# Patient Record
Sex: FEMALE | Race: WHITE | NOT HISPANIC OR LATINO | Employment: OTHER | URBAN - METROPOLITAN AREA
[De-identification: names, ages, dates, MRNs, and addresses within clinical notes are randomized per-mention and may not be internally consistent; named-entity substitution may affect disease eponyms.]

---

## 2017-01-03 ENCOUNTER — TELEPHONE (OUTPATIENT)
Dept: OPHTHALMOLOGY | Facility: CLINIC | Age: 82
End: 2017-01-03

## 2017-01-03 NOTE — TELEPHONE ENCOUNTER
----- Message from Monisha Mejia MA sent at 1/3/2017 11:42 AM CST -----  Contact: self [phone]      ----- Message -----     From: Dori See     Sent: 1/3/2017  11:32 AM       To: Rupali LÓPEZ Staff    Please call her about rescheduling her appt.  Thank you!

## 2017-01-11 ENCOUNTER — LAB VISIT (OUTPATIENT)
Dept: LAB | Facility: HOSPITAL | Age: 82
End: 2017-01-11
Attending: INTERNAL MEDICINE
Payer: MEDICARE

## 2017-01-11 ENCOUNTER — ANTI-COAG VISIT (OUTPATIENT)
Dept: CARDIOLOGY | Facility: CLINIC | Age: 82
End: 2017-01-11

## 2017-01-11 DIAGNOSIS — Z96.649 STATUS POST REVISION OF TOTAL HIP REPLACEMENT: ICD-10-CM

## 2017-01-11 DIAGNOSIS — Z86.718 PERSONAL HISTORY OF DVT (DEEP VEIN THROMBOSIS): ICD-10-CM

## 2017-01-11 DIAGNOSIS — N18.30 CKD (CHRONIC KIDNEY DISEASE) STAGE 3, GFR 30-59 ML/MIN: ICD-10-CM

## 2017-01-11 LAB
ALBUMIN SERPL BCP-MCNC: 3 G/DL
ANION GAP SERPL CALC-SCNC: 8 MMOL/L
BUN SERPL-MCNC: 40 MG/DL
CALCIUM SERPL-MCNC: 9.4 MG/DL
CHLORIDE SERPL-SCNC: 106 MMOL/L
CO2 SERPL-SCNC: 28 MMOL/L
CREAT SERPL-MCNC: 1.8 MG/DL
EST. GFR  (AFRICAN AMERICAN): 29 ML/MIN/1.73 M^2
EST. GFR  (NON AFRICAN AMERICAN): 25 ML/MIN/1.73 M^2
GLUCOSE SERPL-MCNC: 79 MG/DL
INR PPP: 1.9
PHOSPHATE SERPL-MCNC: 3 MG/DL
POTASSIUM SERPL-SCNC: 4.3 MMOL/L
PROTHROMBIN TIME: 19.8 SEC
SODIUM SERPL-SCNC: 142 MMOL/L

## 2017-01-11 PROCEDURE — 80069 RENAL FUNCTION PANEL: CPT

## 2017-01-11 PROCEDURE — 36415 COLL VENOUS BLD VENIPUNCTURE: CPT

## 2017-01-11 PROCEDURE — 85610 PROTHROMBIN TIME: CPT

## 2017-01-11 NOTE — PROGRESS NOTES
lvm for pt with dosing and next inr check date, pt was asked to return call to clinic to confirm. Lab appt made.

## 2017-01-25 ENCOUNTER — OFFICE VISIT (OUTPATIENT)
Dept: ORTHOPEDICS | Facility: CLINIC | Age: 82
End: 2017-01-25
Payer: MEDICARE

## 2017-01-25 VITALS — DIASTOLIC BLOOD PRESSURE: 74 MMHG | HEIGHT: 67 IN | SYSTOLIC BLOOD PRESSURE: 125 MMHG | HEART RATE: 88 BPM

## 2017-01-25 DIAGNOSIS — M17.11 PRIMARY OSTEOARTHRITIS OF RIGHT KNEE: Primary | ICD-10-CM

## 2017-01-25 PROCEDURE — 1126F AMNT PAIN NOTED NONE PRSNT: CPT | Mod: S$GLB,,, | Performed by: ORTHOPAEDIC SURGERY

## 2017-01-25 PROCEDURE — 1159F MED LIST DOCD IN RCRD: CPT | Mod: S$GLB,,, | Performed by: ORTHOPAEDIC SURGERY

## 2017-01-25 PROCEDURE — 3078F DIAST BP <80 MM HG: CPT | Mod: S$GLB,,, | Performed by: ORTHOPAEDIC SURGERY

## 2017-01-25 PROCEDURE — 3074F SYST BP LT 130 MM HG: CPT | Mod: S$GLB,,, | Performed by: ORTHOPAEDIC SURGERY

## 2017-01-25 PROCEDURE — 99212 OFFICE O/P EST SF 10 MIN: CPT | Mod: S$GLB,,, | Performed by: ORTHOPAEDIC SURGERY

## 2017-01-25 PROCEDURE — 1157F ADVNC CARE PLAN IN RCRD: CPT | Mod: S$GLB,,, | Performed by: ORTHOPAEDIC SURGERY

## 2017-01-25 PROCEDURE — 1160F RVW MEDS BY RX/DR IN RCRD: CPT | Mod: S$GLB,,, | Performed by: ORTHOPAEDIC SURGERY

## 2017-01-25 PROCEDURE — 99999 PR PBB SHADOW E&M-EST. PATIENT-LVL III: CPT | Mod: PBBFAC,,, | Performed by: ORTHOPAEDIC SURGERY

## 2017-01-25 NOTE — MR AVS SNAPSHOT
[facility]  [address]  [facility] [address]  Phone: [phone]                  Nicole Lala   2017 11:00 AM   Office Visit    Description:  Female : 10/22/1932   Provider:  Kana Montgomery MD   Department:  [facility]           Reason for Visit     Follow-up           Diagnoses this Visit        Comments    Primary osteoarthritis of right knee    -  Primary            To Do List           Future Appointments        Provider Department Dept Phone    2017 2:15 PM Brandon Gray MD [facility] [phone]    2017 2:20 PM [facility] [phone]    2017 10:00 AM Bernie Zapien MD [facility] [phone]    3/20/2017 10:30 AM LAB, [facility] - Lab [phone]    3/27/2017 10:00 AM Rey Muhammad MD [facility] Nephrology [phone]      Goals (5 Years of Data)     None      Follow-Up and Disposition     Return in about 1 year (around 2018).    Follow-up and Disposition History      [facility]     [facility] Nurse Care Line -  Assistance  Registered nurses in the [facility] provide clinical advisement, health education, appointment booking, and other advisory services.  Call for this free service at [phone].             Medications           Message regarding Medications     Verify the changes and/or additions to your medication regime listed below are the same as discussed with your clinician today.  If any of these changes or additions are incorrect, please notify your healthcare provider.             Verify that the below list of medications is an accurate representation of the medications you are currently taking.  If none reported, the list may be blank. If incorrect, please contact your healthcare provider. Carry this list with you in case of emergency.           Current Medications     digoxin (LANOXIN)  "125 mcg tablet Take 1 tablet (0.125 mg total) by mouth once daily.    diltiazem (CARDIZEM CD) 240 MG 24 hr capsule Take 1 capsule (240 mg total) by mouth once daily.    lisinopril (PRINIVIL,ZESTRIL) 20 MG tablet Take 1 tablet (20 mg total) by mouth once daily.    MULTAQ 400 mg Tab TAKE 1 TABLET TWICE DAILY WITH MEALS    torsemide (DEMADEX) 20 MG Tab TAKE 1 TABLET EVERY MORNING    warfarin (COUMADIN) 2.5 MG tablet Take 1-2 tablets (2.5-5 mg total) by mouth Daily.    warfarin (COUMADIN) 5 MG tablet TAKE 1 TABLET EVERY EVENING AS INSTRUCTED BY COUMADIN CLINIC           Clinical Reference Information           Vital Signs - Last Recorded  Most recent update: 1/25/2017 11:17 AM by Trice Ann MA    BP Pulse Ht LMP          125/74 (BP Location: Right arm, Patient Position: Sitting, BP Method: Automatic) 88 5' 7" (1.702 m) (LMP Unknown)        Blood Pressure          Most Recent Value    BP  125/74      Allergies as of 1/25/2017     Penicillins      Immunizations Administered on Date of Encounter - 1/25/2017     None      "

## 2017-01-25 NOTE — PROGRESS NOTES
Nicole Lala is in for 4 week follow-up from a right knee corticosteroid injection. She received excellent relief.  Currently there is minimal pain.    Review of Systems   Constitution: Negative for chills, fever and night sweats.   Cardiovascular: Negative for chest pain, claudication and leg swelling.   Respiratory: Negative for shortness of breath.   Hematologic/Lymphatic: Negative for adenopathy and bleeding problem. Does not bruise/bleed easily.   Skin: Negative for poor wound healing.   Gastrointestinal: Negative for diarrhea and heartburn.   Genitourinary: Negative for bladder incontinence.   Neurological: Negative for focal weakness, numbness, paresthesias and sensory change.   Psychiatric/Behavioral: The patient is not nervous/anxious.   Allergic/Immunologic: Negative for persistent infections.    EXAM:  {RIGHT LEFT BILATERAL:35751  Knee:  Skin intact.  No effusion.  Minimal tenderness.  Minimal crepitus.  No instability.  ROM: 0-100  NVI distally.  Severe venous stasis bilaterally      Imp: DJD Right knee      Plan: Doing well.  F/U in one year with xrays

## 2017-01-31 ENCOUNTER — OFFICE VISIT (OUTPATIENT)
Dept: FAMILY MEDICINE | Facility: CLINIC | Age: 82
End: 2017-01-31
Payer: MEDICARE

## 2017-01-31 VITALS
SYSTOLIC BLOOD PRESSURE: 114 MMHG | HEART RATE: 72 BPM | RESPIRATION RATE: 20 BRPM | DIASTOLIC BLOOD PRESSURE: 58 MMHG | HEIGHT: 67 IN | OXYGEN SATURATION: 97 %

## 2017-01-31 DIAGNOSIS — I48.20 CHRONIC ATRIAL FIBRILLATION: Primary | ICD-10-CM

## 2017-01-31 DIAGNOSIS — E66.01 MORBID OBESITY, UNSPECIFIED OBESITY TYPE: ICD-10-CM

## 2017-01-31 DIAGNOSIS — I27.20 PULMONARY HYPERTENSION: ICD-10-CM

## 2017-01-31 DIAGNOSIS — E77.8 HYPOPROTEINEMIA: ICD-10-CM

## 2017-01-31 PROCEDURE — 3074F SYST BP LT 130 MM HG: CPT | Mod: S$GLB,,, | Performed by: FAMILY MEDICINE

## 2017-01-31 PROCEDURE — 1159F MED LIST DOCD IN RCRD: CPT | Mod: S$GLB,,, | Performed by: FAMILY MEDICINE

## 2017-01-31 PROCEDURE — 1157F ADVNC CARE PLAN IN RCRD: CPT | Mod: S$GLB,,, | Performed by: FAMILY MEDICINE

## 2017-01-31 PROCEDURE — 99214 OFFICE O/P EST MOD 30 MIN: CPT | Mod: S$GLB,,, | Performed by: FAMILY MEDICINE

## 2017-01-31 PROCEDURE — 99999 PR PBB SHADOW E&M-EST. PATIENT-LVL III: CPT | Mod: PBBFAC,,, | Performed by: FAMILY MEDICINE

## 2017-01-31 PROCEDURE — 3078F DIAST BP <80 MM HG: CPT | Mod: S$GLB,,, | Performed by: FAMILY MEDICINE

## 2017-01-31 PROCEDURE — 99499 UNLISTED E&M SERVICE: CPT | Mod: S$GLB,,, | Performed by: FAMILY MEDICINE

## 2017-01-31 PROCEDURE — 1126F AMNT PAIN NOTED NONE PRSNT: CPT | Mod: S$GLB,,, | Performed by: FAMILY MEDICINE

## 2017-01-31 PROCEDURE — 1160F RVW MEDS BY RX/DR IN RCRD: CPT | Mod: S$GLB,,, | Performed by: FAMILY MEDICINE

## 2017-01-31 NOTE — MR AVS SNAPSHOT
[facility]  [address] [facility] [address]  [place] [address]  Phone: [phone]  Fax: [phone]                  Nicole Lala   2017 2:15 PM   Office Visit    Description:  Female : 10/22/1932   Provider:  Brandon Gray MD   Department:  [facility]           Reason for Visit     Hypertension           Diagnoses this Visit        Comments    Chronic atrial fibrillation    -  Primary     Morbid obesity, unspecified obesity type         Hypoproteinemia         Pulmonary hypertension                To Do List           Future Appointments        Provider Department Dept Phone    2017 2:20 PM [facility] [phone]    2017 8:30 AM Bernie Zapien MD [facility] Ophthalmology [phone]    3/20/2017 10:30 AM LAB, [facility] - Lab [phone]    3/27/2017 10:00 AM Rey Muhammad MD [facility] Nephrology [phone]      Goals (5 Years of Data)     None      Follow-Up and Disposition     Return in about 6 months (around 2017).    Follow-up and Disposition History      [facility]     [facility] Care [facility] -  Assistance  Registered nurses in the [facility] provide clinical advisement, health education, appointment booking, and other advisory services.  Call for this free service at [phone].             Medications           Message regarding Medications     Verify the changes and/or additions to your medication regime listed below are the same as discussed with your clinician today.  If any of these changes or additions are incorrect, please notify your healthcare provider.             Verify that the below list of medications is an accurate representation of the medications you are currently taking.  If none reported, the list may be blank. If incorrect, please contact your healthcare provider. Carry this list with you in case of emergency.          "  Current Medications     digoxin (LANOXIN) 125 mcg tablet Take 1 tablet (0.125 mg total) by mouth once daily.    diltiazem (CARDIZEM CD) 240 MG 24 hr capsule Take 1 capsule (240 mg total) by mouth once daily.    MULTAQ 400 mg Tab TAKE 1 TABLET TWICE DAILY WITH MEALS    torsemide (DEMADEX) 20 MG Tab TAKE 1 TABLET EVERY MORNING    warfarin (COUMADIN) 2.5 MG tablet Take 1-2 tablets (2.5-5 mg total) by mouth Daily.    warfarin (COUMADIN) 5 MG tablet TAKE 1 TABLET EVERY EVENING AS INSTRUCTED BY COUMADIN CLINIC           Clinical Reference Information           Vital Signs - Last Recorded  Most recent update: 1/31/2017  2:20 PM by Katie Chatterjee LPN    BP Pulse Resp Ht LMP SpO2    (!) 114/58 72 20 5' 7" (1.702 m) (LMP Unknown) 97%      Blood Pressure          Most Recent Value    BP  (!)  114/58      Allergies as of 1/31/2017     Penicillins      Immunizations Administered on Date of Encounter - 1/31/2017     None      "

## 2017-01-31 NOTE — PROGRESS NOTES
Patient, Nicole Lala (MRN #[id number]), presented with a recent Estimated Glumerular Filtration Rate (EGFR) between 15 and 29 consistent with the definition of chronic kidney disease stage 4 (ICD10 - N18.4).    eGFR if non    Date Value Ref Range Status   01/11/2017 25 (A) >60 mL/min/1.73 m^2 Final     Comment:     Calculation used to obtain the estimated glomerular filtration  rate (eGFR) is the CKD-EPI equation. Since race is unknown   in our information system, the eGFR values for   -American and Non--American patients are given   for each creatinine result.     01/11/2017 25 (A) >60 mL/min/1.73 m^2 Final     Comment:     Calculation used to obtain the estimated glomerular filtration  rate (eGFR) is the CKD-EPI equation. Since race is unknown   in our information system, the eGFR values for   -American and Non--American patients are given   for each creatinine result.         The patient's chronic kidney disease stage 4 was monitored, evaluated, addressed and/or treated. This addendum to the medical record is made on 01/31/2017.

## 2017-01-31 NOTE — PROGRESS NOTES
Subjective:       Patient ID: Nicole Lala is a 84 y.o. female.    Chief Complaint: Hypertension    HPI   Here today for interval evaluation  She reports improved dyspnea and oxygen dependence.  Improved lower extremity edema/lymphedema.    Review of Systems    Objective:      Physical Exam   Constitutional: She appears well-developed and well-nourished.   HENT:   Head: Normocephalic and atraumatic.   Eyes: Conjunctivae and EOM are normal. Pupils are equal, round, and reactive to light. No scleral icterus.   Neck: Normal range of motion. Neck supple. No thyromegaly present.   Cardiovascular: Normal rate and normal heart sounds.  An irregularly irregular rhythm present. Exam reveals no gallop and no friction rub.    No murmur heard.  Pulmonary/Chest: Effort normal and breath sounds normal. No respiratory distress. She has no wheezes. She has no rales.   Lymphadenopathy:     She has no cervical adenopathy.   Vitals reviewed.      Lab Results   Component Value Date    WBC 7.29 05/24/2016    HGB 11.0 (L) 05/24/2016    HCT 34.9 (L) 05/24/2016     05/24/2016    CHOL 169 06/25/2015    TRIG 81 06/25/2015    HDL 60 06/25/2015    ALT 19 05/24/2016    AST 13 05/24/2016     01/11/2017     01/11/2017    K 4.3 01/11/2017    K 4.3 01/11/2017     01/11/2017     01/11/2017    CREATININE 1.8 (H) 01/11/2017    CREATININE 1.8 (H) 01/11/2017    BUN 40 (H) 01/11/2017    BUN 40 (H) 01/11/2017    CO2 28 01/11/2017    CO2 28 01/11/2017    TSH 2.483 05/27/2014    INR 1.9 (H) 01/11/2017       Assessment:       1. Chronic atrial fibrillation    2. Morbid obesity, unspecified obesity type    3. Hypoproteinemia    4. Pulmonary hypertension        Plan:       Chronic atrial fibrillation with Pulmonary hypertension related to Morbid obesity, unspecified obesity type  - Continue current therapy  - Serial blood pressure monitoring  - Diet and exercise education.  - Continue Cardiology    Hypoproteinemia  - Diet  and exercise education.  - Continue Nephrology

## 2017-02-01 ENCOUNTER — ANTI-COAG VISIT (OUTPATIENT)
Dept: CARDIOLOGY | Facility: CLINIC | Age: 82
End: 2017-02-01

## 2017-02-01 ENCOUNTER — TELEPHONE (OUTPATIENT)
Dept: OPTOMETRY | Facility: CLINIC | Age: 82
End: 2017-02-01

## 2017-02-01 ENCOUNTER — LAB VISIT (OUTPATIENT)
Dept: LAB | Facility: HOSPITAL | Age: 82
End: 2017-02-01
Attending: INTERNAL MEDICINE
Payer: MEDICARE

## 2017-02-01 DIAGNOSIS — Z96.649 STATUS POST REVISION OF TOTAL HIP REPLACEMENT: ICD-10-CM

## 2017-02-01 DIAGNOSIS — Z86.718 PERSONAL HISTORY OF DVT (DEEP VEIN THROMBOSIS): ICD-10-CM

## 2017-02-01 LAB
INR PPP: 2.2
PROTHROMBIN TIME: 22.6 SEC

## 2017-02-01 PROCEDURE — 85610 PROTHROMBIN TIME: CPT

## 2017-02-01 PROCEDURE — 36415 COLL VENOUS BLD VENIPUNCTURE: CPT

## 2017-02-01 NOTE — TELEPHONE ENCOUNTER
----- Message from Monisha Mejia MA sent at 2/1/2017  2:19 PM CST -----  Contact: pt      ----- Message -----     From: Brenda Lyman     Sent: 2/1/2017   1:26 PM       To: Rupali LÓPEZ Staff    Pt would like to reschedule appt on 2-8-17,(due to transportation issues) pt would like to get appt back on  2-22-17 at 10:00    Call back on # [phone]  thanks

## 2017-02-22 ENCOUNTER — INITIAL CONSULT (OUTPATIENT)
Dept: OPHTHALMOLOGY | Facility: CLINIC | Age: 82
End: 2017-02-22
Payer: MEDICARE

## 2017-02-22 DIAGNOSIS — H25.12 NUCLEAR SCLEROTIC CATARACT OF LEFT EYE: ICD-10-CM

## 2017-02-22 DIAGNOSIS — H25.11 NUCLEAR SCLEROTIC CATARACT OF RIGHT EYE: Primary | ICD-10-CM

## 2017-02-22 PROCEDURE — 92136 OPHTHALMIC BIOMETRY: CPT | Mod: RT,S$GLB,, | Performed by: OPHTHALMOLOGY

## 2017-02-22 PROCEDURE — 99999 PR PBB SHADOW E&M-EST. PATIENT-LVL II: CPT | Mod: PBBFAC,,, | Performed by: OPHTHALMOLOGY

## 2017-02-22 PROCEDURE — 92014 COMPRE OPH EXAM EST PT 1/>: CPT | Mod: S$GLB,,, | Performed by: OPHTHALMOLOGY

## 2017-02-22 RX ORDER — CIPROFLOXACIN 250 MG/5ML
125 KIT ORAL 2 TIMES DAILY
COMMUNITY
End: 2017-03-14

## 2017-02-22 NOTE — LETTER
February 22, 2017      Chiki Cornelius, OD  [address]  [place] [address]           [facility]  [address] [facility] [address] [place] [address]  Phone: [phone]  Fax: [phone]          Patient: Nicole Lala   MR Number: [id number]   YOB: 1932   Date of Visit: 2/22/2017       Dear Dr. Chiki Cornelius:    Thank you for referring Nicole Lala to me for evaluation. Attached you will find relevant portions of my assessment and plan of care.    If you have questions, please do not hesitate to call me. I look forward to following Nicole Lala along with you.    Sincerely,    Bernie Zapien MD    Enclosure  CC:  No Recipients    If you would like to receive this communication electronically, please contact [email or web address] or [phone] to request more information on [facility] App Link access.    For providers and/or their staff who would like to refer a patient to [facility], please contact us through our one-stop-shop provider referral line, [facility], at [phone].    If you feel you have received this communication in error or would no longer like to receive these types of communications, please e-mail [email or web address]

## 2017-02-22 NOTE — PROGRESS NOTES
HPI     [place] ref    1.NS OU    Blurred vision at near-ok distance. No gtts, no eye pain       Last edited by Jina Medina on 2/22/2017 10:36 AM.         Assessment /Plan     For exam results, see Encounter Report.    Nuclear sclerotic cataract of right eye  -     IOL Master - OD - Right Eye    Nuclear sclerotic cataract of left eye      Visually significant nuclear sclerotic cataract   - Interfering with activities of daily living.  Pt desires cataract surgery for Va rehabilitation.   - R/B/A discussed and pt agrees to proceed with surgery.   - IOL options discussed according to patient's goals and concomitant ocular pathology; and pt content with monofocal lens.    - Target: plano.    pcboo 22.0 OD  * dense / VB  * i did sx on pt's     (pcboo 21.5  OS)

## 2017-03-01 ENCOUNTER — ANTI-COAG VISIT (OUTPATIENT)
Dept: CARDIOLOGY | Facility: CLINIC | Age: 82
End: 2017-03-01

## 2017-03-01 ENCOUNTER — LAB VISIT (OUTPATIENT)
Dept: LAB | Facility: HOSPITAL | Age: 82
End: 2017-03-01
Attending: INTERNAL MEDICINE
Payer: MEDICARE

## 2017-03-01 DIAGNOSIS — Z96.649 STATUS POST REVISION OF TOTAL HIP REPLACEMENT: ICD-10-CM

## 2017-03-01 DIAGNOSIS — Z86.718 PERSONAL HISTORY OF DVT (DEEP VEIN THROMBOSIS): ICD-10-CM

## 2017-03-01 LAB
INR PPP: 1.8
PROTHROMBIN TIME: 18.6 SEC

## 2017-03-01 PROCEDURE — 85610 PROTHROMBIN TIME: CPT

## 2017-03-01 PROCEDURE — 36415 COLL VENOUS BLD VENIPUNCTURE: CPT

## 2017-03-06 ENCOUNTER — TELEPHONE (OUTPATIENT)
Dept: DERMATOLOGY | Facility: CLINIC | Age: 82
End: 2017-03-06

## 2017-03-06 NOTE — TELEPHONE ENCOUNTER
----- Message from Omayra Ford sent at 3/6/2017  7:04 AM CST -----  Contact: PT  Pt called to speak to the nurse to request a work in appt with the provider for a skin check on her hand on Friday, March 10, 2017 and would also like to be seen by the provider at the time of her 's appt at 2:00 pm.    Pt can be reached at [phone].    Thanks

## 2017-03-07 NOTE — PROGRESS NOTES
Pt states she does not want to increase her dose she will not eat the coleslaw no other vit K foods

## 2017-03-07 NOTE — PROGRESS NOTES
Need to know if patient is currently eating any vitamin K foods. If not, then will increase dose to 7.5mg daily except 5mg mon/fri once she starts eating coleslaw. Need to check INR 1 week post diet change. Need to document when she is starting and add dose change to the calendar.

## 2017-03-13 ENCOUNTER — TELEPHONE (OUTPATIENT)
Dept: OPHTHALMOLOGY | Facility: CLINIC | Age: 82
End: 2017-03-13

## 2017-03-13 DIAGNOSIS — H25.11 NUCLEAR SCLEROTIC CATARACT OF RIGHT EYE: Primary | ICD-10-CM

## 2017-03-14 ENCOUNTER — OFFICE VISIT (OUTPATIENT)
Dept: CARDIOLOGY | Facility: CLINIC | Age: 82
End: 2017-03-14
Payer: MEDICARE

## 2017-03-14 VITALS — HEIGHT: 67 IN | SYSTOLIC BLOOD PRESSURE: 112 MMHG | HEART RATE: 92 BPM | DIASTOLIC BLOOD PRESSURE: 70 MMHG

## 2017-03-14 DIAGNOSIS — Z95.828 S/P INSERTION OF IVC (INFERIOR VENA CAVAL) FILTER: Chronic | ICD-10-CM

## 2017-03-14 DIAGNOSIS — I87.8 VENOUS STASIS OF LOWER EXTREMITY: Chronic | ICD-10-CM

## 2017-03-14 DIAGNOSIS — R60.0 BILATERAL LEG EDEMA: ICD-10-CM

## 2017-03-14 DIAGNOSIS — Z86.718 PERSONAL HISTORY OF DVT (DEEP VEIN THROMBOSIS): ICD-10-CM

## 2017-03-14 DIAGNOSIS — I10 ESSENTIAL HYPERTENSION: Chronic | ICD-10-CM

## 2017-03-14 DIAGNOSIS — E66.01 MORBID OBESITY WITH BMI OF 40.0-44.9, ADULT: ICD-10-CM

## 2017-03-14 DIAGNOSIS — I48.21 PERMANENT ATRIAL FIBRILLATION: ICD-10-CM

## 2017-03-14 DIAGNOSIS — N18.30 CKD (CHRONIC KIDNEY DISEASE) STAGE 3, GFR 30-59 ML/MIN: Chronic | ICD-10-CM

## 2017-03-14 PROCEDURE — 3078F DIAST BP <80 MM HG: CPT | Mod: S$GLB,,, | Performed by: INTERNAL MEDICINE

## 2017-03-14 PROCEDURE — 1126F AMNT PAIN NOTED NONE PRSNT: CPT | Mod: S$GLB,,, | Performed by: INTERNAL MEDICINE

## 2017-03-14 PROCEDURE — 1159F MED LIST DOCD IN RCRD: CPT | Mod: S$GLB,,, | Performed by: INTERNAL MEDICINE

## 2017-03-14 PROCEDURE — 1160F RVW MEDS BY RX/DR IN RCRD: CPT | Mod: S$GLB,,, | Performed by: INTERNAL MEDICINE

## 2017-03-14 PROCEDURE — 99499 UNLISTED E&M SERVICE: CPT | Mod: S$GLB,,, | Performed by: INTERNAL MEDICINE

## 2017-03-14 PROCEDURE — 99214 OFFICE O/P EST MOD 30 MIN: CPT | Mod: S$GLB,,, | Performed by: INTERNAL MEDICINE

## 2017-03-14 PROCEDURE — 1157F ADVNC CARE PLAN IN RCRD: CPT | Mod: S$GLB,,, | Performed by: INTERNAL MEDICINE

## 2017-03-14 PROCEDURE — 3074F SYST BP LT 130 MM HG: CPT | Mod: S$GLB,,, | Performed by: INTERNAL MEDICINE

## 2017-03-14 PROCEDURE — 99999 PR PBB SHADOW E&M-EST. PATIENT-LVL II: CPT | Mod: PBBFAC,,, | Performed by: INTERNAL MEDICINE

## 2017-03-14 RX ORDER — LISINOPRIL 20 MG/1
20 TABLET ORAL DAILY
COMMUNITY
End: 2017-04-03 | Stop reason: SDUPTHER

## 2017-03-14 NOTE — MR AVS SNAPSHOT
[facility]  [address] [facility]  [facility] [address]  Phone: [phone]                  Nicole Lala   3/14/2017 2:40 PM   Office Visit    Description:  Female : 10/22/1932   Provider:  Froylan Rivas MD   Department:  [facility]           Reason for Visit     Shortness of Breath     Atrial Fibrillation     Leg Swelling     Hypertension           Diagnoses this Visit        Comments    Permanent atrial fibrillation         Essential hypertension         CKD (chronic kidney disease) stage 3, GFR 30-59 ml/min         Venous stasis of lower extremity         Personal history of DVT (deep vein thrombosis)         S/P insertion of IVC (inferior vena caval) filter         Morbid obesity with BMI of 40.0-44.9, adult         Bilateral leg edema                To Do List           Future Appointments        Provider Department Dept Phone    3/22/2017 2:00 PM LAB, [facility] [phone]    3/27/2017 10:00 AM Rey Muhammad MD [facility] Nephrology [phone]    2017 1:00 PM Bernie Zapien MD [facility] Ophthalmology [phone]      Goals (5 Years of Data)     None      Follow-Up and Disposition     Return in about 6 months (around 2017).      [facility]     [facility] Nurse Care Line - 24/7 Assistance  Registered nurses in the [facility] provide clinical advisement, health education, appointment booking, and other advisory services.  Call for this free service at [phone].             Medications           Message regarding Medications     Verify the changes and/or additions to your medication regime listed below are the same as discussed with your clinician today.  If any of these changes or additions are incorrect, please notify your healthcare provider.        STOP taking these medications     ciprofloxacin 250 mg/5 ml (CIPRO) Take 125 mg by mouth 2 (two) times daily.    digoxin (LANOXIN) 125 mcg  "tablet Take 1 tablet (0.125 mg total) by mouth once daily.           Verify that the below list of medications is an accurate representation of the medications you are currently taking.  If none reported, the list may be blank. If incorrect, please contact your healthcare provider. Carry this list with you in case of emergency.           Current Medications     diltiazem (CARDIZEM CD) 240 MG 24 hr capsule Take 1 capsule (240 mg total) by mouth once daily.    lisinopril (PRINIVIL,ZESTRIL) 20 MG tablet Take 20 mg by mouth once daily.    MULTAQ 400 mg Tab TAKE 1 TABLET TWICE DAILY WITH MEALS    torsemide (DEMADEX) 20 MG Tab TAKE 1 TABLET EVERY MORNING    warfarin (COUMADIN) 2.5 MG tablet Take 1-2 tablets (2.5-5 mg total) by mouth Daily.    warfarin (COUMADIN) 5 MG tablet TAKE 1 TABLET EVERY EVENING AS INSTRUCTED BY COUMADIN CLINIC           Clinical Reference Information           Your Vitals Were     BP Pulse Height Last Period          112/70 92 5' 7" (1.702 m) (LMP Unknown)        Blood Pressure          Most Recent Value    BP  112/70      Allergies as of 3/14/2017     Penicillins      Immunizations Administered on Date of Encounter - 3/14/2017     None      Language Assistance Services     ATTENTION: Language assistance services are available, free of charge. Please call [phone].      ATENCIÓN: Si flash robbin, tiene a cadet disposición servicios gratuitos de asistencia lingüística. Llame al [phone].     [facility] Ý: N?u b?n nói Ti?ng Vi?t, có các d?ch v? h? tr? ngôn ng? mi?n phí dành cho b?n. G?i s? [phone].         [facility] complies with applicable Federal civil rights laws and does not discriminate on the basis of race, color, national origin, age, disability, or sex.        "

## 2017-03-14 NOTE — PROGRESS NOTES
Subjective:    Patient ID:  Nicole Lala is a 84 y.o. female who presents for follow-up of Shortness of Breath (follow up); Atrial Fibrillation; Leg Swelling; and Hypertension      HPI Comments: Pt last seen in June. She had an admission last year for LE cellulitis and her chronic AF became rapid and she was cardioverted. She was started on Multaq to maintain sinus rhythm. Presently she reports no cardiac symptoms. She will have occasional SOB but has not changed. Her only new complaint is an intermittent R facial muscle twitch. She has chronic LE edema and uses a leg pumping system and leg wraps.      Review of Systems   Constitution: Negative for weight gain and weight loss.   HENT: Negative.    Eyes: Negative.    Cardiovascular: Positive for leg swelling. Negative for chest pain, claudication, cyanosis, dyspnea on exertion, irregular heartbeat, near-syncope, orthopnea (no PND), palpitations and syncope.   Respiratory: Positive for shortness of breath (occasional). Negative for cough, hemoptysis and snoring.    Endocrine: Negative.    Skin: Negative.    Musculoskeletal: Negative for joint pain, muscle cramps, muscle weakness and myalgias.   Gastrointestinal: Negative for diarrhea, hematemesis, nausea and vomiting.   Genitourinary: Negative.    Neurological: Negative for dizziness, focal weakness, light-headedness, loss of balance, numbness, paresthesias and seizures.   Psychiatric/Behavioral: Negative.         Objective:    Physical Exam   Constitutional: She is oriented to person, place, and time. She appears well-developed and well-nourished.   Eyes: Pupils are equal, round, and reactive to light.   Neck: Normal range of motion. No thyromegaly present.   Cardiovascular: Normal rate, S1 normal, S2 normal, normal heart sounds, intact distal pulses and normal pulses.  An irregularly irregular rhythm present.  No extrasystoles are present. PMI is not displaced.  Exam reveals no friction rub.    No murmur  heard.  Pulmonary/Chest: Effort normal and breath sounds normal. She has no wheezes. She has no rales. She exhibits no tenderness.   Abdominal: Soft. Bowel sounds are normal. She exhibits no distension and no mass. There is no tenderness.   Musculoskeletal: Normal range of motion. She exhibits edema (legs in ACE wraps).   Neurological: She is alert and oriented to person, place, and time.   Skin: Skin is warm and dry.   Vitals reviewed.      Test(s) Reviewed  I have reviewed the following in detail:  [] Stress test   [] Angiography   [x] Echocardiogram   [] Labs   [] Other:         Assessment:       1. Permanent atrial fibrillation    2. Essential hypertension    3. CKD (chronic kidney disease) stage 3, GFR 30-59 ml/min    4. Venous stasis of lower extremity    5. Personal history of DVT (deep vein thrombosis)    6. S/P insertion of IVC (inferior vena caval) filter 3/22/10    7. Morbid obesity with BMI of 40.0-44.9, adult    8. Bilateral leg edema         Plan:       Pt back in AF w/o symptoms.  Will stop the multaq  Continue the diltiazem and warfarin  F/u 6 months

## 2017-03-24 ENCOUNTER — ANTI-COAG VISIT (OUTPATIENT)
Dept: CARDIOLOGY | Facility: CLINIC | Age: 82
End: 2017-03-24

## 2017-03-24 ENCOUNTER — LAB VISIT (OUTPATIENT)
Dept: LAB | Facility: HOSPITAL | Age: 82
End: 2017-03-24
Attending: INTERNAL MEDICINE
Payer: MEDICARE

## 2017-03-24 DIAGNOSIS — Z96.649 STATUS POST REVISION OF TOTAL HIP REPLACEMENT: ICD-10-CM

## 2017-03-24 DIAGNOSIS — Z86.718 PERSONAL HISTORY OF DVT (DEEP VEIN THROMBOSIS): ICD-10-CM

## 2017-03-24 DIAGNOSIS — N18.30 CKD (CHRONIC KIDNEY DISEASE), STAGE III: Primary | ICD-10-CM

## 2017-03-24 LAB
INR PPP: 1.8
PROTHROMBIN TIME: 18.3 SEC

## 2017-03-24 PROCEDURE — 85610 PROTHROMBIN TIME: CPT

## 2017-03-24 PROCEDURE — 36415 COLL VENOUS BLD VENIPUNCTURE: CPT

## 2017-03-27 ENCOUNTER — OFFICE VISIT (OUTPATIENT)
Dept: NEPHROLOGY | Facility: CLINIC | Age: 82
End: 2017-03-27
Payer: MEDICARE

## 2017-03-27 VITALS
WEIGHT: 275.56 LBS | DIASTOLIC BLOOD PRESSURE: 80 MMHG | HEART RATE: 90 BPM | BODY MASS INDEX: 43.16 KG/M2 | TEMPERATURE: 98 F | OXYGEN SATURATION: 97 % | SYSTOLIC BLOOD PRESSURE: 150 MMHG

## 2017-03-27 DIAGNOSIS — I12.9 BENIGN HYPERTENSIVE KIDNEY DISEASE WITH CHRONIC KIDNEY DISEASE STAGE I THROUGH STAGE IV, OR UNSPECIFIED(403.10): ICD-10-CM

## 2017-03-27 DIAGNOSIS — N18.30 CKD (CHRONIC KIDNEY DISEASE), STAGE III: Primary | ICD-10-CM

## 2017-03-27 DIAGNOSIS — I89.0 LYMPHEDEMA: ICD-10-CM

## 2017-03-27 PROCEDURE — 99214 OFFICE O/P EST MOD 30 MIN: CPT | Mod: S$GLB,,, | Performed by: INTERNAL MEDICINE

## 2017-03-27 PROCEDURE — 1126F AMNT PAIN NOTED NONE PRSNT: CPT | Mod: S$GLB,,, | Performed by: INTERNAL MEDICINE

## 2017-03-27 PROCEDURE — 1159F MED LIST DOCD IN RCRD: CPT | Mod: S$GLB,,, | Performed by: INTERNAL MEDICINE

## 2017-03-27 PROCEDURE — 99999 PR PBB SHADOW E&M-EST. PATIENT-LVL II: CPT | Mod: PBBFAC,,, | Performed by: INTERNAL MEDICINE

## 2017-03-27 PROCEDURE — 1160F RVW MEDS BY RX/DR IN RCRD: CPT | Mod: S$GLB,,, | Performed by: INTERNAL MEDICINE

## 2017-03-27 PROCEDURE — 3077F SYST BP >= 140 MM HG: CPT | Mod: S$GLB,,, | Performed by: INTERNAL MEDICINE

## 2017-03-27 PROCEDURE — 3079F DIAST BP 80-89 MM HG: CPT | Mod: S$GLB,,, | Performed by: INTERNAL MEDICINE

## 2017-03-27 PROCEDURE — 1157F ADVNC CARE PLAN IN RCRD: CPT | Mod: S$GLB,,, | Performed by: INTERNAL MEDICINE

## 2017-03-27 PROCEDURE — 99499 UNLISTED E&M SERVICE: CPT | Mod: S$GLB,,, | Performed by: INTERNAL MEDICINE

## 2017-03-27 NOTE — Clinical Note
Hello.  I just wanted you to be aware that she was asking about being taken off of the multaq.  Please contact her to answer her questions.  Thanks!

## 2017-03-27 NOTE — PROGRESS NOTES
Subjective:       Patient ID: Nicole Lala is a 84 y.o. White female who presents for return patient evaluation for chronic renal failure.    She has no uremic or urinary symptoms and is in her usual state of health.  She is still wrapping her legs each week with no cellulitis or wounds with Dr. Hernandez at [facility].  She reports that she passes a lot of urine with her diuretic.      Review of Systems   Constitutional: Negative for appetite change, chills and fever.   Eyes: Negative for visual disturbance.   Respiratory: Positive for shortness of breath (with exertion). Negative for cough.    Cardiovascular: Positive for leg swelling. Negative for chest pain and palpitations.   Gastrointestinal: Negative for nausea.   Genitourinary: Negative for difficulty urinating, dysuria and hematuria.   Musculoskeletal: Positive for arthralgias (knees, B hip) and gait problem.   Skin: Negative for rash.   Hematological: Bruises/bleeds easily.         BP (!) 150/80  Pulse 90  Temp 98.1 °F (36.7 °C) (Oral)   Wt 125 kg (275 lb 9.2 oz)  LMP  (LMP Unknown)  SpO2 97%  BMI 43.16 kg/m2    Objective:      Physical Exam   Constitutional: She appears well-developed and well-nourished. No distress.   HENT:   Head: Normocephalic and atraumatic.   Eyes: Conjunctivae are normal. No scleral icterus.   Neck: Normal range of motion. No JVD present.   Cardiovascular: Exam reveals no gallop and no friction rub.    No murmur heard.  Decreased tones with irregularly irregular rhythm   Pulmonary/Chest: Effort normal and breath sounds normal. No respiratory distress. She has no wheezes.   Abdominal: Soft. Bowel sounds are normal. Distention: obese. There is no tenderness.   Musculoskeletal: She exhibits edema (legs are wrapped).   Skin: Skin is warm and dry. No rash noted.   Psychiatric: She has a normal mood and affect.   Vitals reviewed.      Assessment:       1. CKD (chronic kidney disease), stage III    2. 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 Lymphedema        Plan:   Return to clinic in 3 months.  Labs for next visit include rp, pth. She gets her labs in [place].  Renal panel on the 11th.  Baseline creatinine is 1.2-1.6 since 2013.  Her function is labile. She does not seem to have an active disease which is affecting her renal function however her cardiac function may be implicated in her labile function. She has pulmonary HTN in the past and this would explain both her edema and a labile kidney function.

## 2017-04-03 RX ORDER — LISINOPRIL 20 MG/1
TABLET ORAL
Qty: 90 TABLET | Refills: 3 | Status: SHIPPED | OUTPATIENT
Start: 2017-04-03 | End: 2017-12-21

## 2017-04-03 RX ORDER — DILTIAZEM HYDROCHLORIDE 240 MG/1
CAPSULE, EXTENDED RELEASE ORAL
Qty: 90 CAPSULE | Refills: 3 | Status: ON HOLD | OUTPATIENT
Start: 2017-04-03 | End: 2017-09-20

## 2017-04-12 ENCOUNTER — OFFICE VISIT (OUTPATIENT)
Dept: FAMILY MEDICINE | Facility: CLINIC | Age: 82
End: 2017-04-12
Payer: MEDICARE

## 2017-04-12 VITALS
DIASTOLIC BLOOD PRESSURE: 74 MMHG | RESPIRATION RATE: 18 BRPM | WEIGHT: 277.75 LBS | HEIGHT: 67 IN | BODY MASS INDEX: 43.6 KG/M2 | HEART RATE: 73 BPM | SYSTOLIC BLOOD PRESSURE: 128 MMHG

## 2017-04-12 DIAGNOSIS — E66.01 MORBID OBESITY WITH BMI OF 40.0-44.9, ADULT: ICD-10-CM

## 2017-04-12 DIAGNOSIS — Z01.818 PREOPERATIVE CLEARANCE: Primary | ICD-10-CM

## 2017-04-12 DIAGNOSIS — I48.91 ATRIAL FIBRILLATION WITH RAPID VENTRICULAR RESPONSE: ICD-10-CM

## 2017-04-12 DIAGNOSIS — I10 ESSENTIAL HYPERTENSION: Chronic | ICD-10-CM

## 2017-04-12 DIAGNOSIS — N18.30 CKD (CHRONIC KIDNEY DISEASE) STAGE 3, GFR 30-59 ML/MIN: Chronic | ICD-10-CM

## 2017-04-12 PROCEDURE — 1159F MED LIST DOCD IN RCRD: CPT | Mod: S$GLB,,, | Performed by: NURSE PRACTITIONER

## 2017-04-12 PROCEDURE — 99214 OFFICE O/P EST MOD 30 MIN: CPT | Mod: S$GLB,,, | Performed by: NURSE PRACTITIONER

## 2017-04-12 PROCEDURE — 99999 PR PBB SHADOW E&M-EST. PATIENT-LVL III: CPT | Mod: PBBFAC,,, | Performed by: NURSE PRACTITIONER

## 2017-04-12 PROCEDURE — 3074F SYST BP LT 130 MM HG: CPT | Mod: S$GLB,,, | Performed by: NURSE PRACTITIONER

## 2017-04-12 PROCEDURE — 1126F AMNT PAIN NOTED NONE PRSNT: CPT | Mod: S$GLB,,, | Performed by: NURSE PRACTITIONER

## 2017-04-12 PROCEDURE — 1157F ADVNC CARE PLAN IN RCRD: CPT | Mod: S$GLB,,, | Performed by: NURSE PRACTITIONER

## 2017-04-12 PROCEDURE — 1160F RVW MEDS BY RX/DR IN RCRD: CPT | Mod: S$GLB,,, | Performed by: NURSE PRACTITIONER

## 2017-04-12 PROCEDURE — 3078F DIAST BP <80 MM HG: CPT | Mod: S$GLB,,, | Performed by: NURSE PRACTITIONER

## 2017-04-12 PROCEDURE — 99499 UNLISTED E&M SERVICE: CPT | Mod: S$GLB,,, | Performed by: NURSE PRACTITIONER

## 2017-04-12 NOTE — MR AVS SNAPSHOT
[facility]  [address] [facility] [place] [address]  Phone: [phone]  Fax: [phone]                  Nicole Lala   2017 10:00 AM   Office Visit    Description:  Female : 10/22/1932   Provider:  Bobbi Lai NP   Department:  [facility]           Reason for Visit     Pre-op Exam           Diagnoses this Visit        Comments    Preoperative clearance    -  Primary            To Do List           Future Appointments        Provider Department Dept Phone    2017 9:00 AM HROMAYRA MARES 1 [facility] [phone]    2017 2:00 PM LAB, N [facility] HOSP [facility] [phone]    2017 1:00 PM Bernie Zapien MD [facility] Ophthalmology [phone]    2017 10:45 AM [facility] [phone]    2017 10:40 AM Rey Muhammad MD [facility] Nephrology [phone]      Your Future Surgeries/Procedures     2017   Surgery with Bernie Zapien MD   [facility] ([facility] [place])    [address] [facility] [place] [id number] [phone]              Goals (5 Years of Data)     None      Follow-Up and Disposition     Return if symptoms worsen or fail to improve.      [facility]     [facility] Nurse Care Line -  Assistance  Unless otherwise directed by your provider, please contact [facility], our nurse care line that is available for / assistance.     Registered nurses in the [facility] provide: appointment scheduling, clinical advisement, health education, and other advisory services.  Call: [phone] (toll free)               Medications           Message regarding Medications     Verify the changes and/or additions to your medication regime listed below are the same as discussed with your clinician today.  If any of these changes or additions are incorrect, please notify your healthcare provider.            "  Verify that the below list of medications is an accurate representation of the medications you are currently taking.  If none reported, the list may be blank. If incorrect, please contact your healthcare provider. Carry this list with you in case of emergency.           Current Medications     CARTIA  mg 24 hr capsule TAKE 1 CAPSULE ONE TIME DAILY    lisinopril (PRINIVIL,ZESTRIL) 20 MG tablet TAKE 1 TABLET EVERY DAY    torsemide (DEMADEX) 20 MG Tab TAKE 1 TABLET EVERY MORNING    warfarin (COUMADIN) 2.5 MG tablet Take 1-2 tablets (2.5-5 mg total) by mouth Daily.    warfarin (COUMADIN) 5 MG tablet TAKE 1 TABLET EVERY EVENING AS INSTRUCTED BY COUMADIN CLINIC           Clinical Reference Information           Your Vitals Were     BP Pulse Resp Height Weight Last Period    128/74 (BP Location: Left arm) 73 18 5' 7" (1.702 m) 126 kg (277 lb 12.5 oz) (LMP Unknown)    BMI                43.51 kg/m2          Blood Pressure          Most Recent Value    BP  128/74      Allergies as of 4/12/2017     Penicillins      Immunizations Administered on Date of Encounter - 4/12/2017     None      Language Assistance Services     ATTENTION: Language assistance services are available, free of charge. Please call [phone].      ATENCIÓN: Si habla robbin, tiene a cadet disposición servicios gratuitos de asistencia lingüística. Llame al [phone].     CONNOR Ý: N?u b?n nói Ti?ng Vi?t, có các d?ch v? h? tr? ngôn ng? mi?n phí dành cho b?n. G?i s? [phone].         [facility] complies with applicable Federal civil rights laws and does not discriminate on the basis of race, color, national origin, age, disability, or sex.        "

## 2017-04-12 NOTE — PROGRESS NOTES
Subjective:       Patient ID: Nicole Lala is a 84 y.o. female.    Chief Complaint: Pre-op Exam (cataract surgery)    HPI Comments: Here today for preoperative clearance. She is scheduled for right eye cataract surgery with Dr. Zapien on 4/25/17.   She is doing well. No complaints    Past Medical History:  9/25/2013: Acute CHF  No date: Blood transfusion  No date: Branch retinal vein occlusion of right eye  No date: Cellulitis and abscess of lower extremity  No date: Cerebral aneurysm      Comment: S/p repair  No date: CKD (chronic kidney disease)  No date: Elevated serum creatinine  No date: HTN (hypertension)  No date: Lymphedema  1/31/2013: Macular degeneration - Right Eye  1/31/2013: Nuclear sclerosis - Both Eyes  excised 11/16/16: Squamous cell carcinoma      Comment: R forearm    Past Surgical History:  No date: ABDOMINAL SURGERY  No date: APPENDECTOMY  No date: APPENDECTOMY  10/16/1994: CEREBRAL ANEURYSM REPAIR      Comment: RIGHT FRONTOTEMPORAL CRANIOTOMY WITH LIPPING                OF SUPRACLINOID CAROTID ARTERY ANEURYSM    10/27/1994: CEREBRAL ANEURYSM REPAIR      Comment: RIGHT FRONTOTEMPORAL CRANIOTOMY WITH CLIPPING                OF RIGHT MIDDLE CEREBRAL ARTERY ANEURYSM   8/29/2013: COMPLICATED TOTAL HIP PROSTHESIS AND METHYLMET*      Comment: left  No date: EYE SURGERY  No date: Focal laser      Comment: OD  3/22/2010: ADOLFO FILTER PLACEMENT  11/2009: HIP FRACTURE SURGERY      Comment: left  No date: JOINT REPLACEMENT  No date: Patent PI      Comment: Both Eye   No date: TOTAL ABDOMINAL HYSTERECTOMY  3/19/2010: TOTAL HIP ARTHROPLASTY      Comment: left  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 father    Aneurysm                                                    Comment: maternal aunt-Brain    Glaucoma                       Daughter                    Comment: Narrow Angle Glaucoma    Hypertension                                             Amblyopia                      Neg Hx                    Blindness                      Neg Hx                    Cataracts                      Neg Hx                    Macular degeneration           Neg Hx                    Retinal detachment             Neg Hx                    Strabismus                     Neg Hx                    Anesthesia problems            Neg Hx                    Malignant hypertension         Neg Hx                    Hypotension                    Neg Hx                    Malignant hyperthermia         Neg Hx                    Pseudochol deficiency          Neg Hx                    Cancer                         Neg Hx                    Thyroid disease                Neg Hx                    Melanoma                       Neg Hx                    Psoriasis                      Neg Hx                    Lupus                          Neg Hx                    Social History    Marital status:              Spouse name:                       Years of education:                 Number of children:               Social History Main Topics    Smoking status: Former Smoker                                                                Packs/day: 0.00      Years: 0.00           Quit date: 11/7/1992    Smokeless status: Never Used                        Alcohol use: Yes                Comment: socially    Drug use: No              Sexual activity: No                   Other Topics            Concern  Are you pregnant or th* No  Breast-feeding          No    Current Outpatient Prescriptions:  CARTIA  mg 24 hr capsule, TAKE 1 CAPSULE ONE TIME DAILY, Disp: 90 capsule, Rfl: 3  lisinopril (PRINIVIL,ZESTRIL) 20 MG tablet, TAKE 1 TABLET EVERY DAY, Disp: 90  tablet, Rfl: 3  torsemide (DEMADEX) 20 MG Tab, TAKE 1 TABLET EVERY MORNING, Disp: 90 tablet, Rfl: 1  warfarin (COUMADIN) 2.5 MG tablet, Take 1-2 tablets (2.5-5 mg total) by mouth Daily., Disp: 100 tablet, Rfl: 3  warfarin (COUMADIN) 5 MG tablet, TAKE 1 TABLET EVERY EVENING AS INSTRUCTED BY COUMADIN CLINIC, Disp: 90 tablet, Rfl: 3    No current facility-administered medications for this visit.       Review of patient's allergies indicates:   -- Penicillins     --  Other reaction(s): Hives      Review of Systems   Constitutional: Negative for activity change, appetite change, fatigue and fever.   HENT: Negative.    Respiratory: Negative for cough, chest tightness, shortness of breath and wheezing.    Cardiovascular: Negative for chest pain, palpitations and leg swelling.   Gastrointestinal: Negative.    Genitourinary: Negative.    Neurological: Negative for dizziness, weakness, light-headedness and headaches.       Objective:      Physical Exam   Constitutional: She is oriented to person, place, and time. She appears well-nourished.   HENT:   Head: Normocephalic and atraumatic.   Right Ear: External ear normal.   Left Ear: External ear normal.   Nose: Nose normal.   Mouth/Throat: Oropharynx is clear and moist. No oropharyngeal exudate.   Eyes: Conjunctivae and EOM are normal. Pupils are equal, round, and reactive to light.   Neck: Normal range of motion. Neck supple.   Cardiovascular: Normal rate, normal heart sounds and intact distal pulses.  An irregularly irregular rhythm present.   Pulmonary/Chest: Effort normal and breath sounds normal. She has no wheezes. She has no rales.   Abdominal: Soft. Bowel sounds are normal. There is no tenderness.   Lymphadenopathy:     She has no cervical adenopathy.   Neurological: She is alert and oriented to person, place, and time.   Skin: Skin is warm and dry. No rash noted.   Vitals reviewed.      Assessment:       1. Preoperative clearance    2. Atrial fibrillation with rapid  ventricular response    3. CKD (chronic kidney disease) stage 3, GFR 30-59 ml/min    4. Essential hypertension    5. Morbid obesity with BMI of 40.0-44.9, adult        Plan:       Nicole was seen today for pre-op exam.    Diagnoses and all orders for this visit:    Atrial fibrillation with rapid ventricular response  Stable- continue current medications and follow up with cardiology as planned    CKD (chronic kidney disease) stage 3, GFR 30-59 ml/min  Stable- continue routine follow ups. Ongoing monitioring    Essential hypertension  Stable- continue current medications and follow up with cardiology as planned    Morbid obesity with BMI of 40.0-44.9, adult  Encouraged healthy eating and increased physical activity    Preoperative clearance  Pt cleared for cataract surgery with Dr. Zapien

## 2017-04-17 ENCOUNTER — OFFICE VISIT (OUTPATIENT)
Dept: FAMILY MEDICINE | Facility: CLINIC | Age: 82
End: 2017-04-17
Payer: MEDICARE

## 2017-04-17 VITALS — HEIGHT: 67 IN | HEART RATE: 85 BPM | SYSTOLIC BLOOD PRESSURE: 131 MMHG | DIASTOLIC BLOOD PRESSURE: 67 MMHG

## 2017-04-17 DIAGNOSIS — N18.4 CHRONIC KIDNEY DISEASE, STAGE IV (SEVERE): ICD-10-CM

## 2017-04-17 DIAGNOSIS — Z00.00 ENCOUNTER FOR PREVENTIVE HEALTH EXAMINATION: Primary | ICD-10-CM

## 2017-04-17 DIAGNOSIS — I89.0 LYMPHEDEMA: Chronic | ICD-10-CM

## 2017-04-17 DIAGNOSIS — I10 ESSENTIAL HYPERTENSION: Chronic | ICD-10-CM

## 2017-04-17 DIAGNOSIS — I48.91 ATRIAL FIBRILLATION WITH RAPID VENTRICULAR RESPONSE: ICD-10-CM

## 2017-04-17 DIAGNOSIS — I87.8 VENOUS STASIS OF LOWER EXTREMITY: Chronic | ICD-10-CM

## 2017-04-17 DIAGNOSIS — Z79.01 LONG TERM (CURRENT) USE OF ANTICOAGULANTS: ICD-10-CM

## 2017-04-17 DIAGNOSIS — D50.9 IRON DEFICIENCY ANEMIA, UNSPECIFIED IRON DEFICIENCY ANEMIA TYPE: ICD-10-CM

## 2017-04-17 DIAGNOSIS — E66.01 MORBID OBESITY DUE TO EXCESS CALORIES: Chronic | ICD-10-CM

## 2017-04-17 PROBLEM — I48.21 PERMANENT ATRIAL FIBRILLATION: Status: RESOLVED | Noted: 2017-03-14 | Resolved: 2017-04-17

## 2017-04-17 PROCEDURE — 99499 UNLISTED E&M SERVICE: CPT | Mod: S$GLB,,, | Performed by: NURSE PRACTITIONER

## 2017-04-17 PROCEDURE — 99999 PR PBB SHADOW E&M-EST. PATIENT-LVL III: CPT | Mod: PBBFAC,,, | Performed by: NURSE PRACTITIONER

## 2017-04-17 PROCEDURE — 3078F DIAST BP <80 MM HG: CPT | Mod: S$GLB,,, | Performed by: NURSE PRACTITIONER

## 2017-04-17 PROCEDURE — G0439 PPPS, SUBSEQ VISIT: HCPCS | Mod: S$GLB,,, | Performed by: NURSE PRACTITIONER

## 2017-04-17 PROCEDURE — 3075F SYST BP GE 130 - 139MM HG: CPT | Mod: S$GLB,,, | Performed by: NURSE PRACTITIONER

## 2017-04-17 NOTE — PATIENT INSTRUCTIONS
Counseling and Referral of Other Preventative  (Italic type indicates deductible and co-insurance are waived)    Patient Name: Nicole Lala  Today's Date: 4/17/2017      SERVICE LIMITATIONS RECOMMENDATION    Vaccines    · Pneumococcal (once after 65)    · Influenza (annually)    · Hepatitis B (if medium/high risk)    · Prevnar 13      Hepatitis B medium/high risk factors:       - End-stage renal disease       - Hemophiliacs who received Factor VII or         IX concentrates       - Clients of institutions for the mentally             retarded       - Persons who live in the same house as          a HepB carrier       - Homosexual men       - Illicit injectable drug abusers     Pneumococcal: Done, no repeat necessary     Influenza: Done, repeat in one year     Hepatitis B: N/A     Prevnar 13: Done, no repeat necessary    Mammogram (biennial age 50-74)  Annually (age 40 or over)  N/A    Pap (up to age 70 and after 70 if unknown history or abnormal study last 10 years)    N/A     The USPSTF recommends against screening for cervical cancer in women who have had a hysterectomy with removal of the cervix and who do not have a history of a high-grade precancerous lesion (cervical intraepithelial neoplasia [JOHN] grade 2 or 3) or cervical cancer.     Colorectal cancer screening (to age 75)    · Fecal occult blood test (annual)  · Flexible sigmoidoscopy (5y)  · Screening colonoscopy (10y)  · Barium enema   N/A    Diabetes self-management training (no USPSTF recommendations)  Requires referral by treating physician for patient with diabetes or renal disease. 10 hours of initial DSMT sessions of no less than 30 minutes each in a continuous 12-month period. 2 hours of follow-up DSMT in subsequent years.  Done this year, repeat every year    Bone mass measurements (age 65 & older, biennial)  Requires diagnosis related to osteoporosis or estrogen deficiency. Biennial benefit unless patient has history of long-term glucocorticoid   Last done 2015, recommend to repeat every 3 years     Glaucoma screening (no USPSTF recommendation)  Diabetes mellitus, family history   , age 50 or over    American, age 65 or over  Scheduled, see appointments    Medical nutrition therapy for diabetes or renal disease (no recommended schedule)  Requires referral by treating physician for patient with diabetes or renal disease or kidney transplant within the past 3 years.  Can be provided in same year as diabetes self-management training (DSMT), and CMS recommends medical nutrition therapy take place after DSMT. Up to 3 hours for initial year and 2 hours in subsequent years.  N/A    Cardiovascular screening blood tests (every 5 years)  · Fasting lipid panel  Order as a panel if possible  Done this year, repeat every year    Diabetes screening tests (at least every 3 years, Medicare covers annually or at 6-month intervals for prediabetic patients)  · Fasting blood sugar (FBS) or glucose tolerance test (GTT)  Patient must be diagnosed with one of the following:       - Hypertension       - Dyslipidemia       - Obesity (BMI 30kg/m2)       - Previous elevated impaired FBS or GTT       ... or any two of the following:       - Overweight (BMI 25 but <30)       - Family history of diabetes       - Age 65 or older       - History of gestational diabetes or birth of baby weighing more than 9 pounds  Done this year, repeat every year    Abdominal aortic aneurysm screening (once)  · Sonogram   Limited to patients who meet one of the following criteria:       - Men who are 65-75 years old and have smoked more than 100 cigarette in their lifetime       - Anyone with a family history of abdominal aortic aneurysm       - Anyone recommended for screening by the USPSTF  N/A    HIV screening (annually for increased risk patients)  · HIV-1 and HIV-2 by EIA, or MARY ANN, rapid antibody test or oral mucosa transudate  Patients must be at increased risk for HIV  infection per USPSTF guidelines or pregnant. Tests covered annually for patient at increased risk or as requested by the patient. Pregnant patients may receive up to 3 tests during pregnancy.  Risks discussed, screening is not recommended    Smoking cessation counseling (up to 8 sessions per year)  Patients must be asymptomatic of tobacco-related conditions to receive as a preventative service.  Non-smoker    Subsequent annual wellness visit  At least 12 months since last AWV  Return in one year     The following information is provided to all patients.  This information is to help you find resources for any of the problems found today that may be affecting your health:                Living healthy guide: www.[facility].[place].[facility]      Understanding Diabetes: www.diabetes.org      Eating healthy: www.cdc.gov/healthyweight      CDC home safety checklist: www.cdc.gov/steadi/patient.html      Agency on Aging: www.goea.[place].[facility]      Alcoholics anonymous (AA): www.aa.org      Physical Activity: www.boo.nih.gov/xa6bzkw      Tobacco use: www.quitwithusla.org

## 2017-04-17 NOTE — MR AVS SNAPSHOT
[facility]  [address]  [place] [address]  Phone: [phone]  Fax: [phone]                  Nicole Lala   2017 9:00 AM   Office Visit    Description:  Female : 10/22/1932   Provider:  Bobbi Lai NP   Department:  [facility]           Reason for Visit     Health Risk Assessment           Diagnoses this Visit        Comments    Encounter for preventive health examination    -  Primary     Chronic kidney disease, stage IV (severe)         Atrial fibrillation with rapid ventricular response         Lymphedema         Venous stasis of lower extremity         Essential hypertension         Morbid obesity due to excess calories         Iron deficiency anemia, unspecified iron deficiency anemia type         Long term (current) use of anticoagulants                To Do List           Future Appointments        Provider Department Dept Phone    2017 2:00 PM LAB N [facility] [phone]    2017 1:00 PM Bernie Zapien MD [facility] Ophthalmology [phone]    2017 10:45 AM SWETHA HARRIS [facility] - Lab [phone]    2017 10:40 AM Rey Muhammad MD [facility] Nephrology [phone]    2017 11:00 AM Brandon Gray MD [facility] [phone]      Your Future Surgeries/Procedures     2017   Surgery with Bernie Zapien MD   [facility] ([facility])    [address] [id number] [phone]              Goals (5 Years of Data)     None      Follow-Up and Disposition     Return in about 3 months (around 2017).      [facility]     [facility] Nurse Care Line -  Assistance  Unless otherwise directed by your provider, please contact [facility], our nurse care line that is available for  assistance.     Registered nurses in the [facility] provide: appointment  "scheduling, clinical advisement, health education, and other advisory services.  Call: [phone] (toll free)               Medications           Message regarding Medications     Verify the changes and/or additions to your medication regime listed below are the same as discussed with your clinician today.  If any of these changes or additions are incorrect, please notify your healthcare provider.             Verify that the below list of medications is an accurate representation of the medications you are currently taking.  If none reported, the list may be blank. If incorrect, please contact your healthcare provider. Carry this list with you in case of emergency.           Current Medications     CARTIA  mg 24 hr capsule TAKE 1 CAPSULE ONE TIME DAILY    lisinopril (PRINIVIL,ZESTRIL) 20 MG tablet TAKE 1 TABLET EVERY DAY    torsemide (DEMADEX) 20 MG Tab TAKE 1 TABLET EVERY MORNING    warfarin (COUMADIN) 2.5 MG tablet Take 1-2 tablets (2.5-5 mg total) by mouth Daily.    warfarin (COUMADIN) 5 MG tablet TAKE 1 TABLET EVERY EVENING AS INSTRUCTED BY COUMADIN CLINIC           Clinical Reference Information           Your Vitals Were     BP Pulse Height Last Period          131/67 (BP Location: Left arm, Patient Position: Sitting, BP Method: Automatic) 85 5' 7" (1.702 m) (LMP Unknown)        Blood Pressure          Most Recent Value    BP  131/67      Allergies as of 4/17/2017     Penicillins      Immunizations Administered on Date of Encounter - 4/17/2017     None      Instructions      Counseling and Referral of Other Preventative  (Italic type indicates deductible and co-insurance are waived)    Patient Name: Nicole Lala  Today's Date: 4/17/2017      SERVICE LIMITATIONS RECOMMENDATION    Vaccines    · Pneumococcal (once after 65)    · Influenza (annually)    · Hepatitis B (if medium/high risk)    · Prevnar 13      Hepatitis B medium/high risk factors:       - End-stage renal disease       - Hemophiliacs who " received Factor VII or         IX concentrates       - Clients of institutions for the mentally             retarded       - Persons who live in the same house as          a HepB carrier       - Homosexual men       - Illicit injectable drug abusers     Pneumococcal: Done, no repeat necessary     Influenza: Done, repeat in one year     Hepatitis B: N/A     Prevnar 13: Done, no repeat necessary    Mammogram (biennial age 50-74)  Annually (age 40 or over)  N/A    Pap (up to age 70 and after 70 if unknown history or abnormal study last 10 years)    N/A     The USPSTF recommends against screening for cervical cancer in women who have had a hysterectomy with removal of the cervix and who do not have a history of a high-grade precancerous lesion (cervical intraepithelial neoplasia [JOHN] grade 2 or 3) or cervical cancer.     Colorectal cancer screening (to age 75)    · Fecal occult blood test (annual)  · Flexible sigmoidoscopy (5y)  · Screening colonoscopy (10y)  · Barium enema   N/A    Diabetes self-management training (no USPSTF recommendations)  Requires referral by treating physician for patient with diabetes or renal disease. 10 hours of initial DSMT sessions of no less than 30 minutes each in a continuous 12-month period. 2 hours of follow-up DSMT in subsequent years.  Done this year, repeat every year    Bone mass measurements (age 65 & older, biennial)  Requires diagnosis related to osteoporosis or estrogen deficiency. Biennial benefit unless patient has history of long-term glucocorticoid  Last done 2015, recommend to repeat every 3 years     Glaucoma screening (no USPSTF recommendation)  Diabetes mellitus, family history   , age 50 or over    American, age 65 or over  Scheduled, see appointments    Medical nutrition therapy for diabetes or renal disease (no recommended schedule)  Requires referral by treating physician for patient with diabetes or renal disease or kidney transplant within  the past 3 years.  Can be provided in same year as diabetes self-management training (DSMT), and CMS recommends medical nutrition therapy take place after DSMT. Up to 3 hours for initial year and 2 hours in subsequent years.  N/A    Cardiovascular screening blood tests (every 5 years)  · Fasting lipid panel  Order as a panel if possible  Done this year, repeat every year    Diabetes screening tests (at least every 3 years, Medicare covers annually or at 6-month intervals for prediabetic patients)  · Fasting blood sugar (FBS) or glucose tolerance test (GTT)  Patient must be diagnosed with one of the following:       - Hypertension       - Dyslipidemia       - Obesity (BMI 30kg/m2)       - Previous elevated impaired FBS or GTT       ... or any two of the following:       - Overweight (BMI 25 but <30)       - Family history of diabetes       - Age 65 or older       - History of gestational diabetes or birth of baby weighing more than 9 pounds  Done this year, repeat every year    Abdominal aortic aneurysm screening (once)  · Sonogram   Limited to patients who meet one of the following criteria:       - Men who are 65-75 years old and have smoked more than 100 cigarette in their lifetime       - Anyone with a family history of abdominal aortic aneurysm       - Anyone recommended for screening by the USPSTF  N/A    HIV screening (annually for increased risk patients)  · HIV-1 and HIV-2 by EIA, or MARY ANN, rapid antibody test or oral mucosa transudate  Patients must be at increased risk for HIV infection per USPSTF guidelines or pregnant. Tests covered annually for patient at increased risk or as requested by the patient. Pregnant patients may receive up to 3 tests during pregnancy.  Risks discussed, screening is not recommended    Smoking cessation counseling (up to 8 sessions per year)  Patients must be asymptomatic of tobacco-related conditions to receive as a preventative service.  Non-smoker    Subsequent annual  wellness visit  At least 12 months since last AWV  Return in one year     The following information is provided to all patients.  This information is to help you find resources for any of the problems found today that may be affecting your health:                Living healthy guide: www.[facility].[place].[facility]      Understanding Diabetes: www.diabetes.org      Eating healthy: www.cdc.gov/healthyweight      CDC home safety checklist: www.cdc.gov/steadi/patient.html      Agency on Aging: www.goea.[place].[facility]      Alcoholics anonymous (AA): www.aa.org      Physical Activity: www.boo.nih.gov/zn9idgk      Tobacco use: www.quitwithusla.org          Language Assistance Services     ATTENTION: Language assistance services are available, free of charge. Please call [phone].      ATENCIÓN: Si flash robbin, tiene a cadet disposición servicios gratuitos de asistencia lingüística. Llame al [phone].     CHÚ Ý: N?u b?n nói Ti?ng Vi?t, có các d?ch v? h? tr? ngôn ng? mi?n phí dành cho b?n. G?i s? [phone].         [facility] complies with applicable Federal civil rights laws and does not discriminate on the basis of race, color, national origin, age, disability, or sex.

## 2017-04-20 ENCOUNTER — LAB VISIT (OUTPATIENT)
Dept: LAB | Facility: HOSPITAL | Age: 82
End: 2017-04-20
Attending: FAMILY MEDICINE
Payer: MEDICARE

## 2017-04-20 DIAGNOSIS — Z86.718 PERSONAL HISTORY OF DVT (DEEP VEIN THROMBOSIS): ICD-10-CM

## 2017-04-20 DIAGNOSIS — Z96.649 STATUS POST REVISION OF TOTAL HIP REPLACEMENT: ICD-10-CM

## 2017-04-20 LAB
INR PPP: 1.9
PROTHROMBIN TIME: 19.2 SEC

## 2017-04-20 PROCEDURE — 36415 COLL VENOUS BLD VENIPUNCTURE: CPT

## 2017-04-20 PROCEDURE — 85610 PROTHROMBIN TIME: CPT

## 2017-04-21 ENCOUNTER — ANTI-COAG VISIT (OUTPATIENT)
Dept: CARDIOLOGY | Facility: CLINIC | Age: 82
End: 2017-04-21
Payer: MEDICARE

## 2017-04-21 DIAGNOSIS — Z86.718 PERSONAL HISTORY OF DVT (DEEP VEIN THROMBOSIS): ICD-10-CM

## 2017-04-21 NOTE — PROGRESS NOTES
Spoke with pt gave dosing and next inr check date pt voiced understanding.   Pt states she is having cataract surgery on Tuesday will she need to hold her coumadin?

## 2017-04-24 ENCOUNTER — ANESTHESIA EVENT (OUTPATIENT)
Dept: SURGERY | Facility: HOSPITAL | Age: 82
End: 2017-04-24
Payer: MEDICARE

## 2017-04-25 ENCOUNTER — SURGERY (OUTPATIENT)
Age: 82
End: 2017-04-25

## 2017-04-25 ENCOUNTER — ANESTHESIA (OUTPATIENT)
Dept: SURGERY | Facility: HOSPITAL | Age: 82
End: 2017-04-25
Payer: MEDICARE

## 2017-04-25 ENCOUNTER — HOSPITAL ENCOUNTER (OUTPATIENT)
Facility: HOSPITAL | Age: 82
Discharge: HOME | End: 2017-04-25
Attending: OPHTHALMOLOGY | Admitting: OPHTHALMOLOGY
Payer: MEDICARE

## 2017-04-25 VITALS
OXYGEN SATURATION: 92 % | TEMPERATURE: 98 F | DIASTOLIC BLOOD PRESSURE: 76 MMHG | HEART RATE: 111 BPM | BODY MASS INDEX: 43.16 KG/M2 | SYSTOLIC BLOOD PRESSURE: 114 MMHG | WEIGHT: 275 LBS | RESPIRATION RATE: 20 BRPM | HEIGHT: 67 IN

## 2017-04-25 DIAGNOSIS — H25.10 SENILE NUCLEAR SCLEROSIS: ICD-10-CM

## 2017-04-25 DIAGNOSIS — H25.11 NUCLEAR SCLEROSIS, RIGHT: Primary | ICD-10-CM

## 2017-04-25 PROCEDURE — 36000706: Mod: PO | Performed by: OPHTHALMOLOGY

## 2017-04-25 PROCEDURE — D9220A PRA ANESTHESIA: Mod: ANES,,, | Performed by: ANESTHESIOLOGY

## 2017-04-25 PROCEDURE — 63600175 PHARM REV CODE 636 W HCPCS: Mod: PO | Performed by: OPHTHALMOLOGY

## 2017-04-25 PROCEDURE — 36000707: Mod: PO | Performed by: OPHTHALMOLOGY

## 2017-04-25 PROCEDURE — 37000008 HC ANESTHESIA 1ST 15 MINUTES: Mod: PO | Performed by: OPHTHALMOLOGY

## 2017-04-25 PROCEDURE — 71000033 HC RECOVERY, INTIAL HOUR: Mod: PO | Performed by: OPHTHALMOLOGY

## 2017-04-25 PROCEDURE — C9447 INJ, PHENYLEPHRINE KETOROLAC: HCPCS | Mod: PO | Performed by: OPHTHALMOLOGY

## 2017-04-25 PROCEDURE — 25000003 PHARM REV CODE 250: Mod: PO | Performed by: OPHTHALMOLOGY

## 2017-04-25 PROCEDURE — D9220A PRA ANESTHESIA: Mod: CRNA,,, | Performed by: NURSE ANESTHETIST, CERTIFIED REGISTERED

## 2017-04-25 PROCEDURE — 37000009 HC ANESTHESIA EA ADD 15 MINS: Mod: PO | Performed by: OPHTHALMOLOGY

## 2017-04-25 PROCEDURE — V2632 POST CHMBR INTRAOCULAR LENS: HCPCS | Mod: PO | Performed by: OPHTHALMOLOGY

## 2017-04-25 PROCEDURE — 66982 XCAPSL CTRC RMVL CPLX WO ECP: CPT | Mod: RT,,, | Performed by: OPHTHALMOLOGY

## 2017-04-25 PROCEDURE — 25000003 PHARM REV CODE 250: Mod: PO | Performed by: ANESTHESIOLOGY

## 2017-04-25 DEVICE — LENS IOL ITEC PRELOAD 22.0D: Type: IMPLANTABLE DEVICE | Site: EYE | Status: FUNCTIONAL

## 2017-04-25 RX ORDER — PREDNISOLONE ACETATE 10 MG/ML
SUSPENSION/ DROPS OPHTHALMIC
Status: DISCONTINUED | OUTPATIENT
Start: 2017-04-25 | End: 2017-04-25 | Stop reason: HOSPADM

## 2017-04-25 RX ORDER — KETOROLAC TROMETHAMINE 5 MG/ML
1 SOLUTION OPHTHALMIC ONCE
Status: DISCONTINUED | OUTPATIENT
Start: 2017-04-25 | End: 2017-04-25

## 2017-04-25 RX ORDER — LIDOCAINE HYDROCHLORIDE 10 MG/ML
INJECTION, SOLUTION EPIDURAL; INFILTRATION; INTRACAUDAL; PERINEURAL
Status: DISCONTINUED | OUTPATIENT
Start: 2017-04-25 | End: 2017-04-25 | Stop reason: HOSPADM

## 2017-04-25 RX ORDER — MOXIFLOXACIN 5 MG/ML
SOLUTION/ DROPS OPHTHALMIC
Status: DISCONTINUED | OUTPATIENT
Start: 2017-04-25 | End: 2017-04-25 | Stop reason: HOSPADM

## 2017-04-25 RX ORDER — MOXIFLOXACIN 5 MG/ML
1 SOLUTION/ DROPS OPHTHALMIC
Status: DISCONTINUED | OUTPATIENT
Start: 2017-04-25 | End: 2017-04-25

## 2017-04-25 RX ORDER — SODIUM CHLORIDE 0.9 % (FLUSH) 0.9 %
3 SYRINGE (ML) INJECTION
Status: DISCONTINUED | OUTPATIENT
Start: 2017-04-25 | End: 2017-04-25 | Stop reason: HOSPADM

## 2017-04-25 RX ORDER — OFLOXACIN 3 MG/ML
1 SOLUTION/ DROPS OPHTHALMIC
Status: DISCONTINUED | OUTPATIENT
Start: 2017-04-25 | End: 2017-04-25

## 2017-04-25 RX ORDER — LIDOCAINE HYDROCHLORIDE 10 MG/ML
1 INJECTION, SOLUTION EPIDURAL; INFILTRATION; INTRACAUDAL; PERINEURAL ONCE
Status: COMPLETED | OUTPATIENT
Start: 2017-04-25 | End: 2017-04-25

## 2017-04-25 RX ORDER — PROPARACAINE HYDROCHLORIDE 5 MG/ML
1 SOLUTION/ DROPS OPHTHALMIC
Status: DISCONTINUED | OUTPATIENT
Start: 2017-04-25 | End: 2017-04-25 | Stop reason: HOSPADM

## 2017-04-25 RX ORDER — LIDOCAINE HYDROCHLORIDE 40 MG/ML
1 INJECTION, SOLUTION RETROBULBAR
Status: COMPLETED | OUTPATIENT
Start: 2017-04-25 | End: 2017-04-25

## 2017-04-25 RX ORDER — SODIUM CHLORIDE, SODIUM LACTATE, POTASSIUM CHLORIDE, CALCIUM CHLORIDE 600; 310; 30; 20 MG/100ML; MG/100ML; MG/100ML; MG/100ML
INJECTION, SOLUTION INTRAVENOUS CONTINUOUS
Status: DISCONTINUED | OUTPATIENT
Start: 2017-04-25 | End: 2017-04-25 | Stop reason: HOSPADM

## 2017-04-25 RX ORDER — PHENYLEPHRINE HYDROCHLORIDE 25 MG/ML
1 SOLUTION/ DROPS OPHTHALMIC
Status: COMPLETED | OUTPATIENT
Start: 2017-04-25 | End: 2017-04-25

## 2017-04-25 RX ORDER — ONDANSETRON 2 MG/ML
4 INJECTION INTRAMUSCULAR; INTRAVENOUS ONCE AS NEEDED
Status: DISCONTINUED | OUTPATIENT
Start: 2017-04-25 | End: 2017-04-25 | Stop reason: HOSPADM

## 2017-04-25 RX ORDER — PROPARACAINE HYDROCHLORIDE 5 MG/ML
1 SOLUTION/ DROPS OPHTHALMIC
Status: COMPLETED | OUTPATIENT
Start: 2017-04-25 | End: 2017-04-25

## 2017-04-25 RX ORDER — MEPERIDINE HYDROCHLORIDE 100 MG/ML
12.5 INJECTION INTRAMUSCULAR; INTRAVENOUS; SUBCUTANEOUS ONCE AS NEEDED
Status: DISCONTINUED | OUTPATIENT
Start: 2017-04-25 | End: 2017-04-25 | Stop reason: HOSPADM

## 2017-04-25 RX ORDER — TROPICAMIDE 10 MG/ML
1 SOLUTION/ DROPS OPHTHALMIC
Status: COMPLETED | OUTPATIENT
Start: 2017-04-25 | End: 2017-04-25

## 2017-04-25 RX ORDER — SODIUM CHLORIDE, SODIUM LACTATE, POTASSIUM CHLORIDE, CALCIUM CHLORIDE 600; 310; 30; 20 MG/100ML; MG/100ML; MG/100ML; MG/100ML
75 INJECTION, SOLUTION INTRAVENOUS CONTINUOUS
Status: DISCONTINUED | OUTPATIENT
Start: 2017-04-25 | End: 2017-04-25 | Stop reason: HOSPADM

## 2017-04-25 RX ORDER — ACETAMINOPHEN 325 MG/1
650 TABLET ORAL EVERY 4 HOURS PRN
Status: DISCONTINUED | OUTPATIENT
Start: 2017-04-25 | End: 2017-04-25 | Stop reason: HOSPADM

## 2017-04-25 RX ADMIN — TRYPAN BLUE 1 ML: 0.3 INJECTION, SOLUTION INTRAOCULAR; OPHTHALMIC at 10:04

## 2017-04-25 RX ADMIN — LIDOCAINE HYDROCHLORIDE 1 ML: 10 INJECTION, SOLUTION EPIDURAL; INFILTRATION; INTRACAUDAL; PERINEURAL at 09:04

## 2017-04-25 RX ADMIN — PROPARACAINE HYDROCHLORIDE 1 DROP: 5 SOLUTION/ DROPS OPHTHALMIC at 09:04

## 2017-04-25 RX ADMIN — TROPICAMIDE 1 DROP: 10 SOLUTION/ DROPS OPHTHALMIC at 09:04

## 2017-04-25 RX ADMIN — LIDOCAINE HYDROCHLORIDE 1 DROP: 40 SOLUTION RETROBULBAR; TOPICAL at 09:04

## 2017-04-25 RX ADMIN — PHENYLEPHRINE HYDROCHLORIDE 1 DROP: 25 SOLUTION/ DROPS OPHTHALMIC at 09:04

## 2017-04-25 RX ADMIN — LIDOCAINE HYDROCHLORIDE: 10 INJECTION, SOLUTION EPIDURAL; INFILTRATION; INTRACAUDAL; PERINEURAL at 09:04

## 2017-04-25 RX ADMIN — PREDNISOLONE ACETATE 1 DROP: 10 SUSPENSION OPHTHALMIC at 09:04

## 2017-04-25 RX ADMIN — Medication 0.5 ML: at 09:04

## 2017-04-25 RX ADMIN — PHENYLEPHRINE AND KETOROLAC 4 ML: 10.16; 2.88 INJECTION, SOLUTION, CONCENTRATE INTRAOCULAR at 09:04

## 2017-04-25 RX ADMIN — BALANCED SALT SOLUTION 500 ML: 6.4; .75; .48; .3; 3.9; 1.7 SOLUTION OPHTHALMIC at 09:04

## 2017-04-25 RX ADMIN — SODIUM HYALURONATE 10 MG: 10 INJECTION INTRAOCULAR at 09:04

## 2017-04-25 RX ADMIN — MOXIFLOXACIN HYDROCHLORIDE 1 DROP: 5 SOLUTION/ DROPS OPHTHALMIC at 09:04

## 2017-04-25 RX ADMIN — SODIUM CHLORIDE, SODIUM LACTATE, POTASSIUM CHLORIDE, AND CALCIUM CHLORIDE: .6; .31; .03; .02 INJECTION, SOLUTION INTRAVENOUS at 09:04

## 2017-04-25 RX ADMIN — ACETYLCHOLINE CHLORIDE 2 ML: KIT at 10:04

## 2017-04-25 NOTE — PLAN OF CARE
Problem: Patient Care Overview  Goal: Plan of Care Review  Vss, erna po fluids, denies pain, ambulates easily.  States ready to go home.  Discharged from facility with family.

## 2017-04-25 NOTE — H&P
History    Chief complaint:  Painless progressive vision loss    Present Ilness/Diagnosis: Nuclear sclerotic Cataract    Past Medical History:  has a past medical history of Acute CHF (9/25/2013); Blood transfusion; Branch retinal vein occlusion of right eye; Cellulitis and abscess of lower extremity; Cerebral aneurysm; CKD (chronic kidney disease); Elevated serum creatinine; HTN (hypertension); Lymphedema; Macular degeneration - Right Eye (1/31/2013); Nuclear sclerosis - Both Eyes (1/31/2013); and Squamous cell carcinoma (excised 11/16/16).    Family History/Social History: refer to chart    Allergies:   Review of patient's allergies indicates:   Allergen Reactions    Penicillins      Other reaction(s): Hives       Current Medications: No current facility-administered medications for this encounter.     Current Outpatient Prescriptions:     CARTIA  mg 24 hr capsule, TAKE 1 CAPSULE ONE TIME DAILY, Disp: 90 capsule, Rfl: 3    lisinopril (PRINIVIL,ZESTRIL) 20 MG tablet, TAKE 1 TABLET EVERY DAY, Disp: 90 tablet, Rfl: 3    torsemide (DEMADEX) 20 MG Tab, TAKE 1 TABLET EVERY MORNING, Disp: 90 tablet, Rfl: 1    warfarin (COUMADIN) 2.5 MG tablet, Take 1-2 tablets (2.5-5 mg total) by mouth Daily., Disp: 100 tablet, Rfl: 3    warfarin (COUMADIN) 5 MG tablet, TAKE 1 TABLET EVERY EVENING AS INSTRUCTED BY COUMADIN CLINIC, Disp: 90 tablet, Rfl: 3    Physical Exam    BP: Vital signs stable  General: No apparent distress  HEENT: nuclear sclerotic cataract  Lungs: adequate respirations  Heart: + pulses  Abdomen: soft  Rectal/pelvic: deferred    Impression: Visually significant Cataract    Plan: Phacoemulsification with implantation of Intraocular lens

## 2017-04-25 NOTE — ANESTHESIA POSTPROCEDURE EVALUATION
"Anesthesia Post Evaluation    Patient: Nicole Lala    Procedure(s) Performed: Procedure(s) (LRB):  PHACOEMULSIFICATION-ASPIRATION-CATARACT (Right)  INSERTION-INTRAOCULAR LENS (IOL) (Right)    Final Anesthesia Type: general  Patient location during evaluation: PACU  Patient participation: Yes- Able to Participate  Level of consciousness: awake and alert and oriented  Post-procedure vital signs: reviewed and stable  Pain management: adequate  Airway patency: patent  PONV status at discharge: No PONV  Anesthetic complications: no      Cardiovascular status: blood pressure returned to baseline  Respiratory status: unassisted, spontaneous ventilation and room air  Hydration status: euvolemic  Follow-up not needed.        Visit Vitals    BP (!) 149/71 (BP Location: Right arm, Patient Position: Lying, BP Method: Automatic)    Pulse 78    Temp 36.6 °C (97.9 °F) (Skin)    Resp (!) 22    Ht 5' 7" (1.702 m)    Wt 124.7 kg (275 lb)    LMP  (LMP Unknown)    SpO2 98%    Breastfeeding No    BMI 43.07 kg/m2       Pain/Lucy Score: Pain Assessment Performed: Yes (4/25/2017  9:36 AM)  Presence of Pain: denies (4/25/2017  9:36 AM)      "

## 2017-04-25 NOTE — TRANSFER OF CARE
"Anesthesia Transfer of Care Note    Patient: Nicole Lala    Procedure(s) Performed: Procedure(s) (LRB):  PHACOEMULSIFICATION-ASPIRATION-CATARACT (Right)  INSERTION-INTRAOCULAR LENS (IOL) (Right)    Patient location: PACU    Anesthesia Type: MAC    Transport from OR: Transported from OR on room air with adequate spontaneous ventilation    Post pain: adequate analgesia    Post assessment: no apparent anesthetic complications and tolerated procedure well    Post vital signs: stable    Level of consciousness: awake and sedated    Nausea/Vomiting: no nausea/vomiting    Complications: none          Last vitals:   Visit Vitals    BP (!) 149/71 (BP Location: Right arm, Patient Position: Lying, BP Method: Automatic)    Pulse 78    Temp 36.6 °C (97.9 °F) (Skin)    Resp (!) 22    Ht 5' 7" (1.702 m)    Wt 124.7 kg (275 lb)    LMP  (LMP Unknown)    SpO2 98%    Breastfeeding No    BMI 43.07 kg/m2     "

## 2017-04-25 NOTE — IP AVS SNAPSHOT
[facility]  [address]  [place] [address]  Phone: [phone]           Patient Discharge Instructions   Our goal is to set you up for success. This packet includes information on your condition, medications, and your home care.  It will help you care for yourself to prevent having to return to the hospital.     Please ask your nurse if you have any questions.      There are many details to remember when preparing to leave the hospital. Here is what you will need to do:    1. Take your medicine. If you are prescribed medications, review your Medication List on the following pages. You may have new medications to  at the pharmacy and others that you'll need to stop taking. Review the instructions for how and when to take your medications. Talk with your doctor or nurses if you are unsure of what to do.     2. Go to your follow-up appointments. Specific follow-up information is listed in the following pages. Your may be contacted by a nurse or clinical provider about future appointments. Be sure we have all of the phone numbers to reach you. Please contact your provider's office if you are unable to make an appointment.     3. Watch for warning signs. Your doctor or nurse will give you detailed warning signs to watch for and when to call for assistance. These instructions may also include educational information about your condition. If you experience any of warning signs to your health, call your doctor.           [facility]  Unless otherwise directed by your provider, please   contact [facility], our nurse care line   that is available for 24/7 assistance.     [phone] (toll-free)     Registered nurses in the [facility]   provide: appointment scheduling, clinical advisement, health education, and other advisory services.                  ** Verify the list of medication(s) below is accurate and up to date. Carry this with you in case of  emergency. If your medications have changed, please notify your healthcare provider.             Medication List      CONTINUE taking these medications        Additional Info                      CARTIA  MG 24 hr capsule   Quantity:  90 capsule   Refills:  3   Generic drug:  diltiaZEM    Instructions:  TAKE 1 CAPSULE ONE TIME DAILY     Begin Date    AM    Noon    PM    Bedtime       lisinopril 20 MG tablet   Commonly known as:  PRINIVIL,ZESTRIL   Quantity:  90 tablet   Refills:  3    Instructions:  TAKE 1 TABLET EVERY DAY     Begin Date    AM    Noon    PM    Bedtime       torsemide 20 MG Tab   Commonly known as:  DEMADEX   Quantity:  90 tablet   Refills:  1    Instructions:  TAKE 1 TABLET EVERY MORNING     Begin Date    AM    Noon    PM    Bedtime       * warfarin 2.5 MG tablet   Commonly known as:  COUMADIN   Quantity:  100 tablet   Refills:  3   Dose:  2.5-5 mg    Instructions:  Take 1-2 tablets (2.5-5 mg total) by mouth Daily.     Begin Date    AM    Noon    PM    Bedtime       * warfarin 5 MG tablet   Commonly known as:  COUMADIN   Quantity:  90 tablet   Refills:  3    Instructions:  TAKE 1 TABLET EVERY EVENING AS INSTRUCTED BY COUMADIN CLINIC     Begin Date    AM    Noon    PM    Bedtime       * Notice:  This list has 2 medication(s) that are the same as other medications prescribed for you. Read the directions carefully, and ask your doctor or other care provider to review them with you.               Please bring to all follow up appointments:    1. A copy of your discharge instructions.  2. All medicines you are currently taking in their original bottles.  3. Identification and insurance card.    Please arrive 15 minutes ahead of scheduled appointment time.    Please call 24 hours in advance if you must reschedule your appointment and/or time.        Your Scheduled Appointments     Apr 26, 2017  1:00 PM CDT   Post OP with MD [facility] ([facility])    [address] [facility] [address]   [phone]            Jun 01, 2017 10:30 AM CDT   Non-Fasting Lab with LAB, SLIDELL SAT   [facility] ([facility])    [address] [place] [address]   [phone]            Jun 21, 2017 10:45 AM CDT   Non-Fasting Lab with LAB, SLIDELL SAT   [facility] [facility]    [address] [place]  [place] [address]   [phone]            Jun 28, 2017 10:40 AM CDT   Established Patient Visit with Rey Muhammad MD   [place] - Nephrology ([facility])    [address] [facility] [address]   [phone]            Aug 07, 2017 11:00 AM CDT   Established Patient Visit with Brandon Gray MD   [facility] ([facility])    [address]  [facility] [address]   [phone]              Follow-up Information     Follow up In 1 day.        Discharge Instructions     Future Orders    Call MD for:  severe uncontrolled pain     Diet general     Questions:    Total calories:      Fat restriction, if any:      Protein restriction, if any:      Na restriction, if any:      Fluid restriction:      Additional restrictions:      Lifting restrictions         Discharge Instructions       Please follow instructions provided by Dr. Zapien  and return for your follow up appointment tomorrow morning as scheduled.       Procedural Sedation (Adult)  You have been given medicine by vein to make you sleep during your surgery. This may have included both a pain medicine and sleeping medicine. Most of the effects have worn off. But you may still have some drowsiness for the next 6 to 8 hours.  Home care  Follow these guidelines when you get home:  · For the next 8 hours, you should be watched by a responsible adult. This person should make sure your condition is not getting worse.  · Don't take any medicine by mouth for pain or for sleep during the next 4 hours. These might react with the medicines you were given  "in the hospital. This could cause a much stronger response than usual.  · Don't drink any alcohol for the next 24 hours.  · Don't drive, operate dangerous machinery, or make important business or personal decisions during the next 24 hours.  Follow-up care  Follow up with your healthcare provider if you are not alert and back to your usual level of activity within 12 hours.  When to seek medical advice  Call your healthcare provider right away if any of these occur:  · Drowsiness gets worse  · Weakness or dizziness gets worse  · Repeated vomiting  · You cannot be awakened   Date Last Reviewed: 10/18/2016  © 9031-7192 [employer]. [address] [place] [address]. All rights reserved. This information is not intended as a substitute for professional medical care. Always follow your healthcare professional's instructions.            Primary Diagnosis     Your primary diagnosis was:  Cataract      Admission Information     Date & Time Provider Department CSN    4/25/2017  7:56 AM Bernie Zapien MD [facility] [id number]      Care Providers     Provider Role Specialty Primary office phone    Bernie Zapien MD Attending Provider Ophthalmology [phone]    Bernie Zapien MD Surgeon  Ophthalmology [phone]      Your Vitals Were     BP Pulse Temp Resp Height Weight    127/95 120 97.7 °F (36.5 °C) (Skin) 20 5' 7" (1.702 m) 124.7 kg (275 lb)    Last Period SpO2 BMI          (LMP Unknown) 97% 43.07 kg/m2        Recent Lab Values     No lab values to display.      Allergies as of 4/25/2017        Reactions    Penicillins Hives    Other reaction(s): Hives      Advance Directives     An advance directive is a document which, in the event you are no longer able to make decisions for yourself, tells your healthcare team what kind of treatment you do or do not want to receive, or who you would like to make those decisions for you.  If you do not currently have an advance " directive, [facility] encourages you to create one.  For more information call:  [phone] ([phone]), [phone] ([phone]),  or log on to [email or web address].        Smoking Cessation     If you would like to quit smoking:   You may be eligible for free services if you are a [place] resident and started smoking cigarettes before September 1, 1988.  Call the Smoking Cessation Trust (SCT) toll free at (590) 945-2786 or (951) 288-5121.   Call 2-646-QUIT-NOW if you do not meet the above criteria.   Contact us via email: [email or web address]   View our website for more information: [email or web address]        Language Assistance Services     ATTENTION: Language assistance services are available, free of charge. Please call [phone].      ATENCIÓN: Si habla español, tiene a cadet disposición servicios gratuitos de asistencia lingüística. Llame al [phone].     CHÚ Ý: N?u b?n nói Ti?ng Vi?t, có các d?ch v? h? tr? ngôn ng? mi?n phí dành cho b?n. G?i s? [phone].         [facility] complies with applicable Federal civil rights laws and does not discriminate on the basis of race, color, national origin, age, disability, or sex.

## 2017-04-25 NOTE — OP NOTE
DATE OF PROCEDURE: 04/25/2017    SURGEON: KIKE MCDERMOTT MD    PREOPERATIVE DIAGNOSIS:  Mature brunescent senile nuclear sclerotic cataract right eye.     POSTOPERATIVE DIAGNOSIS: Mature brunescent senile nuclear sclerotic cataract right eye.     PROCEDURE PERFORMED:  Complex phacoemulsification with placement of intraocular lens, right eye, with trypan blue    IMPLANT: PCBOO 22.0    ANESTHESIA:  Topical and MAC    COMPLICATIONS: none    ESTIMATED BLOOD LOSS: <1cc    SPECIMENS: none    INDICATIONS FOR PROCEDURE:  This patient presented to the clinic with decreased vision in the right eye and was found to have a cataract.  The risks, benefits, and alternatives were discussed and the patient agreed to proceed with phacoemulsification and implantation of a lens in the right eye.     PROCEDURE IN DETAIL:  The patient was met in the preop holding area.  Consent was confirmed to be signed.  The operative site was marked.  The patient was brought into the operating room by the anesthesia team and placed under monitored anesthesia care.  The right eye was prepped and draped in a sterile ophthalmic fashion.  A John speculum was placed into the right eye.   A paracentesis site was made and 1% preservative-free lidocaine was injected into the anterior chamber.  Trypan blue was then injected and allowed to sit for 1 minute.  Then BSS was used to wash out the trypan blue. Viscoelastic material was injected into the anterior chamber.  A keratome blade was used to make a clear corneal incision.  A cystotome was used to initiate the continuous curvilinear capsulorrhexis which was completed with Utrata forceps.  BSS on a sood cannula was used to perform hydrodissection.  The phacoemulsification tip was introduced into the eye and the nucleus was removed in a standard divide-and-conquer fashion.  Remaining cortical material was removed from the eye using irrigation-aspiration.  The capsular bag was filled with viscoelastic  material and the intraocular lens was injected and positioned into place. Remaining viscoelastic material was removed from the eye using irrigation and aspiration.  The corneal wounds were hydrated.  The eye was filled to physiologic pressure. The wounds were found to be watertight. Drops of Vigamox and prednisilone were placed into the eye.  The eye was washed, dried, and shielded.  The patient tolerated the procedure well and knows to follow up with me tomorrow morning, sooner if needed.

## 2017-04-25 NOTE — DISCHARGE SUMMARY
BRIEF DISCHARGE NOTE:    Reason for hospitalization -  Cataract surgery     Final Diagnosis - Visually significant Cataract    Procedures and treatment provided - Status post phacoemulsification with placement of intraocular lens     Diet - Advance to regular as tolerated    Activity - as tolerated    Disposition at the end of the case - Good.    Discharge: to home    The patient tolerated the procedure well and knows to follow up with me tomorrow morning in the eye clinic, sooner if needed.    Patient and family instructions (as appropriate) - Given to patient on discharge    Bernie Zapien MD

## 2017-04-25 NOTE — DISCHARGE INSTRUCTIONS
Please follow instructions provided by Dr. Zapien  and return for your follow up appointment tomorrow morning as scheduled.       Procedural Sedation (Adult)  You have been given medicine by vein to make you sleep during your surgery. This may have included both a pain medicine and sleeping medicine. Most of the effects have worn off. But you may still have some drowsiness for the next 6 to 8 hours.  Home care  Follow these guidelines when you get home:  · For the next 8 hours, you should be watched by a responsible adult. This person should make sure your condition is not getting worse.  · Don't take any medicine by mouth for pain or for sleep during the next 4 hours. These might react with the medicines you were given in the hospital. This could cause a much stronger response than usual.  · Don't drink any alcohol for the next 24 hours.  · Don't drive, operate dangerous machinery, or make important business or personal decisions during the next 24 hours.  Follow-up care  Follow up with your healthcare provider if you are not alert and back to your usual level of activity within 12 hours.  When to seek medical advice  Call your healthcare provider right away if any of these occur:  · Drowsiness gets worse  · Weakness or dizziness gets worse  · Repeated vomiting  · You cannot be awakened   Date Last Reviewed: 10/18/2016  © 7931-2237 [employer]. [address] [place] [address], [place] [address]. All rights reserved. This information is not intended as a substitute for professional medical care. Always follow your healthcare professional's instructions.

## 2017-04-25 NOTE — ANESTHESIA PREPROCEDURE EVALUATION
04/25/2017  Nicole Lomas is a 84 y.o., female.    Anesthesia Evaluation    I have reviewed the Patient Summary Reports.    I have reviewed the Nursing Notes.   I have reviewed the Medications.     Review of Systems  Anesthesia Hx:  No problems with previous Anesthesia Results for NICOLE LOMAS (MRN [id number]) as of 4/25/2017 09:01    4/20/2017 15:36  Protime: 19.2 (H)  Coumadin Monitoring INR: 1.9 (H)     Social:  Non-Smoker, No Alcohol Use    Cardiovascular:   Hypertension, poorly controlled Dysrhythmias atrial fibrillation CHF Orthopnea PVD hyperlipidemia ALLEN    Pulmonary:   COPD, severe Shortness of breath  Pulmonary Symptoms:  are shortness of breath at rest.  Chronic Obstructive Pulmonary Disease (COPD): Chronic Bronchitis Current breathing status is wheezing, moderate/severe exercise intolerence.  Obstructive Sleep Apnea (MELISSA).   Renal/:   Chronic Renal Disease, CRI  Kidney Function/Disease, Chronic Kidney Disease (CKD) , CKD Stage III (GFR 30-59)    Hepatic/GI:   GERD    Neurological:   Neuromuscular Disease, Macular degeneration  Cerebral aneurysm repair   shunt  Essential tremor       Physical Exam  General:  Morbid Obesity    Airway/Jaw/Neck:  Airway Findings: Mouth Opening: Normal Tongue: Large  General Airway Assessment: Adult, Possible difficult mask airway, Possible difficult intubation, Average  Mallampati: III  TM Distance: Normal, at least 6 cm  Jaw/Neck Findings:  Neck ROM: Normal ROM, Decreased flexion, Extension Decreased, Mod., Decreased Lateral Motion, to the left, to the right     Eyes/Ears/Nose:  Eyes/Ears/Nose Findings:    Dental:  Dental Findings: In tact   Chest/Lungs:  Chest/Lungs Findings: Rales, Basilar, Rhonchi, Tachypnea     Heart/Vascular:  Heart Findings: Rate: Normal  Rhythm: Irregularly Irregular  Sounds: Normal  Heart murmur: negative       Mental  Status:  Mental Status Findings:  Cooperative         Anesthesia Plan  Type of Anesthesia, risks & benefits discussed:  Anesthesia Type:  MAC  Patient's Preference:   Intra-op Monitoring Plan:   Intra-op Monitoring Plan Comments:   Post Op Pain Control Plan:   Post Op Pain Control Plan Comments:   Induction:    Beta Blocker:  Patient is not currently on a Beta-Blocker (No further documentation required).       Informed Consent: Patient understands risks and agrees with Anesthesia plan.  Questions answered. Anesthesia consent signed with patient.  ASA Score: 4     Day of Surgery Review of History & Physical: I have interviewed and examined the patient. I have reviewed the patient's H&P dated:  There are no significant changes.          Ready For Surgery From Anesthesia Perspective.

## 2017-04-26 ENCOUNTER — OFFICE VISIT (OUTPATIENT)
Dept: OPHTHALMOLOGY | Facility: CLINIC | Age: 82
End: 2017-04-26
Payer: MEDICARE

## 2017-04-26 DIAGNOSIS — Z96.1 STATUS POST CATARACT EXTRACTION AND INSERTION OF INTRAOCULAR LENS, RIGHT: Primary | ICD-10-CM

## 2017-04-26 DIAGNOSIS — Z98.41 STATUS POST CATARACT EXTRACTION AND INSERTION OF INTRAOCULAR LENS, RIGHT: Primary | ICD-10-CM

## 2017-04-26 DIAGNOSIS — H25.12 NUCLEAR SCLEROTIC CATARACT OF LEFT EYE: ICD-10-CM

## 2017-04-26 PROCEDURE — 99999 PR PBB SHADOW E&M-EST. PATIENT-LVL II: CPT | Mod: PBBFAC,,, | Performed by: OPHTHALMOLOGY

## 2017-04-26 PROCEDURE — 99024 POSTOP FOLLOW-UP VISIT: CPT | Mod: S$GLB,,, | Performed by: OPHTHALMOLOGY

## 2017-04-26 NOTE — PROGRESS NOTES
HPI     [place] ref     1. S/p phaco iol OD done 4/25/17    Pt denies pain, using gtts as directed.    PGN TID OD         Last edited by Jina Medina on 4/26/2017  1:12 PM.         Assessment /Plan     For exam results, see Encounter Report.    Status post cataract extraction and insertion of intraocular lens, right    Nuclear sclerotic cataract of left eye      Slit Lamp Exam  L/L - normal  C/s - quiet  Cornea - clear  A/C - 1+ cell  Lens - PCIOL    POD #1 s/p phaco/IOL  - doing well  - continue the following drops:    vigamox or ocuflox TID x 1 wk then stop  Pred forte or durezol or dexamethasone TID x  4 wks  Ketorolac TID until runs out    Versus:    Steroid/abx/NSAID combo drop TID until it runs out, ~ 30 days duration    Appropriate precautions and post op medications reviewed.  Patient instructed to call or come in if symptoms of redness, decreased vision, or pain are experienced.    -f/up 1-2wks, sooner PRN.     Cat OS - can schedule next if ready to proceed.

## 2017-05-01 ENCOUNTER — TELEPHONE (OUTPATIENT)
Dept: CARDIOLOGY | Facility: CLINIC | Age: 82
End: 2017-05-01

## 2017-05-01 NOTE — TELEPHONE ENCOUNTER
----- Message from Dori See sent at 5/1/2017  7:25 AM CDT -----  Contact: self [phone]  Please call her, she says she is confused about her medication.  Thank you!

## 2017-05-03 ENCOUNTER — TELEPHONE (OUTPATIENT)
Dept: CARDIOLOGY | Facility: CLINIC | Age: 82
End: 2017-05-03

## 2017-05-03 NOTE — TELEPHONE ENCOUNTER
----- Message from Shari Perales sent at 5/3/2017  7:04 AM CDT -----  Patient requesting to speak with nurse concerning her medication: Diltiazem 240 mg /stated that she is confused about whether to take or not/please call patient back at [phone] to advise.

## 2017-05-08 NOTE — PROGRESS NOTES
Pt is wondering what the side effect of coumadin is? Could one be joint pain? She states she is having pain in her knee and pain in her back and abdomen. Could this be due to the coumadin?

## 2017-05-09 ENCOUNTER — OFFICE VISIT (OUTPATIENT)
Dept: FAMILY MEDICINE | Facility: CLINIC | Age: 82
End: 2017-05-09
Payer: MEDICARE

## 2017-05-09 VITALS
HEIGHT: 67 IN | HEART RATE: 80 BPM | SYSTOLIC BLOOD PRESSURE: 128 MMHG | DIASTOLIC BLOOD PRESSURE: 70 MMHG | BODY MASS INDEX: 43.16 KG/M2 | WEIGHT: 275 LBS

## 2017-05-09 DIAGNOSIS — R60.0 BILATERAL LEG EDEMA: ICD-10-CM

## 2017-05-09 DIAGNOSIS — R53.81 PHYSICAL DECONDITIONING: ICD-10-CM

## 2017-05-09 DIAGNOSIS — E66.01 MORBID OBESITY WITH BMI OF 40.0-44.9, ADULT: ICD-10-CM

## 2017-05-09 DIAGNOSIS — M54.9 MID BACK PAIN: Primary | ICD-10-CM

## 2017-05-09 DIAGNOSIS — Z91.81 RISK FOR FALLS: ICD-10-CM

## 2017-05-09 DIAGNOSIS — R25.2 MUSCLE CRAMPING: ICD-10-CM

## 2017-05-09 PROCEDURE — 99499 UNLISTED E&M SERVICE: CPT | Mod: S$GLB,,, | Performed by: NURSE PRACTITIONER

## 2017-05-09 PROCEDURE — 99999 PR PBB SHADOW E&M-EST. PATIENT-LVL III: CPT | Mod: PBBFAC,,, | Performed by: NURSE PRACTITIONER

## 2017-05-09 PROCEDURE — 3078F DIAST BP <80 MM HG: CPT | Mod: S$GLB,,, | Performed by: NURSE PRACTITIONER

## 2017-05-09 PROCEDURE — 1157F ADVNC CARE PLAN IN RCRD: CPT | Mod: S$GLB,,, | Performed by: NURSE PRACTITIONER

## 2017-05-09 PROCEDURE — 99213 OFFICE O/P EST LOW 20 MIN: CPT | Mod: S$GLB,,, | Performed by: NURSE PRACTITIONER

## 2017-05-09 PROCEDURE — 3074F SYST BP LT 130 MM HG: CPT | Mod: S$GLB,,, | Performed by: NURSE PRACTITIONER

## 2017-05-09 PROCEDURE — 1159F MED LIST DOCD IN RCRD: CPT | Mod: S$GLB,,, | Performed by: NURSE PRACTITIONER

## 2017-05-09 PROCEDURE — 1160F RVW MEDS BY RX/DR IN RCRD: CPT | Mod: S$GLB,,, | Performed by: NURSE PRACTITIONER

## 2017-05-09 NOTE — PROGRESS NOTES
"Subjective:       Patient ID: Nicole Lala is a 84 y.o. female.    Chief Complaint: Back Pain (R sided midback pain.) and Abdominal Pain (R sided)    Back Pain   This is a chronic problem. The current episode started more than 1 month ago. The problem occurs intermittently. The problem is unchanged. The pain is present in the thoracic spine. The pain does not radiate. Associated symptoms include abdominal pain (RUQ "soreness"). Pertinent negatives include no bladder incontinence, bowel incontinence, chest pain, dysuria, fever, headaches, leg pain, numbness, paresis, paresthesias, pelvic pain, perianal numbness, tingling, weakness or weight loss. She has tried analgesics for the symptoms.     Review of Systems   Constitutional: Negative for fever and weight loss.   Cardiovascular: Negative for chest pain.   Gastrointestinal: Positive for abdominal pain (RUQ "soreness"). Negative for bowel incontinence.   Genitourinary: Negative for bladder incontinence, dysuria and pelvic pain.   Musculoskeletal: Positive for back pain.   Neurological: Negative for tingling, weakness, numbness, headaches and paresthesias.       Objective:      Physical Exam   Constitutional: She is oriented to person, place, and time. She appears well-nourished.   Cardiovascular: Normal rate, regular rhythm, normal heart sounds and intact distal pulses.    Pulmonary/Chest: Effort normal and breath sounds normal. She has no wheezes. She has no rales.   Abdominal: Soft. Bowel sounds are normal. There is no tenderness.   Musculoskeletal:        Thoracic back: She exhibits decreased range of motion, tenderness and spasm. She exhibits no bony tenderness, no swelling, no edema, no laceration, no pain and normal pulse.        Back:    Neurological: She is alert and oriented to person, place, and time.   Skin: Skin is warm and dry.   Vitals reviewed.      Assessment:       1. Mid back pain    2. Bilateral leg edema    3. Morbid obesity with BMI of " 40.0-44.9, adult    4. Physical deconditioning    5. Muscle cramping    6. Risk for falls        Plan:       Nicole was seen today for back pain and abdominal pain.    Diagnoses and all orders for this visit:    Mid back pain  -     Ambulatory referral to Home Health    Bilateral leg edema  -     Ambulatory referral to Home Health    Morbid obesity with BMI of 40.0-44.9, adult  -     Ambulatory referral to Home Health    Physical deconditioning  -     Ambulatory referral to Home Health    Muscle cramping  -     Ambulatory referral to Home Health    Risk for falls  -     Ambulatory referral to Home Health    PT eval with   RTC prn

## 2017-05-09 NOTE — MR AVS SNAPSHOT
[facility]  [address]  [place] [address]  Phone: [phone]  Fax: [phone]                  Nicole Lala   2017 1:40 PM   Office Visit    Description:  Female : 10/22/1932   Provider:  Bobbi Lai NP   Department:  [facility]           Reason for Visit     Back Pain     Abdominal Pain           Diagnoses this Visit        Comments    Mid back pain    -  Primary     Bilateral leg edema         Morbid obesity with BMI of 40.0-44.9, adult         Physical deconditioning         Muscle cramping         Risk for falls                To Do List           Future Appointments        Provider Department Dept Phone    5/10/2017 2:00 PM Bernie Zapien MD [facility] [phone]    2017 10:30 AM LAB, [facility] - Lab [phone]    2017 10:45 AM LAB, [facility] - Lab [phone]    2017 10:40 AM Rey Muhammad MD [facility] Nephrology [phone]    2017 11:00 AM Brandon Gray MD [facility] [phone]      Goals (5 Years of Data)     None      Follow-Up and Disposition     Return if symptoms worsen or fail to improve.      [facility]     [facility] Nurse Care Line - 24/ Assistance  Unless otherwise directed by your provider, please contact [facility], our nurse care line that is available for / assistance.     Registered nurses in the [facility] provide: appointment scheduling, clinical advisement, health education, and other advisory services.  Call: [phone] (toll free)               Medications           Message regarding Medications     Verify the changes and/or additions to your medication regime listed below are the same as discussed with your clinician today.  If any of these changes or additions are incorrect, please notify your healthcare provider.             Verify that the below list of medications is an  "accurate representation of the medications you are currently taking.  If none reported, the list may be blank. If incorrect, please contact your healthcare provider. Carry this list with you in case of emergency.           Current Medications     CARTIA  mg 24 hr capsule TAKE 1 CAPSULE ONE TIME DAILY    lisinopril (PRINIVIL,ZESTRIL) 20 MG tablet TAKE 1 TABLET EVERY DAY    torsemide (DEMADEX) 20 MG Tab TAKE 1 TABLET EVERY MORNING    UNABLE TO FIND 1 drop 3 (three) times daily. PGN    warfarin (COUMADIN) 2.5 MG tablet Take 1-2 tablets (2.5-5 mg total) by mouth Daily.    warfarin (COUMADIN) 5 MG tablet TAKE 1 TABLET EVERY EVENING AS INSTRUCTED BY COUMADIN CLINIC           Clinical Reference Information           Your Vitals Were     BP Pulse Height Weight Last Period BMI    128/70 80 5' 7" (1.702 m) 124.7 kg (275 lb) (LMP Unknown) 43.07 kg/m2      Blood Pressure          Most Recent Value    BP  128/70      Allergies as of 5/9/2017     Penicillins      Immunizations Administered on Date of Encounter - 5/9/2017     None      Orders Placed During Today's Visit      Normal Orders This Visit    Ambulatory referral to Home Health       Language Assistance Services     ATTENTION: Language assistance services are available, free of charge. Please call [phone].      ATENCIÓN: Si habla robbin, tiene a cadet disposición servicios gratuitos de asistencia lingüística. Llame al [phone].     CONNOR Ý: N?u b?n nói Ti?ng Vi?t, có các d?ch v? h? tr? ngôn ng? mi?n phí dành cho b?n. G?i s? [phone].         [facility] complies with applicable Federal civil rights laws and does not discriminate on the basis of race, color, national origin, age, disability, or sex.        "

## 2017-05-10 ENCOUNTER — OFFICE VISIT (OUTPATIENT)
Dept: OPHTHALMOLOGY | Facility: CLINIC | Age: 82
End: 2017-05-10
Payer: MEDICARE

## 2017-05-10 DIAGNOSIS — H25.12 NUCLEAR SCLEROTIC CATARACT OF LEFT EYE: ICD-10-CM

## 2017-05-10 DIAGNOSIS — Z98.41 STATUS POST CATARACT EXTRACTION AND INSERTION OF INTRAOCULAR LENS, RIGHT: Primary | ICD-10-CM

## 2017-05-10 DIAGNOSIS — Z96.1 STATUS POST CATARACT EXTRACTION AND INSERTION OF INTRAOCULAR LENS, RIGHT: Primary | ICD-10-CM

## 2017-05-10 PROCEDURE — 99024 POSTOP FOLLOW-UP VISIT: CPT | Mod: S$GLB,,, | Performed by: OPHTHALMOLOGY

## 2017-05-10 PROCEDURE — 99999 PR PBB SHADOW E&M-EST. PATIENT-LVL II: CPT | Mod: PBBFAC,,, | Performed by: OPHTHALMOLOGY

## 2017-05-10 PROCEDURE — 92136 OPHTHALMIC BIOMETRY: CPT | Mod: 26,LT,S$GLB, | Performed by: OPHTHALMOLOGY

## 2017-05-10 NOTE — PROGRESS NOTES
HPI     [place] ref     1. phaco iol OD done 4/25/17   AMD OD>OS    2 week s/p phaco iol done 4/25/17 Pt denies pain, using gtts as directed.   PGN TID OD         Last edited by Bernie Zapien MD on 5/10/2017  2:20 PM.         Assessment /Plan     For exam results, see Encounter Report.    Status post cataract extraction and insertion of intraocular lens, right    Nuclear sclerotic cataract of left eye  -     IOL Master - OU - Both Eyes      PO week #1 s/p phaco/IOL -    - doing well, no issues  - okay to d/c abx gtt  - continue PF/ketoroloc TID for total of 1 month VS. COMBINATION drop TID until gone.    - f/up 3-4 wks for MRx, DFE    Visually significant nuclear sclerotic cataract   - Interfering with activities of daily living.  Pt desires cataract surgery for [facility].   - R/B/A discussed and pt agrees to proceed with surgery.   - IOL options discussed according to patient's goals and concomitant ocular pathology; and pt content with monofocal lens.    - Target: plano.     dense / VB    (pcboo 21.5  OS)

## 2017-05-15 ENCOUNTER — TELEPHONE (OUTPATIENT)
Dept: FAMILY MEDICINE | Facility: CLINIC | Age: 82
End: 2017-05-15

## 2017-05-15 NOTE — TELEPHONE ENCOUNTER
(see other telephone message 5/15/17) called pt, notified order for wheelchair sent over and she verbally understood.

## 2017-05-15 NOTE — TELEPHONE ENCOUNTER
----- Message from Dori See sent at 5/15/2017 10:09 AM CDT -----  Contact: self [phone]  She is requesting that you fax an order for a wide wheelchair with a cushion, to [facility] @ [phone].  Thank you!

## 2017-05-15 NOTE — TELEPHONE ENCOUNTER
----- Message from [place] sent at 5/15/2017 12:40 PM CDT -----  Contact: self  Patient is returning calling back regarding a order for a wheelchair. Please call patient at [phone]. Thanks!

## 2017-05-22 ENCOUNTER — TELEPHONE (OUTPATIENT)
Dept: OPHTHALMOLOGY | Facility: CLINIC | Age: 82
End: 2017-05-22

## 2017-05-22 DIAGNOSIS — H25.12 NUCLEAR SCLEROTIC CATARACT OF LEFT EYE: Primary | ICD-10-CM

## 2017-05-29 ENCOUNTER — ANTI-COAG VISIT (OUTPATIENT)
Dept: CARDIOLOGY | Facility: CLINIC | Age: 82
End: 2017-05-29

## 2017-05-29 ENCOUNTER — TELEPHONE (OUTPATIENT)
Dept: OPHTHALMOLOGY | Facility: CLINIC | Age: 82
End: 2017-05-29

## 2017-05-29 DIAGNOSIS — Z86.718 PERSONAL HISTORY OF DVT (DEEP VEIN THROMBOSIS): ICD-10-CM

## 2017-05-29 LAB — INR PPP: 2.5

## 2017-05-29 NOTE — TELEPHONE ENCOUNTER
----- Message from Monisha Mejia MA sent at 5/29/2017  1:40 PM CDT -----  Contact: self [phone]      ----- Message -----  From: Dori See  Sent: 5/29/2017   1:30 PM  To: [facility] Staff    Please call her regarding the clearance for her cataract surgery.  She has questions.  Thank you!

## 2017-06-01 ENCOUNTER — OFFICE VISIT (OUTPATIENT)
Dept: FAMILY MEDICINE | Facility: CLINIC | Age: 82
End: 2017-06-01
Payer: MEDICARE

## 2017-06-01 VITALS
WEIGHT: 275 LBS | HEIGHT: 67 IN | DIASTOLIC BLOOD PRESSURE: 88 MMHG | HEART RATE: 76 BPM | SYSTOLIC BLOOD PRESSURE: 136 MMHG | BODY MASS INDEX: 43.16 KG/M2

## 2017-06-01 DIAGNOSIS — Z01.818 PREOPERATIVE CLEARANCE: ICD-10-CM

## 2017-06-01 DIAGNOSIS — I10 ESSENTIAL HYPERTENSION: Chronic | ICD-10-CM

## 2017-06-01 DIAGNOSIS — N18.4 CHRONIC KIDNEY DISEASE, STAGE IV (SEVERE): ICD-10-CM

## 2017-06-01 DIAGNOSIS — I89.0 LYMPHEDEMA: Primary | Chronic | ICD-10-CM

## 2017-06-01 DIAGNOSIS — R53.81 PHYSICAL DECONDITIONING: ICD-10-CM

## 2017-06-01 DIAGNOSIS — E66.01 MORBID OBESITY WITH BMI OF 40.0-44.9, ADULT: ICD-10-CM

## 2017-06-01 DIAGNOSIS — Z79.01 LONG TERM (CURRENT) USE OF ANTICOAGULANTS: ICD-10-CM

## 2017-06-01 DIAGNOSIS — I48.91 ATRIAL FIBRILLATION WITH RAPID VENTRICULAR RESPONSE: ICD-10-CM

## 2017-06-01 PROCEDURE — 1159F MED LIST DOCD IN RCRD: CPT | Mod: S$GLB,,, | Performed by: NURSE PRACTITIONER

## 2017-06-01 PROCEDURE — 99999 PR PBB SHADOW E&M-EST. PATIENT-LVL III: CPT | Mod: PBBFAC,,, | Performed by: NURSE PRACTITIONER

## 2017-06-01 PROCEDURE — 99499 UNLISTED E&M SERVICE: CPT | Mod: S$GLB,,, | Performed by: NURSE PRACTITIONER

## 2017-06-01 PROCEDURE — 99214 OFFICE O/P EST MOD 30 MIN: CPT | Mod: S$GLB,,, | Performed by: NURSE PRACTITIONER

## 2017-06-01 PROCEDURE — 1126F AMNT PAIN NOTED NONE PRSNT: CPT | Mod: S$GLB,,, | Performed by: NURSE PRACTITIONER

## 2017-06-01 NOTE — PROGRESS NOTES
Subjective:       Patient ID: Nicole Lala is a 84 y.o. female.    Chief Complaint: Pre-op Exam (L eye cataract clerance)    Here today for preoperative clearance. She is scheduled for left eye cataract surgery with Dr. Zapien on 6/13/17.   She is doing well. No complaints    Past Medical History:  9/25/2013: Acute CHF  No date: Blood transfusion  No date: Branch retinal vein occlusion of right eye  No date: Cellulitis and abscess of lower extremity  No date: Cerebral aneurysm      Comment: S/p repair  No date: CKD (chronic kidney disease)  No date: Elevated serum creatinine  No date: HTN (hypertension)  No date: Lymphedema  1/31/2013: Macular degeneration - Right Eye  1/31/2013: Nuclear sclerosis - Both Eyes  excised 11/16/16: Squamous cell carcinoma      Comment: R forearm    Past Surgical History:  No date: ABDOMINAL SURGERY  No date: APPENDECTOMY  No date: APPENDECTOMY  10/16/1994: CEREBRAL ANEURYSM REPAIR      Comment: RIGHT FRONTOTEMPORAL CRANIOTOMY WITH LIPPING                OF SUPRACLINOID CAROTID ARTERY ANEURYSM    10/27/1994: CEREBRAL ANEURYSM REPAIR      Comment: RIGHT FRONTOTEMPORAL CRANIOTOMY WITH CLIPPING                OF RIGHT MIDDLE CEREBRAL ARTERY ANEURYSM   8/29/2013: COMPLICATED TOTAL HIP PROSTHESIS AND METHYLMET*      Comment: left  No date: EYE SURGERY  No date: Focal laser      Comment: OD  3/22/2010: ADOLFO FILTER PLACEMENT  11/2009: HIP FRACTURE SURGERY      Comment: left  No date: JOINT REPLACEMENT  No date: Patent PI      Comment: Both Eye   No date: TOTAL ABDOMINAL HYSTERECTOMY  3/19/2010: TOTAL HIP ARTHROPLASTY      Comment: left  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 father    Aneurysm                                                    Comment: maternal aunt-Brain    Glaucoma                       Daughter                    Comment: Narrow Angle Glaucoma    Hypertension                                             Amblyopia                      Neg Hx                    Blindness                      Neg Hx                    Cataracts                      Neg Hx                    Macular degeneration           Neg Hx                    Retinal detachment             Neg Hx                    Strabismus                     Neg Hx                    Anesthesia problems            Neg Hx                    Malignant hypertension         Neg Hx                    Hypotension                    Neg Hx                    Malignant hyperthermia         Neg Hx                    Pseudochol deficiency          Neg Hx                    Cancer                         Neg Hx                    Thyroid disease                Neg Hx                    Melanoma                       Neg Hx                    Psoriasis                      Neg Hx                    Lupus                          Neg Hx                    Social History    Marital status:              Spouse name:                       Years of education:                 Number of children:               Social History Main Topics    Smoking status: Former Smoker                                                                Packs/day: 0.00      Years: 0.00           Quit date: 11/7/1992    Smokeless status: Never Used                        Alcohol use: Yes                Comment: socially    Drug use: No              Sexual activity: No                   Other Topics            Concern  Are you pregnant or th* No  Breast-feeding          No    Current Outpatient Prescriptions:  CARTIA  mg 24 hr capsule, TAKE 1 CAPSULE ONE TIME DAILY, Disp: 90 capsule, Rfl: 3  lisinopril (PRINIVIL,ZESTRIL) 20 MG tablet, TAKE 1 TABLET EVERY DAY, Disp: 90 tablet,  Rfl: 3  torsemide (DEMADEX) 20 MG Tab, TAKE 1 TABLET EVERY MORNING, Disp: 90 tablet, Rfl: 1  warfarin (COUMADIN) 2.5 MG tablet, Take 1-2 tablets (2.5-5 mg total) by mouth Daily., Disp: 100 tablet, Rfl: 3  warfarin (COUMADIN) 5 MG tablet, TAKE 1 TABLET EVERY EVENING AS INSTRUCTED BY COUMADIN CLINIC, Disp: 90 tablet, Rfl: 3    No current facility-administered medications for this visit.       Review of patient's allergies indicates:   -- Penicillins     --  Other reaction(s): Hives        Review of Systems   Constitutional: Negative for activity change, appetite change, fatigue and fever.   HENT: Negative.    Respiratory: Negative for cough, chest tightness, shortness of breath and wheezing.    Cardiovascular: Negative for chest pain, palpitations and leg swelling.   Gastrointestinal: Negative.    Genitourinary: Negative.    Neurological: Negative for dizziness, weakness, light-headedness and headaches.       Objective:      Physical Exam   Constitutional: She is oriented to person, place, and time. She appears well-nourished.   HENT:   Head: Normocephalic and atraumatic.   Right Ear: External ear normal.   Left Ear: External ear normal.   Nose: Nose normal.   Mouth/Throat: Oropharynx is clear and moist. No oropharyngeal exudate.   Eyes: Conjunctivae and EOM are normal. Pupils are equal, round, and reactive to light.   Neck: Normal range of motion. Neck supple.   Cardiovascular: Normal rate, normal heart sounds and intact distal pulses.  An irregularly irregular rhythm present.   Pulmonary/Chest: Effort normal and breath sounds normal. She has no wheezes. She has no rales.   Abdominal: Soft. Bowel sounds are normal. There is no tenderness.   Lymphadenopathy:     She has no cervical adenopathy.   Neurological: She is alert and oriented to person, place, and time.   Skin: Skin is warm and dry. No rash noted.   Vitals reviewed.      Assessment:       1. Lymphedema    2. Atrial fibrillation with rapid ventricular response     3. Physical deconditioning    4. Long term (current) use of anticoagulants    5. Morbid obesity with BMI of 40.0-44.9, adult    6. Essential hypertension    7. Chronic kidney disease, stage IV (severe)    8. Preoperative clearance        Plan:       Nicole was seen today for pre-op exam.    Diagnoses and all orders for this visit:    Lymphedema  Stable- continue current treatment    Atrial fibrillation with rapid ventricular response  Stable- continue current treatment    Physical deconditioning  Stable- continue home PT    Long term (current) use of anticoagulants  Stable- continue current treatment and routine monitoring    Morbid obesity with BMI of 40.0-44.9, adult  Encouraged weight loss, exercise and healthy eating    Essential hypertension  Stable- continue current treatment    Chronic kidney disease, stage IV (severe)  Stable - labs reviewed. Continue nephology follow up    Preoperative clearance  Pt cleared for surgery with Dr. Zapien

## 2017-06-05 ENCOUNTER — ANTI-COAG VISIT (OUTPATIENT)
Dept: CARDIOLOGY | Facility: CLINIC | Age: 82
End: 2017-06-05

## 2017-06-05 ENCOUNTER — TELEPHONE (OUTPATIENT)
Dept: FAMILY MEDICINE | Facility: CLINIC | Age: 82
End: 2017-06-05

## 2017-06-05 DIAGNOSIS — Z86.718 PERSONAL HISTORY OF DVT (DEEP VEIN THROMBOSIS): ICD-10-CM

## 2017-06-05 LAB — INR PPP: 2.8

## 2017-06-05 NOTE — TELEPHONE ENCOUNTER
----- Message from RT Mor sent at 6/2/2017  1:51 PM CDT -----  Contact: ImeldaPT,  [phone] [facility]  Imelda,PT,  [phone] [facility], requesting to know if she may get a verbal PT order extension order for one week one, for the week of 06/05/2017 to reevaluate pt, thanks.

## 2017-06-05 NOTE — PROGRESS NOTES
Pt called and states she may have taken two tablets of coumadin a few nights ago but is unsure when and unsure if she truly did, she has a  nurse coming out today and wants to have level check today orders sent to hh

## 2017-06-06 NOTE — TELEPHONE ENCOUNTER
Called Imelda with [facility], no answer left voice mail that pcp verbally okay'ed extension.

## 2017-06-07 NOTE — H&P
History    Chief complaint:  Painless progressive vision loss    Present Ilness/Diagnosis: Nuclear sclerotic Cataract    Past Medical History:  has a past medical history of A-fib; Acute CHF (9/25/2013); Anticoagulant long-term use; Blood transfusion; Branch retinal vein occlusion of right eye; Cellulitis and abscess of lower extremity; Cerebral aneurysm; CKD (chronic kidney disease); Elevated serum creatinine; HTN (hypertension); Lymphedema; Macular degeneration - Right Eye (1/31/2013); Nuclear sclerosis - Both Eyes (1/31/2013); and Squamous cell carcinoma (excised 11/16/16).    Family History/Social History: refer to chart    Allergies:   Review of patient's allergies indicates:   Allergen Reactions    Penicillins Hives     Other reaction(s): Hives       Current Medications: No current facility-administered medications for this encounter.     Current Outpatient Prescriptions:     CARTIA  mg 24 hr capsule, TAKE 1 CAPSULE ONE TIME DAILY, Disp: 90 capsule, Rfl: 3    lisinopril (PRINIVIL,ZESTRIL) 20 MG tablet, TAKE 1 TABLET EVERY DAY, Disp: 90 tablet, Rfl: 3    torsemide (DEMADEX) 20 MG Tab, TAKE 1 TABLET EVERY MORNING, Disp: 90 tablet, Rfl: 1    warfarin (COUMADIN) 2.5 MG tablet, Take 1-2 tablets (2.5-5 mg total) by mouth Daily., Disp: 100 tablet, Rfl: 3    warfarin (COUMADIN) 5 MG tablet, TAKE 1 TABLET EVERY EVENING AS INSTRUCTED BY COUMADIN CLINIC, Disp: 90 tablet, Rfl: 3    Physical Exam    BP: Vital signs stable  General: No apparent distress  HEENT: nuclear sclerotic cataract  Lungs: adequate respirations  Heart: + pulses  Abdomen: soft  Rectal/pelvic: deferred    Impression: Visually significant Cataract    Plan: Phacoemulsification with implantation of Intraocular lens

## 2017-06-12 ENCOUNTER — ANESTHESIA EVENT (OUTPATIENT)
Dept: SURGERY | Facility: HOSPITAL | Age: 82
End: 2017-06-12
Payer: MEDICARE

## 2017-06-13 ENCOUNTER — ANESTHESIA (OUTPATIENT)
Dept: SURGERY | Facility: HOSPITAL | Age: 82
End: 2017-06-13
Payer: MEDICARE

## 2017-06-13 ENCOUNTER — HOSPITAL ENCOUNTER (OUTPATIENT)
Facility: HOSPITAL | Age: 82
Discharge: HOME | End: 2017-06-13
Attending: OPHTHALMOLOGY | Admitting: OPHTHALMOLOGY
Payer: MEDICARE

## 2017-06-13 ENCOUNTER — SURGERY (OUTPATIENT)
Age: 82
End: 2017-06-13

## 2017-06-13 VITALS
HEIGHT: 67 IN | WEIGHT: 275 LBS | OXYGEN SATURATION: 97 % | BODY MASS INDEX: 43.16 KG/M2 | HEART RATE: 116 BPM | TEMPERATURE: 97 F | SYSTOLIC BLOOD PRESSURE: 154 MMHG | RESPIRATION RATE: 20 BRPM | DIASTOLIC BLOOD PRESSURE: 70 MMHG

## 2017-06-13 DIAGNOSIS — H25.12 NUCLEAR SCLEROSIS, LEFT: Primary | ICD-10-CM

## 2017-06-13 DIAGNOSIS — H25.10 SENILE NUCLEAR SCLEROSIS: ICD-10-CM

## 2017-06-13 PROCEDURE — 66982 XCAPSL CTRC RMVL CPLX WO ECP: CPT | Mod: 79,LT,, | Performed by: OPHTHALMOLOGY

## 2017-06-13 PROCEDURE — C9447 INJ, PHENYLEPHRINE KETOROLAC: HCPCS | Mod: PO | Performed by: OPHTHALMOLOGY

## 2017-06-13 PROCEDURE — 25000003 PHARM REV CODE 250: Mod: PO | Performed by: OPHTHALMOLOGY

## 2017-06-13 PROCEDURE — 63600175 PHARM REV CODE 636 W HCPCS: Mod: PO | Performed by: OPHTHALMOLOGY

## 2017-06-13 PROCEDURE — D9220A PRA ANESTHESIA: Mod: CRNA,,, | Performed by: NURSE ANESTHETIST, CERTIFIED REGISTERED

## 2017-06-13 PROCEDURE — 25000003 PHARM REV CODE 250: Mod: PO | Performed by: ANESTHESIOLOGY

## 2017-06-13 PROCEDURE — D9220A PRA ANESTHESIA: Mod: ANES,,, | Performed by: ANESTHESIOLOGY

## 2017-06-13 PROCEDURE — 36000706: Mod: PO | Performed by: OPHTHALMOLOGY

## 2017-06-13 PROCEDURE — 37000009 HC ANESTHESIA EA ADD 15 MINS: Mod: PO | Performed by: OPHTHALMOLOGY

## 2017-06-13 PROCEDURE — 37000008 HC ANESTHESIA 1ST 15 MINUTES: Mod: PO | Performed by: OPHTHALMOLOGY

## 2017-06-13 PROCEDURE — V2632 POST CHMBR INTRAOCULAR LENS: HCPCS | Mod: PO | Performed by: OPHTHALMOLOGY

## 2017-06-13 PROCEDURE — 71000033 HC RECOVERY, INTIAL HOUR: Mod: PO | Performed by: OPHTHALMOLOGY

## 2017-06-13 PROCEDURE — 36000707: Mod: PO | Performed by: OPHTHALMOLOGY

## 2017-06-13 DEVICE — LENS IOL ITEC PRELOAD 21.5D: Type: IMPLANTABLE DEVICE | Site: EYE | Status: FUNCTIONAL

## 2017-06-13 RX ORDER — KETOROLAC TROMETHAMINE 5 MG/ML
1 SOLUTION OPHTHALMIC ONCE
Status: DISCONTINUED | OUTPATIENT
Start: 2017-06-13 | End: 2017-06-13

## 2017-06-13 RX ORDER — PROPARACAINE HYDROCHLORIDE 5 MG/ML
1 SOLUTION/ DROPS OPHTHALMIC
Status: DISCONTINUED | OUTPATIENT
Start: 2017-06-13 | End: 2017-06-13 | Stop reason: HOSPADM

## 2017-06-13 RX ORDER — LIDOCAINE HYDROCHLORIDE 10 MG/ML
INJECTION, SOLUTION EPIDURAL; INFILTRATION; INTRACAUDAL; PERINEURAL
Status: DISCONTINUED | OUTPATIENT
Start: 2017-06-13 | End: 2017-06-13 | Stop reason: HOSPADM

## 2017-06-13 RX ORDER — HYDROMORPHONE HYDROCHLORIDE 2 MG/ML
0.2 INJECTION, SOLUTION INTRAMUSCULAR; INTRAVENOUS; SUBCUTANEOUS EVERY 5 MIN PRN
Status: DISCONTINUED | OUTPATIENT
Start: 2017-06-13 | End: 2017-06-13 | Stop reason: HOSPADM

## 2017-06-13 RX ORDER — ONDANSETRON 2 MG/ML
4 INJECTION INTRAMUSCULAR; INTRAVENOUS ONCE
Status: DISCONTINUED | OUTPATIENT
Start: 2017-06-13 | End: 2017-06-13 | Stop reason: HOSPADM

## 2017-06-13 RX ORDER — MOXIFLOXACIN 5 MG/ML
SOLUTION/ DROPS OPHTHALMIC
Status: DISCONTINUED | OUTPATIENT
Start: 2017-06-13 | End: 2017-06-13 | Stop reason: HOSPADM

## 2017-06-13 RX ORDER — SODIUM CHLORIDE, SODIUM LACTATE, POTASSIUM CHLORIDE, CALCIUM CHLORIDE 600; 310; 30; 20 MG/100ML; MG/100ML; MG/100ML; MG/100ML
INJECTION, SOLUTION INTRAVENOUS CONTINUOUS
Status: DISCONTINUED | OUTPATIENT
Start: 2017-06-13 | End: 2017-06-13 | Stop reason: HOSPADM

## 2017-06-13 RX ORDER — LIDOCAINE HYDROCHLORIDE 10 MG/ML
1 INJECTION, SOLUTION EPIDURAL; INFILTRATION; INTRACAUDAL; PERINEURAL ONCE
Status: DISCONTINUED | OUTPATIENT
Start: 2017-06-13 | End: 2017-06-13 | Stop reason: HOSPADM

## 2017-06-13 RX ORDER — TROPICAMIDE 10 MG/ML
1 SOLUTION/ DROPS OPHTHALMIC
Status: DISCONTINUED | OUTPATIENT
Start: 2017-06-13 | End: 2017-06-13 | Stop reason: HOSPADM

## 2017-06-13 RX ORDER — OFLOXACIN 3 MG/ML
1 SOLUTION/ DROPS OPHTHALMIC
Status: DISCONTINUED | OUTPATIENT
Start: 2017-06-13 | End: 2017-06-13

## 2017-06-13 RX ORDER — LIDOCAINE HYDROCHLORIDE 40 MG/ML
INJECTION, SOLUTION RETROBULBAR
Status: DISCONTINUED | OUTPATIENT
Start: 2017-06-13 | End: 2017-06-13 | Stop reason: HOSPADM

## 2017-06-13 RX ORDER — ACETAMINOPHEN 325 MG/1
650 TABLET ORAL EVERY 4 HOURS PRN
Status: DISCONTINUED | OUTPATIENT
Start: 2017-06-13 | End: 2017-06-13 | Stop reason: HOSPADM

## 2017-06-13 RX ORDER — OXYCODONE HYDROCHLORIDE 5 MG/1
5 TABLET ORAL ONCE AS NEEDED
Status: DISCONTINUED | OUTPATIENT
Start: 2017-06-14 | End: 2017-06-13 | Stop reason: HOSPADM

## 2017-06-13 RX ORDER — FENTANYL CITRATE 50 UG/ML
25 INJECTION, SOLUTION INTRAMUSCULAR; INTRAVENOUS EVERY 5 MIN PRN
Status: DISCONTINUED | OUTPATIENT
Start: 2017-06-13 | End: 2017-06-13 | Stop reason: HOSPADM

## 2017-06-13 RX ORDER — PREDNISOLONE ACETATE 10 MG/ML
SUSPENSION/ DROPS OPHTHALMIC
Status: DISCONTINUED | OUTPATIENT
Start: 2017-06-13 | End: 2017-06-13 | Stop reason: HOSPADM

## 2017-06-13 RX ORDER — MOXIFLOXACIN 5 MG/ML
1 SOLUTION/ DROPS OPHTHALMIC
Status: DISCONTINUED | OUTPATIENT
Start: 2017-06-13 | End: 2017-06-13

## 2017-06-13 RX ORDER — LIDOCAINE HYDROCHLORIDE 40 MG/ML
1 INJECTION, SOLUTION RETROBULBAR
Status: ACTIVE | OUTPATIENT
Start: 2017-06-13 | End: 2017-06-13

## 2017-06-13 RX ORDER — MEPERIDINE HYDROCHLORIDE 50 MG/ML
12.5 INJECTION INTRAMUSCULAR; INTRAVENOUS; SUBCUTANEOUS ONCE AS NEEDED
Status: DISCONTINUED | OUTPATIENT
Start: 2017-06-13 | End: 2017-06-13 | Stop reason: HOSPADM

## 2017-06-13 RX ORDER — DIPHENHYDRAMINE HYDROCHLORIDE 50 MG/ML
25 INJECTION INTRAMUSCULAR; INTRAVENOUS EVERY 6 HOURS PRN
Status: DISCONTINUED | OUTPATIENT
Start: 2017-06-13 | End: 2017-06-13 | Stop reason: HOSPADM

## 2017-06-13 RX ORDER — PHENYLEPHRINE HYDROCHLORIDE 25 MG/ML
1 SOLUTION/ DROPS OPHTHALMIC
Status: DISCONTINUED | OUTPATIENT
Start: 2017-06-13 | End: 2017-06-13 | Stop reason: HOSPADM

## 2017-06-13 RX ORDER — SODIUM CHLORIDE 0.9 % (FLUSH) 0.9 %
3 SYRINGE (ML) INJECTION
Status: DISCONTINUED | OUTPATIENT
Start: 2017-06-13 | End: 2017-06-13 | Stop reason: HOSPADM

## 2017-06-13 RX ADMIN — Medication 0.5 ML: at 08:06

## 2017-06-13 RX ADMIN — SODIUM HYALURONATE 0.8 MG: 10 INJECTION INTRAOCULAR at 08:06

## 2017-06-13 RX ADMIN — LIDOCAINE HYDROCHLORIDE 1 DROP: 40 SOLUTION RETROBULBAR; TOPICAL at 08:06

## 2017-06-13 RX ADMIN — TROPICAMIDE 1 DROP: 10 SOLUTION/ DROPS OPHTHALMIC at 07:06

## 2017-06-13 RX ADMIN — PREDNISOLONE ACETATE 1 DROP: 10 SUSPENSION OPHTHALMIC at 08:06

## 2017-06-13 RX ADMIN — PROPARACAINE HYDROCHLORIDE 1 DROP: 5 SOLUTION/ DROPS OPHTHALMIC at 07:06

## 2017-06-13 RX ADMIN — SODIUM CHLORIDE, SODIUM LACTATE, POTASSIUM CHLORIDE, AND CALCIUM CHLORIDE: .6; .31; .03; .02 INJECTION, SOLUTION INTRAVENOUS at 07:06

## 2017-06-13 RX ADMIN — PHENYLEPHRINE HYDROCHLORIDE 1 DROP: 25 SOLUTION/ DROPS OPHTHALMIC at 07:06

## 2017-06-13 RX ADMIN — LIDOCAINE HYDROCHLORIDE 1 ML: 10 INJECTION, SOLUTION EPIDURAL; INFILTRATION; INTRACAUDAL; PERINEURAL at 08:06

## 2017-06-13 RX ADMIN — PHENYLEPHRINE AND KETOROLAC 1 EACH: 10.16; 2.88 INJECTION, SOLUTION, CONCENTRATE INTRAOCULAR at 08:06

## 2017-06-13 RX ADMIN — PROPARACAINE HYDROCHLORIDE 1 DROP: 5 SOLUTION/ DROPS OPHTHALMIC at 08:06

## 2017-06-13 RX ADMIN — BALANCED SALT SOLUTION 500 ML: 6.4; .75; .48; .3; 3.9; 1.7 SOLUTION OPHTHALMIC at 08:06

## 2017-06-13 RX ADMIN — MOXIFLOXACIN HYDROCHLORIDE 1 DROP: 5 SOLUTION/ DROPS OPHTHALMIC at 08:06

## 2017-06-13 RX ADMIN — LIDOCAINE HYDROCHLORIDE: 40 INJECTION, SOLUTION RETROBULBAR; TOPICAL at 07:06

## 2017-06-13 RX ADMIN — TRYPAN BLUE 2 ML: 0.3 INJECTION, SOLUTION INTRAOCULAR; OPHTHALMIC at 08:06

## 2017-06-13 RX ADMIN — LIDOCAINE HYDROCHLORIDE 200 MG: 40 INJECTION, SOLUTION RETROBULBAR; TOPICAL at 07:06

## 2017-06-13 NOTE — DISCHARGE INSTRUCTIONS
Procedural Sedation (Adult)  You have been given medicine by vein to make you sleep during your surgery. This may have included both a pain medicine and sleeping medicine. Most of the effects have worn off. But you may still have some drowsiness for the next 6 to 8 hours.  Home care  Follow these guidelines when you get home:  · For the next 8 hours, you should be watched by a responsible adult. This person should make sure your condition is not getting worse.  · Don't take any medicine by mouth for pain or for sleep during the next 4 hours. These might react with the medicines you were given in the hospital. This could cause a much stronger response than usual.  · Don't drink any alcohol for the next 24 hours.  · Don't drive, operate dangerous machinery, or make important business or personal decisions during the next 24 hours.  Follow-up care  Follow up with your healthcare provider if you are not alert and back to your usual level of activity within 12 hours.  When to seek medical advice  Call your healthcare provider right away if any of these occur:  · Drowsiness gets worse  · Weakness or dizziness gets worse  · Repeated vomiting  · You cannot be awakened   Date Last Reviewed: 10/18/2016  © 3813-3807 [employer]. [address] [place] [address], [place] [address]. All rights reserved. This information is not intended as a substitute for professional medical care. Always follow your healthcare professional's instructions.      See eye instructions

## 2017-06-13 NOTE — ANESTHESIA PREPROCEDURE EVALUATION
06/13/2017  Nicole Lala is a 84 y.o., female.    Anesthesia Evaluation    I have reviewed the Patient Summary Reports.    I have reviewed the Nursing Notes.   I have reviewed the Medications.     Review of Systems  Anesthesia Hx:  No problems with previous Anesthesia    Cardiovascular:   Hypertension, poorly controlled CHF  Congestive Heart Failure (CHF) , Chronic Congestive Heart Failure , NYHA Classification IV  , uncompensated, on chronic Rx, no recent change Denies Cardiomyopathy  Hypertension, Essential Hypertension    Pulmonary:   Shortness of breath    Renal/:   Chronic Renal Disease        Physical Exam  General:  Morbid Obesity    Airway/Jaw/Neck:  Airway Findings: Mouth Opening: Normal Tongue: Normal  General Airway Assessment: Adult, Average  Mallampati: III  TM Distance: 4 - 6 cm       Chest/Lungs:  Chest/Lungs Findings: Clear to auscultation, Normal Respiratory Rate     Heart/Vascular:  Heart Findings: Rate: Normal  Rhythm: Regular Rhythm  Sounds: Normal        Mental Status:  Mental Status Findings:  Alert and Oriented, Cooperative         Anesthesia Plan  Type of Anesthesia, risks & benefits discussed:  Anesthesia Type:  MAC  Patient's Preference:   Intra-op Monitoring Plan:   Intra-op Monitoring Plan Comments:   Post Op Pain Control Plan:   Post Op Pain Control Plan Comments:   Induction:    Beta Blocker:  Patient is on a Beta-Blocker and has received one dose within the past 24 hours (No further documentation required).       Informed Consent: Patient understands risks and agrees with Anesthesia plan.  Questions answered. Anesthesia consent signed with patient.  ASA Score: 4     Day of Surgery Review of History & Physical: I have interviewed and examined the patient. I have reviewed the patient's H&P dated:  There are no significant changes.          Ready For Surgery From  Anesthesia Perspective.

## 2017-06-13 NOTE — TRANSFER OF CARE
"Anesthesia Transfer of Care Note    Patient: Nicole Lala    Procedure(s) Performed: Procedure(s) (LRB):  PHACOEMULSIFICATION-ASPIRATION-CATARACT (Left)  INSERTION-INTRAOCULAR LENS (IOL) (Left)    Patient location: PACU    Anesthesia Type: MAC    Transport from OR: Transported from OR on room air with adequate spontaneous ventilation    Post pain: adequate analgesia    Post assessment: no apparent anesthetic complications and tolerated procedure well    Post vital signs: stable    Level of consciousness: awake    Complications: none    Transfer of care protocol was followed      Last vitals:   Visit Vitals  /77 (BP Location: Right arm, Patient Position: Lying, BP Method: Automatic)   Pulse 106   Temp 36.2 °C (97.2 °F) (Skin)   Resp (!) 30   Ht 5' 7" (1.702 m)   Wt 124.7 kg (275 lb)   LMP  (LMP Unknown)   SpO2 (!) 94%   Breastfeeding? No   BMI 43.07 kg/m²     "

## 2017-06-13 NOTE — OP NOTE
DATE OF PROCEDURE: 06/13/2017    SURGEON: KIKE MCDERMOTT MD    PREOPERATIVE DIAGNOSIS:  Mature brunescent senile nuclear sclerotic cataract left eye.     POSTOPERATIVE DIAGNOSIS: Mature brunescent senile nuclear sclerotic cataract left eye.     PROCEDURE PERFORMED:  Complex phacoemulsification with placement of intraocular lens, left eye, with trypan blue    IMPLANT:  PCBOO 21.5    ANESTHESIA:  Topical and MAC    COMPLICATIONS: none    ESTIMATED BLOOD LOSS: <1cc    SPECIMENS: none    INDICATIONS FOR PROCEDURE:  This patient presented to the clinic with decreased vision in the left eye and was found to have a cataract.  The risks, benefits, and alternatives were discussed and the patient agreed to proceed with phacoemulsification and implantation of a lens in the left eye.     PROCEDURE IN DETAIL:  The patient was met in the preop holding area.  Consent was confirmed to be signed.  The operative site was marked.  The patient was brought into the operating room by the anesthesia team and placed under monitored anesthesia care.  The left eye was prepped and draped in a sterile ophthalmic fashion.  A John speculum was placed into the left eye.   A paracentesis site was made and 1% preservative-free lidocaine was injected into the anterior chamber.  Trypan blue was then injected and allowed to sit for 1 minute.  Then BSS was used to wash out the trypan blue. Viscoelastic material was injected into the anterior chamber.  A keratome blade was used to make a clear corneal incision.  A cystotome was used to initiate the continuous curvilinear capsulorrhexis which was completed with Utrata forceps.  BSS on a sood cannula was used to perform hydrodissection.  The phacoemulsification tip was introduced into the eye and the nucleus was removed in a standard divide-and-conquer fashion.  Remaining cortical material was removed from the eye using irrigation-aspiration.  The capsular bag was filled with viscoelastic material  and the intraocular lens was injected and positioned into place. Remaining viscoelastic material was removed from the eye using irrigation and aspiration.  The corneal wounds were hydrated.  The eye was filled to physiologic pressure. The wounds were found to be watertight. Drops of Vigamox and prednisilone were placed into the eye.  The eye was washed, dried, and shielded.  The patient tolerated the procedure well and knows to follow up with me tomorrow morning, sooner if needed.

## 2017-06-13 NOTE — ANESTHESIA POSTPROCEDURE EVALUATION
"Anesthesia Post Evaluation    Patient: Nicole Lala    Procedure(s) Performed: Procedure(s) (LRB):  PHACOEMULSIFICATION-ASPIRATION-CATARACT (Left)  INSERTION-INTRAOCULAR LENS (IOL) (Left)    Final Anesthesia Type: general  Patient location during evaluation: PACU  Patient participation: Yes- Able to Participate  Level of consciousness: awake and alert and oriented  Post-procedure vital signs: reviewed and stable  Pain management: adequate  Airway patency: patent  PONV status at discharge: No PONV  Anesthetic complications: no      Cardiovascular status: blood pressure returned to baseline  Respiratory status: unassisted, spontaneous ventilation and room air  Hydration status: euvolemic  Follow-up not needed.        Visit Vitals  BP (!) 141/65 (BP Location: Left arm, Patient Position: Lying, BP Method: Automatic)   Pulse (!) 128   Temp 36.2 °C (97.2 °F) (Skin)   Resp (!) 26   Ht 5' 7" (1.702 m)   Wt 124.7 kg (275 lb)   LMP  (LMP Unknown)   SpO2 96%   Breastfeeding? No   BMI 43.07 kg/m²       Pain/Lucy Score: Pain Assessment Performed: Yes (6/13/2017  8:39 AM)  Presence of Pain: denies (6/13/2017  8:39 AM)  Lucy Score: 10 (6/13/2017  8:39 AM)      "

## 2017-06-14 ENCOUNTER — ANTI-COAG VISIT (OUTPATIENT)
Dept: CARDIOLOGY | Facility: CLINIC | Age: 82
End: 2017-06-14

## 2017-06-14 ENCOUNTER — OFFICE VISIT (OUTPATIENT)
Dept: OPHTHALMOLOGY | Facility: CLINIC | Age: 82
End: 2017-06-14
Payer: MEDICARE

## 2017-06-14 DIAGNOSIS — Z96.1 STATUS POST CATARACT EXTRACTION AND INSERTION OF INTRAOCULAR LENS, RIGHT: Primary | ICD-10-CM

## 2017-06-14 DIAGNOSIS — Z86.718 PERSONAL HISTORY OF DVT (DEEP VEIN THROMBOSIS): ICD-10-CM

## 2017-06-14 DIAGNOSIS — Z98.42 STATUS POST CATARACT EXTRACTION AND INSERTION OF INTRAOCULAR LENS, LEFT: ICD-10-CM

## 2017-06-14 DIAGNOSIS — H35.30 MACULAR DEGENERATION: ICD-10-CM

## 2017-06-14 DIAGNOSIS — Z98.41 STATUS POST CATARACT EXTRACTION AND INSERTION OF INTRAOCULAR LENS, RIGHT: Primary | ICD-10-CM

## 2017-06-14 DIAGNOSIS — Z96.1 STATUS POST CATARACT EXTRACTION AND INSERTION OF INTRAOCULAR LENS, LEFT: ICD-10-CM

## 2017-06-14 LAB — INR PPP: 2.9

## 2017-06-14 PROCEDURE — 99999 PR PBB SHADOW E&M-EST. PATIENT-LVL II: CPT | Mod: PBBFAC,,, | Performed by: OPHTHALMOLOGY

## 2017-06-14 PROCEDURE — 99024 POSTOP FOLLOW-UP VISIT: CPT | Mod: S$GLB,,, | Performed by: OPHTHALMOLOGY

## 2017-06-14 NOTE — PROGRESS NOTES
HPI     Post-op Evaluation    Additional comments: 1 day s/p phaco iol OS 6/13           Comments   Ashli ref     1 day s/p phaco iol OS 6/13/17    1. phaco iol OD done 4/25/17   AMD OD>OS     Pt states no pain or irritation today. Vision seems much better today.    Gtts: PGN TID OS       Last edited by Cheryle Quintana on 6/14/2017  1:43 PM. (History)            Assessment /Plan     For exam results, see Encounter Report.    Status post cataract extraction and insertion of intraocular lens, right    Status post cataract extraction and insertion of intraocular lens, left    Macular degeneration - Right Eye      Slit Lamp Exam  L/L - normal  C/s - quiet  Cornea - clear  A/C - 1+ cell  Lens - PCIOL    POD #1 s/p phaco/IOL  - doing well  - continue the following drops:    vigamox or ocuflox TID x 1 wk then stop  Pred forte or durezol or dexamethasone TID x  4 wks  Ketorolac TID until runs out    Appropriate precautions and post op medications reviewed.  Patient instructed to call or come in if symptoms of redness, decreased vision, or pain are experienced.    -f/up  4wks, sooner PRN.  mrx ou dfe ou with

## 2017-06-15 ENCOUNTER — TELEPHONE (OUTPATIENT)
Dept: NEPHROLOGY | Facility: CLINIC | Age: 82
End: 2017-06-15

## 2017-06-15 NOTE — TELEPHONE ENCOUNTER
----- Message from Jessica Dalal sent at 6/15/2017 10:58 AM CDT -----  Contact: Patient  Nicole, patient [phone], Calling to have Lab order sent to [facility] for the lab draw at home. Please fax to  [phone] for 6/21/17. Please advise. Thanks.

## 2017-06-21 ENCOUNTER — LAB VISIT (OUTPATIENT)
Dept: LAB | Facility: HOSPITAL | Age: 82
End: 2017-06-21
Attending: INTERNAL MEDICINE
Payer: MEDICARE

## 2017-06-21 ENCOUNTER — ANTI-COAG VISIT (OUTPATIENT)
Dept: CARDIOLOGY | Facility: CLINIC | Age: 82
End: 2017-06-21

## 2017-06-21 DIAGNOSIS — I89.0 OTHER LYMPHEDEMA: ICD-10-CM

## 2017-06-21 DIAGNOSIS — Z86.718 PERSONAL HISTORY OF DVT (DEEP VEIN THROMBOSIS): ICD-10-CM

## 2017-06-21 DIAGNOSIS — N18.30 CHRONIC KIDNEY DISEASE, STAGE III (MODERATE): Primary | ICD-10-CM

## 2017-06-21 DIAGNOSIS — I12.9 MALIGNANT HYPERTENSIVE KIDNEY DISEASE WITH CHRONIC KIDNEY DISEASE STAGE I THROUGH STAGE IV, OR UNSPECIFIED(403.00): ICD-10-CM

## 2017-06-21 LAB
ALBUMIN SERPL BCP-MCNC: 3.1 G/DL
ANION GAP SERPL CALC-SCNC: 6 MMOL/L
BUN SERPL-MCNC: 28 MG/DL
CALCIUM SERPL-MCNC: 9.6 MG/DL
CHLORIDE SERPL-SCNC: 107 MMOL/L
CO2 SERPL-SCNC: 29 MMOL/L
CREAT SERPL-MCNC: 1.4 MG/DL
EST. GFR  (AFRICAN AMERICAN): 39.8 ML/MIN/1.73 M^2
EST. GFR  (NON AFRICAN AMERICAN): 34.5 ML/MIN/1.73 M^2
GLUCOSE SERPL-MCNC: 75 MG/DL
INR PPP: 2.6
PHOSPHATE SERPL-MCNC: 2.4 MG/DL
POTASSIUM SERPL-SCNC: 4.6 MMOL/L
PTH-INTACT SERPL-MCNC: 210 PG/ML
SODIUM SERPL-SCNC: 142 MMOL/L

## 2017-06-21 PROCEDURE — 83970 ASSAY OF PARATHORMONE: CPT

## 2017-06-21 PROCEDURE — 80069 RENAL FUNCTION PANEL: CPT

## 2017-06-28 ENCOUNTER — ANTI-COAG VISIT (OUTPATIENT)
Dept: CARDIOLOGY | Facility: CLINIC | Age: 82
End: 2017-06-28

## 2017-06-28 ENCOUNTER — LAB VISIT (OUTPATIENT)
Dept: LAB | Facility: HOSPITAL | Age: 82
End: 2017-06-28
Attending: INTERNAL MEDICINE
Payer: MEDICARE

## 2017-06-28 ENCOUNTER — OFFICE VISIT (OUTPATIENT)
Dept: NEPHROLOGY | Facility: CLINIC | Age: 82
End: 2017-06-28
Payer: MEDICARE

## 2017-06-28 ENCOUNTER — OFFICE VISIT (OUTPATIENT)
Dept: OPHTHALMOLOGY | Facility: CLINIC | Age: 82
End: 2017-06-28
Payer: MEDICARE

## 2017-06-28 VITALS
HEART RATE: 106 BPM | DIASTOLIC BLOOD PRESSURE: 82 MMHG | SYSTOLIC BLOOD PRESSURE: 136 MMHG | OXYGEN SATURATION: 98 % | TEMPERATURE: 98 F

## 2017-06-28 DIAGNOSIS — Z96.1 STATUS POST CATARACT EXTRACTION AND INSERTION OF INTRAOCULAR LENS, RIGHT: Primary | ICD-10-CM

## 2017-06-28 DIAGNOSIS — Z98.42 STATUS POST CATARACT EXTRACTION AND INSERTION OF INTRAOCULAR LENS, LEFT: ICD-10-CM

## 2017-06-28 DIAGNOSIS — N18.30 CKD (CHRONIC KIDNEY DISEASE), STAGE III: Primary | ICD-10-CM

## 2017-06-28 DIAGNOSIS — I12.9 BENIGN HYPERTENSIVE KIDNEY DISEASE WITH CHRONIC KIDNEY DISEASE STAGE I THROUGH STAGE IV, OR UNSPECIFIED(403.10): ICD-10-CM

## 2017-06-28 DIAGNOSIS — Z96.649 STATUS POST REVISION OF TOTAL HIP REPLACEMENT: ICD-10-CM

## 2017-06-28 DIAGNOSIS — Z98.41 STATUS POST CATARACT EXTRACTION AND INSERTION OF INTRAOCULAR LENS, RIGHT: Primary | ICD-10-CM

## 2017-06-28 DIAGNOSIS — Z86.718 PERSONAL HISTORY OF DVT (DEEP VEIN THROMBOSIS): ICD-10-CM

## 2017-06-28 DIAGNOSIS — H35.30 MACULAR DEGENERATION: ICD-10-CM

## 2017-06-28 DIAGNOSIS — Z96.1 STATUS POST CATARACT EXTRACTION AND INSERTION OF INTRAOCULAR LENS, LEFT: ICD-10-CM

## 2017-06-28 DIAGNOSIS — I89.0 LYMPHEDEMA: ICD-10-CM

## 2017-06-28 LAB
INR PPP: 1.9
PROTHROMBIN TIME: 19 SEC

## 2017-06-28 PROCEDURE — 99024 POSTOP FOLLOW-UP VISIT: CPT | Mod: S$GLB,,, | Performed by: OPHTHALMOLOGY

## 2017-06-28 PROCEDURE — 99999 PR PBB SHADOW E&M-EST. PATIENT-LVL II: CPT | Mod: PBBFAC,,, | Performed by: OPHTHALMOLOGY

## 2017-06-28 PROCEDURE — 36415 COLL VENOUS BLD VENIPUNCTURE: CPT | Mod: PO

## 2017-06-28 PROCEDURE — 99499 UNLISTED E&M SERVICE: CPT | Mod: S$GLB,,, | Performed by: INTERNAL MEDICINE

## 2017-06-28 PROCEDURE — 1126F AMNT PAIN NOTED NONE PRSNT: CPT | Mod: S$GLB,,, | Performed by: INTERNAL MEDICINE

## 2017-06-28 PROCEDURE — 1159F MED LIST DOCD IN RCRD: CPT | Mod: S$GLB,,, | Performed by: INTERNAL MEDICINE

## 2017-06-28 PROCEDURE — 99214 OFFICE O/P EST MOD 30 MIN: CPT | Mod: S$GLB,,, | Performed by: INTERNAL MEDICINE

## 2017-06-28 PROCEDURE — 85610 PROTHROMBIN TIME: CPT | Mod: PO

## 2017-06-28 PROCEDURE — 99999 PR PBB SHADOW E&M-EST. PATIENT-LVL II: CPT | Mod: PBBFAC,,, | Performed by: INTERNAL MEDICINE

## 2017-06-28 NOTE — PROGRESS NOTES
HPI     Post-op Evaluation    Additional comments: 2 week s/p phaco iol OS 6/13           Comments   Ashli ref     2 week s/p phaco iol OS 6/13/17     1. phaco iol OD done 4/25/17   AMD OD>OS     Pt states seeing much better at dist and near and no pain or irritations.     Gtts: PGN TID OS       Last edited by Cheryle Quintana on 6/28/2017 11:53 AM. (History)            Assessment /Plan     For exam results, see Encounter Report.    Status post cataract extraction and insertion of intraocular lens, right    Status post cataract extraction and insertion of intraocular lens, left    Macular degeneration - Right Eye      PO week #1 s/p phaco/IOL -    - doing well, no issues  - okay to d/c abx gtt  - continue PF/ketoroloc TID for total of 1 month    - f/up 3-4 wks for MRx, DFE with dr. gilliland    AMD  - start AREagnes HERRERA

## 2017-06-28 NOTE — PROGRESS NOTES
Subjective:       Patient ID: Nicole Lala is a 84 y.o. White female who presents for return patient evaluation for chronic renal failure.    She has no uremic or urinary symptoms and is in her usual state of health.  She is still wrapping her legs each week with no cellulitis or wounds with Dr. Hernandez at [facility].  She had her cataracts removed and can see much better now.      Review of Systems   Constitutional: Negative for appetite change, chills and fever.   Eyes: Negative for visual disturbance.   Respiratory: Positive for shortness of breath (with exertion). Negative for cough.    Cardiovascular: Positive for leg swelling. Negative for chest pain and palpitations.   Gastrointestinal: Negative for nausea.   Genitourinary: Negative for difficulty urinating, dysuria and hematuria.   Musculoskeletal: Positive for arthralgias (knees, B hip) and gait problem.   Skin: Negative for rash.   Hematological: Bruises/bleeds easily.         /82   Pulse 106   Temp 97.5 °F (36.4 °C) (Oral)   LMP  (LMP Unknown)   SpO2 98%     Objective:      Physical Exam   Constitutional: She appears well-developed and well-nourished. No distress.   HENT:   Head: Normocephalic and atraumatic.   Eyes: Conjunctivae are normal. No scleral icterus.   Neck: Normal range of motion. No JVD present.   Cardiovascular: Exam reveals no gallop and no friction rub.    No murmur heard.  Decreased tones with irregularly irregular rhythm   Pulmonary/Chest: Effort normal and breath sounds normal. No respiratory distress. She has no wheezes.   Abdominal: Soft. Bowel sounds are normal. Distention: obese. There is no tenderness.   Musculoskeletal: She exhibits edema (legs are wrapped).   Skin: Skin is warm and dry. No rash noted.   Psychiatric: She has a normal mood and affect.   Vitals reviewed.      Assessment:       1. CKD (chronic kidney disease), stage III    2. 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 Lymphedema        Plan:   Return to clinic in 3 months.  Labs for next visit include rp, pth. She gets her labs in [place].  Baseline creatinine is 1.2-1.6 since 2013.  PTH is 210 with a calcium of 9.6.  Her function is labile. She does not seem to have an active disease which is affecting her renal function however her cardiac function may be implicated in her labile function. She has pulmonary HTN in the past and this would explain both her edema and a labile kidney function.

## 2017-07-12 ENCOUNTER — LAB VISIT (OUTPATIENT)
Dept: LAB | Facility: HOSPITAL | Age: 82
End: 2017-07-12
Attending: INTERNAL MEDICINE
Payer: MEDICARE

## 2017-07-12 DIAGNOSIS — Z86.718 PERSONAL HISTORY OF DVT (DEEP VEIN THROMBOSIS): ICD-10-CM

## 2017-07-12 DIAGNOSIS — Z96.649 STATUS POST REVISION OF TOTAL HIP REPLACEMENT: ICD-10-CM

## 2017-07-12 LAB
INR PPP: 1.4
PROTHROMBIN TIME: 14 SEC

## 2017-07-12 PROCEDURE — 36415 COLL VENOUS BLD VENIPUNCTURE: CPT

## 2017-07-12 PROCEDURE — 85610 PROTHROMBIN TIME: CPT

## 2017-07-13 ENCOUNTER — ANTI-COAG VISIT (OUTPATIENT)
Dept: CARDIOLOGY | Facility: CLINIC | Age: 82
End: 2017-07-13

## 2017-07-13 DIAGNOSIS — Z86.718 PERSONAL HISTORY OF DVT (DEEP VEIN THROMBOSIS): ICD-10-CM

## 2017-07-19 ENCOUNTER — OFFICE VISIT (OUTPATIENT)
Dept: OPTOMETRY | Facility: CLINIC | Age: 82
End: 2017-07-19
Payer: MEDICARE

## 2017-07-19 DIAGNOSIS — Z98.890 POST-OPERATIVE STATE: Primary | ICD-10-CM

## 2017-07-19 PROCEDURE — 99024 POSTOP FOLLOW-UP VISIT: CPT | Mod: S$GLB,,, | Performed by: OPTOMETRIST

## 2017-07-19 PROCEDURE — 99999 PR PBB SHADOW E&M-EST. PATIENT-LVL II: CPT | Mod: PBBFAC,,, | Performed by: OPTOMETRIST

## 2017-07-19 NOTE — PROGRESS NOTES
HPI     1 month s/p phaco iol OS 6/13/17 Rupali    Pt states no changes since last visit. No eye pain, no longer using gtts.   Taking eye vitamins.     Last edited by Jina Medina on 7/19/2017 10:07 AM. (History)            Assessment /Plan     For exam results, see Encounter Report.    Post-operative state        1. Doing well. IOP and vision normal. Cont with reading glasses OTC.

## 2017-07-24 ENCOUNTER — ANTI-COAG VISIT (OUTPATIENT)
Dept: CARDIOLOGY | Facility: CLINIC | Age: 82
End: 2017-07-24

## 2017-07-24 ENCOUNTER — LAB VISIT (OUTPATIENT)
Dept: LAB | Facility: HOSPITAL | Age: 82
End: 2017-07-24
Attending: INTERNAL MEDICINE
Payer: MEDICARE

## 2017-07-24 DIAGNOSIS — Z96.649 STATUS POST REVISION OF TOTAL HIP REPLACEMENT: ICD-10-CM

## 2017-07-24 DIAGNOSIS — Z86.718 PERSONAL HISTORY OF DVT (DEEP VEIN THROMBOSIS): ICD-10-CM

## 2017-07-24 LAB
INR PPP: 1.5
PROTHROMBIN TIME: 15.3 SEC

## 2017-07-24 PROCEDURE — 36415 COLL VENOUS BLD VENIPUNCTURE: CPT

## 2017-07-24 PROCEDURE — 85610 PROTHROMBIN TIME: CPT

## 2017-08-04 ENCOUNTER — LAB VISIT (OUTPATIENT)
Dept: LAB | Facility: HOSPITAL | Age: 82
End: 2017-08-04
Attending: INTERNAL MEDICINE
Payer: MEDICARE

## 2017-08-04 DIAGNOSIS — Z86.718 PERSONAL HISTORY OF DVT (DEEP VEIN THROMBOSIS): ICD-10-CM

## 2017-08-04 DIAGNOSIS — Z96.649 STATUS POST REVISION OF TOTAL HIP REPLACEMENT: ICD-10-CM

## 2017-08-04 LAB
INR PPP: 1.7
PROTHROMBIN TIME: 17.9 SEC

## 2017-08-04 PROCEDURE — 36415 COLL VENOUS BLD VENIPUNCTURE: CPT

## 2017-08-04 PROCEDURE — 85610 PROTHROMBIN TIME: CPT

## 2017-08-07 ENCOUNTER — ANTI-COAG VISIT (OUTPATIENT)
Dept: CARDIOLOGY | Facility: CLINIC | Age: 82
End: 2017-08-07

## 2017-08-07 DIAGNOSIS — Z86.718 PERSONAL HISTORY OF DVT (DEEP VEIN THROMBOSIS): ICD-10-CM

## 2017-08-09 ENCOUNTER — OFFICE VISIT (OUTPATIENT)
Dept: FAMILY MEDICINE | Facility: CLINIC | Age: 82
End: 2017-08-09
Payer: MEDICARE

## 2017-08-09 VITALS
HEART RATE: 92 BPM | DIASTOLIC BLOOD PRESSURE: 90 MMHG | BODY MASS INDEX: 43.16 KG/M2 | WEIGHT: 275 LBS | RESPIRATION RATE: 16 BRPM | SYSTOLIC BLOOD PRESSURE: 132 MMHG | HEIGHT: 67 IN | OXYGEN SATURATION: 97 %

## 2017-08-09 DIAGNOSIS — I89.0 LYMPHEDEMA: ICD-10-CM

## 2017-08-09 DIAGNOSIS — R60.0 BILATERAL LEG EDEMA: ICD-10-CM

## 2017-08-09 DIAGNOSIS — R53.81 PHYSICAL DECONDITIONING: Primary | ICD-10-CM

## 2017-08-09 DIAGNOSIS — E66.01 MORBID OBESITY WITH BMI OF 40.0-44.9, ADULT: ICD-10-CM

## 2017-08-09 PROCEDURE — 99499 UNLISTED E&M SERVICE: CPT | Mod: S$GLB,,, | Performed by: NURSE PRACTITIONER

## 2017-08-09 PROCEDURE — 1126F AMNT PAIN NOTED NONE PRSNT: CPT | Mod: S$GLB,,, | Performed by: NURSE PRACTITIONER

## 2017-08-09 PROCEDURE — 99213 OFFICE O/P EST LOW 20 MIN: CPT | Mod: S$GLB,,, | Performed by: NURSE PRACTITIONER

## 2017-08-09 PROCEDURE — 3080F DIAST BP >= 90 MM HG: CPT | Mod: S$GLB,,, | Performed by: NURSE PRACTITIONER

## 2017-08-09 PROCEDURE — 3075F SYST BP GE 130 - 139MM HG: CPT | Mod: S$GLB,,, | Performed by: NURSE PRACTITIONER

## 2017-08-09 PROCEDURE — 3008F BODY MASS INDEX DOCD: CPT | Mod: S$GLB,,, | Performed by: NURSE PRACTITIONER

## 2017-08-09 PROCEDURE — 1159F MED LIST DOCD IN RCRD: CPT | Mod: S$GLB,,, | Performed by: NURSE PRACTITIONER

## 2017-08-09 PROCEDURE — 99999 PR PBB SHADOW E&M-EST. PATIENT-LVL IV: CPT | Mod: PBBFAC,,, | Performed by: NURSE PRACTITIONER

## 2017-08-13 RX ORDER — TORSEMIDE 20 MG/1
TABLET ORAL
Qty: 90 TABLET | Refills: 1 | Status: SHIPPED | OUTPATIENT
Start: 2017-08-13 | End: 2017-11-08

## 2017-08-13 NOTE — PROGRESS NOTES
Subjective:       Patient ID: Nicole Lala is a 84 y.o. female.    Chief Complaint: lymphedema    Here today for routine follow up.   Doing well. But needs a new wheelchair. She is not fitting in hers anymore      Review of Systems   Constitutional: Negative for activity change, appetite change, fatigue and fever.   HENT: Negative for congestion.    Respiratory: Negative for chest tightness, shortness of breath and wheezing.    Cardiovascular: Negative for chest pain, palpitations and leg swelling.   Gastrointestinal: Negative for abdominal pain, blood in stool, constipation, diarrhea, nausea and vomiting.   Skin: Negative for rash.   Neurological: Negative for dizziness.       Objective:      Physical Exam   Constitutional: She is oriented to person, place, and time. She appears well-nourished.   Cardiovascular: Normal rate, regular rhythm, normal heart sounds and intact distal pulses.    Pulmonary/Chest: Effort normal and breath sounds normal.   Neurological: She is alert and oriented to person, place, and time.   Skin: Skin is warm and dry.   Vitals reviewed.      Assessment:       1. Physical deconditioning    2. Bilateral leg edema    3. Morbid obesity with BMI of 40.0-44.9, adult    4. Lymphedema        Plan:       Nicole was seen today for lymphedema.    Diagnoses and all orders for this visit:    Physical deconditioning  -     WHEELCHAIR FOR HOME USE    Bilateral leg edema  -     WHEELCHAIR FOR HOME USE    Morbid obesity with BMI of 40.0-44.9, adult  -     WHEELCHAIR FOR HOME USE    Lymphedema  -     WHEELCHAIR FOR HOME USE

## 2017-08-14 ENCOUNTER — LAB VISIT (OUTPATIENT)
Dept: LAB | Facility: HOSPITAL | Age: 82
End: 2017-08-14
Attending: INTERNAL MEDICINE
Payer: MEDICARE

## 2017-08-14 ENCOUNTER — ANTI-COAG VISIT (OUTPATIENT)
Dept: CARDIOLOGY | Facility: CLINIC | Age: 82
End: 2017-08-14

## 2017-08-14 DIAGNOSIS — Z96.649 STATUS POST REVISION OF TOTAL HIP REPLACEMENT: ICD-10-CM

## 2017-08-14 DIAGNOSIS — Z86.718 PERSONAL HISTORY OF DVT (DEEP VEIN THROMBOSIS): ICD-10-CM

## 2017-08-14 LAB
INR PPP: 2.1
PROTHROMBIN TIME: 21.7 SEC

## 2017-08-14 PROCEDURE — 85610 PROTHROMBIN TIME: CPT

## 2017-08-14 PROCEDURE — 36415 COLL VENOUS BLD VENIPUNCTURE: CPT

## 2017-09-05 ENCOUNTER — TELEPHONE (OUTPATIENT)
Dept: FAMILY MEDICINE | Facility: CLINIC | Age: 82
End: 2017-09-05

## 2017-09-05 NOTE — TELEPHONE ENCOUNTER
----- Message from Apurva Cedillo sent at 9/5/2017  1:00 PM CDT -----  Contact: patient  Patient, Nicole Lala, [phone].  Patient would like to get portable oxygen to use in the car.  Please advise.  Thanks!

## 2017-09-08 ENCOUNTER — ANTI-COAG VISIT (OUTPATIENT)
Dept: CARDIOLOGY | Facility: CLINIC | Age: 82
End: 2017-09-08

## 2017-09-08 DIAGNOSIS — Z86.718 PERSONAL HISTORY OF DVT (DEEP VEIN THROMBOSIS): ICD-10-CM

## 2017-09-08 NOTE — PROGRESS NOTES
Left voice message with new dosing instructions and next INR date. Patient to return call and confirm understanding.  Lab appt made

## 2017-09-11 ENCOUNTER — HOSPITAL ENCOUNTER (INPATIENT)
Facility: HOSPITAL | Age: 82
LOS: 9 days | Discharge: HOME HEALTH | DRG: 193 | End: 2017-09-20
Attending: EMERGENCY MEDICINE | Admitting: HOSPITALIST
Payer: MEDICARE

## 2017-09-11 DIAGNOSIS — I48.91 ATRIAL FIBRILLATION: ICD-10-CM

## 2017-09-11 DIAGNOSIS — J96.21 ACUTE ON CHRONIC RESPIRATORY FAILURE WITH HYPOXIA: ICD-10-CM

## 2017-09-11 DIAGNOSIS — J18.9 CAP (COMMUNITY ACQUIRED PNEUMONIA): ICD-10-CM

## 2017-09-11 DIAGNOSIS — I27.20 PULMONARY HTN: ICD-10-CM

## 2017-09-11 DIAGNOSIS — J44.1 COPD EXACERBATION: Primary | ICD-10-CM

## 2017-09-11 DIAGNOSIS — G47.33 OSA (OBSTRUCTIVE SLEEP APNEA): ICD-10-CM

## 2017-09-11 LAB
ANION GAP SERPL CALC-SCNC: 5 MMOL/L
BASOPHILS # BLD AUTO: 0 K/UL
BASOPHILS NFR BLD: 0.3 %
BUN SERPL-MCNC: 19 MG/DL
CALCIUM SERPL-MCNC: 10 MG/DL
CHLORIDE SERPL-SCNC: 107 MMOL/L
CO2 SERPL-SCNC: 32 MMOL/L
CREAT SERPL-MCNC: 1.3 MG/DL
DIFFERENTIAL METHOD: ABNORMAL
EOSINOPHIL # BLD AUTO: 0.1 K/UL
EOSINOPHIL NFR BLD: 1.8 %
ERYTHROCYTE [DISTWIDTH] IN BLOOD BY AUTOMATED COUNT: 16.5 %
EST. GFR  (AFRICAN AMERICAN): 44 ML/MIN/1.73 M^2
EST. GFR  (NON AFRICAN AMERICAN): 38 ML/MIN/1.73 M^2
GLUCOSE SERPL-MCNC: 97 MG/DL
HCT VFR BLD AUTO: 37.8 %
HGB BLD-MCNC: 12.1 G/DL
INR PPP: 2.2
LACTATE SERPL-SCNC: 0.9 MMOL/L
LYMPHOCYTES # BLD AUTO: 0.6 K/UL
LYMPHOCYTES NFR BLD: 7.3 %
MCH RBC QN AUTO: 30.2 PG
MCHC RBC AUTO-ENTMCNC: 32.2 G/DL
MCV RBC AUTO: 94 FL
MONOCYTES # BLD AUTO: 0.8 K/UL
MONOCYTES NFR BLD: 10.2 %
NEUTROPHILS # BLD AUTO: 6.7 K/UL
NEUTROPHILS NFR BLD: 80.4 %
PLATELET # BLD AUTO: 183 K/UL
PMV BLD AUTO: 8.5 FL
POTASSIUM SERPL-SCNC: 5.4 MMOL/L
PROTHROMBIN TIME: 22.7 SEC
RBC # BLD AUTO: 4.02 M/UL
SODIUM SERPL-SCNC: 144 MMOL/L
WBC # BLD AUTO: 8.3 K/UL

## 2017-09-11 PROCEDURE — 99285 EMERGENCY DEPT VISIT HI MDM: CPT | Mod: 25

## 2017-09-11 PROCEDURE — 85025 COMPLETE CBC W/AUTO DIFF WBC: CPT

## 2017-09-11 PROCEDURE — 80048 BASIC METABOLIC PNL TOTAL CA: CPT

## 2017-09-11 PROCEDURE — 96365 THER/PROPH/DIAG IV INF INIT: CPT

## 2017-09-11 PROCEDURE — 93005 ELECTROCARDIOGRAM TRACING: CPT

## 2017-09-11 PROCEDURE — 36415 COLL VENOUS BLD VENIPUNCTURE: CPT

## 2017-09-11 PROCEDURE — 87040 BLOOD CULTURE FOR BACTERIA: CPT | Mod: 59

## 2017-09-11 PROCEDURE — 83605 ASSAY OF LACTIC ACID: CPT

## 2017-09-11 PROCEDURE — 85610 PROTHROMBIN TIME: CPT

## 2017-09-11 PROCEDURE — 63600175 PHARM REV CODE 636 W HCPCS: Performed by: EMERGENCY MEDICINE

## 2017-09-11 PROCEDURE — 96375 TX/PRO/DX INJ NEW DRUG ADDON: CPT

## 2017-09-11 PROCEDURE — 25000003 PHARM REV CODE 250: Performed by: EMERGENCY MEDICINE

## 2017-09-11 PROCEDURE — 25000242 PHARM REV CODE 250 ALT 637 W/ HCPCS: Performed by: EMERGENCY MEDICINE

## 2017-09-11 PROCEDURE — 96366 THER/PROPH/DIAG IV INF ADDON: CPT

## 2017-09-11 PROCEDURE — 94640 AIRWAY INHALATION TREATMENT: CPT

## 2017-09-11 PROCEDURE — 25000003 PHARM REV CODE 250: Performed by: HOSPITALIST

## 2017-09-11 PROCEDURE — 96376 TX/PRO/DX INJ SAME DRUG ADON: CPT

## 2017-09-11 PROCEDURE — 27000221 HC OXYGEN, UP TO 24 HOURS

## 2017-09-11 PROCEDURE — 11000001 HC ACUTE MED/SURG PRIVATE ROOM

## 2017-09-11 RX ORDER — DILTIAZEM HCL/D5W 125 MG/125
12.5 PLASTIC BAG, INJECTION (ML) INTRAVENOUS CONTINUOUS
Status: DISCONTINUED | OUTPATIENT
Start: 2017-09-11 | End: 2017-09-13

## 2017-09-11 RX ORDER — LEVALBUTEROL INHALATION SOLUTION 1.25 MG/3ML
1.25 SOLUTION RESPIRATORY (INHALATION)
Status: COMPLETED | OUTPATIENT
Start: 2017-09-11 | End: 2017-09-11

## 2017-09-11 RX ORDER — LISINOPRIL 10 MG/1
20 TABLET ORAL DAILY
Status: DISCONTINUED | OUTPATIENT
Start: 2017-09-12 | End: 2017-09-16

## 2017-09-11 RX ORDER — DILTIAZEM HCL/D5W 125 MG/125
12.5 PLASTIC BAG, INJECTION (ML) INTRAVENOUS CONTINUOUS
Status: DISCONTINUED | OUTPATIENT
Start: 2017-09-11 | End: 2017-09-11 | Stop reason: SDUPTHER

## 2017-09-11 RX ORDER — WARFARIN SODIUM 5 MG/1
5 TABLET ORAL DAILY
Status: DISCONTINUED | OUTPATIENT
Start: 2017-09-12 | End: 2017-09-11

## 2017-09-11 RX ORDER — DILTIAZEM HYDROCHLORIDE 5 MG/ML
10 INJECTION INTRAVENOUS
Status: COMPLETED | OUTPATIENT
Start: 2017-09-11 | End: 2017-09-11

## 2017-09-11 RX ORDER — MOXIFLOXACIN HYDROCHLORIDE 400 MG/1
400 TABLET ORAL DAILY
Status: DISCONTINUED | OUTPATIENT
Start: 2017-09-12 | End: 2017-09-12

## 2017-09-11 RX ORDER — MOXIFLOXACIN HYDROCHLORIDE 400 MG/1
400 TABLET ORAL DAILY
Status: DISCONTINUED | OUTPATIENT
Start: 2017-09-11 | End: 2017-09-11 | Stop reason: SDUPTHER

## 2017-09-11 RX ORDER — DILTIAZEM HCL 1 MG/ML
5 INJECTION, SOLUTION INTRAVENOUS CONTINUOUS
Status: DISCONTINUED | OUTPATIENT
Start: 2017-09-11 | End: 2017-09-11 | Stop reason: SDUPTHER

## 2017-09-11 RX ORDER — WARFARIN SODIUM 5 MG/1
5 TABLET ORAL DAILY
Status: DISCONTINUED | OUTPATIENT
Start: 2017-09-11 | End: 2017-09-17

## 2017-09-11 RX ORDER — DILTIAZEM HCL/D5W 125 MG/125
10 PLASTIC BAG, INJECTION (ML) INTRAVENOUS CONTINUOUS
Status: DISCONTINUED | OUTPATIENT
Start: 2017-09-11 | End: 2017-09-11

## 2017-09-11 RX ORDER — TORSEMIDE 20 MG/1
20 TABLET ORAL EVERY MORNING
Status: DISCONTINUED | OUTPATIENT
Start: 2017-09-12 | End: 2017-09-12

## 2017-09-11 RX ORDER — FUROSEMIDE 10 MG/ML
20 INJECTION INTRAMUSCULAR; INTRAVENOUS
Status: COMPLETED | OUTPATIENT
Start: 2017-09-11 | End: 2017-09-11

## 2017-09-11 RX ADMIN — DILTIAZEM HYDROCHLORIDE 10 MG: 5 INJECTION INTRAVENOUS at 05:09

## 2017-09-11 RX ADMIN — LEVALBUTEROL HYDROCHLORIDE 1.25 MG: 1.25 SOLUTION RESPIRATORY (INHALATION) at 07:09

## 2017-09-11 RX ADMIN — FUROSEMIDE 20 MG: 10 INJECTION, SOLUTION INTRAMUSCULAR; INTRAVENOUS at 08:09

## 2017-09-11 RX ADMIN — MOXIFLOXACIN HYDROCHLORIDE 400 MG: 400 TABLET, FILM COATED ORAL at 05:09

## 2017-09-11 RX ADMIN — WARFARIN SODIUM 5 MG: 5 TABLET ORAL at 11:09

## 2017-09-11 RX ADMIN — DILTIAZEM HYDROCHLORIDE 10 MG/HR: 5 INJECTION INTRAVENOUS at 05:09

## 2017-09-11 RX ADMIN — DILTIAZEM HYDROCHLORIDE 5 MG/HR: 5 INJECTION INTRAVENOUS at 05:09

## 2017-09-11 NOTE — ED NOTES
Presents to the ER with c/o SOB that started a few days ago. Associated complaints are productive cough with brown sputum, sore throat and bilateral lower extremity swelling. Patient has bilateral lower extremity lymphedema. Mucous membranes are pink and moist. Skin is warm, dry and intact. Bilateral coarse breath sounds with rhonchi and expiratory wheezing bilaterally,  patient is tachypenic with respiratory rate of 24 respirations are regular and labored. Denies congestion or rhinorrhea. BS active x4, no tenderness with palpation, abd is soft and not distended. Denies any appetite or activity change. S1S2, capillary refill is < 2 seconds. Denies dysuria, difficulty urinating, frequency, numbness, tingling or weakness. MARTIN NUNEZS

## 2017-09-11 NOTE — ED PROVIDER NOTES
Encounter Date: 9/11/2017    SCRIBE #1 NOTE: I, Yanni Perea, am scribing for, and in the presence of, Dr. Bashir .       History     Chief Complaint   Patient presents with    Cough     started yesterday       09/11/2017 3:24 PM     Chief Complaint: Cough      Nicole Lala is a 84 y.o. female with a history of HTN, CKD, A-fib (on coumadin) Lymphedema and DVT who presents to the ED with complaints of cough with brown phlegm associated with general weakness since today. She also notes an intermittent sore throat. She had an PCP appoinmtent today but presented to the ED after becoming SOB. Pt also reports she had blood work yesterday and was advised to take 1/2 of her coumadin daily 5 mg Rx yesterday and return to the 5 mg q.d. Pt reports 2L of oxygen use at home which she wears mainly when resting. She denies any worsened swelling or SOB. Pt denies fever, abd pain, and tobacco use. Allergens include Penicillin.         The history is provided by the patient.     Review of patient's allergies indicates:   Allergen Reactions    Penicillins Hives     Other reaction(s): Hives     Past Medical History:   Diagnosis Date    A-fib     Acute CHF 9/25/2013    Anticoagulant long-term use     Arthritis     Blood transfusion     Branch retinal vein occlusion of right eye     Cellulitis and abscess of lower extremity     Cerebral aneurysm     S/p repair    CKD (chronic kidney disease)     Elevated serum creatinine     HTN (hypertension)     Lymphedema     Macular degeneration - Right Eye 1/31/2013    Nuclear sclerosis - Both Eyes 1/31/2013    Done OU    Squamous cell carcinoma excised 11/16/16    R forearm     Past Surgical History:   Procedure Laterality Date    ABDOMINAL SURGERY      APPENDECTOMY      APPENDECTOMY      CATARACT EXTRACTION W/  INTRAOCULAR LENS IMPLANT Right 04/25/2017    CarynMead    CATARACT EXTRACTION W/  INTRAOCULAR LENS IMPLANT Left 06/13/2017    Rupali    CEREBRAL ANEURYSM  REPAIR  10/16/1994    RIGHT FRONTOTEMPORAL CRANIOTOMY WITH LIPPING OF SUPRACLINOID CAROTID ARTERY ANEURYSM      CEREBRAL ANEURYSM REPAIR  10/27/1994    RIGHT FRONTOTEMPORAL CRANIOTOMY WITH CLIPPING OF RIGHT MIDDLE CEREBRAL ARTERY ANEURYSM     COMPLICATED TOTAL HIP PROSTHESIS AND METHYLMETHACRALATE REMOVAL W/ SPACER INSERTION  8/29/2013    left    EYE SURGERY      Focal laser      OD    ADOLFO FILTER PLACEMENT  3/22/2010    HIP FRACTURE SURGERY  11/2009    left    JOINT REPLACEMENT Left     hip    Patent PI      Both Eye     SKIN BIOPSY Right     foream    TONSILLECTOMY      TOTAL ABDOMINAL HYSTERECTOMY      TOTAL HIP ARTHROPLASTY  3/19/2010    left    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 father    Aneurysm       maternal aunt-Brain    Glaucoma Daughter      Narrow Angle Glaucoma    Hypertension      Amblyopia Neg Hx     Blindness Neg Hx     Cataracts Neg Hx     Macular degeneration Neg Hx     Retinal detachment Neg Hx     Strabismus Neg Hx     Anesthesia problems Neg Hx     Malignant hypertension Neg Hx     Hypotension Neg Hx     Malignant hyperthermia Neg Hx     Pseudochol deficiency Neg Hx     Cancer Neg Hx     Thyroid disease Neg Hx     Melanoma Neg Hx     Psoriasis Neg Hx     Lupus Neg Hx      Social History   Substance Use Topics    Smoking status: Former Smoker     Quit date: 11/7/1992    Smokeless tobacco: Never Used    Alcohol use Yes      Comment: socially     Review of Systems   Constitutional: Negative for fever.   HENT: Positive for sore throat.    Respiratory: Positive for cough and shortness of breath.    Cardiovascular: Negative for chest pain.   Gastrointestinal: Negative for abdominal pain and nausea.   Genitourinary: Negative for dysuria.   Musculoskeletal: Negative for back pain.   Skin: Negative for rash.    Neurological: Positive for weakness.   Hematological: Bruises/bleeds easily.       Physical Exam     Initial Vitals [09/11/17 1501]   BP Pulse Resp Temp SpO2   (!) 172/79 (!) 134 (!) 24 97.2 °F (36.2 °C) (!) 89 %      MAP       110         Physical Exam    Nursing note and vitals reviewed.  Constitutional: She appears well-developed and well-nourished. She is not diaphoretic. No distress.   HENT:   Head: Normocephalic and atraumatic.   Mouth/Throat: Oropharynx is clear and moist.   Eyes: Conjunctivae and EOM are normal. Pupils are equal, round, and reactive to light.   Neck: Normal range of motion. Neck supple.   Cardiovascular: Normal rate, normal heart sounds and intact distal pulses. An irregularly irregular rhythm present. Exam reveals no gallop and no friction rub.    No murmur heard.  Pulmonary/Chest: No accessory muscle usage. Tachypnea noted. She has wheezes (Expiratory bilaerally ). She has rhonchi (Coarse ). She has no rales.   Abdominal: Soft. She exhibits no distension. There is no tenderness.   Musculoskeletal: Normal range of motion. She exhibits edema.   LE lymphedema bilaterally. Legs symmetrical.    Neurological: She is alert and oriented to person, place, and time.   Skin: No rash noted. No erythema.   Psychiatric: She has a normal mood and affect. Her speech is normal and behavior is normal. Judgment and thought content normal.         ED Course   Critical Care  Date/Time: 9/11/2017 11:07 PM  Performed by: MOSES KHOURY  Authorized by: DIMITRIOS ZIMMERMAN   Direct patient critical care time: 26 minutes  Additional history critical care time: 5 minutes  Ordering / reviewing critical care time: 6 minutes  Documentation critical care time: 5 minutes  Consulting other physicians critical care time: 3 minutes  Total critical care time (exclusive of procedural time) : 45 minutes        Labs Reviewed   CBC W/ AUTO DIFFERENTIAL - Abnormal; Notable for the following:        Result Value    RDW  16.5 (*)     MPV 8.5 (*)     Lymph # 0.6 (*)     Gran% 80.4 (*)     Lymph% 7.3 (*)     All other components within normal limits   BASIC METABOLIC PANEL - Abnormal; Notable for the following:     Potassium 5.4 (*)     CO2 32 (*)     Anion Gap 5 (*)     eGFR if  44 (*)     eGFR if non  38 (*)     All other components within normal limits   PROTIME-INR - Abnormal; Notable for the following:     Prothrombin Time 22.7 (*)     INR 2.2 (*)     All other components within normal limits   CULTURE, BLOOD   CULTURE, BLOOD   LACTIC ACID, PLASMA                        Scribe Attestation:   Scribe #1: I performed the above scribed service and the documentation accurately describes the services I performed. I attest to the accuracy of the note.    Attending Attestation:           Physician Attestation for Scribe:  Physician Attestation Statement for Scribe #1: I, Dr. Bashir , reviewed documentation, as scribed by Yanni Perea in my presence, and it is both accurate and complete.         Nicole Lala is a 84 y.o. female presenting with shortness of breath and cough consistent with community acquired pneumonia.  Patient is therapeutic on Coumadin and this is much less consistent with recurrent venous thromboembolism.  I doubt PE.  I do not think CT angiography is indicated.  CHF seems less likely with initial furosemide IV given in emergency department.  Patient closely monitored for multiple hours in the emergency department.  She does have tachypnea without accessory muscle use or retractions.  Mental status remains normal.  I do not think positive pressure ventilation is indicated at this point.  Serial chest x-ray shows no worsening.  I doubt ACS do not think cardiac biomarkers indicated.  Lactic acid is normal and I doubt sepsis.  Moxifloxacin initiated for antibiotic coverage with blood cultures drawn.  Titration of diltiazem for A. fib with RVR likely contributing to dyspnea  performed in ED.  I have discussed with Dr. Dong with the hospitalist service who has assumed care.        ED Course as of Sep 11 2305   Mon Sep 11, 2017   1605 EKG: Atrial fibrillation, RVR, rate of 145.  L axis.  Normal intervals. No new ST/T wave changes suggesting acute ischemia or infarction.    [MR]      ED Course User Index  [MR] Duane Bashir MD     Clinical Impression:     1. CAP (community acquired pneumonia)    2. Atrial fibrillation                               Duane Bashir MD  09/11/17 9530

## 2017-09-12 PROBLEM — J96.21 ACUTE ON CHRONIC RESPIRATORY FAILURE WITH HYPOXIA: Status: ACTIVE | Noted: 2017-09-12

## 2017-09-12 LAB
ANION GAP SERPL CALC-SCNC: 7 MMOL/L
BASOPHILS # BLD AUTO: 0 K/UL
BASOPHILS NFR BLD: 0.1 %
BNP SERPL-MCNC: 391 PG/ML
BUN SERPL-MCNC: 18 MG/DL
CALCIUM SERPL-MCNC: 9.4 MG/DL
CHLORIDE SERPL-SCNC: 105 MMOL/L
CO2 SERPL-SCNC: 32 MMOL/L
CREAT SERPL-MCNC: 1.2 MG/DL
DIASTOLIC DYSFUNCTION: NO
DIFFERENTIAL METHOD: ABNORMAL
EOSINOPHIL # BLD AUTO: 0.1 K/UL
EOSINOPHIL NFR BLD: 1.5 %
ERYTHROCYTE [DISTWIDTH] IN BLOOD BY AUTOMATED COUNT: 15.7 %
EST. GFR  (AFRICAN AMERICAN): 48 ML/MIN/1.73 M^2
EST. GFR  (NON AFRICAN AMERICAN): 42 ML/MIN/1.73 M^2
ESTIMATED PA SYSTOLIC PRESSURE: 31.36
GLUCOSE SERPL-MCNC: 91 MG/DL
HCT VFR BLD AUTO: 35.5 %
HGB BLD-MCNC: 11.3 G/DL
INR PPP: 2.1
LACTATE SERPL-SCNC: 2.2 MMOL/L
LYMPHOCYTES # BLD AUTO: 0.6 K/UL
LYMPHOCYTES NFR BLD: 9 %
MCH RBC QN AUTO: 30 PG
MCHC RBC AUTO-ENTMCNC: 31.9 G/DL
MCV RBC AUTO: 94 FL
MONOCYTES # BLD AUTO: 0.8 K/UL
MONOCYTES NFR BLD: 11.3 %
NEUTROPHILS # BLD AUTO: 5.5 K/UL
NEUTROPHILS NFR BLD: 78.1 %
PLATELET # BLD AUTO: 165 K/UL
PMV BLD AUTO: 8.9 FL
POTASSIUM SERPL-SCNC: 4.3 MMOL/L
PROCALCITONIN SERPL IA-MCNC: 0.1 NG/ML
PROTHROMBIN TIME: 21.6 SEC
RBC # BLD AUTO: 3.78 M/UL
RETIRED EF AND QEF - SEE NOTES: 55 (ref 55–65)
SODIUM SERPL-SCNC: 144 MMOL/L
TSH SERPL DL<=0.005 MIU/L-ACNC: 2.12 UIU/ML
WBC # BLD AUTO: 7 K/UL

## 2017-09-12 PROCEDURE — 83605 ASSAY OF LACTIC ACID: CPT

## 2017-09-12 PROCEDURE — 97530 THERAPEUTIC ACTIVITIES: CPT

## 2017-09-12 PROCEDURE — 97162 PT EVAL MOD COMPLEX 30 MIN: CPT

## 2017-09-12 PROCEDURE — 94761 N-INVAS EAR/PLS OXIMETRY MLT: CPT

## 2017-09-12 PROCEDURE — 27000221 HC OXYGEN, UP TO 24 HOURS

## 2017-09-12 PROCEDURE — 25000003 PHARM REV CODE 250: Performed by: NURSE PRACTITIONER

## 2017-09-12 PROCEDURE — 25000242 PHARM REV CODE 250 ALT 637 W/ HCPCS: Performed by: NURSE PRACTITIONER

## 2017-09-12 PROCEDURE — C8929 TTE W OR WO FOL WCON,DOPPLER: HCPCS

## 2017-09-12 PROCEDURE — 25000003 PHARM REV CODE 250: Performed by: HOSPITALIST

## 2017-09-12 PROCEDURE — 63600175 PHARM REV CODE 636 W HCPCS: Performed by: NURSE PRACTITIONER

## 2017-09-12 PROCEDURE — 27000190 HC CPAP FULL FACE MASK W/VALVE

## 2017-09-12 PROCEDURE — 63600175 PHARM REV CODE 636 W HCPCS: Performed by: INTERNAL MEDICINE

## 2017-09-12 PROCEDURE — 97802 MEDICAL NUTRITION INDIV IN: CPT

## 2017-09-12 PROCEDURE — 94660 CPAP INITIATION&MGMT: CPT

## 2017-09-12 PROCEDURE — 97110 THERAPEUTIC EXERCISES: CPT

## 2017-09-12 PROCEDURE — 80048 BASIC METABOLIC PNL TOTAL CA: CPT

## 2017-09-12 PROCEDURE — 25000003 PHARM REV CODE 250: Performed by: EMERGENCY MEDICINE

## 2017-09-12 PROCEDURE — 84443 ASSAY THYROID STIM HORMONE: CPT

## 2017-09-12 PROCEDURE — 99900035 HC TECH TIME PER 15 MIN (STAT)

## 2017-09-12 PROCEDURE — 84145 PROCALCITONIN (PCT): CPT

## 2017-09-12 PROCEDURE — 85610 PROTHROMBIN TIME: CPT

## 2017-09-12 PROCEDURE — 83880 ASSAY OF NATRIURETIC PEPTIDE: CPT

## 2017-09-12 PROCEDURE — 85025 COMPLETE CBC W/AUTO DIFF WBC: CPT

## 2017-09-12 PROCEDURE — 11000001 HC ACUTE MED/SURG PRIVATE ROOM

## 2017-09-12 PROCEDURE — 97165 OT EVAL LOW COMPLEX 30 MIN: CPT

## 2017-09-12 PROCEDURE — 97535 SELF CARE MNGMENT TRAINING: CPT

## 2017-09-12 PROCEDURE — 99223 1ST HOSP IP/OBS HIGH 75: CPT | Mod: ,,, | Performed by: INTERNAL MEDICINE

## 2017-09-12 PROCEDURE — 36415 COLL VENOUS BLD VENIPUNCTURE: CPT

## 2017-09-12 PROCEDURE — 94640 AIRWAY INHALATION TREATMENT: CPT

## 2017-09-12 RX ORDER — FUROSEMIDE 10 MG/ML
20 INJECTION INTRAMUSCULAR; INTRAVENOUS ONCE
Status: COMPLETED | OUTPATIENT
Start: 2017-09-12 | End: 2017-09-12

## 2017-09-12 RX ORDER — BISACODYL 10 MG
10 SUPPOSITORY, RECTAL RECTAL DAILY PRN
Status: DISCONTINUED | OUTPATIENT
Start: 2017-09-12 | End: 2017-09-18

## 2017-09-12 RX ORDER — DIGOXIN 0.25 MG/ML
250 INJECTION INTRAMUSCULAR; INTRAVENOUS ONCE
Status: COMPLETED | OUTPATIENT
Start: 2017-09-12 | End: 2017-09-12

## 2017-09-12 RX ORDER — IPRATROPIUM BROMIDE AND ALBUTEROL SULFATE 2.5; .5 MG/3ML; MG/3ML
3 SOLUTION RESPIRATORY (INHALATION) EVERY 4 HOURS
Status: DISCONTINUED | OUTPATIENT
Start: 2017-09-12 | End: 2017-09-16

## 2017-09-12 RX ORDER — DIGOXIN 125 MCG
0.12 TABLET ORAL DAILY
Status: DISCONTINUED | OUTPATIENT
Start: 2017-09-13 | End: 2017-09-13

## 2017-09-12 RX ORDER — LEVALBUTEROL 1.25 MG/.5ML
1.25 SOLUTION, CONCENTRATE RESPIRATORY (INHALATION) EVERY 6 HOURS
Status: DISCONTINUED | OUTPATIENT
Start: 2017-09-12 | End: 2017-09-12

## 2017-09-12 RX ORDER — DIGOXIN 0.25 MG/ML
500 INJECTION INTRAMUSCULAR; INTRAVENOUS ONCE
Status: COMPLETED | OUTPATIENT
Start: 2017-09-12 | End: 2017-09-12

## 2017-09-12 RX ADMIN — WARFARIN SODIUM 5 MG: 5 TABLET ORAL at 04:09

## 2017-09-12 RX ADMIN — IPRATROPIUM BROMIDE AND ALBUTEROL SULFATE 3 ML: .5; 3 SOLUTION RESPIRATORY (INHALATION) at 03:09

## 2017-09-12 RX ADMIN — HUMAN ALBUMIN MICROSPHERES AND PERFLUTREN 0.11 MG: 10; .22 INJECTION, SOLUTION INTRAVENOUS at 11:09

## 2017-09-12 RX ADMIN — DILTIAZEM HYDROCHLORIDE 12.5 MG/HR: 5 INJECTION INTRAVENOUS at 11:09

## 2017-09-12 RX ADMIN — LEVALBUTEROL 1.25 MG: 1.25 SOLUTION, CONCENTRATE RESPIRATORY (INHALATION) at 12:09

## 2017-09-12 RX ADMIN — LISINOPRIL 20 MG: 10 TABLET ORAL at 08:09

## 2017-09-12 RX ADMIN — IPRATROPIUM BROMIDE AND ALBUTEROL SULFATE 3 ML: .5; 3 SOLUTION RESPIRATORY (INHALATION) at 11:09

## 2017-09-12 RX ADMIN — DILTIAZEM HYDROCHLORIDE 12.5 MG/HR: 5 INJECTION INTRAVENOUS at 09:09

## 2017-09-12 RX ADMIN — IPRATROPIUM BROMIDE AND ALBUTEROL SULFATE 3 ML: .5; 3 SOLUTION RESPIRATORY (INHALATION) at 07:09

## 2017-09-12 RX ADMIN — FUROSEMIDE 20 MG: 10 INJECTION, SOLUTION INTRAVENOUS at 12:09

## 2017-09-12 RX ADMIN — DIGOXIN 250 MCG: 0.25 INJECTION INTRAMUSCULAR; INTRAVENOUS at 09:09

## 2017-09-12 RX ADMIN — AZITHROMYCIN MONOHYDRATE 500 MG: 500 INJECTION, POWDER, LYOPHILIZED, FOR SOLUTION INTRAVENOUS at 08:09

## 2017-09-12 RX ADMIN — DIGOXIN 500 MCG: 0.25 INJECTION INTRAMUSCULAR; INTRAVENOUS at 04:09

## 2017-09-12 RX ADMIN — DILTIAZEM HYDROCHLORIDE 12.5 MG/HR: 5 INJECTION INTRAVENOUS at 01:09

## 2017-09-12 RX ADMIN — CEFTRIAXONE 1 G: 1 INJECTION, SOLUTION INTRAVENOUS at 06:09

## 2017-09-12 NOTE — ED NOTES
"Patient is sitting up in bed, states, that she feels "Slightly better."   Dr. Bashir to order breathing treatment.  "

## 2017-09-12 NOTE — ED NOTES
Bipap has been suggested again to physician who is at the bedside. Dr. Bashir to order a repeat chest xray.

## 2017-09-12 NOTE — ED NOTES
Dose of diltiazem titrated to 12.5 mg /hr per Dr. Bashir who is at the bedside of patient.   No bipap orders at this time.

## 2017-09-12 NOTE — ASSESSMENT & PLAN NOTE
Possibly multifactorial with PNA and CHF in differential. Requiring NIPPV.  Continue bipap overnight and trial off during day, if patient requires continuous bipap therapy consider transfer to ICU.  Abx therapy as well as inhaled bronchodilators-- follow clinically.    Concern for CHF-- no historical echo evidence of HF, however clinically appears overloaded with elevated BNP. Given 40 mg lasix.  Check ECHO.  Monitor closely and diurese as required.  Cardiology consulted.

## 2017-09-12 NOTE — PROGRESS NOTES
09/12/17 0047   Preset CPAP/BiPAP Settings   Mode Of Delivery BiPAP   $ CPAP/BiPAP Daily Charge BiPAP/CPAP Daily   $ Initial CPAP/BiPAP Setup? Yes   $ Is patient using? Yes   Equipment Type V60   Airway Device Type medium nasal mask   Oxygen Concentration (%) 35   Ipap 15   EPAP (cm H2O) 6   Pressure Support (cm H2O) 9   Set Rate (Breaths/Min) 10   ITime (sec) 2   Rise Time (sec) 0.3   Patient Settings   Pulse 84   SpO2 96 %   Pulse Oximetry Type Intermittent   Resp (!) 31   Positioning HOB elevated 45 degrees   Tidal Volume (mL) 428   Minute Ventilation (L/Min) 9.7   Peak Inspiratory Pressure (cm H2O) 16   TiTOT (%) 30   Patient Trigger - ST Mode Only (%) 98   Total Leak (L/Min) 27   CPAP/BiPAP Alarms   High Pressure (cm H2O) 18   Low Pressure (cm H2O) 5   High RR (breaths/min) 40   Low RR (breaths/min) 7   pt placed on bipap tc well. Aero tx given pt RR is decreased from 32-35 to 21-23 Sats 96%

## 2017-09-12 NOTE — PLAN OF CARE
Problem: Patient Care Overview  Goal: Plan of Care Review  Outcome: Outcome(s) achieved Date Met: 09/12/17  Pt assited to [facility] commode after breakfast. Pt unable to have BM. Pt max assist to return to bed using 3 staff members. Pt became red in the face and very short of breath. HOB raised immediately to facilitate breathing. During bath pt hard hard time breathing during turns requiring bath to be halted and HOB raised. IV digoxin given. Explained why new medication given.

## 2017-09-12 NOTE — CONSULTS
Food & Nutrition  Education    Diet Education:CHF and Coumadin nutrition education  Time Spent:15 minutes  Learners:pt and son for coumadin; pt for CHF      Nutrition Education provided with handouts: yes for coumadin and vitamin k; and heart failure nutrition      Comments: Pt reports she has been on coumadin and is being followed r/t labs.    Nutrition r/t CHF.  Verbalized understanding for need for daily wts. Needs reinforcement with low sodium foods.       All questions and concerns answered. Dietitian's contact information provided.       Follow-Up:yes    Please Re-consult as needed

## 2017-09-12 NOTE — PHYSICIAN QUERY
"PT Name: Nicole Lala  MR #: [id number]    Physician Query Form - Pneumonia Clarification     CDS/: Merry Cabrera RN               Contact information:  [phone]  This form is a permanent document in the medical record.    Query Date:  September 12, 2017    By submitting this query, we are merely seeking further clarification of documentation. Please utilize your independent clinical judgment when addressing the question(s) below.    The Medical record contains the following:   Indicators   Supporting Clinical Findings Location in Medical Record   x "Pneumonia" documented Community acquired pneumonia 9/12 [facility]   x Chest X-Ray: Partial clearing right lung base compared to the prior exam. 9/12 CXR   x PaO2    PaCO2     O2 sat O2 sats 89% on room air  RR= 24   9/11 Flowsheet    Cultures      x Treatment  Azithromycin/ceftriaxone, supplemental O2, inhaled bronchodilators.      Azithromycin IVPB   Ceftriazxone IVPB    Moxifloxacin 400mg po daily      9/12 [facility]      9/12 [facility]      9/11 [facility]   x Supplemental O2 Bipap 35%   9/11 Flowsheet   x Other Acute on chronic respiratory failure with hypoxia      She reports a week or so of a "cold" describing cough with brown phlegm associated with general weakness and an intermittentently sore throat   9/12 [facility]       Provider, please specify type of pneumonia.    [  ] Bacterial Pneumonia -     ( if known, Specify organism): ______________________    [  ] Bacterial, Gram Negative organism Pneumonia    [  ] Other type of pneumonia (please specify): ______________________________________    [x  ] Clinically undetermined    Please document in your progress notes daily for the duration of treatment, until resolved, and include in your discharge summary.    .                                                                                    "

## 2017-09-12 NOTE — PLAN OF CARE
Problem: Arrhythmia/Dysrhythmia (Symptomatic) (Adult)  Goal: Signs and Symptoms of Listed Potential Problems Will be Absent, Minimized or Managed (Arrhythmia/Dysrhythmia)  Signs and symptoms of listed potential problems will be absent, minimized or managed by discharge/transition of care (reference Arrhythmia/Dysrhythmia (Symptomatic) (Adult) CPG).   Outcome: Ongoing (interventions implemented as appropriate)  Admitted with atrial fib RVR, on cardizem drip at 12.5 mg/hr, heart rate greatly improved.

## 2017-09-12 NOTE — ASSESSMENT & PLAN NOTE
Improved with cardizem gtt, continue at 12.5 mg/hr for now, will wean as able.  Consult cardiology.  Continue warfarin- therapeutic at this time.  Monitor closely on monitor.

## 2017-09-12 NOTE — PLAN OF CARE
Problem: Occupational Therapy Goal  Goal: Occupational Therapy Goal  Goals to be met by: 9-19-17     Patient will increase functional independence with ADLs by performing:    LE Dressing with Moderate Assistance.  Toileting from bedside commode with Moderate Assistance for hygiene and clothing management.   Bathing from  shower chair/bench with Moderate Assistance.  Supine to sit with Minimal Assistance.  Stand pivot transfers with Minimal Assistance.  Toilet transfer to bedside commode with Minimal Assistance.    Outcome: Ongoing (interventions implemented as appropriate)  OT Evaluation completed and poc established. Pt may benefit from inpatient skilled admission but not sure if pt and spouse receptive to this.  JARRED Adams

## 2017-09-12 NOTE — ED NOTES
ER techs attempted to get patient up to use the bedside commode, patient is unable to get up and support weight without having unsteady gait, patient has severe SOB when attempting to get out of bed. Dr. Bashir is aware.

## 2017-09-12 NOTE — HPI
"Nicole Lala is a 84 y.o. female with a history of HTN, CKD, A-fib (on coumadin), Lymphedema and DVT.  She was admitted to the service of hospital medicine with acute on chronic hypoxic respiratory failure.  She presented to the ED with complaints of severe SOB while getting into the car from her wheelchair.  She reports a week or so of a "cold" describing cough with brown phlegm associated with general weakness and an intermittentently sore throat. She denies any attempted OTC medications for symptoms. She had an PCP appointment today but presented to the ED after becoming SOB. The SOB is notable worse with exertion. Pt also reports she had blood work yesterday and was advised to take 1/2 of her coumadin daily 5 mg Rx yesterday and return to the 5 mg q.d. Pt reports 2L of oxygen use at home which she wears mainly when resting. She denies any worsening of her chronic LE swelling, chest pain, or syncope. Pt denies fever/chills, abd pain, and tobacco use.   "

## 2017-09-12 NOTE — CONSULTS
[facility]  Cardiology  Consult Note    Patient Name: Nicole Lala  MRN: [id number]  Admission Date: 9/11/2017  Hospital Length of Stay: 1 days  Code Status: Full Code   Attending Provider: Artie Dong MD   Consulting Provider: Yanet Ta NP  Primary Care Physician: Brandon Gray MD  Principal Problem:Acute on chronic respiratory failure with hypoxia    Patient information was obtained from patient and medical record.     Inpatient consult to Cardiology  Consult performed by: YANET TA  Consult ordered by: GRACIELA ESPITIA  Reason for consult: atrial fibrillation with RVR         Subjective:     Principle Problem:  Acute on chronic respiratory failure with hypoxia.     HPI:   PCP: Dr. Gray  Cardiology: Dr. Rivas    83 y/o WF with a h/o permanent atrial fibrillation, anticoagulated on coumadin, CHF, HTN, CKD, NADINE,  personal h/o DVT, s/p IVC filter 03/2010, home O2 use,and lymphedema. Admitted with acute on chronic respiratory failure after presenting to the ED with a c/o 3 day history of URI symptoms (productive cough with brown sputum, generalized weakness, and sore throat). Yesterday, developed some SOB worse than was she has been experiencing chronically prompting her ED visit. SOB worse with exertion. 2L home O2 use prn with increased use over the past week. Initial O2 sat of 89% on RA, improved with Bipap and supplemental O2 via NC. Patient expressing that her chronic LE seems to be a little worse than usual. She does not monitor her weight and cannot confirm compliance with a low sodium diet. Activity is mostly sedentary, uses a walker and WC for ambulation assistance. She mentions that she is supposed to take her diuretic (demadex) daily but adds that she usually only takes it 3 times a week. She forgot to take home regimen of long-acting Diltiazem 240mg daily yesterday. Stating that she forgets to take her medications 2 - 3 times a month on average. Here,  noted to be in atrial fibrillion with high rate into 150s, improved some after IV CCB. Remains on cardizem drip. BP stable. Deneis palpitations, CP, lightheadedness, syncope. Prior DCCV in 05/2016 and trial of antiarthrymic medication (multaq) which was stopped in 03/2017 after patient noted to being permanent atrial fibrillation. She is anticoagulated on coumadin. INR 2.0. BNP elevated at 391. Concern for pulmonary vascular congestion vs infiltrate on CXR. Patient currently receiving abx given clinical concern for CAP.  Dose of IV lasix administered.  Prior echo done in 04/2016 showing normal EF of 65%.       Past Medical History:   Diagnosis Date    A-fib     Acute CHF 9/25/2013    Anticoagulant long-term use     Arthritis     Blood transfusion     Branch retinal vein occlusion of right eye     Cellulitis and abscess of lower extremity     Cerebral aneurysm     S/p repair    CKD (chronic kidney disease)     Elevated serum creatinine     HTN (hypertension)     Lymphedema     Macular degeneration - Right Eye 1/31/2013    Nuclear sclerosis - Both Eyes 1/31/2013    Done OU    Squamous cell carcinoma excised 11/16/16    R forearm       Past Surgical History:   Procedure Laterality Date    ABDOMINAL SURGERY      APPENDECTOMY      APPENDECTOMY      CATARACT EXTRACTION W/  INTRAOCULAR LENS IMPLANT Right 04/25/2017    [facility]    CATARACT EXTRACTION W/  INTRAOCULAR LENS IMPLANT Left 06/13/2017    [facility]    CEREBRAL ANEURYSM REPAIR  10/16/1994    RIGHT FRONTOTEMPORAL CRANIOTOMY WITH LIPPING OF SUPRACLINOID CAROTID ARTERY ANEURYSM      CEREBRAL ANEURYSM REPAIR  10/27/1994    RIGHT FRONTOTEMPORAL CRANIOTOMY WITH CLIPPING OF RIGHT MIDDLE CEREBRAL ARTERY ANEURYSM     COMPLICATED TOTAL HIP PROSTHESIS AND METHYLMETHACRALATE REMOVAL W/ SPACER INSERTION  8/29/2013    left    EYE SURGERY      Focal laser      OD    ADOLFO FILTER PLACEMENT  3/22/2010    HIP FRACTURE SURGERY  11/2009    left    JOINT  REPLACEMENT Left     hip    Patent PI      Both Eye     SKIN BIOPSY Right     foream    TONSILLECTOMY      TOTAL ABDOMINAL HYSTERECTOMY      TOTAL HIP ARTHROPLASTY  3/19/2010    left    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      Current Outpatient Prescriptions on File Prior to Encounter   Medication Sig    CARTIA  mg 24 hr capsule TAKE 1 CAPSULE ONE TIME DAILY    lisinopril (PRINIVIL,ZESTRIL) 20 MG tablet TAKE 1 TABLET EVERY DAY    torsemide (DEMADEX) 20 MG Tab TAKE 1 TABLET EVERY MORNING    warfarin (COUMADIN) 2.5 MG tablet Take 1-2 tablets (2.5-5 mg total) by mouth Daily. (Patient taking differently: Take 2.5 mg by mouth Daily. Monday)    warfarin (COUMADIN) 5 MG tablet TAKE 1 TABLET EVERY EVENING AS INSTRUCTED BY COUMADIN CLINIC (Patient taking differently: TAKE 1 TABLET EVERY EVENING AS INSTRUCTED BY COUMADIN CLINIC Tuesday thru Sunday)     Family History     Problem Relation (Age of Onset)    Aneurysm Mother,     Coronary artery disease     Glaucoma Daughter    Hypertension     Stroke         Social History Main Topics    Smoking status: Former Smoker     Quit date: 11/7/1992    Smokeless tobacco: Never Used    Alcohol use Yes      Comment: socially    Drug use: No    Sexual activity: No     Review of Systems   Constitution: Positive for chills, weakness and malaise/fatigue. Negative for decreased appetite, diaphoresis and fever.   HENT: Positive for congestion and sore throat.    Eyes:        Denies acute vision changes   Cardiovascular: Positive for leg swelling and orthopnea. Negative for chest pain, dyspnea on exertion, irregular heartbeat, near-syncope, palpitations and syncope.   Respiratory: Positive for cough, shortness of breath, sputum production and wheezing.    Endocrine:  Negative for cold intolerance, heat intolerance, polydipsia and polyphagia.   Hematologic/Lymphatic: Does not bruise/bleed easily.   Skin: Negative for rash.   Musculoskeletal: Negative for arthritis, back pain and joint swelling.   Gastrointestinal: Negative for abdominal pain, constipation, diarrhea, melena, nausea and vomiting.   Genitourinary: Negative for dysuria and hematuria.   Neurological: Positive for headaches (sinus HA). Negative for dizziness, focal weakness, light-headedness, numbness, paresthesias and sensory change.   Psychiatric/Behavioral: Negative for altered mental status and depression.     Objective:     Vital Signs (Most Recent):  Temp: 97.3 °F (36.3 °C) (09/12/17 0756)  Pulse: 86 (09/12/17 0756)  Resp: (!) 21 (09/12/17 0756)  BP: 124/85 (09/12/17 0756)  SpO2: 97 % (09/12/17 0756) Vital Signs (24h Range):  Temp:  [97.2 °F (36.2 °C)-97.7 °F (36.5 °C)] 97.3 °F (36.3 °C)  Pulse:  [] 86  Resp:  [20-34] 21  SpO2:  [89 %-98 %] 97 %  BP: (108-172)/() 124/85     Weight: 125.2 kg (276 lb)  Body mass index is 43.23 kg/m².    SpO2: 97 %  O2 Device (Oxygen Therapy): BiPAP      Intake/Output Summary (Last 24 hours) at 09/12/17 0828  Last data filed at 09/12/17 0500   Gross per 24 hour   Intake              208 ml   Output             1300 ml   Net            -1092 ml       Lines/Drains/Airways     Drain                 Urethral Catheter 09/11/17 2104 16 Fr. less than 1 day          Peripheral Intravenous Line                 Peripheral IV - Single Lumen 09/11/17 1648 Right Forearm less than 1 day         Peripheral IV - Single Lumen 09/12/17 0525 Left;Medial Forearm less than 1 day                Physical Exam   Constitutional: She is oriented to person, place, and time. She appears well-developed and well-nourished. No distress.   HENT:   Head: Normocephalic and atraumatic.   Eyes: Conjunctivae and EOM are normal. Right eye exhibits no discharge. Left eye exhibits no discharge. No scleral  icterus.   Neck: Normal range of motion. Carotid bruit is not present.   Unable to adequately assess JVD d/t body habitus    Cardiovascular: An irregularly irregular rhythm present. Tachycardia present.    Pulses:       Radial pulses are 2+ on the right side, and 2+ on the left side.   Pulmonary/Chest: No respiratory distress. She has decreased breath sounds in the right lower field and the left lower field. She has rhonchi in the right lower field, the left middle field and the left lower field. She has rales in the right lower field and the left lower field.   Abdominal: Soft. Bowel sounds are normal. There is no tenderness. There is no guarding.   Musculoskeletal:   + BLE edema with dressings present (bandages from feet to area just below knees).    Neurological: She is alert and oriented to person, place, and time.   Follows commands, moves all extremities.    Skin: Skin is warm and dry. She is not diaphoretic. No cyanosis. Nails show no clubbing.   Psychiatric: She has a normal mood and affect. Her behavior is normal.   Nursing note and vitals reviewed.      Significant Labs:   Blood Culture:   Recent Labs  Lab 09/11/17  1728   LABBLOO No Growth to date  No Growth to date   , CMP   Recent Labs  Lab 09/11/17  1546 09/12/17  0411    144   K 5.4* 4.3    105   CO2 32* 32*   GLU 97 91   BUN 19 18   CREATININE 1.3 1.2   CALCIUM 10.0 9.4   ANIONGAP 5* 7*   ESTGFRAFRICA 44* 48*   EGFRNONAA 38* 42*   , CBC   Recent Labs  Lab 09/11/17  1546 09/12/17  0411   WBC 8.30 7.00   HGB 12.1 11.3*   HCT 37.8 35.5*    165   , INR   Recent Labs  Lab 09/11/17  1547 09/12/17  0411   INR 2.2* 2.1*    and Lipid Panel No results for input(s): CHOL, HDL, LDLCALC, TRIG, CHOLHDL in the last 48 hours.    Significant Imaging:   Echo 09/12/2017:  Results pending    CXR 09/11/2017:  The cardiac silhouette is enlarged but stable.  There is been slight clearing of the right lower lung field with probable chest mild mild  residual infiltrate right medial lung base.  Trace pleural effusion suggested  Impression:  Partial clearing right lung base compared to the prior exam.    Prior Echo 04/2016:  CONCLUSIONS     1 - Biatrial enlargement.     2 - Normal left ventricular systolic function (EF 60-65%).     3 - Normal right ventricular systolic function .     4 - The estimated PA systolic pressure is 36 mmHg.     5 - Mild tricuspid regurgitation.     Assessment and Plan:     Active Hospital Problems    Diagnosis    *Acute on chronic respiratory failure with hypoxia    CAP (community acquired pneumonia)    Morbid obesity with BMI of 40.0-44.9, adult    Essential hypertension    Atrial fibrillation with rapid ventricular response    Lymphedema    Physical deconditioning    Venous stasis of lower extremity       - Continue Cardizem drip for now and wean off as respiratory issues improve.   - Give Digoxin 500mcg IV x 1 now then 250mcg IV in 6 hours. Begin po digoxin tomorrow and check dig level in AM.   - Continue warfarin    - Continue abx for presumed CAP and supplemental O2   - Echo today, results pending. Additional recommendations to follow per Dr. Holborok.   - Continue ACEI  - Low sodium diet  - monitor on telemetry     This patient has been discussed with and evaluated by my collaborating physician, Dr. Holbrook.  Please see Dr. Holbrook's MD attestation above for additional information/recommendations.       Yanet Ta NP  Cardiology   [facility]

## 2017-09-12 NOTE — SUBJECTIVE & OBJECTIVE
Past Medical History:   Diagnosis Date    A-fib     Acute CHF 9/25/2013    Anticoagulant long-term use     Arthritis     Blood transfusion     Branch retinal vein occlusion of right eye     Cellulitis and abscess of lower extremity     Cerebral aneurysm     S/p repair    CKD (chronic kidney disease)     Elevated serum creatinine     HTN (hypertension)     Lymphedema     Macular degeneration - Right Eye 1/31/2013    Nuclear sclerosis - Both Eyes 1/31/2013    Done OU    Squamous cell carcinoma excised 11/16/16    R forearm       Past Surgical History:   Procedure Laterality Date    ABDOMINAL SURGERY      APPENDECTOMY      APPENDECTOMY      CATARACT EXTRACTION W/  INTRAOCULAR LENS IMPLANT Right 04/25/2017    [facility]    CATARACT EXTRACTION W/  INTRAOCULAR LENS IMPLANT Left 06/13/2017    [facility]    CEREBRAL ANEURYSM REPAIR  10/16/1994    RIGHT FRONTOTEMPORAL CRANIOTOMY WITH LIPPING OF SUPRACLINOID CAROTID ARTERY ANEURYSM      CEREBRAL ANEURYSM REPAIR  10/27/1994    RIGHT FRONTOTEMPORAL CRANIOTOMY WITH CLIPPING OF RIGHT MIDDLE CEREBRAL ARTERY ANEURYSM     COMPLICATED TOTAL HIP PROSTHESIS AND METHYLMETHACRALATE REMOVAL W/ SPACER INSERTION  8/29/2013    left    EYE SURGERY      Focal laser      OD    ADOLFO FILTER PLACEMENT  3/22/2010    HIP FRACTURE SURGERY  11/2009    left    JOINT REPLACEMENT Left     hip    Patent PI      Both Eye     SKIN BIOPSY Right     foream    TONSILLECTOMY      TOTAL ABDOMINAL HYSTERECTOMY      TOTAL HIP ARTHROPLASTY  3/19/2010    left    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      Current Outpatient Prescriptions on File Prior to Encounter   Medication Sig    CARTIA  mg 24 hr capsule TAKE 1 CAPSULE ONE TIME DAILY    lisinopril (PRINIVIL,ZESTRIL)  20 MG tablet TAKE 1 TABLET EVERY DAY    torsemide (DEMADEX) 20 MG Tab TAKE 1 TABLET EVERY MORNING    warfarin (COUMADIN) 2.5 MG tablet Take 1-2 tablets (2.5-5 mg total) by mouth Daily. (Patient taking differently: Take 2.5 mg by mouth Daily. Monday)    warfarin (COUMADIN) 5 MG tablet TAKE 1 TABLET EVERY EVENING AS INSTRUCTED BY COUMADIN CLINIC (Patient taking differently: TAKE 1 TABLET EVERY EVENING AS INSTRUCTED BY COUMADIN CLINIC Tuesday thru Sunday)     Family History     Problem Relation (Age of Onset)    Aneurysm Mother,     Coronary artery disease     Glaucoma Daughter    Hypertension     Stroke         Social History Main Topics    Smoking status: Former Smoker     Quit date: 11/7/1992    Smokeless tobacco: Never Used    Alcohol use Yes      Comment: socially    Drug use: No    Sexual activity: No     Review of Systems   Constitutional: Positive for activity change and fatigue. Negative for appetite change, chills and fever.   HENT: Positive for postnasal drip and sore throat. Negative for congestion, sinus pressure and trouble swallowing.    Eyes: Negative for photophobia and visual disturbance.   Respiratory: Positive for cough, shortness of breath and wheezing. Negative for chest tightness.    Cardiovascular: Positive for leg swelling (chronic, unchanged). Negative for chest pain and palpitations.   Gastrointestinal: Negative for abdominal distention, constipation, diarrhea, nausea and vomiting.   Genitourinary: Negative for dysuria, frequency and urgency.   Musculoskeletal: Negative for arthralgias and back pain.   Neurological: Positive for weakness. Negative for dizziness, syncope, light-headedness and headaches.   Psychiatric/Behavioral: Negative for confusion. The patient is not nervous/anxious.      Objective:     Vital Signs (Most Recent):  Temp: 97.7 °F (36.5 °C) (09/12/17 0323)  Pulse: 84 (09/12/17 0539)  Resp: 20 (09/12/17 0323)  BP: 114/62 (09/12/17 0323)  SpO2: (!) 94 % (09/12/17 0401)  Vital Signs (24h Range):  Temp:  [97.2 °F (36.2 °C)-97.7 °F (36.5 °C)] 97.7 °F (36.5 °C)  Pulse:  [] 84  Resp:  [20-34] 20  SpO2:  [89 %-98 %] 94 %  BP: (108-172)/() 114/62     Weight: 125.2 kg (276 lb)  Body mass index is 43.23 kg/m².    Physical Exam   Constitutional: She is oriented to person, place, and time. She appears well-developed and well-nourished. She appears distressed.   HENT:   Head: Normocephalic and atraumatic.   Eyes: Conjunctivae and EOM are normal. Pupils are equal, round, and reactive to light.   Neck: Normal range of motion. Neck supple. No thyromegaly present.   Cardiovascular: An irregularly irregular rhythm present. Tachycardia present.    Pulses difficult to palpate 2/2 lymphedema ACE wrapping   Pulmonary/Chest: She exhibits no tenderness.   Increased respiratory effort and rate diffuse wheezing and rales to bases.   Abdominal: Soft. Bowel sounds are normal. She exhibits no distension. There is no tenderness.   Musculoskeletal: Normal range of motion. She exhibits edema (severe edema to BLEs, equal-- ace wraps in place.). She exhibits no tenderness.   Neurological: She is alert and oriented to person, place, and time. No cranial nerve deficit.   Skin: Skin is warm and dry. Capillary refill takes less than 2 seconds.   Psychiatric: She has a normal mood and affect. Her behavior is normal. Judgment and thought content normal.        Significant Labs:   CBC:   Recent Labs  Lab 09/11/17  1546 09/12/17 0411   WBC 8.30 7.00   HGB 12.1 11.3*   HCT 37.8 35.5*    165     CMP:   Recent Labs  Lab 09/11/17  1546 09/12/17 0411    144   K 5.4* 4.3    105   CO2 32* 32*   GLU 97 91   BUN 19 18   CREATININE 1.3 1.2   CALCIUM 10.0 9.4   ANIONGAP 5* 7*   EGFRNONAA 38* 42*     Cardiac Markers:   Recent Labs  Lab 09/12/17 0411   *     Coagulation:   Recent Labs  Lab 09/12/17  0411   INR 2.1*     Lactic Acid:   Recent Labs  Lab 09/11/17  1546   LACTATE 0.9     TSH:    Recent Labs  Lab 09/12/17  0411   TSH 2.117       Significant Imaging:     EKG: AFib  with LEFT axis deviation, no ischemic ST or T wave abnormalities (my read).     CXR: Tiny suggesting mild basilar infiltrates particularly on the right.  Lateral view might aid in evaluation. (radiology report)    Repeat CXR (2114): increased pulmonary vascular congestion compared to prior with cardiomegaly (my interpretation).

## 2017-09-12 NOTE — ED NOTES
Patient is more comfortable, resting in bed. Patient is aware that she will be transferred to PCU in 15 minutes.

## 2017-09-12 NOTE — PLAN OF CARE
09/12/17 0747   Patient Assessment/Suction   Level of Consciousness (AVPU) alert   All Lung Fields Breath Sounds wheezes, expiratory   PRE-TX-O2-ETCO2   O2 Device (Oxygen Therapy) BiPAP   $ Is the patient on Oxygen? Yes   SpO2 (!) 93 %   Pulse Oximetry Type Intermittent   $ Pulse Oximetry - Multiple Charge Pulse Oximetry - Multiple   Pulse 90   Resp 20   Aerosol Therapy   $ Aerosol Therapy Charges Aerosol Treatment   Respiratory Treatment Status given   SVN/Inhaler Treatment Route in-line   Position During Treatment HOB at 45 degrees   Patient Tolerance good   Post-Treatment   Post-treatment Heart Rate (beats/min) 92   Post-treatment Resp Rate (breaths/min) 20   All Fields Breath Sounds unchanged   Preset CPAP/BiPAP Settings   Mode Of Delivery BiPAP   $ CPAP/BiPAP Daily Charge BiPAP/CPAP Daily   $ Initial CPAP/BiPAP Setup? No   $ Is patient using? Yes   Equipment Type V60   Airway Device Type medium nasal mask   Ipap 15   EPAP (cm H2O) 6   Pressure Support (cm H2O) 9   Set Rate (Breaths/Min) 8   ITime (sec) 2   Rise Time (sec) 0.3   Patient CPAP/BiPAP Settings   Tidal Volume (mL) 393   Peak Inspiratory Pressure (cm H2O) 16   TiTOT (%) 28   Total Leak (L/Min) 22   Patient Trigger - ST Mode Only (%) 82   CPAP/BiPAP Alarms   High Pressure (cm H2O) 18   Low Pressure (cm H2O) 5   Minute Ventilation (L/Min) 10   High RR (breaths/min) 40   Low RR (breaths/min) 7

## 2017-09-12 NOTE — ASSESSMENT & PLAN NOTE
Chronic, continue antihypertensive regimen.  Monitor BP closely, titrate medication as required for sustained BP control.

## 2017-09-12 NOTE — PLAN OF CARE
Problem: Patient Care Overview  Goal: Plan of Care Review  PT eval and treat completed. Pt seen for EOB sitting, progressed to standing with RW- dyspnea with exertion/wheezing O2 at 5 liters

## 2017-09-12 NOTE — HPI
PCP: Dr. Gray  Cardiology: Dr. Rivas    85 y/o WF with a h/o permanent atrial fibrillation, anticoagulated on coumadin, CHF, HTN, CKD, NADINE,  personal h/o DVT, s/p IVC filter 03/2010, home O2 use,and lymphedema. Admitted with acute on chronic respiratory failure after presenting to the ED with a c/o 3 day history of URI symptoms (productive cough with brown sputum, generalized weakness, and sore throat). Yesterday, developed some SOB worse than was she has been experiencing chronically prompting her ED visit. SOB worse with exertion. 2L home O2 use prn with increased use over the past week. Initial O2 sat of 89% on RA, improved with Bipap and supplemental O2 via NC. Patient expressing that her chronic LE seems to be a little worse than usual. She does not monitor her weight and cannot confirm compliance with a low sodium diet. Activity is mostly sedentary, uses a walker and WC for ambulation assistance. She mentions that she is supposed to take her diuretic (demadex) daily but adds that she usually only takes it 3 times a week. She forgot to take home regimen of long-acting Diltiazem 240mg daily yesterday. Stating that she forgets to take her medications 2 - 3 times a month on average. Here, noted to be in atrial fibrillion with high rate into 150s, improved some after IV CCB. Remains on cardizem drip. BP stable. Deneis palpitations, CP, lightheadedness, syncope. Prior DCCV in 05/2016 and trial of antiarthrymic medication (multaq) which was stopped in 03/2017 after patient noted to being permanent atrial fibrillation. She is anticoagulated on coumadin. INR 2.0. BNP elevated at 391. Concern for pulmonary vascular congestion vs infiltrate on CXR. Patient currently receiving abx given clinical concern for CAP.  Dose of IV lasix administered.  Prior echo done in 04/2016 showing normal EF of 65%.       Stop dig, echo resuts  Lasix today, monitor before giving more  Start Dilt 300mg and wean of cardizem gtt

## 2017-09-12 NOTE — PT/OT/SLP EVAL
Physical Therapy  Evaluation    Nicole Lala   MRN: [id number]   Admitting Diagnosis: Acute on chronic respiratory failure with hypoxia    PT Received On: 09/12/17  PT Start Time: 1341     PT Stop Time: 1411    PT Total Time (min): 30 min       Billable Minutes:  Evaluation 10, Therapeutic Activity 10 and Therapeutic Exercise 10    Diagnosis: Acute on chronic respiratory failure with hypoxia      Past Medical History:   Diagnosis Date    A-fib     Acute CHF 9/25/2013    Anticoagulant long-term use     Arthritis     Blood transfusion     Branch retinal vein occlusion of right eye     Cellulitis and abscess of lower extremity     Cerebral aneurysm     S/p repair    CKD (chronic kidney disease)     Elevated serum creatinine     HTN (hypertension)     Lymphedema     Macular degeneration - Right Eye 1/31/2013    Nuclear sclerosis - Both Eyes 1/31/2013    Done OU    Squamous cell carcinoma excised 11/16/16    R forearm      Past Surgical History:   Procedure Laterality Date    ABDOMINAL SURGERY      APPENDECTOMY      APPENDECTOMY      CATARACT EXTRACTION W/  INTRAOCULAR LENS IMPLANT Right 04/25/2017    Rupali    CATARACT EXTRACTION W/  INTRAOCULAR LENS IMPLANT Left 06/13/2017    Rupali    CEREBRAL ANEURYSM REPAIR  10/16/1994    RIGHT FRONTOTEMPORAL CRANIOTOMY WITH LIPPING OF SUPRACLINOID CAROTID ARTERY ANEURYSM      CEREBRAL ANEURYSM REPAIR  10/27/1994    RIGHT FRONTOTEMPORAL CRANIOTOMY WITH CLIPPING OF RIGHT MIDDLE CEREBRAL ARTERY ANEURYSM     COMPLICATED TOTAL HIP PROSTHESIS AND METHYLMETHACRALATE REMOVAL W/ SPACER INSERTION  8/29/2013    left    EYE SURGERY      Focal laser      OD    ADOLFO FILTER PLACEMENT  3/22/2010    HIP FRACTURE SURGERY  11/2009    left    JOINT REPLACEMENT Left     hip    Patent PI      Both Eye     SKIN BIOPSY Right     foream    TONSILLECTOMY      TOTAL ABDOMINAL HYSTERECTOMY      TOTAL HIP ARTHROPLASTY  3/19/2010    left    VENTRICULOPERITONEAL  SHUNT  11/15/1994    RIGHT       Referring physician: Iker  Date referred to PT: 09-    General Precautions: Standard, fall  Orthopedic Precautions: N/A   Braces: N/A            Patient History:  Lives With: spouse  Living Arrangements: house  Transportation Available: family or friend will provide  Equipment Currently Used at Home: walker, rolling, wheelchair  DME owned (not currently used):     Previous Level of Function:  Ambulation Skills: unable to perform  Transfer Skills: needs device and assist  ADL Skills: needs device and assist    Subjective:  Communicated with nurse Roberson prior to session.  Pt alert, interactive, pleasant  Chief Complaint: constipated  Patient goals: get well    Pain/Comfort  Pain Rating 1: 0/10      Objective:   Patient found with: oxygen, telemetry, peripheral IV, costa catheter     Cognitive Exam:  Oriented to: Person, Place, Time and Situation    Follows Commands/attention: Follows multistep  commands  Communication: clear/fluent  Safety awareness/insight to disability: intact    Physical Exam:  Postural examination/scapula alignment: Rounded shoulder and Head forward    Skin integrity: Dry  Edema: Severe LE's lymphedema    Sensation:   Intact    Upper Extremity Range of Motion:  Right Upper Extremity: WFL  Left Upper Extremity: WFL    Upper Extremity Strength:  Right Upper Extremity: WFL  Left Upper Extremity: WFL    Lower Extremity Range of Motion:  Right Lower Extremity: WFL  Left Lower Extremity: WFL    Lower Extremity Strength:  Right Lower Extremity: 3/5  Left Lower Extremity: 3/5     Fine motor coordination:  Intact    Gross motor coordination: WFL    Functional Mobility:  Bed Mobility:  Rolling/Turning Right: Minimum assistance  Scooting/Bridging: With assist of 2  Supine to Sit: Minimum Assistance  Sit to Supine: Maximum Assistance    Transfers:  Sit <> Stand Assistance: Minimum Assistance, Moderate Assistance  Sit <> Stand Assistive Device: Rolling Walker    Gait:    Gait Distance: standing only    Stairs:      Balance:   Static Sit: FAIR: Maintains without assist, but unable to take any challenges   Dynamic Sit: FAIR: Cannot move trunk without losing balance  Static Stand: POOR: Needs MODERATE assist to maintain  Dynamic stand: POOR: N/A    Therapeutic Activities and Exercises:  EOB sitting with extra time- pt with wheezing, dyspnea, O2 at 5 liters SAT 95-96%  Sit to stand with RW less than 1 minute- unable to step + wheezing  thera ex to LE's- pt with bilateral LE dressing with coban- attends wound care at [facility] 2 x week  Returned supine with assist x2- to HOB with 2 people assist    AM-PAC 6 CLICK MOBILITY  How much help from another person does this patient currently need?   1 = Unable, Total/Dependent Assistance  2 = A lot, Maximum/Moderate Assistance  3 = A little, Minimum/Contact Guard/Supervision  4 = None, Modified [place]/Independent          AM-PAC Raw Score CMS G-Code Modifier Level of Impairment Assistance   6 % Total / Unable   7 - 9 CM 80 - 100% Maximal Assist   10 - 14 CL 60 - 80% Moderate Assist   15 - 19 CK 40 - 60% Moderate Assist   20 - 22 CJ 20 - 40% Minimal Assist   23 CI 1-20% SBA / CGA   24 CH 0% Independent/ Mod I     Patient left HOB elevated with all lines intact, call button in reach, nurse shanthi notified and spouse present.    Assessment:   Nicole Lala is a 84 y.o. female with a medical diagnosis of Acute on chronic respiratory failure with hypoxia and presents with deconditioning, wheezing with exertion, low standing endurance. Previously home with 86 y/o spouse and functional at wheelchair level, cooks, cleans and attends OP wound care. Pt will benefit from continued therapies.    Rehab identified problem list/impairments: Rehab identified problem list/impairments: weakness, impaired endurance, impaired functional mobilty, impaired self care skills, decreased lower extremity function, edema, impaired  cardiopulmonary response to activity    Rehab potential is fair.    Activity tolerance: Fair    Discharge recommendations: Discharge Facility/Level Of Care Needs: nursing facility, skilled     Barriers to discharge: Barriers to Discharge: Decreased caregiver support    Equipment recommendations: Equipment Needed After Discharge: none     GOALS:    Physical Therapy Goals        Problem: Physical Therapy Goal    Goal Priority Disciplines Outcome Goal Variances Interventions   Physical Therapy Goal    High PT/OT, PT      Description:  Goals to be met by: 2017     Patient will increase functional independence with mobility by performin. Supine to sit with MInimal Assistance  2. Sit to supine with Moderate Assistance  3. Sit to stand transfer with Minimal Assistance  4. Bed to chair transfer with Minimal Assistance using Rolling Walker  5. Sitting at edge of bed x15 minutes with Minimal Assistance  6. Lower extremity exercise program x10 reps per handout, with assistance as needed                      PLAN:    Patient to be seen 6 x/week to address the above listed problems via therapeutic activities, therapeutic exercises  Plan of Care expires: 17  Plan of Care reviewed with: patient, spouse          Soraida Kelley, PT  2017

## 2017-09-12 NOTE — H&P
"[facility] Medicine  History & Physical    Patient Name: Nicole Lala  MRN: [id number]  Admission Date: 9/11/2017  Attending Physician: Artie Dong MD   Primary Care Provider: Brandon Gray MD         Patient information was obtained from patient and ER records.     Subjective:     Principal Problem:Acute on chronic respiratory failure with hypoxia    Chief Complaint:   Chief Complaint   Patient presents with    Cough     started yesterday        HPI: Nicole Lala is a 84 y.o. female with a history of HTN, CKD, A-fib (on coumadin), Lymphedema and DVT.  She was admitted to the service of hospital medicine with acute on chronic hypoxic respiratory failure.  She presented to the ED with complaints of severe SOB while getting into the car from her wheelchair.  She reports a week or so of a "cold" describing cough with brown phlegm associated with general weakness and an intermittentently sore throat. She denies any attempted OTC medications for symptoms. She had an PCP appointment today but presented to the ED after becoming SOB. The SOB is notable worse with exertion. Pt also reports she had blood work yesterday and was advised to take 1/2 of her coumadin daily 5 mg Rx yesterday and return to the 5 mg q.d. Pt reports 2L of oxygen use at home which she wears mainly when resting. She denies any worsening of her chronic LE swelling, chest pain, or syncope. Pt denies fever/chills, abd pain, and tobacco use.     Past Medical History:   Diagnosis Date    A-fib     Acute CHF 9/25/2013    Anticoagulant long-term use     Arthritis     Blood transfusion     Branch retinal vein occlusion of right eye     Cellulitis and abscess of lower extremity     Cerebral aneurysm     S/p repair    CKD (chronic kidney disease)     Elevated serum creatinine     HTN (hypertension)     Lymphedema     Macular degeneration - Right Eye 1/31/2013    Nuclear sclerosis - Both Eyes 1/31/2013    " Done OU    Squamous cell carcinoma excised 11/16/16    R forearm       Past Surgical History:   Procedure Laterality Date    ABDOMINAL SURGERY      APPENDECTOMY      APPENDECTOMY      CATARACT EXTRACTION W/  INTRAOCULAR LENS IMPLANT Right 04/25/2017    Rupali    CATARACT EXTRACTION W/  INTRAOCULAR LENS IMPLANT Left 06/13/2017    Rupali    CEREBRAL ANEURYSM REPAIR  10/16/1994    RIGHT FRONTOTEMPORAL CRANIOTOMY WITH LIPPING OF SUPRACLINOID CAROTID ARTERY ANEURYSM      CEREBRAL ANEURYSM REPAIR  10/27/1994    RIGHT FRONTOTEMPORAL CRANIOTOMY WITH CLIPPING OF RIGHT MIDDLE CEREBRAL ARTERY ANEURYSM     COMPLICATED TOTAL HIP PROSTHESIS AND METHYLMETHACRALATE REMOVAL W/ SPACER INSERTION  8/29/2013    left    EYE SURGERY      Focal laser      OD    ADOLFO FILTER PLACEMENT  3/22/2010    HIP FRACTURE SURGERY  11/2009    left    JOINT REPLACEMENT Left     hip    Patent PI      Both Eye     SKIN BIOPSY Right     foream    TONSILLECTOMY      TOTAL ABDOMINAL HYSTERECTOMY      TOTAL HIP ARTHROPLASTY  3/19/2010    left    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      Current Outpatient Prescriptions on File Prior to Encounter   Medication Sig    CARTIA  mg 24 hr capsule TAKE 1 CAPSULE ONE TIME DAILY    lisinopril (PRINIVIL,ZESTRIL) 20 MG tablet TAKE 1 TABLET EVERY DAY    torsemide (DEMADEX) 20 MG Tab TAKE 1 TABLET EVERY MORNING    warfarin (COUMADIN) 2.5 MG tablet Take 1-2 tablets (2.5-5 mg total) by mouth Daily. (Patient taking differently: Take 2.5 mg by mouth Daily. Monday)    warfarin (COUMADIN) 5 MG tablet TAKE 1 TABLET EVERY EVENING AS INSTRUCTED BY COUMADIN CLINIC (Patient taking differently: TAKE 1 TABLET EVERY EVENING AS INSTRUCTED BY COUMADIN CLINIC Tuesday thru Sunday)     Family  History     Problem Relation (Age of Onset)    Aneurysm Mother,     Coronary artery disease     Glaucoma Daughter    Hypertension     Stroke         Social History Main Topics    Smoking status: Former Smoker     Quit date: 11/7/1992    Smokeless tobacco: Never Used    Alcohol use Yes      Comment: socially    Drug use: No    Sexual activity: No     Review of Systems   Constitutional: Positive for activity change and fatigue. Negative for appetite change, chills and fever.   HENT: Positive for postnasal drip and sore throat. Negative for congestion, sinus pressure and trouble swallowing.    Eyes: Negative for photophobia and visual disturbance.   Respiratory: Positive for cough, shortness of breath and wheezing. Negative for chest tightness.    Cardiovascular: Positive for leg swelling (chronic, unchanged). Negative for chest pain and palpitations.   Gastrointestinal: Negative for abdominal distention, constipation, diarrhea, nausea and vomiting.   Genitourinary: Negative for dysuria, frequency and urgency.   Musculoskeletal: Negative for arthralgias and back pain.   Neurological: Positive for weakness. Negative for dizziness, syncope, light-headedness and headaches.   Psychiatric/Behavioral: Negative for confusion. The patient is not nervous/anxious.      Objective:     Vital Signs (Most Recent):  Temp: 97.7 °F (36.5 °C) (09/12/17 0323)  Pulse: 84 (09/12/17 0539)  Resp: 20 (09/12/17 0323)  BP: 114/62 (09/12/17 0323)  SpO2: (!) 94 % (09/12/17 0401) Vital Signs (24h Range):  Temp:  [97.2 °F (36.2 °C)-97.7 °F (36.5 °C)] 97.7 °F (36.5 °C)  Pulse:  [] 84  Resp:  [20-34] 20  SpO2:  [89 %-98 %] 94 %  BP: (108-172)/() 114/62     Weight: 125.2 kg (276 lb)  Body mass index is 43.23 kg/m².    Physical Exam   Constitutional: She is oriented to person, place, and time. She appears well-developed and well-nourished. She appears distressed.   HENT:   Head: Normocephalic and atraumatic.   Eyes: Conjunctivae and  EOM are normal. Pupils are equal, round, and reactive to light.   Neck: Normal range of motion. Neck supple. No thyromegaly present.   Cardiovascular: An irregularly irregular rhythm present. Tachycardia present.    Pulses difficult to palpate 2/2 lymphedema ACE wrapping   Pulmonary/Chest: She exhibits no tenderness.   Increased respiratory effort and rate diffuse wheezing and rales to bases.   Abdominal: Soft. Bowel sounds are normal. She exhibits no distension. There is no tenderness.   Musculoskeletal: Normal range of motion. She exhibits edema (severe edema to BLEs, equal-- ace wraps in place.). She exhibits no tenderness.   Neurological: She is alert and oriented to person, place, and time. No cranial nerve deficit.   Skin: Skin is warm and dry. Capillary refill takes less than 2 seconds.   Psychiatric: She has a normal mood and affect. Her behavior is normal. Judgment and thought content normal.        Significant Labs:   CBC:   Recent Labs  Lab 09/11/17  1546 09/12/17 0411   WBC 8.30 7.00   HGB 12.1 11.3*   HCT 37.8 35.5*    165     CMP:   Recent Labs  Lab 09/11/17  1546 09/12/17  0411    144   K 5.4* 4.3    105   CO2 32* 32*   GLU 97 91   BUN 19 18   CREATININE 1.3 1.2   CALCIUM 10.0 9.4   ANIONGAP 5* 7*   EGFRNONAA 38* 42*     Cardiac Markers:   Recent Labs  Lab 09/12/17 0411   *     Coagulation:   Recent Labs  Lab 09/12/17 0411   INR 2.1*     Lactic Acid:   Recent Labs  Lab 09/11/17 1546   LACTATE 0.9     TSH:   Recent Labs  Lab 09/12/17 0411   TSH 2.117       Significant Imaging:     EKG: AFib  with LEFT axis deviation, no ischemic ST or T wave abnormalities (my read).     CXR: Tiny suggesting mild basilar infiltrates particularly on the right.  Lateral view might aid in evaluation. (radiology report)    Repeat CXR (2114): increased pulmonary vascular congestion compared to prior with cardiomegaly (my interpretation).    Assessment/Plan:     * Acute on chronic  respiratory failure with hypoxia    Possibly multifactorial with PNA and CHF in differential. Requiring NIPPV.  Continue bipap overnight and trial off during day, if patient requires continuous bipap therapy consider transfer to ICU.  Abx therapy as well as inhaled bronchodilators-- follow clinically.    Concern for CHF-- no historical echo evidence of HF, however clinically appears overloaded with elevated BNP. Given 40 mg lasix.  Check ECHO.  Monitor closely and diurese as required.  Cardiology consulted.        CAP (community acquired pneumonia)    Clinically concerning for PNA.  Azithromycin/ceftriaxone, supplemental O2, inhaled bronchodilators.            Essential hypertension    Chronic, continue antihypertensive regimen.  Monitor BP closely, titrate medication as required for sustained BP control.          Atrial fibrillation with rapid ventricular response    Improved with cardizem gtt, continue at 12.5 mg/hr for now, will wean as able.  Consult cardiology.  Continue warfarin- therapeutic at this time.  Monitor closely on monitor.           Lymphedema    Continue ACE wrapping as per home regimen. Keep extremities elevated.          Physical deconditioning    PT/OT consultation.          Venous stasis of lower extremity                VTE Risk Mitigation         Ordered     Medium Risk of VTE  Once      09/12/17 0150     Reason for No Pharmacological VTE Prophylaxis  Once      09/12/17 0150     warfarin (COUMADIN) tablet 5 mg  Daily     Route:  Oral        09/11/17 3809         I spent 20 minutes of face to face discussion regarding advance directives and end of life planning which included patient. Patient understands the seriousness of their condition and would like to make their end of life decisions known as follows- Full code.  Patient expresses she would not want to be kept alive on life support, but would like to be resuscitated in treatment of condition as warranted. Recommend completion of advanced  directives once clinically improved.      Merry Borrego NP  Department of Hospital Medicine   [facility]

## 2017-09-12 NOTE — ASSESSMENT & PLAN NOTE
BMI 43.2.  Obesity compounds patient co-morbidities and complicates treatment course.  Counseling given regarding diet modification and exercise recommendations.

## 2017-09-12 NOTE — PT/OT/SLP EVAL
Occupational Therapy  Evaluation    Nicole Lala   MRN: [id number]   Admitting Diagnosis: Acute on chronic respiratory failure with hypoxia    OT Date of Treatment: 09/12/17   OT Start Time: 0830  OT Stop Time: 1000  OT Total Time (min): 90 min    Billable Minutes:  Evaluation 15  Self Care/Home Management 75      Diagnosis: Acute on chronic respiratory failure with hypoxia       Past Medical History:   Diagnosis Date    A-fib     Acute CHF 9/25/2013    Anticoagulant long-term use     Arthritis     Blood transfusion     Branch retinal vein occlusion of right eye     Cellulitis and abscess of lower extremity     Cerebral aneurysm     S/p repair    CKD (chronic kidney disease)     Elevated serum creatinine     HTN (hypertension)     Lymphedema     Macular degeneration - Right Eye 1/31/2013    Nuclear sclerosis - Both Eyes 1/31/2013    Done OU    Squamous cell carcinoma excised 11/16/16    R forearm      Past Surgical History:   Procedure Laterality Date    ABDOMINAL SURGERY      APPENDECTOMY      APPENDECTOMY      CATARACT EXTRACTION W/  INTRAOCULAR LENS IMPLANT Right 04/25/2017    [facility]    CATARACT EXTRACTION W/  INTRAOCULAR LENS IMPLANT Left 06/13/2017    [facility]    CEREBRAL ANEURYSM REPAIR  10/16/1994    RIGHT FRONTOTEMPORAL CRANIOTOMY WITH LIPPING OF SUPRACLINOID CAROTID ARTERY ANEURYSM      CEREBRAL ANEURYSM REPAIR  10/27/1994    RIGHT FRONTOTEMPORAL CRANIOTOMY WITH CLIPPING OF RIGHT MIDDLE CEREBRAL ARTERY ANEURYSM     COMPLICATED TOTAL HIP PROSTHESIS AND METHYLMETHACRALATE REMOVAL W/ SPACER INSERTION  8/29/2013    left    EYE SURGERY      Focal laser      OD    ADOLFO FILTER PLACEMENT  3/22/2010    HIP FRACTURE SURGERY  11/2009    left    JOINT REPLACEMENT Left     hip    Patent PI      Both Eye     SKIN BIOPSY Right     foream    TONSILLECTOMY      TOTAL ABDOMINAL HYSTERECTOMY      TOTAL HIP ARTHROPLASTY  3/19/2010    left    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     "RIGHT       Referring physician: Iker  Date referred to OT: 9-11-17    General Precautions: Standard, fall, respiratory  Orthopedic Precautions: N/A  Braces: N/A    Do you have any cultural, spiritual, Orthodoxy conflicts, given your current situation?: none     Patient History:  Living Environment  Lives With: spouse  Living Arrangements: house  Home Accessibility:  (no concerns)  Transportation Available: family or friend will provide  Living Environment Comment:  (pt w/c bound, able to transfer with use of RW, has handicapped shower.)  Equipment Currently Used at Home: shower chair, wheelchair, walker, rolling (sleeps in a lift chair)    Prior level of function:   Bed Mobility/Transfers: needs device and assist  Grooming: independent  Bathing: needs device and assist  Upper Body Dressing: independent  Lower Body Dressing: needs assist  Toileting: needs device and assist  Home Management Skills: unable to perform  Homemaking Responsibilities: No  Driving License: No  Mode of Transportation: Family     Dominant hand: right    Subjective:  Communicated with nurse prior to session.  " I would like to get to the bathroom."  Chief Complaint: breathing difficulty  Patient/Family stated goals: to be able to return home.    Pain/Comfort  Pain Rating 1: 0/10  Pain Rating Post-Intervention 1: 0/10    Objective:  Patient found with: telemetry, oxygen, peripheral IV, costa catheter (B lower legs bandaged)    Cognitive Exam:  Oriented to: Person, Place, Time and Situation, although limited insight in to situation.  Follows Commands/attention: Follows multistep  commands  Communication: clear/fluent  Memory:  No Deficits noted  Safety awareness/insight to disability: impaired  Coping skills/emotional control: Appropriate to situation    Visual/perceptual:  Intact    Physical Exam:  Postural examination/scapula alignment: Rounded shoulder and Head forward  Skin integrity: Visible skin intact  Edema: Severe B above knee " significant swelling 2/2 coban wraps on lower leg from wound care center. Pt well known to this writer and has hx of B LE lymphedema.    Sensation:   Intact    Upper Extremity Range of Motion:  Right Upper Extremity: WFL  Left Upper Extremity: WFL    Upper Extremity Strength:  Right Upper Extremity: WFL  Left Upper Extremity: WFL   Strength: WNL    Fine motor coordination:   Intact    Gross motor coordination: impaired by body habitus although this is not acute issue.    Functional Mobility:  Bed Mobility:  Rolling/Turning Right: Moderate assistance  Scooting/Bridging: Minimum Assistance  Supine to Sit: Moderate Assistance (with extended time 2/2 respiratory issues.)    Transfers:  Sit <> Stand Assistance: Minimum Assistance  Sit <> Stand Assistive Device: Rolling Walker  Toilet Transfer Technique: Stand Pivot  Toilet Transfer Assistance: Moderate Assistance  Toilet Transfer Assistive Device: Rolling Walker, bedside commode    Functional Ambulation: NA    Activities of Daily Living:  Feeding Level of Assistance: Modified independent  UE Dressing Level of Assistance: Minimum assistance  LE Dressing Level of Assistance: Total assistance    Grooming Position: EOB  Grooming Level of Assistance: Supervision  Toileting Where Assessed: Bedside commode  Toileting Level of Assistance: Total assistance      Balance:   Static Sit: NORMAL: No deviations seen in posture held statically  Dynamic Sit: GOOD+: Maintains balance through MAXIMAL excursions of active trunk motion  Static Stand: RW and min-mod assist  Dynamic stand: RW and mod assist to transfer    Therapeutic Activities and Exercises:  Session included evaluation and establishment of poc. Also, included bsc transfers, grooming, toileting and UE exercises. Pt motivated to get in to shower in a.m.     AM-PAC 6 CLICK ADL  How much help from another person does this patient currently need?  1 = Unable, Total/Dependent Assistance  2 = A lot, Maximum/Moderate  "Assistance  3 = A little, Minimum/Contact Guard/Supervision  4 = None, Modified [place]/Independent    Putting on and taking off regular lower body clothing? : 2  Bathing (including washing, rinsing, drying)?: 2  Toileting, which includes using toilet, bedpan, or urinal? : 1  Putting on and taking off regular upper body clothing?: 3  Taking care of personal grooming such as brushing teeth?: 4  Eating meals?: 4  Total Score: 16    AM-PAC Raw Score CMS "G-Code Modifier Level of Impairment Assistance   6 % Total / Unable   7 - 9 CM 80 - 100% Maximal Assist   10-14 CL 60 - 80% Moderate Assist   15 - 19 CK 40 - 60% Moderate Assist   20 - 22 CJ 20 - 40% Minimal Assist   23 CI 1-20% SBA / CGA   24 CH 0% Independent/ Mod I       Patient left on bsc with all lines intact, call button in reach and nurse notified    Assessment:  Nicole Lala is a 84 y.o. female with a medical diagnosis of Acute on chronic respiratory failure with hypoxia . Pt well known to this writer from outpatient lymphedema services. Pt has been wheelchair bound for > 5years, sleeps in lift chair and transfers with RW at home. Pt goes to wound are 2x weekly for lower leg edema and wound care. Contributing factor to decline is respiratory decline. Pt became extremely SOB with minimal activity, productive cough throughout session.    Rehab identified problem list/impairments: Rehab identified problem list/impairments: impaired endurance, impaired cardiopulmonary response to activity, weakness, decreased lower extremity function, impaired functional mobilty, impaired self care skills    Rehab potential is good.    Activity tolerance: Good    Discharge recommendations: Discharge Facility/Level Of Care Needs:  (skilled vs home with 24 hour assistance and therapy.)     Barriers to discharge: Barriers to Discharge: Decreased caregiver support    Equipment recommendations: none     GOALS:    Occupational Therapy Goals        Problem: " Occupational Therapy Goal    Goal Priority Disciplines Outcome Interventions   Occupational Therapy Goal     OT, PT/OT Ongoing (interventions implemented as appropriate)    Description:  Goals to be met by: 9-19-17     Patient will increase functional independence with ADLs by performing:    LE Dressing with Moderate Assistance.  Toileting from bedside commode with Moderate Assistance for hygiene and clothing management.   Bathing from  shower chair/bench with Moderate Assistance.  Supine to sit with Minimal Assistance.  Stand pivot transfers with Minimal Assistance.  Toilet transfer to bedside commode with Minimal Assistance.                      PLAN:  Patient to be seen 3 x/week, 4 x/week to address the above listed problems via self-care/home management, therapeutic activities, therapeutic exercises  Plan of Care expires: 10/12/17  Plan of Care reviewed with: patient, spouse         JARRED Adams  09/12/2017

## 2017-09-12 NOTE — PHYSICIAN QUERY
"PT Name: Nicole Lala  MR #: [id number]     Physician Query Form - Documentation Clarification      CDS/: Merry Cabrera RN              Contact information: [phone]  9/13 Cards answered in 9/12 PN GENAROT    This form is a permanent document in the medical record.     Query Date: September 12, 2017    By submitting this query, we are merely seeking further clarification of documentation. Please utilize your independent clinical judgment when addressing the question(s) below.    The Medical record reflects the following:    Supporting Clinical Findings Location in Medical Record   BMI  43.3  Ht 5'7"  Wt  125.2kg     9/12 Flowsheet                                                                            Doctor, Please specify diagnosis or diagnoses associated with above clinical findings.                            (  )  Obesity (*BMI>30)                     (  )  Morbid obesity (*BMI >40)                     (  )  Unable to Determine                   (  )  Other: _____________________  "

## 2017-09-12 NOTE — ASSESSMENT & PLAN NOTE
Clinically concerning for PNA.  Azithromycin/ceftriaxone, supplemental O2, inhaled bronchodilators.

## 2017-09-12 NOTE — SUBJECTIVE & OBJECTIVE
Past Medical History:   Diagnosis Date    A-fib     Acute CHF 9/25/2013    Anticoagulant long-term use     Arthritis     Blood transfusion     Branch retinal vein occlusion of right eye     Cellulitis and abscess of lower extremity     Cerebral aneurysm     S/p repair    CKD (chronic kidney disease)     Elevated serum creatinine     HTN (hypertension)     Lymphedema     Macular degeneration - Right Eye 1/31/2013    Nuclear sclerosis - Both Eyes 1/31/2013    Done OU    Squamous cell carcinoma excised 11/16/16    R forearm       Past Surgical History:   Procedure Laterality Date    ABDOMINAL SURGERY      APPENDECTOMY      APPENDECTOMY      CATARACT EXTRACTION W/  INTRAOCULAR LENS IMPLANT Right 04/25/2017    [facility]    CATARACT EXTRACTION W/  INTRAOCULAR LENS IMPLANT Left 06/13/2017    [facility]    CEREBRAL ANEURYSM REPAIR  10/16/1994    RIGHT FRONTOTEMPORAL CRANIOTOMY WITH LIPPING OF SUPRACLINOID CAROTID ARTERY ANEURYSM      CEREBRAL ANEURYSM REPAIR  10/27/1994    RIGHT FRONTOTEMPORAL CRANIOTOMY WITH CLIPPING OF RIGHT MIDDLE CEREBRAL ARTERY ANEURYSM     COMPLICATED TOTAL HIP PROSTHESIS AND METHYLMETHACRALATE REMOVAL W/ SPACER INSERTION  8/29/2013    left    EYE SURGERY      Focal laser      OD    ADOLFO FILTER PLACEMENT  3/22/2010    HIP FRACTURE SURGERY  11/2009    left    JOINT REPLACEMENT Left     hip    Patent PI      Both Eye     SKIN BIOPSY Right     foream    TONSILLECTOMY      TOTAL ABDOMINAL HYSTERECTOMY      TOTAL HIP ARTHROPLASTY  3/19/2010    left    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      Current Outpatient Prescriptions on File Prior to Encounter   Medication Sig    CARTIA  mg 24 hr capsule TAKE 1 CAPSULE ONE TIME DAILY    lisinopril (PRINIVIL,ZESTRIL)  20 MG tablet TAKE 1 TABLET EVERY DAY    torsemide (DEMADEX) 20 MG Tab TAKE 1 TABLET EVERY MORNING    warfarin (COUMADIN) 2.5 MG tablet Take 1-2 tablets (2.5-5 mg total) by mouth Daily. (Patient taking differently: Take 2.5 mg by mouth Daily. Monday)    warfarin (COUMADIN) 5 MG tablet TAKE 1 TABLET EVERY EVENING AS INSTRUCTED BY COUMADIN CLINIC (Patient taking differently: TAKE 1 TABLET EVERY EVENING AS INSTRUCTED BY COUMADIN CLINIC Tuesday thru Sunday)     Family History     Problem Relation (Age of Onset)    Aneurysm Mother,     Coronary artery disease     Glaucoma Daughter    Hypertension     Stroke         Social History Main Topics    Smoking status: Former Smoker     Quit date: 11/7/1992    Smokeless tobacco: Never Used    Alcohol use Yes      Comment: socially    Drug use: No    Sexual activity: No     Review of Systems   Constitution: Positive for chills, weakness and malaise/fatigue. Negative for decreased appetite, diaphoresis and fever.   HENT: Positive for congestion and sore throat.    Eyes:        Denies acute vision changes   Cardiovascular: Positive for leg swelling and orthopnea. Negative for chest pain, dyspnea on exertion, irregular heartbeat, near-syncope, palpitations and syncope.   Respiratory: Positive for cough, shortness of breath, sputum production and wheezing.    Endocrine: Negative for cold intolerance, heat intolerance, polydipsia and polyphagia.   Hematologic/Lymphatic: Does not bruise/bleed easily.   Skin: Negative for rash.   Musculoskeletal: Negative for arthritis, back pain and joint swelling.   Gastrointestinal: Negative for abdominal pain, constipation, diarrhea, melena, nausea and vomiting.   Genitourinary: Negative for dysuria and hematuria.   Neurological: Positive for headaches (sinus HA). Negative for dizziness, focal weakness, light-headedness, numbness, paresthesias and sensory change.   Psychiatric/Behavioral: Negative for altered mental status and depression.      Objective:     Vital Signs (Most Recent):  Temp: 97.3 °F (36.3 °C) (09/12/17 0756)  Pulse: 86 (09/12/17 0756)  Resp: (!) 21 (09/12/17 0756)  BP: 124/85 (09/12/17 0756)  SpO2: 97 % (09/12/17 0756) Vital Signs (24h Range):  Temp:  [97.2 °F (36.2 °C)-97.7 °F (36.5 °C)] 97.3 °F (36.3 °C)  Pulse:  [] 86  Resp:  [20-34] 21  SpO2:  [89 %-98 %] 97 %  BP: (108-172)/() 124/85     Weight: 125.2 kg (276 lb)  Body mass index is 43.23 kg/m².    SpO2: 97 %  O2 Device (Oxygen Therapy): BiPAP      Intake/Output Summary (Last 24 hours) at 09/12/17 0828  Last data filed at 09/12/17 0500   Gross per 24 hour   Intake              208 ml   Output             1300 ml   Net            -1092 ml       Lines/Drains/Airways     Drain                 Urethral Catheter 09/11/17 2104 16 Fr. less than 1 day          Peripheral Intravenous Line                 Peripheral IV - Single Lumen 09/11/17 1648 Right Forearm less than 1 day         Peripheral IV - Single Lumen 09/12/17 0525 Left;Medial Forearm less than 1 day                Physical Exam   Constitutional: She is oriented to person, place, and time. She appears well-developed and well-nourished. No distress.   HENT:   Head: Normocephalic and atraumatic.   Eyes: Conjunctivae and EOM are normal. Right eye exhibits no discharge. Left eye exhibits no discharge. No scleral icterus.   Neck: Normal range of motion. Carotid bruit is not present.   Unable to adequately assess JVD d/t body habitus    Cardiovascular: An irregularly irregular rhythm present. Tachycardia present.    Pulses:       Radial pulses are 2+ on the right side, and 2+ on the left side.   Pulmonary/Chest: No respiratory distress. She has decreased breath sounds in the right lower field and the left lower field. She has rhonchi in the right lower field, the left middle field and the left lower field. She has rales in the right lower field and the left lower field.   Abdominal: Soft. Bowel sounds are normal. There is  no tenderness. There is no guarding.   Musculoskeletal:   + BLE edema with dressings present (bandages from feet to area just below knees).    Neurological: She is alert and oriented to person, place, and time.   Follows commands, moves all extremities.    Skin: Skin is warm and dry. She is not diaphoretic. No cyanosis. Nails show no clubbing.   Psychiatric: She has a normal mood and affect. Her behavior is normal.   Nursing note and vitals reviewed.      Significant Labs:   Blood Culture:   Recent Labs  Lab 09/11/17  1728   LABBLOO No Growth to date  No Growth to date   , CMP   Recent Labs  Lab 09/11/17  1546 09/12/17  0411    144   K 5.4* 4.3    105   CO2 32* 32*   GLU 97 91   BUN 19 18   CREATININE 1.3 1.2   CALCIUM 10.0 9.4   ANIONGAP 5* 7*   ESTGFRAFRICA 44* 48*   EGFRNONAA 38* 42*   , CBC   Recent Labs  Lab 09/11/17  1546 09/12/17  0411   WBC 8.30 7.00   HGB 12.1 11.3*   HCT 37.8 35.5*    165   , INR   Recent Labs  Lab 09/11/17  1547 09/12/17  0411   INR 2.2* 2.1*    and Lipid Panel No results for input(s): CHOL, HDL, LDLCALC, TRIG, CHOLHDL in the last 48 hours.    Significant Imaging:   Echo 09/12/2017:  Results pending    CXR 09/11/2017:  The cardiac silhouette is enlarged but stable.  There is been slight clearing of the right lower lung field with probable chest mild mild residual infiltrate right medial lung base.  Trace pleural effusion suggested  Impression:  Partial clearing right lung base compared to the prior exam.    Prior Echo 04/2016:  CONCLUSIONS     1 - Biatrial enlargement.     2 - Normal left ventricular systolic function (EF 60-65%).     3 - Normal right ventricular systolic function .     4 - The estimated PA systolic pressure is 36 mmHg.     5 - Mild tricuspid regurgitation.

## 2017-09-13 PROBLEM — I50.31 ACUTE DIASTOLIC HEART FAILURE: Status: ACTIVE | Noted: 2017-09-13

## 2017-09-13 LAB
ANION GAP SERPL CALC-SCNC: 8 MMOL/L
BUN SERPL-MCNC: 20 MG/DL
CALCIUM SERPL-MCNC: 9.2 MG/DL
CHLORIDE SERPL-SCNC: 105 MMOL/L
CO2 SERPL-SCNC: 30 MMOL/L
CREAT SERPL-MCNC: 1.3 MG/DL
DIGOXIN SERPL-MCNC: 1.2 NG/ML
EST. GFR  (AFRICAN AMERICAN): 44 ML/MIN/1.73 M^2
EST. GFR  (NON AFRICAN AMERICAN): 38 ML/MIN/1.73 M^2
GLUCOSE SERPL-MCNC: 110 MG/DL
INR PPP: 2.4
LACTATE SERPL-SCNC: 1.1 MMOL/L
POTASSIUM SERPL-SCNC: 4.4 MMOL/L
PROTHROMBIN TIME: 23.9 SEC
SODIUM SERPL-SCNC: 143 MMOL/L

## 2017-09-13 PROCEDURE — 97535 SELF CARE MNGMENT TRAINING: CPT

## 2017-09-13 PROCEDURE — 36415 COLL VENOUS BLD VENIPUNCTURE: CPT

## 2017-09-13 PROCEDURE — 25000003 PHARM REV CODE 250: Performed by: HOSPITALIST

## 2017-09-13 PROCEDURE — 25000242 PHARM REV CODE 250 ALT 637 W/ HCPCS: Performed by: NURSE PRACTITIONER

## 2017-09-13 PROCEDURE — 97530 THERAPEUTIC ACTIVITIES: CPT

## 2017-09-13 PROCEDURE — 94640 AIRWAY INHALATION TREATMENT: CPT

## 2017-09-13 PROCEDURE — 94761 N-INVAS EAR/PLS OXIMETRY MLT: CPT

## 2017-09-13 PROCEDURE — 11000001 HC ACUTE MED/SURG PRIVATE ROOM

## 2017-09-13 PROCEDURE — 27000221 HC OXYGEN, UP TO 24 HOURS

## 2017-09-13 PROCEDURE — 85610 PROTHROMBIN TIME: CPT

## 2017-09-13 PROCEDURE — 25000003 PHARM REV CODE 250: Performed by: EMERGENCY MEDICINE

## 2017-09-13 PROCEDURE — 25000003 PHARM REV CODE 250: Performed by: NURSE PRACTITIONER

## 2017-09-13 PROCEDURE — 80162 ASSAY OF DIGOXIN TOTAL: CPT

## 2017-09-13 PROCEDURE — 83605 ASSAY OF LACTIC ACID: CPT

## 2017-09-13 PROCEDURE — 63600175 PHARM REV CODE 636 W HCPCS: Performed by: HOSPITALIST

## 2017-09-13 PROCEDURE — 87205 SMEAR GRAM STAIN: CPT

## 2017-09-13 PROCEDURE — 63600175 PHARM REV CODE 636 W HCPCS: Performed by: NURSE PRACTITIONER

## 2017-09-13 PROCEDURE — 94660 CPAP INITIATION&MGMT: CPT

## 2017-09-13 PROCEDURE — 87070 CULTURE OTHR SPECIMN AEROBIC: CPT

## 2017-09-13 PROCEDURE — 99900035 HC TECH TIME PER 15 MIN (STAT)

## 2017-09-13 PROCEDURE — 99232 SBSQ HOSP IP/OBS MODERATE 35: CPT | Mod: ,,, | Performed by: INTERNAL MEDICINE

## 2017-09-13 PROCEDURE — 80048 BASIC METABOLIC PNL TOTAL CA: CPT

## 2017-09-13 RX ORDER — POLYETHYLENE GLYCOL 3350 17 G/17G
17 POWDER, FOR SOLUTION ORAL 2 TIMES DAILY
Status: DISCONTINUED | OUTPATIENT
Start: 2017-09-13 | End: 2017-09-20 | Stop reason: HOSPADM

## 2017-09-13 RX ORDER — FUROSEMIDE 10 MG/ML
40 INJECTION INTRAMUSCULAR; INTRAVENOUS ONCE
Status: COMPLETED | OUTPATIENT
Start: 2017-09-13 | End: 2017-09-13

## 2017-09-13 RX ORDER — ACETAMINOPHEN 500 MG
1000 TABLET ORAL EVERY 6 HOURS PRN
Status: DISCONTINUED | OUTPATIENT
Start: 2017-09-13 | End: 2017-09-20 | Stop reason: HOSPADM

## 2017-09-13 RX ORDER — FUROSEMIDE 10 MG/ML
40 INJECTION INTRAMUSCULAR; INTRAVENOUS 2 TIMES DAILY
Status: DISCONTINUED | OUTPATIENT
Start: 2017-09-13 | End: 2017-09-13

## 2017-09-13 RX ADMIN — POLYETHYLENE GLYCOL 3350 17 G: 17 POWDER, FOR SOLUTION ORAL at 11:09

## 2017-09-13 RX ADMIN — DILTIAZEM HYDROCHLORIDE 300 MG: 180 CAPSULE, COATED, EXTENDED RELEASE ORAL at 12:09

## 2017-09-13 RX ADMIN — WARFARIN SODIUM 5 MG: 5 TABLET ORAL at 05:09

## 2017-09-13 RX ADMIN — IPRATROPIUM BROMIDE AND ALBUTEROL SULFATE 3 ML: .5; 3 SOLUTION RESPIRATORY (INHALATION) at 11:09

## 2017-09-13 RX ADMIN — AZITHROMYCIN MONOHYDRATE 500 MG: 500 INJECTION, POWDER, LYOPHILIZED, FOR SOLUTION INTRAVENOUS at 08:09

## 2017-09-13 RX ADMIN — LISINOPRIL 20 MG: 10 TABLET ORAL at 08:09

## 2017-09-13 RX ADMIN — CEFTRIAXONE 1 G: 1 INJECTION, SOLUTION INTRAVENOUS at 06:09

## 2017-09-13 RX ADMIN — FUROSEMIDE 40 MG: 10 INJECTION, SOLUTION INTRAVENOUS at 08:09

## 2017-09-13 RX ADMIN — ACETAMINOPHEN 1000 MG: 500 TABLET, FILM COATED ORAL at 11:09

## 2017-09-13 RX ADMIN — ACETAMINOPHEN 1000 MG: 500 TABLET, FILM COATED ORAL at 05:09

## 2017-09-13 RX ADMIN — IPRATROPIUM BROMIDE AND ALBUTEROL SULFATE 3 ML: .5; 3 SOLUTION RESPIRATORY (INHALATION) at 07:09

## 2017-09-13 RX ADMIN — DILTIAZEM HYDROCHLORIDE 12.5 MG/HR: 5 INJECTION INTRAVENOUS at 09:09

## 2017-09-13 RX ADMIN — IPRATROPIUM BROMIDE AND ALBUTEROL SULFATE 3 ML: .5; 3 SOLUTION RESPIRATORY (INHALATION) at 04:09

## 2017-09-13 RX ADMIN — IPRATROPIUM BROMIDE AND ALBUTEROL SULFATE 3 ML: .5; 3 SOLUTION RESPIRATORY (INHALATION) at 12:09

## 2017-09-13 RX ADMIN — DIGOXIN 0.12 MG: 125 TABLET ORAL at 08:09

## 2017-09-13 RX ADMIN — FUROSEMIDE 40 MG: 10 INJECTION, SOLUTION INTRAMUSCULAR; INTRAVENOUS at 05:09

## 2017-09-13 NOTE — PLAN OF CARE
09/13/17 0725   Patient Assessment/Suction   Level of Consciousness (AVPU) alert   All Lung Fields Breath Sounds crackles coarse   PRE-TX-O2-ETCO2   O2 Device (Oxygen Therapy) nasal cannula   $ Is the patient on Oxygen? Yes   Flow (L/min) 5   Oxygen Concentration (%) 40   SpO2 (!) 94 %   Pulse Oximetry Type Intermittent   $ Pulse Oximetry - Multiple Charge Pulse Oximetry - Multiple   Pulse 88   Resp 20   Aerosol Therapy   $ Aerosol Therapy Charges Aerosol Treatment   Respiratory Treatment Status given   SVN/Inhaler Treatment Route mask   Position During Treatment HOB at 45 degrees   Patient Tolerance good   Post-Treatment   Post-treatment Heart Rate (beats/min) 90   Post-treatment Resp Rate (breaths/min) 20   All Fields Breath Sounds aeration increased   Ready to Wean/Extubation Screen   FIO2<=50 (chart decimal) 0.4

## 2017-09-13 NOTE — PROGRESS NOTES
"[facility] Medicine  Progress Note    Patient Name: Nicole Lala  MRN: [id number]  Patient Class: IP- Inpatient   Admission Date: 9/11/2017  Length of Stay: 2 days  Attending Physician: Artie Dong MD  Primary Care Provider: Brandon Gray MD        Subjective:     Principal Problem:Acute on chronic respiratory failure with hypoxia    HPI:  Nicole Lala is a 84 y.o. female with a history of HTN, CKD, A-fib (on coumadin), Lymphedema and DVT.  She was admitted to the service of hospital medicine with acute on chronic hypoxic respiratory failure.  She presented to the ED with complaints of severe SOB while getting into the car from her wheelchair.  She reports a week or so of a "cold" describing cough with brown phlegm associated with general weakness and an intermittentently sore throat. She denies any attempted OTC medications for symptoms. She had an PCP appointment today but presented to the ED after becoming SOB. The SOB is notable worse with exertion. Pt also reports she had blood work yesterday and was advised to take 1/2 of her coumadin daily 5 mg Rx yesterday and return to the 5 mg q.d. Pt reports 2L of oxygen use at home which she wears mainly when resting. She denies any worsening of her chronic LE swelling, chest pain, or syncope. Pt denies fever/chills, abd pain, and tobacco use.     Hospital Course:  No notes on file    Interval History:  Still SOB.   This morning, was called by nurse.  Pt had lot of rales.     Review of Systems   Constitutional: Negative for fever.   Respiratory: Positive for cough and shortness of breath.    Cardiovascular: Negative for chest pain.   Gastrointestinal: Negative for abdominal pain.     Objective:     Vital Signs (Most Recent):  Temp: 97.9 °F (36.6 °C) (09/13/17 1210)  Pulse: 94 (09/13/17 1620)  Resp: 18 (09/13/17 1620)  BP: 133/61 (09/13/17 1210)  SpO2: 96 % (09/13/17 1620) Vital Signs (24h Range):  Temp:  [96.1 °F (35.6 " °C)-98.4 °F (36.9 °C)] 97.9 °F (36.6 °C)  Pulse:  [] 94  Resp:  [17-27] 18  SpO2:  [94 %-96 %] 96 %  BP: (101-174)/(58-97) 133/61     Weight: 125.2 kg (276 lb)  Body mass index is 43.23 kg/m².    Intake/Output Summary (Last 24 hours) at 09/13/17 1727  Last data filed at 09/13/17 1146   Gross per 24 hour   Intake           703.79 ml   Output             4500 ml   Net         -3796.21 ml      Physical Exam   Constitutional: She is oriented to person, place, and time. She appears distressed.   HENT:   Head: Normocephalic and atraumatic.   Eyes: EOM are normal. Right eye exhibits no discharge. Left eye exhibits no discharge.   Neck: Normal range of motion. Neck supple. No JVD present.   Cardiovascular: Normal rate.  An irregularly irregular rhythm present.   No murmur heard.  Pulmonary/Chest: No accessory muscle usage. Tachypnea noted. She has rales. She exhibits no tenderness.   Abdominal: Soft. Bowel sounds are normal. She exhibits no distension. There is no tenderness.   Musculoskeletal: She exhibits edema (lymphedema legs bilateral). She exhibits no deformity.   Neurological: She is alert and oriented to person, place, and time. No cranial nerve deficit.   Skin: Skin is warm and dry. No rash noted. She is not diaphoretic.   Psychiatric: She has a normal mood and affect. Her behavior is normal.       Significant Labs: All pertinent labs within the past 24 hours have been reviewed.    Significant Imaging: none    Assessment/Plan:      * Acute on chronic respiratory failure with hypoxia    Due to pna and CHF.  Monitor work of breathing and o2 sats.        Acute diastolic heart failure    Was present on admission.  Continue furosemide IV.  Echo shows normal EF.          CAP (community acquired pneumonia)    Clinically concerning for PNA.  Cont Azithromycin/ceftriaxone, supplemental O2, inhaled bronchodilators.            Morbid obesity with BMI of 40.0-44.9, adult    BMI 43.2.  Obesity compounds patient  co-morbidities and complicates treatment course.  Counseling given regarding diet modification and exercise recommendations.            Essential hypertension    Chronic, continue antihypertensive regimen.  Monitor BP closely, titrate medication as required for sustained BP control.          Atrial fibrillation with rapid ventricular response    Improved with cardizem gtt.  Consulting with cardiology.  Pt restarted on her oral CCB; hope is to wean her off the infusion.  Continue warfarin- therapeutic at this time.  Monitor closely on monitor.           Lymphedema    Continue ACE wrapping as per home regimen. Keep extremities elevated.          Physical deconditioning    PT/OT consultation.          Venous stasis of lower extremity                VTE Risk Mitigation         Ordered     Medium Risk of VTE  Once      09/12/17 0150     Reason for No Pharmacological VTE Prophylaxis  Once      09/12/17 0150     warfarin (COUMADIN) tablet 5 mg  Daily     Route:  Oral        09/11/17 0569              Artie Dong MD  Department of Hospital Medicine   [facility]

## 2017-09-13 NOTE — SUBJECTIVE & OBJECTIVE
Interval History:  Still SOB.   This morning, was called by nurse.  Pt had lot of rales.     Review of Systems   Constitutional: Negative for fever.   Respiratory: Positive for cough and shortness of breath.    Cardiovascular: Negative for chest pain.   Gastrointestinal: Negative for abdominal pain.     Objective:     Vital Signs (Most Recent):  Temp: 97.9 °F (36.6 °C) (09/13/17 1210)  Pulse: 94 (09/13/17 1620)  Resp: 18 (09/13/17 1620)  BP: 133/61 (09/13/17 1210)  SpO2: 96 % (09/13/17 1620) Vital Signs (24h Range):  Temp:  [96.1 °F (35.6 °C)-98.4 °F (36.9 °C)] 97.9 °F (36.6 °C)  Pulse:  [] 94  Resp:  [17-27] 18  SpO2:  [94 %-96 %] 96 %  BP: (101-174)/(58-97) 133/61     Weight: 125.2 kg (276 lb)  Body mass index is 43.23 kg/m².    Intake/Output Summary (Last 24 hours) at 09/13/17 1727  Last data filed at 09/13/17 1146   Gross per 24 hour   Intake           703.79 ml   Output             4500 ml   Net         -3796.21 ml      Physical Exam   Constitutional: She is oriented to person, place, and time. She appears distressed.   HENT:   Head: Normocephalic and atraumatic.   Eyes: EOM are normal. Right eye exhibits no discharge. Left eye exhibits no discharge.   Neck: Normal range of motion. Neck supple. No JVD present.   Cardiovascular: Normal rate.  An irregularly irregular rhythm present.   No murmur heard.  Pulmonary/Chest: No accessory muscle usage. Tachypnea noted. She has rales. She exhibits no tenderness.   Abdominal: Soft. Bowel sounds are normal. She exhibits no distension. There is no tenderness.   Musculoskeletal: She exhibits edema (lymphedema legs bilateral). She exhibits no deformity.   Neurological: She is alert and oriented to person, place, and time. No cranial nerve deficit.   Skin: Skin is warm and dry. No rash noted. She is not diaphoretic.   Psychiatric: She has a normal mood and affect. Her behavior is normal.       Significant Labs: All pertinent labs within the past 24 hours have been  reviewed.    Significant Imaging: none

## 2017-09-13 NOTE — NURSING
Attempted to reach attending MD twice by phone to inform him of pts SOB, crackles and wheezing. Sent an intrigma message. Will continue to attempt to reach.

## 2017-09-13 NOTE — ASSESSMENT & PLAN NOTE
Clinically concerning for PNA.  Cont Azithromycin/ceftriaxone, supplemental O2, inhaled bronchodilators.

## 2017-09-13 NOTE — PLAN OF CARE
CM met with patient and son at bedside , verified address and insurance information, patient lives with spouse Robert de leon who has [phone], is nonambulatory, uses wheelchair and walker for transfers, has some household help 2 days a week, has [facility] and would like to resume, disclosure signed and placed on chart. Has oxygen with [facility] . Pharmacy is [facility], son is Radhames De Leon [phone] and daughter is Cayla De Leon [phone].      09/12/17 1610   Discharge Assessment   Assessment Type Discharge Planning Assessment   Confirmed/corrected address and phone number on facesheet? Yes   Assessment information obtained from? Patient;Caregiver   Communicated expected length of stay with patient/caregiver yes   Prior to hospitilization cognitive status: Alert/Oriented   Prior to hospitalization functional status: Wheelchair Bound   Current cognitive status: Alert/Oriented   Current Functional Status: Wheelchair Bound   Lives With spouse   Able to Return to Prior Arrangements yes   Is patient able to care for self after discharge? Yes   Who are your caregiver(s) and their phone number(s)? Robert De Leon- spouse- [phone]   Patient's perception of discharge disposition home health   Readmission Within The Last 30 Days no previous admission in last 30 days   Patient currently being followed by outpatient case management? No   Patient currently receives any other outside agency services? No   Equipment Currently Used at Home shower chair;wheelchair;walker, standard;3-in-1 commode;oxygen   Do you have any problems affording any of your prescribed medications? No   Is the patient taking medications as prescribed? yes   Does the patient have transportation home? Yes   Transportation Available family or friend will provide   Does the patient receive services at the [facility]? No   Discharge Plan A Home Health   Patient/Family In Agreement With Plan yes

## 2017-09-13 NOTE — PLAN OF CARE
Problem: Occupational Therapy Goal  Goal: Occupational Therapy Goal  Outcome: Ongoing (interventions implemented as appropriate)  Goals remain appropriate.  JARRED Adams

## 2017-09-13 NOTE — PROGRESS NOTES
09/12/17 1949   Patient Assessment/Suction   Level of Consciousness (AVPU) alert   Respiratory Effort Labored   All Lung Fields Breath Sounds crackles coarse;wheezes, expiratory   PRE-TX-O2-ETCO2   O2 Device (Oxygen Therapy) nasal cannula w/ humidification   $ Is the patient on Oxygen? Yes   Flow (L/min) 5   SpO2 95 %   Pulse Oximetry Type Intermittent   $ Pulse Oximetry - Multiple Charge Pulse Oximetry - Multiple   Pulse 105   Resp 19   Aerosol Therapy   $ Aerosol Therapy Charges Aerosol Treatment   Respiratory Treatment Status given   SVN/Inhaler Treatment Route with oxygen;mask   Position During Treatment Sitting in bed   Patient Tolerance good   Post-Treatment   Post-treatment Heart Rate (beats/min) 101   Post-treatment Resp Rate (breaths/min) 19   All Fields Breath Sounds unchanged   Preset CPAP/BiPAP Settings   Mode Of Delivery BiPAP   $ Initial CPAP/BiPAP Setup? No   $ Is patient using? No/refused  (will wear around next aero tx. )

## 2017-09-13 NOTE — PLAN OF CARE
Problem: Physical Therapy Goal  Goal: Physical Therapy Goal  Goals to be met by: 2017     Patient will increase functional independence with mobility by performin. Supine to sit with MInimal Assistance  2. Sit to supine with Moderate Assistance  3. Sit to stand transfer with Minimal Assistance  4. Bed to chair transfer with Minimal Assistance using Rolling Walker  5. Sitting at edge of bed x15 minutes with Minimal Assistance  6. Lower extremity exercise program x10 reps per handout, with assistance as needed     Outcome: Ongoing (interventions implemented as appropriate)  Transfers to EOB and sit to stand with Max A ( 2 people required).

## 2017-09-13 NOTE — PT/OT/SLP PROGRESS
"Occupational Therapy  Treatment    Nicole Lala   MRN: [id number]   Admitting Diagnosis: Acute on chronic respiratory failure with hypoxia    OT Date of Treatment: 09/13/17   OT Start Time: 1000  OT Stop Time: 1140  OT Total Time (min): 100 min    Billable Minutes:  Self Care/Home Management 25mins BID total 50 mins    General Precautions: Standard, fall, respiratory  Orthopedic Precautions: N/A  Braces: N/A    Do you have any cultural, spiritual, Lutheran conflicts, given your current situation?: none    Subjective:  Communicated with nurse prior to session.  " I really want to sit in my wheelchair today."  Pain/Comfort  Pain Rating 1:  (pain when R knee moved but did not quantify)  Pain Addressed 1: Cessation of Activity  Pain Rating Post-Intervention 1: 0/10    Objective:  Patient found with: costa catheter, telemetry, peripheral IV     Functional Mobility:  Bed Mobility:  Rolling/Turning Right: Maximum assistance  Scooting/Bridging: With assist of 2, Maximum Assistance  Supine to Sit: Maximum Assistance, With assist of 2  Sit to Supine: Maximum Assistance, With assist of 2    Transfers:   Sit <> Stand Assistance: Maximum Assistance, Other (see comments) (2 staff, stood 2x but unable to maintain 2/2 dyspnea.)  Sit <> Stand Assistive Device: Rolling Walker  Bed <> Chair Technique:  (was unable to achieve this date.)    Functional Ambulation: NA    Activities of Daily Living:  Feeding Level of Assistance: Modified independent  UE Dressing Level of Assistance: Activity did not occur  LE Dressing Level of Assistance: Total assistance  Grooming Position: other (bed in chair position)  Grooming Level of Assistance: Supervision      Balance:   Static Sit: FAIR-: Maintains without assist but inconsistent   Dynamic Sit: FAIR+: Maintains balance through MINIMAL excursions of active trunk motion  Static Stand: 0: Needs MAXIMAL assist to maintain     Therapeutic Activities and Exercises:  First a.m. Session confined to " "bed 2/2 respiratory issues overnight. Session included education on contraindications for lymphedema weaps at this time: 2/2 respiratory issues and CHF. Pt expressed understanding. Session also included AROM B UE all planes and also positioning in bed. Footboard removed from bed to relieve pressure to feet and bed placed in chair position.  Returned to room with PTA for mobility and attempt at transfer to w/c. Extended time and max assist of 2 to get pt to EOB. Much extended time required for pt to recover breathing after minimal activity. Sit<>stand 2x total assist of 2 and pt unable to maintain for 1 minute 2/2 SOB and much increased anxiety with breathing difficulty. Pt returned to bed, drawsheet to [facility] with bed briefly in Trendelenburg position.    AM-PAC 6 CLICK ADL   How much help from another person does this patient currently need?   1 = Unable, Total/Dependent Assistance  2 = A lot, Maximum/Moderate Assistance  3 = A little, Minimum/Contact Guard/Supervision  4 = None, Modified Day/Independent    Putting on and taking off regular lower body clothing? : 1  Bathing (including washing, rinsing, drying)?: 1  Toileting, which includes using toilet, bedpan, or urinal? : 1  Putting on and taking off regular upper body clothing?: 3  Taking care of personal grooming such as brushing teeth?: 4  Eating meals?: 4  Total Score: 14     AM-PAC Raw Score CMS "G-Code Modifier Level of Impairment Assistance   6 % Total / Unable   7 - 8 CM 80 - 100% Maximal Assist   9-13 CL 60 - 80% Moderate Assist   14 - 19 CK 40 - 60% Moderate Assist   20 - 22 CJ 20 - 40% Minimal Assist   23 CI 1-20% SBA / CGA   24 CH 0% Independent/ Mod I       Patient left HOB elevated with all lines intact, call button in reach and nurse notified    ASSESSMENT:  Nicole Lala is a 84 y.o. female with a medical diagnosis of Acute on chronic respiratory failure with hypoxia .    Rehab identified problem list/impairments: Rehab " identified problem list/impairments: impaired endurance, impaired self care skills, impaired functional mobilty, decreased lower extremity function, pain, impaired skin, edema, impaired cardiopulmonary response to activity    Rehab potential is fair.    Activity tolerance: Poor    Discharge recommendations: Discharge Facility/Level Of Care Needs: nursing facility, skilled     Barriers to discharge: Barriers to Discharge: Decreased caregiver support    Equipment recommendations: none     GOALS:    Occupational Therapy Goals        Problem: Occupational Therapy Goal    Goal Priority Disciplines Outcome Interventions   Occupational Therapy Goal     OT, PT/OT Ongoing (interventions implemented as appropriate)    Description:  Goals to be met by: 9-19-17     Patient will increase functional independence with ADLs by performing:    LE Dressing with Moderate Assistance.  Toileting from bedside commode with Moderate Assistance for hygiene and clothing management.   Bathing from  shower chair/bench with Moderate Assistance.  Supine to sit with Minimal Assistance.  Stand pivot transfers with Minimal Assistance.  Toilet transfer to bedside commode with Minimal Assistance.             Problem: Occupational Therapy Goal    Goal Priority Disciplines Outcome Interventions   Occupational Therapy Goal     OT, PT/OT Ongoing (interventions implemented as appropriate)                    Plan:  Patient to be seen 3 x/week, 4 x/week to address the above listed problems via self-care/home management, therapeutic activities, therapeutic exercises  Plan of Care expires: 10/12/17  Plan of Care reviewed with: patient         JARRED Adams  09/13/2017

## 2017-09-13 NOTE — ASSESSMENT & PLAN NOTE
Improved with cardizem gtt.  Consulting with cardiology.  Pt restarted on her oral CCB; hope is to wean her off the infusion.  Continue warfarin- therapeutic at this time.  Monitor closely on monitor.

## 2017-09-13 NOTE — PROGRESS NOTES
09/12/17 1949   Patient Assessment/Suction   Level of Consciousness (AVPU) alert   Respiratory Effort Labored   All Lung Fields Breath Sounds crackles coarse;wheezes, expiratory   PRE-TX-O2-ETCO2   O2 Device (Oxygen Therapy) nasal cannula w/ humidification   $ Is the patient on Oxygen? Yes   Flow (L/min) 5   SpO2 95 %   Pulse Oximetry Type Intermittent   $ Pulse Oximetry - Multiple Charge Pulse Oximetry - Multiple   Pulse 105   Resp 19   Aerosol Therapy   $ Aerosol Therapy Charges Aerosol Treatment   Respiratory Treatment Status given   SVN/Inhaler Treatment Route with oxygen;mask   Position During Treatment Sitting in bed   Patient Tolerance good   Post-Treatment   Post-treatment Heart Rate (beats/min) 101   Post-treatment Resp Rate (breaths/min) 19   All Fields Breath Sounds unchanged

## 2017-09-13 NOTE — NURSING
Crackles and wheezing noted.  Increased work of breathing.  95% O2 sat on 5lnc.  Resp at bedside to place on Bipap.  Message to DALLAS Butcher NP via Text[employer] system regarding resp status.  Will continue to monitor

## 2017-09-13 NOTE — PROGRESS NOTES
"[facility]  Cardiology  Progress Note    Patient Name: Nicole Lala  MRN: [id number]  Admission Date: 9/11/2017  Hospital Length of Stay: 2 days  Code Status: Full Code   Attending Physician: Artie Dong MD   Primary Care Physician: Brandon Gray MD  Expected Discharge Date:   Principal Problem:Acute on chronic respiratory failure with hypoxia    Subjective:         Interval History: Sitting up in bed. Alert. NAD. Stating that she is "feeling better" today. Has continued cough and SOB both of which have improved.  Got dose of IV Lasix this morning. Good UOP. Patient feels that Lasix has helped her symptoms. Echo done yesterday showing normal EF 55%. Remains in afib. HR mostly in 80s to one teens with intermittent episodes into 120s with activity on Cardizem drip. Got IV digoxin yesterday. Home po CCB resumed earlier today. Anticoagulated on Warfarin. INR 2.4 today. Receiving abx for CAP. O2 sats 94-95% on 5L NC, on Bipap at night.     ROS   Constitutional: negative for chills,fevers, sweats. Positive for fatigue.   Eyes: negative for visual disturbance  Respiratory: negative for hemoptysis. Positive for wheezing, cough, and SOB (improved).   Cardiovascular: negative for chest pain, chest pressure/discomfort, and syncope. Positive for leg swelling.   Gastrointestinal: negative for abdominal pain, nausea and vomiting  Musculoskeletal:negative for muscle weakness, back pain and myalgias  Neurological: negative for dizziness, headaches, sensory changes, focal weakness.     Objective:     Vital Signs (Most Recent):  Temp: 97.9 °F (36.6 °C) (09/13/17 1210)  Pulse: 87 (09/13/17 1210)  Resp: 20 (09/13/17 1138)  BP: 133/61 (09/13/17 1210)  SpO2: (!) 94 % (09/13/17 1210) Vital Signs (24h Range):  Temp:  [96.1 °F (35.6 °C)-98.4 °F (36.9 °C)] 97.9 °F (36.6 °C)  Pulse:  [] 87  Resp:  [17-27] 20  SpO2:  [94 %-96 %] 94 %  BP: (101-174)/(58-97) 133/61     Weight: 125.2 kg (276 lb)  Body mass " index is 43.23 kg/m².    SpO2: (!) 94 %  O2 Device (Oxygen Therapy): nasal cannula      Intake/Output Summary (Last 24 hours) at 09/13/17 1552  Last data filed at 09/13/17 1146   Gross per 24 hour   Intake           703.79 ml   Output             4500 ml   Net         -3796.21 ml       Lines/Drains/Airways     Drain                 Urethral Catheter 09/11/17 2104 16 Fr. 1 day          Peripheral Intravenous Line                 Peripheral IV - Single Lumen 09/11/17 1648 Right Forearm 1 day         Peripheral IV - Single Lumen 09/13/17 0106 Left Hand less than 1 day                Physical Exam  Constitutional: Alert. NAD. Cooperative and conversant.    HENT:   Head: Normocephalic and atraumatic.   Eyes: Conjunctivae and EOM are normal. Right eye exhibits no discharge. Left eye exhibits no discharge. No scleral icterus.   Neck: Normal range of motion. Carotid bruit is not present.   Unable to adequately assess JVD d/t body habitus    Cardiovascular: An irregularly irregular rhythm present. Normal rate.     Pulses:       Radial pulses are 2+ on the right side, and 2+ on the left side.   Pulmonary/Chest: No respiratory distress. She has decreased breath sounds in the right lower field and the left lower field. She has rhonchi in the right lower field, the left middle field and the left lower field. Wheezing is present throughout.    Abdominal: Soft. Bowel sounds are normal. There is no tenderness. There is no guarding.   Musculoskeletal: + BLE edema with dressings present (bandages from feet to area just below knees).    Neurological: She is alert and oriented to person, place, and time.   Follows commands, moves all extremities.    Skin: Skin is warm and dry. She is not diaphoretic. No cyanosis. Nails show no clubbing.   Psychiatric: She has a normal mood and affect. Her behavior is normal.   Vitals reviewed.    Significant Labs:   CMP   Recent Labs  Lab 09/12/17  0411 09/13/17  0515    143   K 4.3 4.4     105   CO2 32* 30*   GLU 91 110   BUN 18 20   CREATININE 1.2 1.3   CALCIUM 9.4 9.2   ANIONGAP 7* 8   ESTGFRAFRICA 48* 44*   EGFRNONAA 42* 38*   , CBC   Recent Labs  Lab 09/12/17  0411   WBC 7.00   HGB 11.3*   HCT 35.5*       and INR   Recent Labs  Lab 09/12/17  0411 09/13/17  0515   INR 2.1* 2.4*       Significant Imaging:    Assessment and Plan:       Active Diagnoses:    Diagnosis Date Noted POA    PRINCIPAL PROBLEM:  Acute on chronic respiratory failure with hypoxia [J96.21] 09/12/2017 Yes    CAP (community acquired pneumonia) [J18.9] 09/11/2017 Yes    Morbid obesity with BMI of 40.0-44.9, adult [E66.01, Z68.41] 03/14/2017 Not Applicable    Essential hypertension [I10] 04/12/2016 Yes     Chronic    Atrial fibrillation with rapid ventricular response [I48.91] 10/01/2015 Yes    Lymphedema [I89.0] 08/11/2014 Yes     Chronic    Physical deconditioning [R53.81] 09/06/2013 Yes    Venous stasis of lower extremity [I87.8] 09/06/2013 Yes     Chronic      Problems Resolved During this Admission:    Diagnosis Date Noted Date Resolved POA     - Stop digoxin. Resume po CCB at a increased dose (Increase home regimen of long-acting diltiazem 240mg daily to 300mg daily) and wean off cardizem gtt.   - Continue warfarin  - Continue ACEI  - Hold off on additional lasix for now and reevaluate tomorrow.   - Monitor renal function and lytes  - Low Na diet  - Monitor on telemetry   - Increase activity, continue PT/OT  - could benefit from outpatient sleep apnea eval  - Supplemental O2  - Continue all other medications and treatment as tolerated.     This patient has been discussed with and evaluated by my collaborating physician, Dr. Dowling.  Please see Dr. Dowling's MD attestation above for additional information/recommendations.     Yanet Ta NP  Cardiology  [facility]

## 2017-09-13 NOTE — PT/OT/SLP PROGRESS
Physical Therapy  Treatment    Nicole Lala   MRN: [id number]   Admitting Diagnosis: Acute on chronic respiratory failure with hypoxia    PT Received On: 09/13/17  PT Start Time: 1115     PT Stop Time: 1140    PT Total Time (min): 25 min       Billable Minutes:  Therapeutic Activity 25min    Treatment Type: Treatment  PT/PTA: PTA     PTA Visit Number: 1       General Precautions: Standard, fall, respiratory  Orthopedic Precautions: N/A   Braces: N/A         Subjective:  Communicated with nurse Sousa prior to session.  Agreed to mobilize.    Pain/Comfort  Pain Rating 1: other (see comments) (did not rate)  Location - Side 1: Right  Location - Orientation 1: generalized  Location 1: knee  Pain Addressed 1: Reposition, Cessation of Activity    Objective:   Patient found with: telemetry, costa catheter, oxygen, peripheral IV    Functional Mobility:  Bed Mobility:   Rolling/Turning Right: Minimum assistance, With side rail  Supine to Sit: Maximum Assistance  Sit to Supine: Maximum Assistance    Transfers:  Sit <> Stand Assistance: Maximum Assistance, Other (see comments) (required A X2 and multiple attempts)  Sit <> Stand Assistive Device: Rolling Walker    Gait:        Stairs:      Balance:   Static Sit: FAIR: Maintains without assist, but unable to take any challenges   Dynamic Sit: FAIR: Cannot move trunk without losing balance  Static Stand: POOR: Needs MODERATE assist to maintain  Dynamic stand: POOR: N/A     Therapeutic Activities and Exercises:  Transferred to EOB with Max A requiring extra time and verbal cues for technique and breathing to increase SpO2 with activity.  Performed 3 attempts at standing , able to complete on 3rd trial with Max A ( 2 people and extra time and verbal cues).  Stood  ~15 secs. before sitting and returning supine with Max A and total A to [facility] .     AM-PAC 6 CLICK MOBILITY  How much help from another person does this patient currently need?   1 = Unable, Total/Dependent  Assistance  2 = A lot, Maximum/Moderate Assistance  3 = A little, Minimum/Contact Guard/Supervision  4 = None, Modified [place]/Independent         AM-PAC Raw Score CMS G-Code Modifier Level of Impairment Assistance   6 % Total / Unable   7 - 9 CM 80 - 100% Maximal Assist   10 - 14 CL 60 - 80% Moderate Assist   15 - 19 CK 40 - 60% Moderate Assist   20 - 22 CJ 20 - 40% Minimal Assist   23 CI 1-20% SBA / CGA   24 CH 0% Independent/ Mod I     Patient left supine with all lines intact, call button in reach and nurse Merry notified.    Assessment:  Nicole Lala is a 84 y.o. female with a medical diagnosis of Acute on chronic respiratory failure with hypoxia and presents with limited endurance, SOB, limited functional mobility.    Rehab identified problem list/impairments: Rehab identified problem list/impairments: weakness, impaired endurance, impaired self care skills, impaired functional mobilty, impaired balance, decreased lower extremity function, impaired skin, edema, impaired cardiopulmonary response to activity    Rehab potential is fair.    Activity tolerance: Fair    Discharge recommendations: Discharge Facility/Level Of Care Needs: nursing facility, skilled     Barriers to discharge: Barriers to Discharge: Decreased caregiver support    Equipment recommendations: Equipment Needed After Discharge: none     GOALS:    Physical Therapy Goals        Problem: Physical Therapy Goal    Goal Priority Disciplines Outcome Goal Variances Interventions   Physical Therapy Goal    High PT/OT, PT Ongoing (interventions implemented as appropriate)     Description:  Goals to be met by: 2017     Patient will increase functional independence with mobility by performin. Supine to sit with MInimal Assistance  2. Sit to supine with Moderate Assistance  3. Sit to stand transfer with Minimal Assistance  4. Bed to chair transfer with Minimal Assistance using Rolling Walker  5. Sitting at edge of bed  x15 minutes with Minimal Assistance  6. Lower extremity exercise program x10 reps per handout, with assistance as needed                      PLAN:    Patient to be seen 6 x/week  to address the above listed problems via therapeutic activities, therapeutic exercises  Plan of Care expires: 09/29/17  Plan of Care reviewed with: patient         [place], PTA  09/13/2017

## 2017-09-14 LAB
ANION GAP SERPL CALC-SCNC: 10 MMOL/L
BUN SERPL-MCNC: 25 MG/DL
CALCIUM SERPL-MCNC: 9.4 MG/DL
CHLORIDE SERPL-SCNC: 100 MMOL/L
CO2 SERPL-SCNC: 33 MMOL/L
CREAT SERPL-MCNC: 1.4 MG/DL
EST. GFR  (AFRICAN AMERICAN): 40 ML/MIN/1.73 M^2
EST. GFR  (NON AFRICAN AMERICAN): 35 ML/MIN/1.73 M^2
GLUCOSE SERPL-MCNC: 107 MG/DL
INR PPP: 2.5
POTASSIUM SERPL-SCNC: 4.2 MMOL/L
PROTHROMBIN TIME: 25.4 SEC
SODIUM SERPL-SCNC: 143 MMOL/L

## 2017-09-14 PROCEDURE — 97530 THERAPEUTIC ACTIVITIES: CPT

## 2017-09-14 PROCEDURE — 63600175 PHARM REV CODE 636 W HCPCS: Performed by: NURSE PRACTITIONER

## 2017-09-14 PROCEDURE — 27000221 HC OXYGEN, UP TO 24 HOURS

## 2017-09-14 PROCEDURE — 11000001 HC ACUTE MED/SURG PRIVATE ROOM

## 2017-09-14 PROCEDURE — 25000003 PHARM REV CODE 250: Performed by: NURSE PRACTITIONER

## 2017-09-14 PROCEDURE — 94660 CPAP INITIATION&MGMT: CPT

## 2017-09-14 PROCEDURE — 99232 SBSQ HOSP IP/OBS MODERATE 35: CPT | Mod: ,,, | Performed by: INTERNAL MEDICINE

## 2017-09-14 PROCEDURE — 25000003 PHARM REV CODE 250: Performed by: EMERGENCY MEDICINE

## 2017-09-14 PROCEDURE — 36569 INSJ PICC 5 YR+ W/O IMAGING: CPT

## 2017-09-14 PROCEDURE — 94761 N-INVAS EAR/PLS OXIMETRY MLT: CPT

## 2017-09-14 PROCEDURE — 25000003 PHARM REV CODE 250: Performed by: HOSPITALIST

## 2017-09-14 PROCEDURE — 85610 PROTHROMBIN TIME: CPT

## 2017-09-14 PROCEDURE — 25000242 PHARM REV CODE 250 ALT 637 W/ HCPCS: Performed by: NURSE PRACTITIONER

## 2017-09-14 PROCEDURE — 94640 AIRWAY INHALATION TREATMENT: CPT

## 2017-09-14 PROCEDURE — 97110 THERAPEUTIC EXERCISES: CPT

## 2017-09-14 PROCEDURE — 76937 US GUIDE VASCULAR ACCESS: CPT

## 2017-09-14 PROCEDURE — 63600175 PHARM REV CODE 636 W HCPCS: Performed by: HOSPITALIST

## 2017-09-14 PROCEDURE — 80048 BASIC METABOLIC PNL TOTAL CA: CPT

## 2017-09-14 PROCEDURE — C1751 CATH, INF, PER/CENT/MIDLINE: HCPCS

## 2017-09-14 PROCEDURE — 99900035 HC TECH TIME PER 15 MIN (STAT)

## 2017-09-14 PROCEDURE — 36415 COLL VENOUS BLD VENIPUNCTURE: CPT

## 2017-09-14 RX ORDER — MIRTAZAPINE 7.5 MG/1
7.5 TABLET, FILM COATED ORAL NIGHTLY
Status: DISCONTINUED | OUTPATIENT
Start: 2017-09-14 | End: 2017-09-20 | Stop reason: HOSPADM

## 2017-09-14 RX ADMIN — AZITHROMYCIN MONOHYDRATE 500 MG: 500 INJECTION, POWDER, LYOPHILIZED, FOR SOLUTION INTRAVENOUS at 08:09

## 2017-09-14 RX ADMIN — IPRATROPIUM BROMIDE AND ALBUTEROL SULFATE 3 ML: .5; 3 SOLUTION RESPIRATORY (INHALATION) at 07:09

## 2017-09-14 RX ADMIN — IPRATROPIUM BROMIDE AND ALBUTEROL SULFATE 3 ML: .5; 3 SOLUTION RESPIRATORY (INHALATION) at 11:09

## 2017-09-14 RX ADMIN — CEFTRIAXONE 1 G: 1 INJECTION, SOLUTION INTRAVENOUS at 05:09

## 2017-09-14 RX ADMIN — IPRATROPIUM BROMIDE AND ALBUTEROL SULFATE 3 ML: .5; 3 SOLUTION RESPIRATORY (INHALATION) at 03:09

## 2017-09-14 RX ADMIN — POLYETHYLENE GLYCOL 3350 17 G: 17 POWDER, FOR SOLUTION ORAL at 08:09

## 2017-09-14 RX ADMIN — IPRATROPIUM BROMIDE AND ALBUTEROL SULFATE 3 ML: .5; 3 SOLUTION RESPIRATORY (INHALATION) at 12:09

## 2017-09-14 RX ADMIN — WARFARIN SODIUM 5 MG: 5 TABLET ORAL at 04:09

## 2017-09-14 RX ADMIN — IPRATROPIUM BROMIDE AND ALBUTEROL SULFATE 3 ML: .5; 3 SOLUTION RESPIRATORY (INHALATION) at 09:09

## 2017-09-14 RX ADMIN — FUROSEMIDE 10 MG/HR: 10 INJECTION, SOLUTION INTRAVENOUS at 04:09

## 2017-09-14 RX ADMIN — DILTIAZEM HYDROCHLORIDE 300 MG: 180 CAPSULE, COATED, EXTENDED RELEASE ORAL at 08:09

## 2017-09-14 RX ADMIN — MIRTAZAPINE 7.5 MG: 7.5 TABLET ORAL at 09:09

## 2017-09-14 RX ADMIN — LISINOPRIL 20 MG: 10 TABLET ORAL at 08:09

## 2017-09-14 NOTE — ASSESSMENT & PLAN NOTE
Off the Cardizem drip.  Pt restarted on her oral CCB; hope is to wean her off the infusion.  Continue warfarin- therapeutic at this time.  Monitor closely on monitor.

## 2017-09-14 NOTE — PLAN OF CARE
Problem: Patient Care Overview  Goal: Plan of Care Review  Outcome: Ongoing (interventions implemented as appropriate)  Pt received on 5 lpm NC. Aerosol Tx given, tolerated well.

## 2017-09-14 NOTE — PLAN OF CARE
Problem: Physical Therapy Goal  Goal: Physical Therapy Goal  Goals to be met by: 2017     Patient will increase functional independence with mobility by performin. Supine to sit with MInimal Assistance  2. Sit to supine with Moderate Assistance  3. Sit to stand transfer with Minimal Assistance  4. Bed to chair transfer with Minimal Assistance using Rolling Walker  5. Sitting at edge of bed x15 minutes with Minimal Assistance  6. Lower extremity exercise program x10 reps per handout, with assistance as needed     Outcome: Ongoing (interventions implemented as appropriate)  LE exercises and transfers to EOB.

## 2017-09-14 NOTE — SUBJECTIVE & OBJECTIVE
Interval History:  Less dyspnea today but not at baseline.    Review of Systems   Constitutional: Negative for fever.   Respiratory: Positive for cough and shortness of breath.    Cardiovascular: Negative for chest pain.   Gastrointestinal: Negative for abdominal pain.     Objective:     Vital Signs (Most Recent):  Temp: 97.4 °F (36.3 °C) (09/14/17 1500)  Pulse: 83 (09/14/17 1531)  Resp: (!) 22 (09/14/17 1531)  BP: 127/66 (09/14/17 1500)  SpO2: (!) 94 % (09/14/17 1531) Vital Signs (24h Range):  Temp:  [97.1 °F (36.2 °C)-98.4 °F (36.9 °C)] 97.4 °F (36.3 °C)  Pulse:  [] 83  Resp:  [17-22] 22  SpO2:  [93 %-98 %] 94 %  BP: (127-162)/(59-86) 127/66     Weight: 125.2 kg (276 lb)  Body mass index is 43.23 kg/m².    Intake/Output Summary (Last 24 hours) at 09/14/17 1657  Last data filed at 09/14/17 1336   Gross per 24 hour   Intake              630 ml   Output             5500 ml   Net            -4870 ml      Physical Exam   Constitutional: She is oriented to person, place, and time. No distress.   HENT:   Head: Normocephalic and atraumatic.   Eyes: EOM are normal. Right eye exhibits no discharge. Left eye exhibits no discharge.   Neck: Normal range of motion. Neck supple. No JVD present.   Cardiovascular: Normal rate.  An irregularly irregular rhythm present.   No murmur heard.  Pulmonary/Chest: No accessory muscle usage. No tachypnea. She has rales. She exhibits no tenderness.   Abdominal: Soft. Bowel sounds are normal. She exhibits no distension. There is no tenderness.   Musculoskeletal: She exhibits edema (lymphedema legs bilateral). She exhibits no deformity.   Neurological: She is alert and oriented to person, place, and time. No cranial nerve deficit.   Skin: Skin is warm and dry. No rash noted. She is not diaphoretic.   Psychiatric: She has a normal mood and affect. Her behavior is normal.       Significant Labs: All pertinent labs within the past 24 hours have been reviewed.    Significant Imaging: none

## 2017-09-14 NOTE — NURSING
Two unsuccessful attempts made to place midline to RUE.  Patient tolerated well.  Additional PICC nurse notified

## 2017-09-14 NOTE — PLAN OF CARE
Problem: Patient Care Overview  Goal: Plan of Care Review  Outcome: Ongoing (interventions implemented as appropriate)  PICC team starting midline at this time. Lasix gtt to be started at 10mg/hr per Dr. Dong. VS stable, see flowsheets. Afib controlled on the monitor. OT, lymphodema specialist is to see pt today. Pt sat on side of the bed today with PT for approximately 10 minutes. Side rails up x2, call light in reach. Will continue to monitor.

## 2017-09-14 NOTE — NURSING
Bard PowerGlide Pro Midline Catheter 18G x 10 cm placed on first attempt in left upper arm.    Vessel Caliber: medium and patent, compressibility normal  Needle advanced into vessel with real time Ultrasound guidance.    Image recorded and saved.Ultrasound guidance: yes    Tolerated well. Good blood return. Limb alert band applied and 37cm circumference on board with measuring tape.   Pt's nurse Pratima notified OK to use with new tubing and power flushing needed between uses.

## 2017-09-14 NOTE — PLAN OF CARE
Problem: Occupational Therapy Goal  Goal: Occupational Therapy Goal  Goals to be met by: 9-19-17     Patient will increase functional independence with ADLs by performing:    LE Dressing with Moderate Assistance.  Toileting from bedside commode with Moderate Assistance for hygiene and clothing management.   Bathing from  shower chair/bench with Moderate Assistance.  Supine to sit with Minimal Assistance.  Stand pivot transfers with Minimal Assistance.  Toilet transfer to bedside commode with Minimal Assistance.     Outcome: Ongoing (interventions implemented as appropriate)  Goals remain appropriate. Also plan to begin compression bandaging.  JARRED Adams

## 2017-09-14 NOTE — PT/OT/SLP PROGRESS
"Occupational Therapy  Treatment    Nicole Lala   MRN: [id number]   Admitting Diagnosis: Acute on chronic respiratory failure with hypoxia    OT Date of Treatment: 09/14/17   OT Start Time: 1415  OT Stop Time: 1454  OT Total Time (min): 39 min    Billable Minutes:  Therapeutic Activity 39    General Precautions: Standard, fall, respiratory  Orthopedic Precautions: N/A  Braces: N/A    Do you have any cultural, spiritual, Ep[employer] conflicts, given your current situation?: none    Subjective:  Communicated with nurse prior to session.  " I think I am doing better, I want you to wrap my legs."  Pain/Comfort  Pain Rating 1:  (pain in knees with mobility)  Location - Side 1: Bilateral  Location - Orientation 1: generalized  Location 1: knee  Pain Addressed 1: Reposition, Cessation of Activity  Pain Rating Post-Intervention 1: 0/10    Objective:  Patient found with: oxygen, costa catheter, telemetry     Functional Mobility:  Bed Mobility:  Rolling/Turning Right: Moderate assistance  Scooting/Bridging: Moderate Assistance (extended time)  Supine to Sit: Maximum Assistance  Sit to Supine: Maximum Assistance    Functional Ambulation: NA    Balance:   Static Sit: GOOD: Takes MODERATE challenges from all directions    Therapeutic Activities and Exercises:  Coban dressings removed from B LE. Reapplied compression bandages conducive to managemeny of lymphedema. Modified approach 2/2 respiratory issues and also CHF. Explained to patient and to her . No wounds visible on lower legs. They had been wrapped with much restriction using Coban and legs show lines of wraps very clearly.    AM-PAC 6 CLICK ADL   How much help from another person does this patient currently need?   1 = Unable, Total/Dependent Assistance  2 = A lot, Maximum/Moderate Assistance  3 = A little, Minimum/Contact Guard/Supervision  4 = None, Modified [place]/Independent    Putting on and taking off regular lower body clothing? : 1  Bathing " "(including washing, rinsing, drying)?: 2  Toileting, which includes using toilet, bedpan, or urinal? : 1  Putting on and taking off regular upper body clothing?: 3  Taking care of personal grooming such as brushing teeth?: 4  Eating meals?: 4  Total Score: 15     AM-PAC Raw Score CMS "G-Code Modifier Level of Impairment Assistance   6 % Total / Unable   7 - 8 CM 80 - 100% Maximal Assist   9-13 CL 60 - 80% Moderate Assist   14 - 19 CK 40 - 60% Moderate Assist   20 - 22 CJ 20 - 40% Minimal Assist   23 CI 1-20% SBA / CGA   24 CH 0% Independent/ Mod I       Patient left HOB elevated with all lines intact, call button in reach and  present    ASSESSMENT:  Nicole Lala is a 84 y.o. female with a medical diagnosis of Acute on chronic respiratory failure with hypoxia.    Rehab identified problem list/impairments: Rehab identified problem list/impairments: weakness, impaired endurance, impaired functional mobilty, impaired self care skills, pain, edema, impaired skin    Rehab potential is fair.    Activity tolerance: Fair    Discharge recommendations: Discharge Facility/Level Of Care Needs: nursing facility, skilled     Barriers to discharge: Barriers to Discharge: Decreased caregiver support    Equipment recommendations: none     GOALS:    Occupational Therapy Goals        Problem: Occupational Therapy Goal    Goal Priority Disciplines Outcome Interventions   Occupational Therapy Goal     OT, PT/OT Ongoing (interventions implemented as appropriate)    Description:  Goals to be met by: 9-19-17     Patient will increase functional independence with ADLs by performing:    LE Dressing with Moderate Assistance.  Toileting from bedside commode with Moderate Assistance for hygiene and clothing management.   Bathing from  shower chair/bench with Moderate Assistance.  Supine to sit with Minimal Assistance.  Stand pivot transfers with Minimal Assistance.  Toilet transfer to bedside commode with Minimal " Assistance.             Problem: Occupational Therapy Goal    Goal Priority Disciplines Outcome Interventions   Occupational Therapy Goal     OT, PT/OT Ongoing (interventions implemented as appropriate)                    Plan:  Patient to be seen 3 x/week, 4 x/week to address the above listed problems via self-care/home management, therapeutic activities, therapeutic exercises  Plan of Care expires: 10/12/17  Plan of Care reviewed with: patient, spouse         JARRED Adams  09/14/2017

## 2017-09-14 NOTE — PROGRESS NOTES
"[facility]  Cardiology  Progress Note    Patient Name: Nicole Lala  MRN: [id number]  Admission Date: 9/11/2017  Hospital Length of Stay: 3 days  Code Status: Full Code   Attending Physician: Artie Dong MD   Primary Care Physician: Brandon Gray MD  Expected Discharge Date:   Principal Problem:Acute on chronic respiratory failure with hypoxia    Subjective:       Interval History: Resting in bed. Alert. NAD. Patient reporting that she's "doing good" and feels better today. She feels that her cough and SOB have improved. No SOB at present; however, she continues to experiencing wheezing and SOB with minimal activity. Denies CP, palpitations, lightheaded, syncope, abdominal pain, n/v.     ROS   Constitutional: negative for chills,fevers, sweats, fatigue   Eyes: negative for visual disturbance  Respiratory: negative for hemoptysis. Positive for cough and SOB (improved) and wheezing.   Cardiovascular: negative for chest pain, chest pressure/discomfort, and syncope. Positive for leg swelling.   Gastrointestinal: negative for abdominal pain, nausea and vomiting. Positive for constipation.   Musculoskeletal:negative for muscle weakness, back pain and myalgias  Neurological: negative for dizziness, headaches, sensory changes, focal weakness.   Objective:     Vital Signs (Most Recent):  Temp: 98 °F (36.7 °C) (09/14/17 1133)  Pulse: 92 (09/14/17 1133)  Resp: 20 (09/14/17 1133)  BP: (!) 148/74 (09/14/17 1133)  SpO2: 98 % (09/14/17 1133) Vital Signs (24h Range):  Temp:  [97.1 °F (36.2 °C)-98.4 °F (36.9 °C)] 98 °F (36.7 °C)  Pulse:  [] 92  Resp:  [17-22] 20  SpO2:  [93 %-98 %] 98 %  BP: (137-162)/(59-86) 148/74     Weight: 125.2 kg (276 lb)  Body mass index is 43.23 kg/m².    SpO2: 98 %  O2 Device (Oxygen Therapy): nasal cannula w/ humidification      Intake/Output Summary (Last 24 hours) at 09/14/17 1505  Last data filed at 09/14/17 1336   Gross per 24 hour   Intake              630 ml "   Output             5500 ml   Net            -4870 ml       Lines/Drains/Airways     Drain                 Urethral Catheter 09/11/17 2104 16 Fr. 2 days          Peripheral Intravenous Line                 Peripheral IV - Single Lumen 09/13/17 0106 Left Hand 1 day                Physical Exam  Constitutional: Alert. NAD. Cooperative and conversant.    HENT:   Head: Normocephalic and atraumatic.   Eyes: Conjunctivae and EOM are normal. Right eye exhibits no discharge. Left eye exhibits no discharge. No scleral icterus.   Neck: Normal range of motion. Carotid bruit is not present. Unable to adequately assess JVD d/t body habitus    Cardiovascular: An irregularly irregular rhythm present. Normal rate.     Pulses:       Radial pulses are 2+ on the right side, and 2+ on the left side.   Pulmonary/Chest: No respiratory distress. She has decreased breath sounds in the right lower field and the left lower field. She has rhonchi in the right lower field, the left middle field and the left lower field.   Abdominal: Soft. Bowel sounds are normal. There is no tenderness. There is no guarding.   Musculoskeletal: + BLE edema with dressings present (bandages from feet to area just below knees).    Neurological: She is alert and oriented to person, place, and time.   Follows commands, moves all extremities.    Skin: Skin is warm and dry. She is not diaphoretic. No cyanosis. Nails show no clubbing.   Psychiatric: She has a normal mood and affect. Her behavior is normal.   Vitals reviewed.    Significant Labs:   CMP   Recent Labs  Lab 09/13/17  0515 09/14/17  0432    143   K 4.4 4.2    100   CO2 30* 33*    107   BUN 20 25*   CREATININE 1.3 1.4   CALCIUM 9.2 9.4   ANIONGAP 8 10   ESTGFRAFRICA 44* 40*   EGFRNONAA 38* 35*    and CBC No results for input(s): WBC, HGB, HCT, PLT in the last 48 hours.    Significant Imaging:   Echo 09/12/2017:  CONCLUSIONS     1 - Normal left ventricular systolic function (EF 55-60%).      2 - The estimated PA systolic pressure is greater than 31 mmHg.      CXR 09/11/2017:  The cardiac silhouette is enlarged but stable.  There is been slight clearing of the right lower lung field with probable chest mild mild residual infiltrate right medial lung base.  Trace pleural effusion suggested  Impression:  Partial clearing right lung base compared to the prior exam.     Prior Echo 04/2016:  CONCLUSIONS     1 - Biatrial enlargement.     2 - Normal left ventricular systolic function (EF 60-65%).     3 - Normal right ventricular systolic function .     4 - The estimated PA systolic pressure is 36 mmHg.     5 - Mild tricuspid regurgitation.     Scheduled Meds:   albuterol-ipratropium 2.5mg-0.5mg/3mL  3 mL Nebulization Q4H    azithromycin  500 mg Intravenous Q24H    cefTRIAXone (ROCEPHIN) IVPB  1 g Intravenous Q24H    diltiaZEM  300 mg Oral Daily    lisinopril  20 mg Oral Daily    polyethylene glycol  17 g Oral BID    warfarin  5 mg Oral Daily     Continuous Infusions:   furosemide (LASIX) 1 mg/mL infusion (non-titrating)       PRN Meds:.acetaminophen, bisacodyl, influenza    Assessment and Plan:     Active Diagnoses:    Diagnosis Date Noted POA    PRINCIPAL PROBLEM:  Acute on chronic respiratory failure with hypoxia [J96.21] 09/12/2017 Yes    Acute diastolic heart failure [I50.31] 09/13/2017 Yes    CAP (community acquired pneumonia) [J18.9] 09/11/2017 Yes    Morbid obesity with BMI of 40.0-44.9, adult [E66.01, Z68.41] 03/14/2017 Not Applicable    Essential hypertension [I10] 04/12/2016 Yes     Chronic    Atrial fibrillation with rapid ventricular response [I48.91] 10/01/2015 Yes    Lymphedema [I89.0] 08/11/2014 Yes     Chronic    Physical deconditioning [R53.81] 09/06/2013 Yes      Problems Resolved During this Admission:    Diagnosis Date Noted Date Resolved POA       VTE Risk Mitigation         Ordered     Medium Risk of VTE  Once      09/12/17 0150     Reason for No Pharmacological VTE  Prophylaxis  Once      09/12/17 0150     warfarin (COUMADIN) tablet 5 mg  Daily     Route:  Oral        09/11/17 2311        Remains in afib which is a chronic issue for the patient. Has had episodes of RVR this admission in setting of acute on chronic respiratory failure with hypoxia, currently receiving abx for CAP. Off Cardizem gtt yesterday and home regimen of long-acting diltiazem was increased from 240mg to 300mg. HR improved, mostly in upper 80s to 90s today.  BP stable. Echo done yesterday showing normal EF 55% (additional results above). BUN/Cr of 25/1.4 today vs 18/1.2 two days ago.  IV Lasix drip started today by primary team.     - Continue po CCB, ACE, warfarin   - Monitor renal function and lytes  - daily weight, I&O  - Low Na diet  - Monitor on telemetry   - Increase activity, continue PT/OT  - could benefit from outpatient sleep apnea eval  - Supplemental O2  - patient should follow up with her cardiologist, Dr. Rivas closely after discharge.       Thank you for this consultation and allowing us to participate in Mrs. Lala's care. No further recommendations at this time. Please call prn for any questions.   This patient has been discussed with and evaluated by my collaborating physician, Dr. Dowling.  Please see Dr. Dowling's MD attestation above for additional information/recommendations.       Yanet Ta NP  Cardiology  [facility]

## 2017-09-14 NOTE — PT/OT/SLP PROGRESS
Physical Therapy  Treatment    Nicole Lala   MRN: [id number]   Admitting Diagnosis: Acute on chronic respiratory failure with hypoxia    PT Received On: 09/14/17  PT Start Time: 1000     PT Stop Time: 1030    PT Total Time (min): 30 min       Billable Minutes:  Therapeutic Activity 20min and Therapeutic Exercise 10min    Treatment Type: Treatment  PT/PTA: PTA     PTA Visit Number: 2       General Precautions: Standard, fall, respiratory  Orthopedic Precautions: N/A   Braces:           Subjective:  Communicated with nurse Sousa prior to session.  Agreed to mobilize.    Pain/Comfort  Pain Rating 1: other (see comments) (did not rate)  Location - Side 1: Right  Location - Orientation 1: generalized  Location 1: knee (reports pain with movement)  Pain Addressed 1: Reposition, Nurse notified    Objective:   Patient found with: costa catheter, oxygen, telemetry    Functional Mobility:  Bed Mobility:   Rolling/Turning Right: Minimum assistance, With side rail  Supine to Sit: Moderate Assistance  Sit to Supine: Moderate Assistance (required A with LE's onto bed)    Transfers:       Gait:   Gait Distance: no gait ,increased SOB with activity.    Stairs:      Balance:   Static Sit: FAIR: Maintains without assist, but unable to take any challenges   Dynamic Sit: FAIR+: Maintains balance through MINIMAL excursions of active trunk motion  Static Stand:   Dynamic stand:      Therapeutic Activities and Exercises:  Performed AAROM BLE's at 2 sets of 5 reps each while supine: SLR's, AP's, Hip abd / add, HS LLE only.  Required extra time and increased rests between each for breathing. Verbal cues required for better breathing technique. Transferred very slowly to EOB with Mod A and HOB elevated. Able to use side rail but assistance needed for LE's to stay together.  Moved very slowly in small increments for breathing. SpO2 @ 91% then quickly to 94%.  Sat ~ 12 min EOB with feet flat on floor and tennis shoes on. Returned  supione with Max A and total A to scoot to HOB ( 2 people).     AM-PAC 6 CLICK MOBILITY  How much help from another person does this patient currently need?   1 = Unable, Total/Dependent Assistance  2 = A lot, Maximum/Moderate Assistance  3 = A little, Minimum/Contact Guard/Supervision  4 = None, Modified [place]/Independent         AM-PAC Raw Score CMS G-Code Modifier Level of Impairment Assistance   6 % Total / Unable   7 - 9 CM 80 - 100% Maximal Assist   10 - 14 CL 60 - 80% Moderate Assist   15 - 19 CK 40 - 60% Moderate Assist   20 - 22 CJ 20 - 40% Minimal Assist   23 CI 1-20% SBA / CGA   24 CH 0% Independent/ Mod I     Patient left supine with all lines intact, call button in reach and nurse Merry notified.    Assessment:  Nicole Lala is a 84 y.o. female with a medical diagnosis of Acute on chronic respiratory failure with hypoxia and presents with weakness, limited mobility, pain R knee with movement, SOB.    Rehab identified problem list/impairments: Rehab identified problem list/impairments: weakness, impaired endurance, impaired self care skills, impaired functional mobilty, impaired balance, decreased lower extremity function, impaired skin, impaired cardiopulmonary response to activity    Rehab potential is fair.    Activity tolerance: Fair    Discharge recommendations: Discharge Facility/Level Of Care Needs: nursing facility, skilled     Barriers to discharge: Barriers to Discharge: Decreased caregiver support    Equipment recommendations: Equipment Needed After Discharge: none     GOALS:    Physical Therapy Goals        Problem: Physical Therapy Goal    Goal Priority Disciplines Outcome Goal Variances Interventions   Physical Therapy Goal    High PT/OT, PT Ongoing (interventions implemented as appropriate)     Description:  Goals to be met by: 2017     Patient will increase functional independence with mobility by performin. Supine to sit with MInimal Assistance  2.  Sit to supine with Moderate Assistance  3. Sit to stand transfer with Minimal Assistance  4. Bed to chair transfer with Minimal Assistance using Rolling Walker  5. Sitting at edge of bed x15 minutes with Minimal Assistance  6. Lower extremity exercise program x10 reps per handout, with assistance as needed                      PLAN:    Patient to be seen 6 x/week  to address the above listed problems via therapeutic activities, therapeutic exercises  Plan of Care expires: 09/29/17  Plan of Care reviewed with: patient         La [place], PTA  09/14/2017

## 2017-09-14 NOTE — PROGRESS NOTES
"[facility] Medicine  Progress Note    Patient Name: Nicole Lala  MRN: [id number]  Patient Class: IP- Inpatient   Admission Date: 9/11/2017  Length of Stay: 3 days  Attending Physician: Artie Dong MD  Primary Care Provider: Brandon Gray MD        Subjective:     Principal Problem:Acute on chronic respiratory failure with hypoxia    HPI:  Nicole Lala is a 84 y.o. female with a history of HTN, CKD, A-fib (on coumadin), Lymphedema and DVT.  She was admitted to the service of hospital medicine with acute on chronic hypoxic respiratory failure.  She presented to the ED with complaints of severe SOB while getting into the car from her wheelchair.  She reports a week or so of a "cold" describing cough with brown phlegm associated with general weakness and an intermittentently sore throat. She denies any attempted OTC medications for symptoms. She had an PCP appointment today but presented to the ED after becoming SOB. The SOB is notable worse with exertion. Pt also reports she had blood work yesterday and was advised to take 1/2 of her coumadin daily 5 mg Rx yesterday and return to the 5 mg q.d. Pt reports 2L of oxygen use at home which she wears mainly when resting. She denies any worsening of her chronic LE swelling, chest pain, or syncope. Pt denies fever/chills, abd pain, and tobacco use.     Hospital Course:  No notes on file    Interval History:  Less dyspnea today but not at baseline.    Review of Systems   Constitutional: Negative for fever.   Respiratory: Positive for cough and shortness of breath.    Cardiovascular: Negative for chest pain.   Gastrointestinal: Negative for abdominal pain.     Objective:     Vital Signs (Most Recent):  Temp: 97.4 °F (36.3 °C) (09/14/17 1500)  Pulse: 83 (09/14/17 1531)  Resp: (!) 22 (09/14/17 1531)  BP: 127/66 (09/14/17 1500)  SpO2: (!) 94 % (09/14/17 1531) Vital Signs (24h Range):  Temp:  [97.1 °F (36.2 °C)-98.4 °F (36.9 °C)] 97.4 °F " (36.3 °C)  Pulse:  [] 83  Resp:  [17-22] 22  SpO2:  [93 %-98 %] 94 %  BP: (127-162)/(59-86) 127/66     Weight: 125.2 kg (276 lb)  Body mass index is 43.23 kg/m².    Intake/Output Summary (Last 24 hours) at 09/14/17 1657  Last data filed at 09/14/17 1336   Gross per 24 hour   Intake              630 ml   Output             5500 ml   Net            -4870 ml      Physical Exam   Constitutional: She is oriented to person, place, and time. No distress.   HENT:   Head: Normocephalic and atraumatic.   Eyes: EOM are normal. Right eye exhibits no discharge. Left eye exhibits no discharge.   Neck: Normal range of motion. Neck supple. No JVD present.   Cardiovascular: Normal rate.  An irregularly irregular rhythm present.   No murmur heard.  Pulmonary/Chest: No accessory muscle usage. No tachypnea. She has rales. She exhibits no tenderness.   Abdominal: Soft. Bowel sounds are normal. She exhibits no distension. There is no tenderness.   Musculoskeletal: She exhibits edema (lymphedema legs bilateral). She exhibits no deformity.   Neurological: She is alert and oriented to person, place, and time. No cranial nerve deficit.   Skin: Skin is warm and dry. No rash noted. She is not diaphoretic.   Psychiatric: She has a normal mood and affect. Her behavior is normal.       Significant Labs: All pertinent labs within the past 24 hours have been reviewed.    Significant Imaging: none    Assessment/Plan:      * Acute on chronic respiratory failure with hypoxia    Due to pna and CHF.  Monitor work of breathing and o2 sats.        Acute diastolic heart failure    Was present on admission.  Continue furosemide IV.  Echo shows normal EF.          CAP (community acquired pneumonia)    Clinically concerning for PNA.  Cont Azithromycin/ceftriaxone, supplemental O2, inhaled bronchodilators.            Morbid obesity with BMI of 40.0-44.9, adult    BMI 43.2.  Obesity compounds patient co-morbidities and complicates treatment course.   Counseling given regarding diet modification and exercise recommendations.            Essential hypertension    Chronic, continue antihypertensive regimen.  Monitor BP closely, titrate medication as required for sustained BP control.          Atrial fibrillation with rapid ventricular response    Off the Cardizem drip.  Pt restarted on her oral CCB; hope is to wean her off the infusion.  Continue warfarin- therapeutic at this time.  Monitor closely on monitor.           Lymphedema    Continue ACE wrapping as per home regimen. Keep extremities elevated.          Physical deconditioning    PT/OT consultation.          Venous stasis of lower extremity                VTE Risk Mitigation         Ordered     Medium Risk of VTE  Once      09/12/17 0150     Reason for No Pharmacological VTE Prophylaxis  Once      09/12/17 0150     warfarin (COUMADIN) tablet 5 mg  Daily     Route:  Oral        09/11/17 5125              Artie Dong MD  Department of Hospital Medicine   [facility]

## 2017-09-15 LAB
ANION GAP SERPL CALC-SCNC: 11 MMOL/L
BACTERIA SPEC AEROBE CULT: NORMAL
BUN SERPL-MCNC: 29 MG/DL
CALCIUM SERPL-MCNC: 10 MG/DL
CHLORIDE SERPL-SCNC: 98 MMOL/L
CO2 SERPL-SCNC: 35 MMOL/L
CREAT SERPL-MCNC: 1.5 MG/DL
EST. GFR  (AFRICAN AMERICAN): 37 ML/MIN/1.73 M^2
EST. GFR  (NON AFRICAN AMERICAN): 32 ML/MIN/1.73 M^2
GLUCOSE SERPL-MCNC: 111 MG/DL
GRAM STN SPEC: NORMAL
INR PPP: 2.4
POTASSIUM SERPL-SCNC: 4.5 MMOL/L
PROTHROMBIN TIME: 24 SEC
SODIUM SERPL-SCNC: 144 MMOL/L

## 2017-09-15 PROCEDURE — 25000003 PHARM REV CODE 250: Performed by: NURSE PRACTITIONER

## 2017-09-15 PROCEDURE — 25000003 PHARM REV CODE 250: Performed by: HOSPITALIST

## 2017-09-15 PROCEDURE — 85610 PROTHROMBIN TIME: CPT

## 2017-09-15 PROCEDURE — 94640 AIRWAY INHALATION TREATMENT: CPT

## 2017-09-15 PROCEDURE — 94660 CPAP INITIATION&MGMT: CPT

## 2017-09-15 PROCEDURE — 63600175 PHARM REV CODE 636 W HCPCS: Performed by: HOSPITALIST

## 2017-09-15 PROCEDURE — 99900035 HC TECH TIME PER 15 MIN (STAT)

## 2017-09-15 PROCEDURE — 97530 THERAPEUTIC ACTIVITIES: CPT

## 2017-09-15 PROCEDURE — 11000001 HC ACUTE MED/SURG PRIVATE ROOM

## 2017-09-15 PROCEDURE — 25000003 PHARM REV CODE 250: Performed by: EMERGENCY MEDICINE

## 2017-09-15 PROCEDURE — 25000242 PHARM REV CODE 250 ALT 637 W/ HCPCS: Performed by: NURSE PRACTITIONER

## 2017-09-15 PROCEDURE — 27000221 HC OXYGEN, UP TO 24 HOURS

## 2017-09-15 PROCEDURE — 94761 N-INVAS EAR/PLS OXIMETRY MLT: CPT

## 2017-09-15 PROCEDURE — 63600175 PHARM REV CODE 636 W HCPCS: Performed by: NURSE PRACTITIONER

## 2017-09-15 PROCEDURE — 97110 THERAPEUTIC EXERCISES: CPT

## 2017-09-15 PROCEDURE — 36415 COLL VENOUS BLD VENIPUNCTURE: CPT

## 2017-09-15 PROCEDURE — 80048 BASIC METABOLIC PNL TOTAL CA: CPT

## 2017-09-15 RX ORDER — BISACODYL 10 MG
10 SUPPOSITORY, RECTAL RECTAL ONCE
Status: COMPLETED | OUTPATIENT
Start: 2017-09-15 | End: 2017-09-15

## 2017-09-15 RX ORDER — ADHESIVE BANDAGE
30 BANDAGE TOPICAL ONCE
Status: COMPLETED | OUTPATIENT
Start: 2017-09-15 | End: 2017-09-15

## 2017-09-15 RX ORDER — PREDNISONE 20 MG/1
40 TABLET ORAL DAILY
Status: DISCONTINUED | OUTPATIENT
Start: 2017-09-15 | End: 2017-09-16

## 2017-09-15 RX ORDER — LABETALOL HYDROCHLORIDE 5 MG/ML
20 INJECTION, SOLUTION INTRAVENOUS ONCE
Status: COMPLETED | OUTPATIENT
Start: 2017-09-15 | End: 2017-09-15

## 2017-09-15 RX ADMIN — IPRATROPIUM BROMIDE AND ALBUTEROL SULFATE 3 ML: .5; 3 SOLUTION RESPIRATORY (INHALATION) at 12:09

## 2017-09-15 RX ADMIN — IPRATROPIUM BROMIDE AND ALBUTEROL SULFATE 3 ML: .5; 3 SOLUTION RESPIRATORY (INHALATION) at 08:09

## 2017-09-15 RX ADMIN — IPRATROPIUM BROMIDE AND ALBUTEROL SULFATE 3 ML: .5; 3 SOLUTION RESPIRATORY (INHALATION) at 01:09

## 2017-09-15 RX ADMIN — IPRATROPIUM BROMIDE AND ALBUTEROL SULFATE 3 ML: .5; 3 SOLUTION RESPIRATORY (INHALATION) at 04:09

## 2017-09-15 RX ADMIN — MAGNESIUM HYDROXIDE 2400 MG: 400 SUSPENSION ORAL at 04:09

## 2017-09-15 RX ADMIN — BISACODYL 10 MG: 10 SUPPOSITORY RECTAL at 01:09

## 2017-09-15 RX ADMIN — POLYETHYLENE GLYCOL 3350 17 G: 17 POWDER, FOR SOLUTION ORAL at 09:09

## 2017-09-15 RX ADMIN — PREDNISONE 40 MG: 20 TABLET ORAL at 01:09

## 2017-09-15 RX ADMIN — WARFARIN SODIUM 5 MG: 5 TABLET ORAL at 04:09

## 2017-09-15 RX ADMIN — DILTIAZEM HYDROCHLORIDE 300 MG: 180 CAPSULE, COATED, EXTENDED RELEASE ORAL at 08:09

## 2017-09-15 RX ADMIN — MIRTAZAPINE 7.5 MG: 7.5 TABLET ORAL at 09:09

## 2017-09-15 RX ADMIN — CEFTRIAXONE 1 G: 1 INJECTION, SOLUTION INTRAVENOUS at 05:09

## 2017-09-15 RX ADMIN — AZITHROMYCIN MONOHYDRATE 500 MG: 500 INJECTION, POWDER, LYOPHILIZED, FOR SOLUTION INTRAVENOUS at 08:09

## 2017-09-15 RX ADMIN — LISINOPRIL 20 MG: 10 TABLET ORAL at 08:09

## 2017-09-15 RX ADMIN — POLYETHYLENE GLYCOL 3350 17 G: 17 POWDER, FOR SOLUTION ORAL at 08:09

## 2017-09-15 RX ADMIN — LABETALOL HYDROCHLORIDE 20 MG: 5 INJECTION, SOLUTION INTRAVENOUS at 03:09

## 2017-09-15 NOTE — PLAN OF CARE
Problem: Physical Therapy Goal  Goal: Physical Therapy Goal  Goals to be met by: 2017     Patient will increase functional independence with mobility by performin. Supine to sit with MInimal Assistance  2. Sit to supine with Moderate Assistance  3. Sit to stand transfer with Minimal Assistance  4. Bed to chair transfer with Minimal Assistance using Rolling Walker  5. Sitting at edge of bed x15 minutes with Minimal Assistance  6. Lower extremity exercise program x10 reps per handout, with assistance as needed     Outcome: Ongoing (interventions implemented as appropriate)  Transferred to sitting EOB with Max A.

## 2017-09-15 NOTE — ASSESSMENT & PLAN NOTE
Was present on admission.  BUN and Cr are elevated, so will discontinue the Lasix infusion.  Echo shows normal EF.

## 2017-09-15 NOTE — PT/OT/SLP PROGRESS
Physical Therapy  Treatment    Nicole Lala   MRN: [id number]   Admitting Diagnosis: Acute on chronic respiratory failure with hypoxia    PT Received On: 09/15/17  PT Start Time: 1152     PT Stop Time: 1217    PT Total Time (min): 25 min       Billable Minutes:  Therapeutic Activity 15min and Therapeutic Exercise 10min    Treatment Type: Treatment  PT/PTA: PTA     PTA Visit Number: 3       General Precautions: Standard, fall, respiratory  Orthopedic Precautions: N/A   Braces:           Subjective:  Communicated with nurse Alanis prior to session.  Agreed to mobilize.    Pain/Comfort  Pain Rating 1: other (see comments) (did not rate)  Location - Side 1: Right  Location - Orientation 1: generalized  Location 1: knee  Pain Addressed 1: Reposition, Cessation of Activity    Objective:   Patient found with: costa catheter, oxygen, telemetry, PICC line    Functional Mobility:  Bed Mobility:   Rolling/Turning to Left: Minimum assistance, With side rail  Rolling/Turning Right: Minimum assistance, With side rail  Scooting/Bridging: Total Assistance, With assist of 2  Supine to Sit: Moderate Assistance  Sit to Supine: Maximum Assistance    Transfers:  Sit <> Stand Assistance:  (3 attempts made)  Sit <> Stand Assistive Device: Rolling Walker    Gait:   Gait Distance: no gait    Stairs:      Balance:   Static Sit: FAIR: Maintains without assist, but unable to take any challenges   Dynamic Sit: FAIR+: Maintains balance through MINIMAL excursions of active trunk motion  Static Stand:   Dynamic stand:      Therapeutic Activities and Exercises:  Transferred to sitting EOB with Max A , very slowly with HOB elevated along the way. SpO2 @ 93% once seated on 4L NC.  Requires verbal cues to improve breathing with activity.  Performed LE exercises at 10 reps each: TKE's, Hip flexion, AP's.  Attempted 3 standing trials with rw and Max A ( unsuccessful), buttocks could not clear bed. Attempted side scooting to HOB , (unsuccessful), c/o  pain on back of thighs. Sat ~ 10 min , returned supine with Max A, rolled R <>L with min A using side rail for pads changed.  Total A ( 2 people) to scoot HOB.    AM-PAC 6 CLICK MOBILITY  How much help from another person does this patient currently need?   1 = Unable, Total/Dependent Assistance  2 = A lot, Maximum/Moderate Assistance  3 = A little, Minimum/Contact Guard/Supervision  4 = None, Modified [place]/Independent         AM-PAC Raw Score CMS G-Code Modifier Level of Impairment Assistance   6 % Total / Unable   7 - 9 CM 80 - 100% Maximal Assist   10 - 14 CL 60 - 80% Moderate Assist   15 - 19 CK 40 - 60% Moderate Assist   20 - 22 CJ 20 - 40% Minimal Assist   23 CI 1-20% SBA / CGA   24 CH 0% Independent/ Mod I     Patient left supine with all lines intact, call button in reach and nurse Annabelle notified.    Assessment:  Nicole Lala is a 84 y.o. female with a medical diagnosis of Acute on chronic respiratory failure with hypoxia and presents with weakness, limited endurance, pain, limited mobility.    Rehab identified problem list/impairments: Rehab identified problem list/impairments: weakness, impaired endurance, impaired self care skills, impaired functional mobilty, decreased upper extremity function, decreased lower extremity function, pain, edema, impaired cardiopulmonary response to activity    Rehab potential is fair.    Activity tolerance: Fair    Discharge recommendations: Discharge Facility/Level Of Care Needs: nursing facility, skilled     Barriers to discharge: Barriers to Discharge: Decreased caregiver support    Equipment recommendations: Equipment Needed After Discharge: none     GOALS:    Physical Therapy Goals        Problem: Physical Therapy Goal    Goal Priority Disciplines Outcome Goal Variances Interventions   Physical Therapy Goal    High PT/OT, PT Ongoing (interventions implemented as appropriate)     Description:  Goals to be met by: 09-     Patient will  increase functional independence with mobility by performin. Supine to sit with MInimal Assistance  2. Sit to supine with Moderate Assistance  3. Sit to stand transfer with Minimal Assistance  4. Bed to chair transfer with Minimal Assistance using Rolling Walker  5. Sitting at edge of bed x15 minutes with Minimal Assistance  6. Lower extremity exercise program x10 reps per handout, with assistance as needed                      PLAN:    Patient to be seen 6 x/week  to address the above listed problems via therapeutic activities, therapeutic exercises  Plan of Care expires: 17  Plan of Care reviewed with: patient         [place], PTA  09/15/2017

## 2017-09-15 NOTE — ASSESSMENT & PLAN NOTE
Clinically concerning for PNA.  Cont Azithromycin/ceftriaxone, supplemental O2, inhaled bronchodilators.   Will start her on daily prednisone for the bronchospasms.  At risk for worsening respiratory failure, so I will continue her inpatient treatment at least another day.

## 2017-09-15 NOTE — HOSPITAL COURSE
Patient was admitted with pneumonia and CHF.  Was given IVAB and placed on Lasix IV infusion.  Her breathing improved.  She was taken off the Lasix drip.  She was also given IV Cardizem for rapid atrial fibrillation.  Once her oral Cardizem took over, she was taken off that infusion.  She was started on prednisone for bronchospasms.  Given severity of respiratory symptoms and uncertain diagnosis, pulmonology consulted and CT chest without contrast performed.  CT chest showed no significant airspace disease, possibly some very minimal interstitial process.  Dr. Chung of pulmonology felt that most likely patient had underlying COPD, ABG confirmed presence of hypercapnia and spirometry in house was consistent with obstructive process.  NIPPV was prescribed on discharge for use nightly as well as nebulizer machine, brief course of steroids and intermittent dosing of loop diuretic. Patient and family expressed understanding of plan of care and as symptoms improved significantly, she was discharged home with home health.

## 2017-09-15 NOTE — PLAN OF CARE
Problem: Patient Care Overview  Goal: Plan of Care Review  PATIENT ALERT AND ORIENTED.  AFIB CONTROLLED RHYTHM.  LASIX DRIP INFUSING PER MD ORDER.  ANDRADE TO GRAVITY.  CALL LIGHT IN REACH.  BED IN LOW/LOCKED POSITION.

## 2017-09-15 NOTE — PLAN OF CARE
Problem: Patient Care Overview  Goal: Plan of Care Review  Outcome: Ongoing (interventions implemented as appropriate)  Pt on 4L NC with Q4 duoneb treatments

## 2017-09-15 NOTE — PROGRESS NOTES
"[facility] Medicine  Progress Note    Patient Name: Nicole Lala  MRN: [id number]  Patient Class: IP- Inpatient   Admission Date: 9/11/2017  Length of Stay: 4 days  Attending Physician: Artie Dong MD  Primary Care Provider: Brandon Gray MD        Subjective:     Principal Problem:Acute on chronic respiratory failure with hypoxia    HPI:  Nicole Lala is a 84 y.o. female with a history of HTN, CKD, A-fib (on coumadin), Lymphedema and DVT.  She was admitted to the service of hospital medicine with acute on chronic hypoxic respiratory failure.  She presented to the ED with complaints of severe SOB while getting into the car from her wheelchair.  She reports a week or so of a "cold" describing cough with brown phlegm associated with general weakness and an intermittentently sore throat. She denies any attempted OTC medications for symptoms. She had an PCP appointment today but presented to the ED after becoming SOB. The SOB is notable worse with exertion. Pt also reports she had blood work yesterday and was advised to take 1/2 of her coumadin daily 5 mg Rx yesterday and return to the 5 mg q.d. Pt reports 2L of oxygen use at home which she wears mainly when resting. She denies any worsening of her chronic LE swelling, chest pain, or syncope. Pt denies fever/chills, abd pain, and tobacco use.     Hospital Course:  Patient was admitted with pneumonia and CHF.  Was given IVAB and placed on Lasix IV infusion.  Her breathing improved.  She was taken off the Lasix drip.  She was also given IV Cardizem for rapid atrial fibrillation.  Once her oral Cardizem took over, she was taken off that infusion.  She was started on prednisone for bronchospasms.    Interval History:  She's doing better but not quite her normal.  Wheezing a bit today.    Review of Systems   Constitutional: Negative for fever.   Respiratory: Positive for cough, shortness of breath and wheezing.    Cardiovascular: " Negative for chest pain.   Gastrointestinal: Negative for abdominal pain.     Objective:     Vital Signs (Most Recent):  Temp: 98.7 °F (37.1 °C) (09/15/17 1530)  Pulse: 101 (09/15/17 1644)  Resp: 20 (09/15/17 1644)  BP: 132/63 (09/15/17 1530)  SpO2: (!) 92 % (09/15/17 1644) Vital Signs (24h Range):  Temp:  [96.2 °F (35.7 °C)-98.7 °F (37.1 °C)] 98.7 °F (37.1 °C)  Pulse:  [] 101  Resp:  [16-22] 20  SpO2:  [91 %-98 %] 92 %  BP: (116-142)/(60-94) 132/63     Weight: 125.2 kg (276 lb)  Body mass index is 43.23 kg/m².    Intake/Output Summary (Last 24 hours) at 09/15/17 1824  Last data filed at 09/15/17 0540   Gross per 24 hour   Intake           976.75 ml   Output             5850 ml   Net         -4873.25 ml      Physical Exam   Constitutional: She is oriented to person, place, and time. No distress.   HENT:   Head: Normocephalic and atraumatic.   Eyes: EOM are normal. Right eye exhibits no discharge. Left eye exhibits no discharge.   Neck: Normal range of motion. Neck supple. No JVD present.   Cardiovascular: Normal rate.  An irregularly irregular rhythm present.   No murmur heard.  Pulmonary/Chest: No accessory muscle usage. No tachypnea. She has no decreased breath sounds. She has wheezes. She has no rales. She exhibits no tenderness.   Abdominal: Soft. Bowel sounds are normal. She exhibits no distension. There is no tenderness.   Musculoskeletal: She exhibits edema (lymphedema legs bilateral). She exhibits no deformity.   Neurological: She is alert and oriented to person, place, and time. No cranial nerve deficit.   Skin: Skin is warm and dry. No rash noted. She is not diaphoretic.   Psychiatric: She has a normal mood and affect. Her behavior is normal.       Significant Labs: All pertinent labs within the past 24 hours have been reviewed.    Significant Imaging: none    Assessment/Plan:      * Acute on chronic respiratory failure with hypoxia    Due to pna and CHF.  Monitor work of breathing and o2 sats.         Acute diastolic heart failure    Was present on admission.  BUN and Cr are elevated, so will discontinue the Lasix infusion.  Echo shows normal EF.          CAP (community acquired pneumonia)    Clinically concerning for PNA.  Cont Azithromycin/ceftriaxone, supplemental O2, inhaled bronchodilators.   Will start her on daily prednisone for the bronchospasms.  At risk for worsening respiratory failure, so I will continue her inpatient treatment at least another day.           Morbid obesity with BMI of 40.0-44.9, adult    BMI 43.2.  Obesity compounds patient co-morbidities and complicates treatment course.  Counseling given regarding diet modification and exercise recommendations.            Essential hypertension    Chronic, continue antihypertensive regimen.  Monitor BP closely, titrate medication as required for sustained BP control.          Atrial fibrillation with rapid ventricular response    Off the Cardizem drip.  Pt restarted on her oral CCB..  Continue warfarin- therapeutic at this time.  Monitor closely on monitor.           Lymphedema    Continue ACE wrapping as per home regimen. Keep extremities elevated.          Physical deconditioning    PT/OT consultation.          Venous stasis of lower extremity                VTE Risk Mitigation         Ordered     Medium Risk of VTE  Once      09/12/17 0150     Reason for No Pharmacological VTE Prophylaxis  Once      09/12/17 0150     warfarin (COUMADIN) tablet 5 mg  Daily     Route:  Oral        09/11/17 9208              Artie Dong MD  Department of Hospital Medicine   [facility]

## 2017-09-15 NOTE — ASSESSMENT & PLAN NOTE
Off the Cardizem drip.  Pt restarted on her oral CCB..  Continue warfarin- therapeutic at this time.  Monitor closely on monitor.

## 2017-09-15 NOTE — SUBJECTIVE & OBJECTIVE
Interval History:  She's doing better but not quite her normal.  Wheezing a bit today.    Review of Systems   Constitutional: Negative for fever.   Respiratory: Positive for cough, shortness of breath and wheezing.    Cardiovascular: Negative for chest pain.   Gastrointestinal: Negative for abdominal pain.     Objective:     Vital Signs (Most Recent):  Temp: 98.7 °F (37.1 °C) (09/15/17 1530)  Pulse: 101 (09/15/17 1644)  Resp: 20 (09/15/17 1644)  BP: 132/63 (09/15/17 1530)  SpO2: (!) 92 % (09/15/17 1644) Vital Signs (24h Range):  Temp:  [96.2 °F (35.7 °C)-98.7 °F (37.1 °C)] 98.7 °F (37.1 °C)  Pulse:  [] 101  Resp:  [16-22] 20  SpO2:  [91 %-98 %] 92 %  BP: (116-142)/(60-94) 132/63     Weight: 125.2 kg (276 lb)  Body mass index is 43.23 kg/m².    Intake/Output Summary (Last 24 hours) at 09/15/17 1824  Last data filed at 09/15/17 0540   Gross per 24 hour   Intake           976.75 ml   Output             5850 ml   Net         -4873.25 ml      Physical Exam   Constitutional: She is oriented to person, place, and time. No distress.   HENT:   Head: Normocephalic and atraumatic.   Eyes: EOM are normal. Right eye exhibits no discharge. Left eye exhibits no discharge.   Neck: Normal range of motion. Neck supple. No JVD present.   Cardiovascular: Normal rate.  An irregularly irregular rhythm present.   No murmur heard.  Pulmonary/Chest: No accessory muscle usage. No tachypnea. She has no decreased breath sounds. She has wheezes. She has no rales. She exhibits no tenderness.   Abdominal: Soft. Bowel sounds are normal. She exhibits no distension. There is no tenderness.   Musculoskeletal: She exhibits edema (lymphedema legs bilateral). She exhibits no deformity.   Neurological: She is alert and oriented to person, place, and time. No cranial nerve deficit.   Skin: Skin is warm and dry. No rash noted. She is not diaphoretic.   Psychiatric: She has a normal mood and affect. Her behavior is normal.       Significant Labs: All  pertinent labs within the past 24 hours have been reviewed.    Significant Imaging: none

## 2017-09-16 LAB
ANION GAP SERPL CALC-SCNC: 13 MMOL/L
BACTERIA BLD CULT: NORMAL
BACTERIA BLD CULT: NORMAL
BUN SERPL-MCNC: 46 MG/DL
CALCIUM SERPL-MCNC: 10.1 MG/DL
CHLORIDE SERPL-SCNC: 96 MMOL/L
CO2 SERPL-SCNC: 34 MMOL/L
CREAT SERPL-MCNC: 1.7 MG/DL
EST. GFR  (AFRICAN AMERICAN): 31 ML/MIN/1.73 M^2
EST. GFR  (NON AFRICAN AMERICAN): 27 ML/MIN/1.73 M^2
GLUCOSE SERPL-MCNC: 148 MG/DL
INR PPP: 2.3
POTASSIUM SERPL-SCNC: 4.6 MMOL/L
PROTHROMBIN TIME: 23.8 SEC
SODIUM SERPL-SCNC: 143 MMOL/L

## 2017-09-16 PROCEDURE — 25000003 PHARM REV CODE 250: Performed by: NURSE PRACTITIONER

## 2017-09-16 PROCEDURE — 36415 COLL VENOUS BLD VENIPUNCTURE: CPT

## 2017-09-16 PROCEDURE — 25000242 PHARM REV CODE 250 ALT 637 W/ HCPCS: Performed by: NURSE PRACTITIONER

## 2017-09-16 PROCEDURE — 27000221 HC OXYGEN, UP TO 24 HOURS

## 2017-09-16 PROCEDURE — 25000242 PHARM REV CODE 250 ALT 637 W/ HCPCS: Performed by: HOSPITALIST

## 2017-09-16 PROCEDURE — 25000003 PHARM REV CODE 250: Performed by: HOSPITALIST

## 2017-09-16 PROCEDURE — 94640 AIRWAY INHALATION TREATMENT: CPT

## 2017-09-16 PROCEDURE — 85610 PROTHROMBIN TIME: CPT

## 2017-09-16 PROCEDURE — 94761 N-INVAS EAR/PLS OXIMETRY MLT: CPT

## 2017-09-16 PROCEDURE — 63600175 PHARM REV CODE 636 W HCPCS: Performed by: NURSE PRACTITIONER

## 2017-09-16 PROCEDURE — 11000001 HC ACUTE MED/SURG PRIVATE ROOM

## 2017-09-16 PROCEDURE — 99900035 HC TECH TIME PER 15 MIN (STAT)

## 2017-09-16 PROCEDURE — 94660 CPAP INITIATION&MGMT: CPT

## 2017-09-16 PROCEDURE — 80048 BASIC METABOLIC PNL TOTAL CA: CPT

## 2017-09-16 PROCEDURE — 63600175 PHARM REV CODE 636 W HCPCS: Performed by: HOSPITALIST

## 2017-09-16 PROCEDURE — 25000003 PHARM REV CODE 250: Performed by: EMERGENCY MEDICINE

## 2017-09-16 RX ORDER — AZITHROMYCIN 250 MG/1
250 TABLET, FILM COATED ORAL DAILY
Status: DISCONTINUED | OUTPATIENT
Start: 2017-09-17 | End: 2017-09-17

## 2017-09-16 RX ORDER — PREDNISONE 20 MG/1
40 TABLET ORAL DAILY
Status: DISCONTINUED | OUTPATIENT
Start: 2017-09-17 | End: 2017-09-20 | Stop reason: HOSPADM

## 2017-09-16 RX ORDER — ALPRAZOLAM 0.25 MG/1
0.25 TABLET ORAL 3 TIMES DAILY PRN
Status: DISCONTINUED | OUTPATIENT
Start: 2017-09-16 | End: 2017-09-20 | Stop reason: HOSPADM

## 2017-09-16 RX ORDER — METOPROLOL TARTRATE 1 MG/ML
5 INJECTION, SOLUTION INTRAVENOUS ONCE
Status: COMPLETED | OUTPATIENT
Start: 2017-09-16 | End: 2017-09-16

## 2017-09-16 RX ORDER — FLUTICASONE FUROATE AND VILANTEROL 100; 25 UG/1; UG/1
1 POWDER RESPIRATORY (INHALATION) DAILY
Status: DISCONTINUED | OUTPATIENT
Start: 2017-09-16 | End: 2017-09-20 | Stop reason: HOSPADM

## 2017-09-16 RX ORDER — LISINOPRIL 10 MG/1
20 TABLET ORAL DAILY
Status: DISCONTINUED | OUTPATIENT
Start: 2017-09-17 | End: 2017-09-17

## 2017-09-16 RX ORDER — IPRATROPIUM BROMIDE AND ALBUTEROL SULFATE 2.5; .5 MG/3ML; MG/3ML
3 SOLUTION RESPIRATORY (INHALATION) EVERY 4 HOURS PRN
Status: DISCONTINUED | OUTPATIENT
Start: 2017-09-16 | End: 2017-09-20 | Stop reason: HOSPADM

## 2017-09-16 RX ADMIN — IPRATROPIUM BROMIDE AND ALBUTEROL SULFATE 3 ML: .5; 3 SOLUTION RESPIRATORY (INHALATION) at 07:09

## 2017-09-16 RX ADMIN — AZITHROMYCIN MONOHYDRATE 500 MG: 500 INJECTION, POWDER, LYOPHILIZED, FOR SOLUTION INTRAVENOUS at 08:09

## 2017-09-16 RX ADMIN — WARFARIN SODIUM 5 MG: 5 TABLET ORAL at 05:09

## 2017-09-16 RX ADMIN — IPRATROPIUM BROMIDE AND ALBUTEROL SULFATE 3 ML: .5; 3 SOLUTION RESPIRATORY (INHALATION) at 12:09

## 2017-09-16 RX ADMIN — IPRATROPIUM BROMIDE AND ALBUTEROL SULFATE 3 ML: .5; 3 SOLUTION RESPIRATORY (INHALATION) at 04:09

## 2017-09-16 RX ADMIN — MIRTAZAPINE 7.5 MG: 7.5 TABLET ORAL at 09:09

## 2017-09-16 RX ADMIN — ALPRAZOLAM 0.25 MG: 0.25 TABLET ORAL at 11:09

## 2017-09-16 RX ADMIN — DILTIAZEM HYDROCHLORIDE 300 MG: 180 CAPSULE, COATED, EXTENDED RELEASE ORAL at 08:09

## 2017-09-16 RX ADMIN — POLYETHYLENE GLYCOL 3350 17 G: 17 POWDER, FOR SOLUTION ORAL at 09:09

## 2017-09-16 RX ADMIN — FLUTICASONE FUROATE AND VILANTEROL TRIFENATATE 1 PUFF: 100; 25 POWDER RESPIRATORY (INHALATION) at 11:09

## 2017-09-16 RX ADMIN — PREDNISONE 40 MG: 20 TABLET ORAL at 08:09

## 2017-09-16 RX ADMIN — LISINOPRIL 20 MG: 10 TABLET ORAL at 08:09

## 2017-09-16 RX ADMIN — IPRATROPIUM BROMIDE AND ALBUTEROL SULFATE 3 ML: .5; 3 SOLUTION RESPIRATORY (INHALATION) at 08:09

## 2017-09-16 RX ADMIN — CEFTRIAXONE 1 G: 1 INJECTION, SOLUTION INTRAVENOUS at 07:09

## 2017-09-16 RX ADMIN — METOPROLOL TARTRATE 5 MG: 5 INJECTION INTRAVENOUS at 11:09

## 2017-09-16 NOTE — PLAN OF CARE
09/15/17 2019   Patient Assessment/Suction   Level of Consciousness (AVPU) alert   Respiratory Effort Mild;Labored   All Lung Fields Breath Sounds coarse;wheezes, expiratory   Rhythm/Pattern, Respiratory deep   Cough Frequency frequent   Cough Type good;productive   Sputum Amount moderate   Sputum Color white   Sputum Consistency thick   PRE-TX-O2-ETCO2   O2 Device (Oxygen Therapy) nasal cannula w/ humidification   $ Is the patient on Oxygen? Yes   Flow (L/min) 4   SpO2 (!) 92 %   Pulse Oximetry Type Intermittent   $ Pulse Oximetry - Multiple Charge Pulse Oximetry - Multiple   Pulse (!) 112   Resp (!) 24   Aerosol Therapy   $ Aerosol Therapy Charges Aerosol Treatment   Respiratory Treatment Status given   SVN/Inhaler Treatment Route mask   Position During Treatment HOB at 30 degrees   Patient Tolerance good   Post-Treatment   Post-treatment Heart Rate (beats/min) 124   Post-treatment Resp Rate (breaths/min) 20   All Fields Breath Sounds unchanged   Preset CPAP/BiPAP Settings   Mode Of Delivery other (see comments)  (on standby)   Pt assessed, no distress noted. Pt receives Duoneb Q6, tols txs well.

## 2017-09-16 NOTE — PROGRESS NOTES
09/16/17 0706   Patient Assessment/Suction   Level of Consciousness (AVPU) alert   All Lung Fields Breath Sounds wheezes, expiratory   Cough Frequency infrequent   Cough Type good;nonproductive   PRE-TX-O2-ETCO2   O2 Device (Oxygen Therapy) BiPAP   $ Is the patient on Oxygen? Yes   Oxygen Concentration (%) 40   SpO2 97 %   Pulse Oximetry Type Intermittent   $ Pulse Oximetry - Multiple Charge Pulse Oximetry - Multiple   Pulse 92   Resp (!) 27   Aerosol Therapy   $ Aerosol Therapy Charges Aerosol Treatment   Respiratory Treatment Status given   SVN/Inhaler Treatment Route in-line   Position During Treatment HOB at 30 degrees   Patient Tolerance good   Post-Treatment   Post-treatment Heart Rate (beats/min) 88   Post-treatment Resp Rate (breaths/min) 27   All Fields Breath Sounds unchanged   Ready to Wean/Extubation Screen   FIO2<=50 (chart decimal) 0.4   Preset CPAP/BiPAP Settings   Mode Of Delivery BiPAP   $ CPAP/BiPAP Daily Charge BiPAP/CPAP Daily   $ Is patient using? Yes   Equipment Type V60   Airway Device Type total face mask   Ipap 10   EPAP (cm H2O) 5   Pressure Support (cm H2O) 5   Set Rate (Breaths/Min) 8   ITime (sec) 1   Rise Time (sec) 2   Patient CPAP/BiPAP Settings   RR Total (Breaths/Min) 27   Tidal Volume (mL) 384   VE Minute Ventilation (L/min) 8 L/min   Peak Inspiratory Pressure (cm H2O) 10   TiTOT (%) 28   Total Leak (L/Min) 11   Patient Trigger - ST Mode Only (%) 100   CPAP/BiPAP Alarms   High Pressure (cm H2O) 15   Low Pressure (cm H2O) 5   Minute Ventilation (L/Min) 3   High RR (breaths/min) 40   Low RR (breaths/min) 8

## 2017-09-16 NOTE — PLAN OF CARE
Problem: Patient Care Overview  Goal: Plan of Care Review  POC reviewed with patient and son. Further explained the purpose of taking Warfarin and Diltazem.  Upon assessment, inspiratory and expiratory wheezes noted in all lung fields. Chest x-ray done today. Will continue to monitor patient throughout my shift.

## 2017-09-16 NOTE — PROGRESS NOTES
09/16/17 0449   Patient Assessment/Suction   Level of Consciousness (AVPU) alert   PRE-TX-O2-ETCO2   O2 Device (Oxygen Therapy) BiPAP   Oxygen Concentration (%) 40   SpO2 96 %   Pulse 87   Resp (!) 28   Preset CPAP/BiPAP Settings   Mode Of Delivery BiPAP S/T   $ Initial CPAP/BiPAP Setup? No   $ Is patient using? Yes   Equipment Type V60   Airway Device Type medium full face mask   Ipap 10   EPAP (cm H2O) 5   Pressure Support (cm H2O) 5   Set Rate (Breaths/Min) 8   ITime (sec) 2   Rise Time (sec) 3   Patient CPAP/BiPAP Settings   RR Total (Breaths/Min) 28   Tidal Volume (mL) 420   VE Minute Ventilation (L/min) 10.9 L/min   Peak Inspiratory Pressure (cm H2O) 11   TiTOT (%) 33   Total Leak (L/Min) 7   Patient Trigger - ST Mode Only (%) 100   CPAP/BiPAP Alarms   High Pressure (cm H2O) 15   Low Pressure (cm H2O) 5   Low Pressure Delay (Sec) 20   Minute Ventilation (L/Min) 3   High RR (breaths/min) 40   Low RR (breaths/min) 8   Pt placed on Bipap at this time

## 2017-09-17 LAB
ANION GAP SERPL CALC-SCNC: 11 MMOL/L
BUN SERPL-MCNC: 61 MG/DL
CALCIUM SERPL-MCNC: 9.6 MG/DL
CHLORIDE SERPL-SCNC: 99 MMOL/L
CO2 SERPL-SCNC: 33 MMOL/L
CREAT SERPL-MCNC: 1.6 MG/DL
EST. GFR  (AFRICAN AMERICAN): 34 ML/MIN/1.73 M^2
EST. GFR  (NON AFRICAN AMERICAN): 29 ML/MIN/1.73 M^2
GLUCOSE SERPL-MCNC: 114 MG/DL
INR PPP: 2.9
POTASSIUM SERPL-SCNC: 4.4 MMOL/L
PROTHROMBIN TIME: 28.9 SEC
SODIUM SERPL-SCNC: 143 MMOL/L

## 2017-09-17 PROCEDURE — 63600175 PHARM REV CODE 636 W HCPCS: Performed by: HOSPITALIST

## 2017-09-17 PROCEDURE — 25000003 PHARM REV CODE 250: Performed by: NURSE PRACTITIONER

## 2017-09-17 PROCEDURE — 94660 CPAP INITIATION&MGMT: CPT

## 2017-09-17 PROCEDURE — 36415 COLL VENOUS BLD VENIPUNCTURE: CPT

## 2017-09-17 PROCEDURE — 25000003 PHARM REV CODE 250: Performed by: HOSPITALIST

## 2017-09-17 PROCEDURE — 94640 AIRWAY INHALATION TREATMENT: CPT

## 2017-09-17 PROCEDURE — 80048 BASIC METABOLIC PNL TOTAL CA: CPT

## 2017-09-17 PROCEDURE — 85610 PROTHROMBIN TIME: CPT

## 2017-09-17 PROCEDURE — 11000001 HC ACUTE MED/SURG PRIVATE ROOM

## 2017-09-17 PROCEDURE — 27000221 HC OXYGEN, UP TO 24 HOURS

## 2017-09-17 PROCEDURE — 97530 THERAPEUTIC ACTIVITIES: CPT

## 2017-09-17 PROCEDURE — 94799 UNLISTED PULMONARY SVC/PX: CPT

## 2017-09-17 PROCEDURE — 25000242 PHARM REV CODE 250 ALT 637 W/ HCPCS: Performed by: HOSPITALIST

## 2017-09-17 PROCEDURE — 94761 N-INVAS EAR/PLS OXIMETRY MLT: CPT

## 2017-09-17 PROCEDURE — 99900035 HC TECH TIME PER 15 MIN (STAT)

## 2017-09-17 RX ORDER — TORSEMIDE 20 MG/1
20 TABLET ORAL DAILY
Status: DISCONTINUED | OUTPATIENT
Start: 2017-09-17 | End: 2017-09-17

## 2017-09-17 RX ORDER — IPRATROPIUM BROMIDE 0.5 MG/2.5ML
0.5 SOLUTION RESPIRATORY (INHALATION) EVERY 6 HOURS
Status: DISCONTINUED | OUTPATIENT
Start: 2017-09-17 | End: 2017-09-18

## 2017-09-17 RX ORDER — METOPROLOL TARTRATE 25 MG/1
12.5 TABLET ORAL 2 TIMES DAILY
Status: DISCONTINUED | OUTPATIENT
Start: 2017-09-18 | End: 2017-09-20 | Stop reason: HOSPADM

## 2017-09-17 RX ORDER — WARFARIN 2 MG/1
4 TABLET ORAL DAILY
Status: DISCONTINUED | OUTPATIENT
Start: 2017-09-17 | End: 2017-09-18

## 2017-09-17 RX ORDER — FUROSEMIDE 10 MG/ML
40 INJECTION INTRAMUSCULAR; INTRAVENOUS DAILY
Status: DISCONTINUED | OUTPATIENT
Start: 2017-09-18 | End: 2017-09-18

## 2017-09-17 RX ORDER — FUROSEMIDE 10 MG/ML
40 INJECTION INTRAMUSCULAR; INTRAVENOUS 2 TIMES DAILY
Status: DISCONTINUED | OUTPATIENT
Start: 2017-09-17 | End: 2017-09-17

## 2017-09-17 RX ORDER — BENZONATATE 100 MG/1
100 CAPSULE ORAL 3 TIMES DAILY
Status: DISCONTINUED | OUTPATIENT
Start: 2017-09-17 | End: 2017-09-20 | Stop reason: HOSPADM

## 2017-09-17 RX ADMIN — LISINOPRIL 20 MG: 10 TABLET ORAL at 08:09

## 2017-09-17 RX ADMIN — MIRTAZAPINE 7.5 MG: 7.5 TABLET ORAL at 08:09

## 2017-09-17 RX ADMIN — ALPRAZOLAM 0.25 MG: 0.25 TABLET ORAL at 09:09

## 2017-09-17 RX ADMIN — FLUTICASONE FUROATE AND VILANTEROL TRIFENATATE 1 PUFF: 100; 25 POWDER RESPIRATORY (INHALATION) at 08:09

## 2017-09-17 RX ADMIN — POLYETHYLENE GLYCOL 3350 17 G: 17 POWDER, FOR SOLUTION ORAL at 08:09

## 2017-09-17 RX ADMIN — DILTIAZEM HYDROCHLORIDE 300 MG: 180 CAPSULE, COATED, EXTENDED RELEASE ORAL at 08:09

## 2017-09-17 RX ADMIN — TORSEMIDE 20 MG: 20 TABLET ORAL at 10:09

## 2017-09-17 RX ADMIN — IPRATROPIUM BROMIDE AND ALBUTEROL SULFATE 3 ML: .5; 3 SOLUTION RESPIRATORY (INHALATION) at 04:09

## 2017-09-17 RX ADMIN — FUROSEMIDE 40 MG: 10 INJECTION, SOLUTION INTRAMUSCULAR; INTRAVENOUS at 05:09

## 2017-09-17 RX ADMIN — IPRATROPIUM BROMIDE AND ALBUTEROL SULFATE 3 ML: .5; 3 SOLUTION RESPIRATORY (INHALATION) at 12:09

## 2017-09-17 RX ADMIN — PREDNISONE 40 MG: 20 TABLET ORAL at 08:09

## 2017-09-17 RX ADMIN — AZITHROMYCIN 250 MG: 250 TABLET, FILM COATED ORAL at 08:09

## 2017-09-17 RX ADMIN — ACETAMINOPHEN 1000 MG: 500 TABLET, FILM COATED ORAL at 04:09

## 2017-09-17 RX ADMIN — WARFARIN SODIUM 4 MG: 2 TABLET ORAL at 05:09

## 2017-09-17 NOTE — PLAN OF CARE
09/17/17 0801   Patient Assessment/Suction   Level of Consciousness (AVPU) alert   Respiratory Effort Unlabored   All Lung Fields Breath Sounds crackles   PRE-TX-O2-ETCO2   O2 Device (Oxygen Therapy) nasal cannula   $ Is the patient on Oxygen? Yes   Flow (L/min) 4   Oxygen Concentration (%) 36   SpO2 95 %   Pulse Oximetry Type Intermittent   $ Pulse Oximetry - Multiple Charge Pulse Oximetry - Multiple   Pulse 92   Resp 18   Inhaler   $ Inhaler Charges MDI (Metered Dose Inahler) Treatment   Respiratory Treatment Status given   SVN/Inhaler Treatment Route mouthpiece   Patient Tolerance good   Ready to Wean/Extubation Screen   FIO2<=50 (chart decimal) 0.36

## 2017-09-17 NOTE — SUBJECTIVE & OBJECTIVE
Interval History:  Continues to improve    Review of Systems   Constitutional: Negative for fever.   Respiratory: Positive for cough and shortness of breath (improving). Negative for wheezing.    Cardiovascular: Negative for chest pain.   Gastrointestinal: Negative for abdominal pain.     Objective:     Vital Signs (Most Recent):  Temp: 97.9 °F (36.6 °C) (09/16/17 2005)  Pulse: 90 (09/16/17 2006)  Resp: (!) 24 (09/16/17 2006)  BP: 123/69 (09/16/17 2005)  SpO2: (!) 93 % (09/16/17 2006) Vital Signs (24h Range):  Temp:  [97.3 °F (36.3 °C)-97.9 °F (36.6 °C)] 97.9 °F (36.6 °C)  Pulse:  [] 90  Resp:  [18-28] 24  SpO2:  [90 %-97 %] 93 %  BP: (113-160)/(65-83) 123/69     Weight: 125.2 kg (276 lb)  Body mass index is 43.23 kg/m².    Intake/Output Summary (Last 24 hours) at 09/16/17 2146  Last data filed at 09/16/17 1800   Gross per 24 hour   Intake              240 ml   Output             1450 ml   Net            -1210 ml      Physical Exam   Constitutional: She is oriented to person, place, and time. No distress.   HENT:   Head: Normocephalic and atraumatic.   Eyes: EOM are normal. Right eye exhibits no discharge. Left eye exhibits no discharge.   Neck: Normal range of motion. Neck supple. No JVD present.   Cardiovascular: Normal rate.  An irregularly irregular rhythm present.   No murmur heard.  Pulmonary/Chest: No accessory muscle usage. No tachypnea. She has no decreased breath sounds. She has wheezes. She has no rales. She exhibits no tenderness.   Abdominal: Soft. Bowel sounds are normal. She exhibits no distension. There is no tenderness.   Musculoskeletal: She exhibits edema (lymphedema legs bilateral). She exhibits no deformity.   Neurological: She is alert and oriented to person, place, and time. No cranial nerve deficit.   Skin: Skin is warm and dry. No rash noted. She is not diaphoretic.   Psychiatric: She has a normal mood and affect. Her behavior is normal.       Significant Labs: All pertinent labs  within the past 24 hours have been reviewed.    Significant Imaging: none

## 2017-09-17 NOTE — ASSESSMENT & PLAN NOTE
Clinically concerning for PNA.  Cont Azithromycin, supplemental O2, inhaled bronchodilators.   Will start her on daily prednisone for the bronchospasms.  At risk for worsening respiratory failure, so I will continue her inpatient treatment at least another day.

## 2017-09-17 NOTE — PLAN OF CARE
09/17/17 1245   PRE-TX-O2-ETCO2   O2 Device (Oxygen Therapy) nasal cannula   $ Is the patient on Oxygen? Yes   Flow (L/min) 4   Oxygen Concentration (%) 36   SpO2 (!) 93 %   Pulse Oximetry Type Intermittent   $ Pulse Oximetry - Multiple Charge Pulse Oximetry - Multiple   Incentive Spirometer   $ Incentive Spirometer Charges done with encouragement   Predicted Level (mL) (Incentive Spirometer) 1900   Administration (Incentive Spirometer) done with encouragement   Number of Repetitions (Incentive Spirometer) 15   Level (mL) (Incentive Spirometer) 750   Patient Tolerance good  (needs coaching)   Ready to Wean/Extubation Screen   FIO2<=50 (chart decimal) 0.36

## 2017-09-17 NOTE — PT/OT/SLP PROGRESS
Physical Therapy  Treatment    Nicole Lala   MRN: [id number]   Admitting Diagnosis: Acute on chronic respiratory failure with hypoxia    PT Received On: 09/17/17  PT Start Time: 1158     PT Stop Time: 1225    PT Total Time (min): 27 min       Billable Minutes:  27 min. There. Act.     Treatment Type: Treatment  PT/PTA: PT     PTA Visit Number: 0       General Precautions: Standard, fall, respiratory  Orthopedic Precautions: N/A   Braces: N/A         Subjective:  Communicated with YOMAIRA Fontenot  prior to session.  Chart reviewed.     Pain/Comfort  Pain Rating 1: 4/10  Location - Side 1: Right  Location - Orientation 1: generalized  Location 1: hip  Pain Addressed 1: Reposition, Distraction  Pain Rating Post-Intervention 1: 5/10    Objective:   Patient found with: costa catheter, oxygen, telemetry, PICC line    Functional Mobility:  Bed Mobility:   Rolling/Turning to Left: Minimum assistance, With side rail  Rolling/Turning Right: Maximum assistance, With side rail  Scooting/Bridging: Total Assistance, With assist of 2  Supine to Sit: Moderate Assistance  Sit to Supine: Maximum Assistance    Transfers:  Sit <> Stand Assistance: Moderate Assistance  Sit <> Stand Assistive Device: Rolling Walker    Gait:   Gait Distance: no gait     Stairs: NA     Balance:   Static Sit: FAIR+: Able to take MINIMAL challenges from all directions  Dynamic Sit: FAIR: Cannot move trunk without losing balance  Static Stand: POOR: Needs MODERATE assist to maintain  Dynamic stand: POOR: N/A     Therapeutic Activities and Exercises:  Bed mobility, sit<->stand x 2      AM-PAC 6 CLICK MOBILITY  How much help from another person does this patient currently need?   1 = Unable, Total/Dependent Assistance  2 = A lot, Maximum/Moderate Assistance  3 = A little, Minimum/Contact Guard/Supervision  4 = None, Modified Barton/Independent         AM-PAC Raw Score CMS G-Code Modifier Level of Impairment Assistance   6 % Total / Unable   7 - 9  CM 80 - 100% Maximal Assist   10 - 14 CL 60 - 80% Moderate Assist   15 - 19 CK 40 - 60% Moderate Assist   20 - 22 CJ 20 - 40% Minimal Assist   23 CI 1-20% SBA / CGA   24 CH 0% Independent/ Mod I     Patient left supine bed, son in room , Morales RN assisted with scooting pt. Up in bed. with all lines intact, call button in reach.    Assessment:  Nicole Lala is a 84 y.o. female with a medical diagnosis of Acute on chronic respiratory failure with hypoxia and presents with weakness, lymphedema, decreased conditioning.     Rehab identified problem list/impairments: Rehab identified problem list/impairments: weakness, impaired endurance, impaired sensation, impaired self care skills, impaired functional mobilty, impaired balance, decreased coordination, pain, decreased lower extremity function, decreased ROM, impaired skin    Rehab potential is fair   Activity tolerance: fair     Discharge recommendations: Discharge Facility/Level Of Care Needs: nursing facility, skilled     Barriers to discharge: Barriers to Discharge: Decreased caregiver support    Equipment recommendations: Equipment Needed After Discharge: none     GOALS:    Physical Therapy Goals        Problem: Physical Therapy Goal    Goal Priority Disciplines Outcome Goal Variances Interventions   Physical Therapy Goal    High PT/OT, PT Ongoing (interventions implemented as appropriate)     Description:  Goals to be met by: 2017     Patient will increase functional independence with mobility by performin. Supine to sit with MInimal Assistance  2. Sit to supine with Moderate Assistance  3. Sit to stand transfer with Minimal Assistance  4. Bed to chair transfer with Minimal Assistance using Rolling Walker  5. Sitting at edge of bed x15 minutes with Minimal Assistance  6. Lower extremity exercise program x10 reps per handout, with assistance as needed                      PLAN:    Patient to be seen 6 x/week  to address the above listed  problems via therapeutic activities, therapeutic exercises  Plan of Care expires: 09/29/17  Plan of Care reviewed with: patient         Iván Velasquez, PT  09/17/2017

## 2017-09-17 NOTE — PLAN OF CARE
09/16/17 2006   Patient Assessment/Suction   Level of Consciousness (AVPU) alert   Respiratory Effort Normal;Unlabored   Expansion/Accessory Muscles/Retractions no use of accessory muscles   All Lung Fields Breath Sounds wheezes, expiratory;wheezes, inspiratory   Cough Frequency frequent   Cough Type good;nonproductive;loose   PRE-TX-O2-ETCO2   O2 Device (Oxygen Therapy) nasal cannula w/ humidification   Flow (L/min) 4   SpO2 (!) 93 %   Pulse Oximetry Type Intermittent   $ Pulse Oximetry - Multiple Charge Pulse Oximetry - Multiple   Pulse 90   Resp (!) 24   Aerosol Therapy   $ Aerosol Therapy Charges Aerosol Treatment   Respiratory Treatment Status given   SVN/Inhaler Treatment Route mask   Position During Treatment HOB at 30 degrees   Patient Tolerance good   Post-Treatment   Post-treatment Heart Rate (beats/min) 98   Post-treatment Resp Rate (breaths/min) 20   All Fields Breath Sounds aeration increased;wheezes, expiratory   Preset CPAP/BiPAP Settings   Mode Of Delivery other (see comments)  (on standby )   Pt assessed for PRN neb tx, neb tx given for wheezing.  Pt improved post treatment.

## 2017-09-17 NOTE — PLAN OF CARE
Problem: Patient Care Overview  Goal: Plan of Care Review  Outcome: Ongoing (interventions implemented as appropriate)  Plan of care reviewed with pt's, verbalized understanding. Bed wheels locked, call light in reach, side rail up x 2. NAD noted. Vitals stable. Still on 4L nasal cannula SPO2 93%. Alert and oriented. PT/OT done today. Will continue to monitor

## 2017-09-17 NOTE — PROGRESS NOTES
Prn tx given for wheezing     09/17/17 1240   PRE-TX-O2-ETCO2   O2 Device (Oxygen Therapy) nasal cannula w/ humidification   $ Is the patient on Oxygen? Yes   Flow (L/min) 3   Oxygen Concentration (%) 32   SpO2 (!) 90 %  (increased back to 4lpm)   Pulse Oximetry Type Intermittent   $ Pulse Oximetry - Multiple Charge Pulse Oximetry - Multiple   Pulse 74   Resp 18   Aerosol Therapy   $ Aerosol Therapy Charges Aerosol Treatment   Respiratory Treatment Status given   SVN/Inhaler Treatment Route mask   Position During Treatment HOB at 30 degrees   Patient Tolerance good   Post-Treatment   Post-treatment Heart Rate (beats/min) 89   Post-treatment Resp Rate (breaths/min) 18   All Fields Breath Sounds aeration increased   Ready to Wean/Extubation Screen   FIO2<=50 (chart decimal) 0.32

## 2017-09-17 NOTE — PROGRESS NOTES
"[facility] Medicine  Progress Note    Patient Name: Nicole Lala  MRN: [id number]  Patient Class: IP- Inpatient   Admission Date: 9/11/2017  Length of Stay: 5 days  Attending Physician: James Chahal MD  Primary Care Provider: Brandon Gray MD        Subjective:     Principal Problem:Acute on chronic respiratory failure with hypoxia    HPI:  Nicole Lala is a 84 y.o. female with a history of HTN, CKD, A-fib (on coumadin), Lymphedema and DVT.  She was admitted to the service of hospital medicine with acute on chronic hypoxic respiratory failure.  She presented to the ED with complaints of severe SOB while getting into the car from her wheelchair.  She reports a week or so of a "cold" describing cough with brown phlegm associated with general weakness and an intermittentently sore throat. She denies any attempted OTC medications for symptoms. She had an PCP appointment today but presented to the ED after becoming SOB. The SOB is notable worse with exertion. Pt also reports she had blood work yesterday and was advised to take 1/2 of her coumadin daily 5 mg Rx yesterday and return to the 5 mg q.d. Pt reports 2L of oxygen use at home which she wears mainly when resting. She denies any worsening of her chronic LE swelling, chest pain, or syncope. Pt denies fever/chills, abd pain, and tobacco use.     Hospital Course:  Patient was admitted with pneumonia and CHF.  Was given IVAB and placed on Lasix IV infusion.  Her breathing improved.  She was taken off the Lasix drip.  She was also given IV Cardizem for rapid atrial fibrillation.  Once her oral Cardizem took over, she was taken off that infusion.  She was started on prednisone for bronchospasms.    Interval History:  Continues to improve    Review of Systems   Constitutional: Negative for fever.   Respiratory: Positive for cough and shortness of breath (improving). Negative for wheezing.    Cardiovascular: Negative for chest pain. "   Gastrointestinal: Negative for abdominal pain.     Objective:     Vital Signs (Most Recent):  Temp: 97.9 °F (36.6 °C) (09/16/17 2005)  Pulse: 90 (09/16/17 2006)  Resp: (!) 24 (09/16/17 2006)  BP: 123/69 (09/16/17 2005)  SpO2: (!) 93 % (09/16/17 2006) Vital Signs (24h Range):  Temp:  [97.3 °F (36.3 °C)-97.9 °F (36.6 °C)] 97.9 °F (36.6 °C)  Pulse:  [] 90  Resp:  [18-28] 24  SpO2:  [90 %-97 %] 93 %  BP: (113-160)/(65-83) 123/69     Weight: 125.2 kg (276 lb)  Body mass index is 43.23 kg/m².    Intake/Output Summary (Last 24 hours) at 09/16/17 2146  Last data filed at 09/16/17 1800   Gross per 24 hour   Intake              240 ml   Output             1450 ml   Net            -1210 ml      Physical Exam   Constitutional: She is oriented to person, place, and time. No distress.   HENT:   Head: Normocephalic and atraumatic.   Eyes: EOM are normal. Right eye exhibits no discharge. Left eye exhibits no discharge.   Neck: Normal range of motion. Neck supple. No JVD present.   Cardiovascular: Normal rate.  An irregularly irregular rhythm present.   No murmur heard.  Pulmonary/Chest: No accessory muscle usage. No tachypnea. She has no decreased breath sounds. She has wheezes. She has no rales. She exhibits no tenderness.   Abdominal: Soft. Bowel sounds are normal. She exhibits no distension. There is no tenderness.   Musculoskeletal: She exhibits edema (lymphedema legs bilateral). She exhibits no deformity.   Neurological: She is alert and oriented to person, place, and time. No cranial nerve deficit.   Skin: Skin is warm and dry. No rash noted. She is not diaphoretic.   Psychiatric: She has a normal mood and affect. Her behavior is normal.       Significant Labs: All pertinent labs within the past 24 hours have been reviewed.    Significant Imaging: none    Assessment/Plan:      * Acute on chronic respiratory failure with hypoxia    Due to pna and CHF.  Monitor work of breathing and o2 sats.        Acute diastolic heart  failure    Was present on admission.  BUN and Cr are elevated, so will discontinue the Lasix infusion.  Echo shows normal EF.          CAP (community acquired pneumonia)    Clinically concerning for PNA.  Cont Azithromycin, supplemental O2, inhaled bronchodilators.   Will start her on daily prednisone for the bronchospasms.  At risk for worsening respiratory failure, so I will continue her inpatient treatment at least another day.           Morbid obesity with BMI of 40.0-44.9, adult    BMI 43.2.  Obesity compounds patient co-morbidities and complicates treatment course.  Counseling given regarding diet modification and exercise recommendations.            Essential hypertension    Chronic, continue antihypertensive regimen.  Monitor BP closely, titrate medication as required for sustained BP control.          Atrial fibrillation with rapid ventricular response    Off the Cardizem drip.  Pt restarted on her oral CCB..  Continue warfarin- therapeutic at this time.  Monitor closely on monitor.           Lymphedema    Continue ACE wrapping as per home regimen. Keep extremities elevated.          Physical deconditioning    PT/OT consultation.          Venous stasis of lower extremity                VTE Risk Mitigation         Ordered     Medium Risk of VTE  Once      09/12/17 0150     Reason for No Pharmacological VTE Prophylaxis  Once      09/12/17 0150     warfarin (COUMADIN) tablet 5 mg  Daily     Route:  Oral        09/11/17 0029              James Chahal MD  Department of Hospital Medicine   [facility]

## 2017-09-17 NOTE — PLAN OF CARE
Problem: Patient Care Overview  Goal: Plan of Care Review  Outcome: Ongoing (interventions implemented as appropriate)  Quiet and cooperative. Bipap on during the night. Tolerated well.  Fall and injury free this shift. Reposition for comfort. Plan of care reviewed with patient.  Understanding voiced. Bed In low position and locked. Side rails up x 2. Call bell in reach.  Telemetry

## 2017-09-18 PROBLEM — N17.9 AKI (ACUTE KIDNEY INJURY): Status: ACTIVE | Noted: 2017-09-18

## 2017-09-18 PROBLEM — J44.1 COPD EXACERBATION: Status: ACTIVE | Noted: 2017-09-18

## 2017-09-18 LAB
ANION GAP SERPL CALC-SCNC: 11 MMOL/L
BUN SERPL-MCNC: 64 MG/DL
CALCIUM SERPL-MCNC: 9.3 MG/DL
CHLORIDE SERPL-SCNC: 99 MMOL/L
CO2 SERPL-SCNC: 33 MMOL/L
CREAT SERPL-MCNC: 1.9 MG/DL
EST. GFR  (AFRICAN AMERICAN): 28 ML/MIN/1.73 M^2
EST. GFR  (NON AFRICAN AMERICAN): 24 ML/MIN/1.73 M^2
GLUCOSE SERPL-MCNC: 115 MG/DL
INR PPP: 3.3
POTASSIUM SERPL-SCNC: 4.4 MMOL/L
PROTHROMBIN TIME: 32.7 SEC
SODIUM SERPL-SCNC: 143 MMOL/L

## 2017-09-18 PROCEDURE — 25000003 PHARM REV CODE 250: Performed by: NURSE PRACTITIONER

## 2017-09-18 PROCEDURE — 94660 CPAP INITIATION&MGMT: CPT

## 2017-09-18 PROCEDURE — 99900035 HC TECH TIME PER 15 MIN (STAT)

## 2017-09-18 PROCEDURE — 25000003 PHARM REV CODE 250: Performed by: HOSPITALIST

## 2017-09-18 PROCEDURE — 97110 THERAPEUTIC EXERCISES: CPT

## 2017-09-18 PROCEDURE — 11000001 HC ACUTE MED/SURG PRIVATE ROOM

## 2017-09-18 PROCEDURE — 97530 THERAPEUTIC ACTIVITIES: CPT

## 2017-09-18 PROCEDURE — 94761 N-INVAS EAR/PLS OXIMETRY MLT: CPT

## 2017-09-18 PROCEDURE — 94640 AIRWAY INHALATION TREATMENT: CPT

## 2017-09-18 PROCEDURE — 99223 1ST HOSP IP/OBS HIGH 75: CPT | Mod: ,,, | Performed by: INTERNAL MEDICINE

## 2017-09-18 PROCEDURE — 25000242 PHARM REV CODE 250 ALT 637 W/ HCPCS: Performed by: HOSPITALIST

## 2017-09-18 PROCEDURE — 63600175 PHARM REV CODE 636 W HCPCS: Performed by: HOSPITALIST

## 2017-09-18 PROCEDURE — 94799 UNLISTED PULMONARY SVC/PX: CPT

## 2017-09-18 PROCEDURE — 85610 PROTHROMBIN TIME: CPT

## 2017-09-18 PROCEDURE — 36415 COLL VENOUS BLD VENIPUNCTURE: CPT

## 2017-09-18 PROCEDURE — 27000221 HC OXYGEN, UP TO 24 HOURS

## 2017-09-18 PROCEDURE — 80048 BASIC METABOLIC PNL TOTAL CA: CPT

## 2017-09-18 RX ORDER — MEROPENEM AND SODIUM CHLORIDE 1 G/50ML
1 INJECTION, SOLUTION INTRAVENOUS
Status: DISCONTINUED | OUTPATIENT
Start: 2017-09-18 | End: 2017-09-18

## 2017-09-18 RX ORDER — AZITHROMYCIN 250 MG/1
250 TABLET, FILM COATED ORAL DAILY
Status: DISCONTINUED | OUTPATIENT
Start: 2017-09-19 | End: 2017-09-20 | Stop reason: HOSPADM

## 2017-09-18 RX ORDER — WARFARIN 2 MG/1
4 TABLET ORAL DAILY
Status: DISCONTINUED | OUTPATIENT
Start: 2017-09-19 | End: 2017-09-20 | Stop reason: HOSPADM

## 2017-09-18 RX ORDER — BISACODYL 10 MG
10 SUPPOSITORY, RECTAL RECTAL DAILY
Status: DISCONTINUED | OUTPATIENT
Start: 2017-09-18 | End: 2017-09-20 | Stop reason: HOSPADM

## 2017-09-18 RX ORDER — IPRATROPIUM BROMIDE AND ALBUTEROL SULFATE 2.5; .5 MG/3ML; MG/3ML
3 SOLUTION RESPIRATORY (INHALATION) EVERY 8 HOURS
Status: DISCONTINUED | OUTPATIENT
Start: 2017-09-19 | End: 2017-09-20 | Stop reason: HOSPADM

## 2017-09-18 RX ORDER — LINEZOLID 2 MG/ML
600 INJECTION, SOLUTION INTRAVENOUS
Status: DISCONTINUED | OUTPATIENT
Start: 2017-09-18 | End: 2017-09-18

## 2017-09-18 RX ORDER — SODIUM CHLORIDE 9 MG/ML
INJECTION, SOLUTION INTRAVENOUS CONTINUOUS
Status: ACTIVE | OUTPATIENT
Start: 2017-09-18 | End: 2017-09-19

## 2017-09-18 RX ADMIN — BENZONATATE 100 MG: 100 CAPSULE ORAL at 12:09

## 2017-09-18 RX ADMIN — Medication 12.5 MG: at 12:09

## 2017-09-18 RX ADMIN — FUROSEMIDE 40 MG: 10 INJECTION, SOLUTION INTRAMUSCULAR; INTRAVENOUS at 08:09

## 2017-09-18 RX ADMIN — POLYETHYLENE GLYCOL 3350 17 G: 17 POWDER, FOR SOLUTION ORAL at 08:09

## 2017-09-18 RX ADMIN — POLYETHYLENE GLYCOL 3350 17 G: 17 POWDER, FOR SOLUTION ORAL at 10:09

## 2017-09-18 RX ADMIN — Medication 12.5 MG: at 08:09

## 2017-09-18 RX ADMIN — BENZONATATE 100 MG: 100 CAPSULE ORAL at 06:09

## 2017-09-18 RX ADMIN — IPRATROPIUM BROMIDE 0.5 MG: 0.5 SOLUTION RESPIRATORY (INHALATION) at 07:09

## 2017-09-18 RX ADMIN — Medication 12.5 MG: at 10:09

## 2017-09-18 RX ADMIN — BENZONATATE 100 MG: 100 CAPSULE ORAL at 02:09

## 2017-09-18 RX ADMIN — SODIUM CHLORIDE: 0.9 INJECTION, SOLUTION INTRAVENOUS at 10:09

## 2017-09-18 RX ADMIN — IPRATROPIUM BROMIDE AND ALBUTEROL SULFATE 3 ML: .5; 3 SOLUTION RESPIRATORY (INHALATION) at 11:09

## 2017-09-18 RX ADMIN — PREDNISONE 40 MG: 20 TABLET ORAL at 08:09

## 2017-09-18 RX ADMIN — VANCOMYCIN HYDROCHLORIDE 2000 MG: 1 INJECTION, POWDER, LYOPHILIZED, FOR SOLUTION INTRAVENOUS at 12:09

## 2017-09-18 RX ADMIN — BENZONATATE 100 MG: 100 CAPSULE ORAL at 10:09

## 2017-09-18 RX ADMIN — IPRATROPIUM BROMIDE 0.5 MG: 0.5 SOLUTION RESPIRATORY (INHALATION) at 01:09

## 2017-09-18 RX ADMIN — FLUTICASONE FUROATE AND VILANTEROL TRIFENATATE 1 PUFF: 100; 25 POWDER RESPIRATORY (INHALATION) at 07:09

## 2017-09-18 RX ADMIN — DILTIAZEM HYDROCHLORIDE 300 MG: 180 CAPSULE, COATED, EXTENDED RELEASE ORAL at 08:09

## 2017-09-18 RX ADMIN — MEROPENEM AND SODIUM CHLORIDE 1 G: 1 INJECTION, SOLUTION INTRAVENOUS at 02:09

## 2017-09-18 RX ADMIN — MIRTAZAPINE 7.5 MG: 7.5 TABLET ORAL at 10:09

## 2017-09-18 RX ADMIN — IPRATROPIUM BROMIDE 0.5 MG: 0.5 SOLUTION RESPIRATORY (INHALATION) at 12:09

## 2017-09-18 NOTE — PLAN OF CARE
Problem: Patient Care Overview  Goal: Plan of Care Review  Outcome: Ongoing (interventions implemented as appropriate)  Pt's up to chair. Alert and vital sign stable. Le cath removed due to pee at 2 am. NAD noted. Alert and oriented. Still on 4L nasal cannula SPO2 93%. Denies any pain. Will continue to monitor

## 2017-09-18 NOTE — PROGRESS NOTES
"[facility] Medicine  Progress Note    Patient Name: Nicole Lala  MRN: [id number]  Patient Class: IP- Inpatient   Admission Date: 9/11/2017  Length of Stay: 6 days  Attending Physician: James Chahal MD  Primary Care Provider: Brandon Gray MD        Subjective:     Principal Problem:Acute on chronic respiratory failure with hypoxia    HPI:  Nicole Lala is a 84 y.o. female with a history of HTN, CKD, A-fib (on coumadin), Lymphedema and DVT.  She was admitted to the service of hospital medicine with acute on chronic hypoxic respiratory failure.  She presented to the ED with complaints of severe SOB while getting into the car from her wheelchair.  She reports a week or so of a "cold" describing cough with brown phlegm associated with general weakness and an intermittentently sore throat. She denies any attempted OTC medications for symptoms. She had an PCP appointment today but presented to the ED after becoming SOB. The SOB is notable worse with exertion. Pt also reports she had blood work yesterday and was advised to take 1/2 of her coumadin daily 5 mg Rx yesterday and return to the 5 mg q.d. Pt reports 2L of oxygen use at home which she wears mainly when resting. She denies any worsening of her chronic LE swelling, chest pain, or syncope. Pt denies fever/chills, abd pain, and tobacco use.     Hospital Course:  Patient was admitted with pneumonia and CHF.  Was given IVAB and placed on Lasix IV infusion.  Her breathing improved.  She was taken off the Lasix drip.  She was also given IV Cardizem for rapid atrial fibrillation.  Once her oral Cardizem took over, she was taken off that infusion.  She was started on prednisone for bronchospasms.    Interval History:  She states she continues to improve but remains tachypneic and wheezing, although is somewhat better, lasix resumed as well as scheduled nebulizer treatments and low dose beta blocker added given fact that nebs are " contributing to tachycardia presently    Review of Systems   Constitutional: Negative for fever.   Respiratory: Positive for cough and shortness of breath (improving). Negative for wheezing.    Cardiovascular: Negative for chest pain.   Gastrointestinal: Negative for abdominal pain.     Objective:     Vital Signs (Most Recent):  Temp: 97.6 °F (36.4 °C) (09/17/17 1929)  Pulse: 86 (09/17/17 2223)  Resp: (!) 25 (09/17/17 2223)  BP: (!) 127/57 (09/17/17 1929)  SpO2: 95 % (09/17/17 2223) Vital Signs (24h Range):  Temp:  [97.6 °F (36.4 °C)-98 °F (36.7 °C)] 97.6 °F (36.4 °C)  Pulse:  [] 86  Resp:  [18-26] 25  SpO2:  [90 %-97 %] 95 %  BP: (106-133)/(57-84) 127/57     Weight: 125.2 kg (276 lb)  Body mass index is 43.23 kg/m².    Intake/Output Summary (Last 24 hours) at 09/17/17 2354  Last data filed at 09/17/17 1700   Gross per 24 hour   Intake             1150 ml   Output             1775 ml   Net             -625 ml      Physical Exam   Constitutional: She is oriented to person, place, and time. No distress.   HENT:   Head: Normocephalic and atraumatic.   Eyes: EOM are normal. Right eye exhibits no discharge. Left eye exhibits no discharge.   Neck: Normal range of motion. Neck supple. No JVD present.   Cardiovascular: Normal rate.  An irregularly irregular rhythm present.   No murmur heard.  Pulmonary/Chest: No accessory muscle usage. No tachypnea. She has no decreased breath sounds. She has wheezes. She has no rales. She exhibits no tenderness.   Abdominal: Soft. Bowel sounds are normal. She exhibits no distension. There is no tenderness.   Musculoskeletal: She exhibits edema (lymphedema legs bilateral). She exhibits no deformity.   Neurological: She is alert and oriented to person, place, and time. No cranial nerve deficit.   Skin: Skin is warm and dry. No rash noted. She is not diaphoretic.   Psychiatric: She has a normal mood and affect. Her behavior is normal.       Significant Labs: All pertinent labs within  the past 24 hours have been reviewed.    Significant Imaging: none    Assessment/Plan:      * Acute on chronic respiratory failure with hypoxia    Due to pna and CHF.  Monitor work of breathing and o2 sats.        Acute diastolic heart failure    Was present on admission.  BUN and Cr are elevated, so will discontinue the Lasix infusion.  Echo shows normal EF.    Resume scheduled lasix today and reassess airspace disease on CXR in am        CAP (community acquired pneumonia)    Clinically concerning for PNA.  Cont Azithromycin, supplemental O2, inhaled bronchodilators.   Will start her on daily prednisone for the bronchospasms  Stop azithromycin         Morbid obesity with BMI of 40.0-44.9, adult    BMI 43.2.  Obesity compounds patient co-morbidities and complicates treatment course.  Counseling given regarding diet modification and exercise recommendations.            Essential hypertension    Chronic, continue antihypertensive regimen.  Monitor BP closely, titrate medication as required for sustained BP control.          Atrial fibrillation with rapid ventricular response    Off the Cardizem drip.  Pt restarted on her oral CCB..  Continue warfarin- therapeutic at this time.  Monitor closely on monitor.     More tachycardic this evening, given low dose metoprolol to help control as nebs likely contributory        Lymphedema    Continue ACE wrapping as per home regimen. Keep extremities elevated.          Physical deconditioning    PT/OT consultation.          Venous stasis of lower extremity                VTE Risk Mitigation         Ordered     warfarin (COUMADIN) tablet 4 mg  Daily     Route:  Oral        09/17/17 1550     Medium Risk of VTE  Once      09/12/17 0150     Reason for No Pharmacological VTE Prophylaxis  Once      09/12/17 0150              James Chahal MD  Department of Hospital Medicine   [facility]

## 2017-09-18 NOTE — CONSULTS
09/18/2017      Admit Date: 9/11/2017  Nicloe Mercer Spike  New Patient Consult    Chief Complaint   Patient presents with    Cough     started yesterday       History of Present Illness:   Pt worked as , smoked ppd for 30-40 yrs, retired age 62 with presentation soon thereafter with ha/dx cerebral aneurysm with 2 repairs. Pt had ivc filter placed preventive.  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 Pt developed lymphedema she feels due to hip surg?  Pt has had low o2 last yr with recommendations to use her nocturnal o2 more continuous?   Sat in jan 2017 97 office visit?  Pt has a fib with no amiodarone rx, but has been on theraputic coumadin.  Pt had echos with pap mid 30's to 55 mm last 4 yrs.    Pt presents to hosp with vague cough and sob, brown mucous.  Sat was low in upper 80's.  Pt was noted ot have wheezes.  Ct chest viewed and pulm art trunk is large with bulky bilat hilar vasc (nodes vs vasc) on no contrast ct.      Pt denies asthma. No copd rx.  No clear h/o pe.  Known snorer,      She cannot lay supine for sob - chronic problem.    Makes 60 anniversary in November.        PFSH:  Past Medical History:   Diagnosis Date    A-fib     Acute CHF 9/25/2013    Anticoagulant long-term use     Arthritis     Blood transfusion     Branch retinal vein occlusion of right eye     Cellulitis and abscess of lower extremity     Cerebral aneurysm     S/p repair    CKD (chronic kidney disease)     Elevated serum creatinine     HTN (hypertension)     Lymphedema     Macular degeneration - Right Eye 1/31/2013    Nuclear sclerosis - Both Eyes 1/31/2013    Done OU    Squamous cell carcinoma excised 11/16/16    R forearm     Past Surgical History:   Procedure Laterality Date    ABDOMINAL SURGERY      APPENDECTOMY      APPENDECTOMY      CATARACT EXTRACTION W/  INTRAOCULAR LENS IMPLANT Right 04/25/2017    [facility]    CATARACT EXTRACTION W/   INTRAOCULAR LENS IMPLANT Left 06/13/2017    [facility]    CEREBRAL ANEURYSM REPAIR  10/16/1994    RIGHT FRONTOTEMPORAL CRANIOTOMY WITH LIPPING OF SUPRACLINOID CAROTID ARTERY ANEURYSM      CEREBRAL ANEURYSM REPAIR  10/27/1994    RIGHT FRONTOTEMPORAL CRANIOTOMY WITH CLIPPING OF RIGHT MIDDLE CEREBRAL ARTERY ANEURYSM     COMPLICATED TOTAL HIP PROSTHESIS AND METHYLMETHACRALATE REMOVAL W/ SPACER INSERTION  8/29/2013    left    EYE SURGERY      Focal laser      OD    ADOLFO FILTER PLACEMENT  3/22/2010    HIP FRACTURE SURGERY  11/2009    left    JOINT REPLACEMENT Left     hip    Patent PI      Both Eye     SKIN BIOPSY Right     foream    TONSILLECTOMY      TOTAL ABDOMINAL HYSTERECTOMY      TOTAL HIP ARTHROPLASTY  3/19/2010    left    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    RIGHT     Social History   Substance Use Topics    Smoking status: Former Smoker     Quit date: 11/7/1992    Smokeless tobacco: Never Used    Alcohol use Yes      Comment: socially     Family History   Problem Relation Age of Onset    Aneurysm Mother      brain    Stroke      Coronary artery disease       ? father    Aneurysm       maternal aunt-Brain    Glaucoma Daughter      Narrow Angle Glaucoma    Hypertension      Amblyopia Neg Hx     Blindness Neg Hx     Cataracts Neg Hx     Macular degeneration Neg Hx     Retinal detachment Neg Hx     Strabismus Neg Hx     Anesthesia problems Neg Hx     Malignant hypertension Neg Hx     Hypotension Neg Hx     Malignant hyperthermia Neg Hx     Pseudochol deficiency Neg Hx     Cancer Neg Hx     Thyroid disease Neg Hx     Melanoma Neg Hx     Psoriasis Neg Hx     Lupus Neg Hx      Review of patient's allergies indicates:   Allergen Reactions    Penicillins Hives     Other reaction(s): Hives          Review of Systems:  a review of eleven systems covering constitutional, Psych, Eye, HEENT, Respiratory, Cardiac, GI, , Musculoskeletal, Endocrine, Dermatologicwas negative except  "the above mentioned abnormalities and for any pertinent findings as listed below: as above, rest good,        Exam:Comprehensive exam done. BP (!) 122/58 (BP Location: Right arm, Patient Position: Sitting)   Pulse 82   Temp 97.7 °F (36.5 °C) (Oral)   Resp 20   Ht 5' 7" (1.702 m)   Wt 125.2 kg (276 lb)   LMP  (LMP Unknown) Comment: post menopausal  SpO2 96%   Breastfeeding? No   BMI 43.23 kg/m²   Exam included Vitals as listed, and patient's appearance and affect and alertness and mood, oral exam for yeast and hygiene and pharynx lesions and Mallapatti (M) score, neck with inspection for jvd and masses and thyroid abnormalities and lymph nodes (supraclavicular and infraclavicular nodes also examined and noted if abn), chest exam included symmetry and effort and fremitus and percussion and auscultation, cardiac exam included rhythm and gallops and murmur and rubs and jvd and edema, abdominal exam for mass and hepatosplenomegaly and tenderness and hernias and bowel sounds, Musculoskeletal exam with muscle tone and posture and mobility/gait and  strenght, and skin for rashes and cyanosis and pallor and turgor, extremity for clubbing.  Findings were normal except as listed below:  M3, morbid obese, no jvd or neck node, thyroid nl, chest symmetric, no distress, nl fremitus/percussion, scattered wheezes, irreg cor no murmur or gallop but bilat edema brawney c/w lymphedema, obese abd no clubbing, skin wnl, good affect and alert and moves all 4,     Radiographs reviewed: view by direct vision    Ct with lg pulm art > aorta, impressive pulm arteries (vs nodes) bilaterally    Results for orders placed during the hospital encounter of 09/11/17   X-Ray Chest 1 View    Narrative Comparison study: 9/16/2017  One view chest  There is right basilar infiltrate suggesting pneumonia.  There is mild left basilar infiltrate which may also be new with left lung base not well visualized on the portable film.  There is no " pleural effusion.  The cardiomediastinal silhouette is stable    Impression Interval development of right basilar infiltrate suggesting pneumonia.  Probable new mild left basilar infiltrate as well differential includes CHF      Electronically signed by: FADIA GUERRA MD  Date:     09/18/17  Time:    09:09    ]    Labs     No results for input(s): WBC, HGB, HCT, PLT, BAND, METAMYELOCYT, MYELOPCT, HGBA1C in the last 24 hours.    Recent Labs  Lab 09/18/17  0326      K 4.4   CL 99   CO2 33*   BUN 64*   CREATININE 1.9*   *   CALCIUM 9.3   INR 3.3*   No results for input(s): PH, PCO2, PO2, HCO3 in the last 24 hours.  Microbiology Results (last 7 days)     Procedure Component Value Units Date/Time    Blood culture [[id number]] Collected:  09/11/17 1728    Order Status:  Completed Specimen:  Blood Updated:  09/16/17 2312     Blood Culture, Routine No growth after 5 days.    Blood culture [[id number]] Collected:  09/11/17 1728    Order Status:  Completed Specimen:  Blood Updated:  09/16/17 2312     Blood Culture, Routine No growth after 5 days.    Culture, Respiratory with Gram Stain [[id number]] Collected:  09/13/17 0748    Order Status:  Completed Specimen:  Respiratory from Sputum Updated:  09/15/17 0928     Respiratory Culture Normal respiratory marisol     Gram Stain (Respiratory) <10 epithelial cells per low power field.     Gram Stain (Respiratory) Few WBC's     Gram Stain (Respiratory) Few Gram positive cocci     Gram Stain (Respiratory) Few Gram positive rods     Gram Stain (Respiratory) Few Gram negative rods          Impression:  Active Hospital Problems    Diagnosis  POA    *Acute on chronic respiratory failure with hypoxia [J96.21]  Yes    COPD exacerbation [J44.1]  Yes    Acute diastolic heart failure [I50.31]  Yes    CAP (community acquired pneumonia) [J18.9]  Yes    Morbid obesity with BMI of 40.0-44.9, adult [E66.01, Z68.41]  Not Applicable    Essential hypertension [I10]  Yes     Chronic     Obesity, morbid, BMI 40.0-49.9 [E66.01]  Yes     Chronic    Atrial fibrillation with rapid ventricular response [I48.91]  Yes    Lymphedema [I89.0]  Yes     Chronic    Physical deconditioning [R53.81]  Yes    S/P insertion of IVC (inferior vena caval) filter 3/22/10 [Z95.828]  Not Applicable     Chronic      Resolved Hospital Problems    Diagnosis Date Resolved POA   No resolved problems to display.               Plan: check v/q now, cont coumadin, check pft, pt had great response to NIV- check abg and use NIV - suspect will need NIV home.  Add duo neb to prednisone.        Due to patient's copd and chronic respiratory failure, patient's conditioning is worsening and requires a non invasive home ventilator to prevent co2 retention and untimely hospital re admits.

## 2017-09-18 NOTE — ASSESSMENT & PLAN NOTE
Off the Cardizem drip.  Pt restarted on her oral CCB..  Continue warfarin- therapeutic at this time.  Monitor closely on monitor.     More tachycardic this evening, given low dose metoprolol to help control as nebs likely contributory

## 2017-09-18 NOTE — PT/OT/SLP PROGRESS
Physical Therapy  Treatment    Nicole Lala   MRN: [id number]   Admitting Diagnosis: Acute on chronic respiratory failure with hypoxia    PT Received On: 09/18/17  PT Start Time: 1040     PT Stop Time: 1110    PT Total Time (min): 30 min       Billable Minutes:  Therapeutic Activity 20min and Therapeutic Exercise 10min    Treatment Type: Treatment  PT/PTA: PTA     PTA Visit Number: 1       General Precautions: Standard, fall, respiratory  Orthopedic Precautions: N/A   Braces:           Subjective:  Communicated with nurse Fontenot prior to session.  Agreed to mobilize. Eager to progress to home.    Pain/Comfort  Pain Rating 1: 0/10    Objective:   Patient found with: costa catheter, telemetry, oxygen, PICC line    Functional Mobility:  Bed Mobility:   Rolling/Turning to Left: Contact guard assistance, With side rail  Rolling/Turning Right: Contact guard assistance, With side rail  Scooting/Bridging: Moderate Assistance  Supine to Sit: Minimum Assistance (requires A with LE's.)    Transfers:  Sit <> Stand Assistance: Minimum Assistance  Sit <> Stand Assistive Device: Rolling Walker  Bed <> Chair Technique: Stand Pivot  Bed <> Chair Assistance: Moderate Assistance  Bed <> Chair Assistive Device: Rolling Walker    Gait:   Gait Distance: few steps to chair  Assistance 1: Moderate assistance  Gait Assistive Device: Rolling walker  Gait Pattern: 3-point gait  Gait Deviation(s): decreased darryn, increased time in double stance, decreased velocity of limb motion, decreased step length, decreased weight-shifting ability    Stairs:      Balance:   Static Sit: GOOD-: Takes MODERATE challenges from all directions but inconsistently  Dynamic Sit: FAIR+: Maintains balance through MINIMAL excursions of active trunk motion  Static Stand: FAIR: Maintains without assist but unable to take challenges  Dynamic stand: POOR: N/A     Therapeutic Activities and Exercises:  Transferred to sitting EOB with Min A and extra time with HOB  elevated requiring A with LE's off bed.  Able to initiate scooting to feet flat on floor.  Sat ~ 5 min before standing with rw and Mod A able to side step to HOB.  After rest period stood and SPT to chair( few steps ) with rw and Mod A with verbal cues for technique. Participated well, appeared stronger requiring less assistance to stand and complete OOB transfer.     AM-PAC 6 CLICK MOBILITY  How much help from another person does this patient currently need?   1 = Unable, Total/Dependent Assistance  2 = A lot, Maximum/Moderate Assistance  3 = A little, Minimum/Contact Guard/Supervision  4 = None, Modified [place]/Independent         AM-PAC Raw Score CMS G-Code Modifier Level of Impairment Assistance   6 % Total / Unable   7 - 9 CM 80 - 100% Maximal Assist   10 - 14 CL 60 - 80% Moderate Assist   15 - 19 CK 40 - 60% Moderate Assist   20 - 22 CJ 20 - 40% Minimal Assist   23 CI 1-20% SBA / CGA   24 CH 0% Independent/ Mod I     Patient left up in chair with all lines intact, call button in reach and nurse Corie notified.    Assessment:  Nicole Lala is a 84 y.o. female with a medical diagnosis of Acute on chronic respiratory failure with hypoxia and presents with weakness, limited endurance, SOB with activity.    Rehab identified problem list/impairments: Rehab identified problem list/impairments: weakness, impaired endurance, impaired sensation, impaired functional mobilty, gait instability, impaired balance, decreased lower extremity function, impaired cardiopulmonary response to activity    Rehab potential is good.    Activity tolerance: Fair    Discharge recommendations: Discharge Facility/Level Of Care Needs: nursing facility, skilled     Barriers to discharge: Barriers to Discharge: Decreased caregiver support    Equipment recommendations: Equipment Needed After Discharge: none     GOALS:    Physical Therapy Goals        Problem: Physical Therapy Goal    Goal Priority Disciplines Outcome Goal  Variances Interventions   Physical Therapy Goal    High PT/OT, PT Ongoing (interventions implemented as appropriate)     Description:  Goals to be met by: 2017     Patient will increase functional independence with mobility by performin. Supine to sit with MInimal Assistance  2. Sit to supine with Moderate Assistance  3. Sit to stand transfer with Minimal Assistance  4. Bed to chair transfer with Minimal Assistance using Rolling Walker  5. Sitting at edge of bed x15 minutes with Minimal Assistance  6. Lower extremity exercise program x10 reps per handout, with assistance as needed                      PLAN:    Patient to be seen 6 x/week  to address the above listed problems via therapeutic activities, therapeutic exercises  Plan of Care expires: 17  Plan of Care reviewed with: patient         [place], PTA  2017

## 2017-09-18 NOTE — ASSESSMENT & PLAN NOTE
Was present on admission.  BUN and Cr are elevated, so will discontinue the Lasix infusion.  Echo shows normal EF.    Resume scheduled lasix today and reassess airspace disease on CXR in am

## 2017-09-18 NOTE — PHYSICIAN QUERY
PT Name: Nicole Lala  MR #: [id number]  Physician Query Form - Renal Clarification     CDS/: Merry Cabrera RN              Contact information: [phone]    This form is a permanent document in the medical record.     QueryDate: September 18, 2017    By submitting this query, we are merely seeking further clarification of documentation. Please utilize your independent clinical judgment when addressing the question(s) below.    The Medical record contains the following:   Indicator Supporting Clinical Findings Location in Medical Record    Kidney (Renal) Insufficiency     x Kidney (Renal) Failure / Injury History:  CKD   9/12 [facility]   x Nephrotoxic Agents Lasix gtt   9/14- 9/15 MAR   x BUN/Creatinine GFR Cr/Bun= 1.4 / 25  GFR= 35  CR/Bun= 1.5 / 29  GFR= 32  Cr/Bun= 1.7/ 46  GFR= 27  Cr/BUN= 1.9 / 64 GFR= 24   9/14 Lab  9/15 Lab  9/16 Lab  9/18 Lab    Urine: Casts         Eosinophils      Dehydration      Nausea/Vomiting      Dialysis/CRRT     x Treatment: Monitor renal function and lytes      BUN and Cr are elevated, so will discontinue the Lasix infusion.    9/14 Hosp med note      9/15 Hosp med note    Other:            Provider, please specify the diagnosis or diagnoses associated with above clinical findings.    [x ] Acute renal failure    [ ] Acute Kidney Failure/Injury with Tubular Necrosis    [ ] Acute on Chronic Renal Failure (please specify stage*): _____________    [ ] Chronic Kidney Disease (CKD) (please specify stage* below):      *National [employer] Foundation Definitions:   Stage Description      eGFR (mL/min)   [ ] I  Slight kidney damage with normal or increased filtration  90+   [ ] II  Mildly reduced kidney function     60-89   [ ] III  Moderately reduced kidney function    30-59   [ ] IV  Severly reduced kidney function     15-29   [ ] V  Kidney failure, requiring transplant or dialysis   < 15    [ ] Other (please specify): _______________________________    [ ] Clinically  Undetermined    Please document in your progress notes daily for the duration of treatment, until resolved, and include in your discharge summary.

## 2017-09-18 NOTE — PROGRESS NOTES
"1025  Patient requested that this  return to her room.  Met with patient at bedside.  Patient states "maybe my breathing and having a hard time breathing will help get me in to that LTAC place".  Informed patient that this  has spoken to Dr Chahal about her LTAC request and that Dr Chahal will be speaking with her.  "

## 2017-09-18 NOTE — PLAN OF CARE
Problem: Patient Care Overview  Goal: Plan of Care Review  Outcome: Ongoing (interventions implemented as appropriate)  Patient remains on aerosol tx via 0.5mg atrovent now and q6hr, duoneb prn, Breo now and qday and IS.  Hr 82 and 02 saturation 94% on nc at 4lpm.  Patient performing 250-500ml on IS.

## 2017-09-18 NOTE — PLAN OF CARE
Problem: Physical Therapy Goal  Goal: Physical Therapy Goal  Goals to be met by: 2017     Patient will increase functional independence with mobility by performin. Supine to sit with MInimal Assistance  2. Sit to supine with Moderate Assistance  3. Sit to stand transfer with Minimal Assistance  4. Bed to chair transfer with Minimal Assistance using Rolling Walker  5. Sitting at edge of bed x15 minutes with Minimal Assistance  6. Lower extremity exercise program x10 reps per handout, with assistance as needed     Outcome: Ongoing (interventions implemented as appropriate)  Transferred bed to chair via SPT with rw and Mod A with O2 attached.

## 2017-09-18 NOTE — ASSESSMENT & PLAN NOTE
Clinically concerning for PNA.  Cont Azithromycin, supplemental O2, inhaled bronchodilators.   Will start her on daily prednisone for the bronchospasms  Stop azithromycin

## 2017-09-18 NOTE — CONSULTS
Nicole Lala [id number] is a 84 y.o. female who has been consulted for vancomycin dosing.    The patient has the following labs:     Date Creatinine (mg/dl)    WBC Count   9/18/2017 Estimated Creatinine Clearance: 30.3 mL/min (based on SCr of 1.9 mg/dL (H)). Lab Results   Component Value Date    WBC 7.00 09/12/2017        Current weight is 125.2 kg (276 lb)    Vancomycin being given for pneumonia.    Pt's renal function is not stable.  Pharmacy will dose by level.  Vanc 2000 mg given initially 9/18/2017  at 1300.  Target goal is 15-20 mg/dL.   A vancomycin level will be ordered on 9/19/17 at 1300.        Patient will be followed by pharmacy for changes in renal function, toxicity, and efficacy.    Thank you for allowing us to participate in this patient's care.     Leopoldo Weldon, Pharmacist

## 2017-09-18 NOTE — PROGRESS NOTES
Per Dr Chahal go ahead and send a referral to [facility] per patient choice.    1459  Referral sent to [facility] via [facility].

## 2017-09-18 NOTE — PLAN OF CARE
Problem: Patient Care Overview  Goal: Plan of Care Review  Outcome: Ongoing (interventions implemented as appropriate)  Alert and oriented. Bipap used this pm. Fall and injury free this shift.  Plan of care reviewed with patient. Understanding voiced. Bed in low position and locked,  Side rails up x 2. Call bell in reach.  Telemetry

## 2017-09-18 NOTE — PLAN OF CARE
0976  Met with patient at bedside regarding SNF consult.  Provided patient with a list of SNF's.  Patient stated that she wants to go to [facility] in [place] because that's where she goes twice per week for lymphedema therapy and her spouse goes there for wound care.  Informed patient that [facility] is an [facility] and this  has an order for SNF.  Discussed the differences between LTAC and SNF; verbalized understanding.  Patient is still insisting on requesting that Dr Chahal find a way to get her to LTAC.  Informed patient that this  would discuss her request with Dr Chahal however she needs to review the SNF list just in case; verbalized understanding.      1015  Discussed with Dr Chahal; Dr Chahal stated he will speak with patient.       09/18/17 1015   Discharge Reassessment   Assessment Type Discharge Planning Reassessment   Discharge Plan A Skilled Nursing Facility   Discharge Plan B Home Health   Patient choice form signed by patient/caregiver Yes   Can the patient answer the patient profile reliably? Yes, cognitively intact

## 2017-09-18 NOTE — SUBJECTIVE & OBJECTIVE
Interval History:  She states she continues to improve but remains tachypneic and wheezing, although is somewhat better, lasix resumed as well as scheduled nebulizer treatments and low dose beta blocker added given fact that nebs are contributing to tachycardia presently    Review of Systems   Constitutional: Negative for fever.   Respiratory: Positive for cough and shortness of breath (improving). Negative for wheezing.    Cardiovascular: Negative for chest pain.   Gastrointestinal: Negative for abdominal pain.     Objective:     Vital Signs (Most Recent):  Temp: 97.6 °F (36.4 °C) (09/17/17 1929)  Pulse: 86 (09/17/17 2223)  Resp: (!) 25 (09/17/17 2223)  BP: (!) 127/57 (09/17/17 1929)  SpO2: 95 % (09/17/17 2223) Vital Signs (24h Range):  Temp:  [97.6 °F (36.4 °C)-98 °F (36.7 °C)] 97.6 °F (36.4 °C)  Pulse:  [] 86  Resp:  [18-26] 25  SpO2:  [90 %-97 %] 95 %  BP: (106-133)/(57-84) 127/57     Weight: 125.2 kg (276 lb)  Body mass index is 43.23 kg/m².    Intake/Output Summary (Last 24 hours) at 09/17/17 2354  Last data filed at 09/17/17 1700   Gross per 24 hour   Intake             1150 ml   Output             1775 ml   Net             -625 ml      Physical Exam   Constitutional: She is oriented to person, place, and time. No distress.   HENT:   Head: Normocephalic and atraumatic.   Eyes: EOM are normal. Right eye exhibits no discharge. Left eye exhibits no discharge.   Neck: Normal range of motion. Neck supple. No JVD present.   Cardiovascular: Normal rate.  An irregularly irregular rhythm present.   No murmur heard.  Pulmonary/Chest: No accessory muscle usage. No tachypnea. She has no decreased breath sounds. She has wheezes. She has no rales. She exhibits no tenderness.   Abdominal: Soft. Bowel sounds are normal. She exhibits no distension. There is no tenderness.   Musculoskeletal: She exhibits edema (lymphedema legs bilateral). She exhibits no deformity.   Neurological: She is alert and oriented to person,  place, and time. No cranial nerve deficit.   Skin: Skin is warm and dry. No rash noted. She is not diaphoretic.   Psychiatric: She has a normal mood and affect. Her behavior is normal.       Significant Labs: All pertinent labs within the past 24 hours have been reviewed.    Significant Imaging: none

## 2017-09-19 LAB
ANION GAP SERPL CALC-SCNC: 11 MMOL/L
BUN SERPL-MCNC: 66 MG/DL
CALCIUM SERPL-MCNC: 9.1 MG/DL
CHLORIDE SERPL-SCNC: 101 MMOL/L
CO2 SERPL-SCNC: 30 MMOL/L
CREAT SERPL-MCNC: 1.6 MG/DL
EST. GFR  (AFRICAN AMERICAN): 34 ML/MIN/1.73 M^2
EST. GFR  (NON AFRICAN AMERICAN): 29 ML/MIN/1.73 M^2
GLUCOSE SERPL-MCNC: 110 MG/DL
INR PPP: 2.6
POTASSIUM SERPL-SCNC: 4.7 MMOL/L
PROTHROMBIN TIME: 26 SEC
SODIUM SERPL-SCNC: 142 MMOL/L
VANCOMYCIN SERPL-MCNC: 15.5 UG/ML
VANCOMYCIN TROUGH SERPL-MCNC: 14.6 UG/ML

## 2017-09-19 PROCEDURE — 94640 AIRWAY INHALATION TREATMENT: CPT

## 2017-09-19 PROCEDURE — 80202 ASSAY OF VANCOMYCIN: CPT | Mod: 91

## 2017-09-19 PROCEDURE — 11000001 HC ACUTE MED/SURG PRIVATE ROOM

## 2017-09-19 PROCEDURE — 25000003 PHARM REV CODE 250: Performed by: HOSPITALIST

## 2017-09-19 PROCEDURE — 97110 THERAPEUTIC EXERCISES: CPT

## 2017-09-19 PROCEDURE — 25000003 PHARM REV CODE 250: Performed by: NURSE PRACTITIONER

## 2017-09-19 PROCEDURE — 94060 EVALUATION OF WHEEZING: CPT

## 2017-09-19 PROCEDURE — 80202 ASSAY OF VANCOMYCIN: CPT

## 2017-09-19 PROCEDURE — 80048 BASIC METABOLIC PNL TOTAL CA: CPT

## 2017-09-19 PROCEDURE — 97530 THERAPEUTIC ACTIVITIES: CPT

## 2017-09-19 PROCEDURE — 63600175 PHARM REV CODE 636 W HCPCS: Performed by: HOSPITALIST

## 2017-09-19 PROCEDURE — 94010 BREATHING CAPACITY TEST: CPT

## 2017-09-19 PROCEDURE — 25000242 PHARM REV CODE 250 ALT 637 W/ HCPCS: Performed by: INTERNAL MEDICINE

## 2017-09-19 PROCEDURE — 85610 PROTHROMBIN TIME: CPT

## 2017-09-19 PROCEDURE — 94761 N-INVAS EAR/PLS OXIMETRY MLT: CPT

## 2017-09-19 PROCEDURE — 36415 COLL VENOUS BLD VENIPUNCTURE: CPT

## 2017-09-19 PROCEDURE — 99232 SBSQ HOSP IP/OBS MODERATE 35: CPT | Mod: ,,, | Performed by: INTERNAL MEDICINE

## 2017-09-19 PROCEDURE — 99900035 HC TECH TIME PER 15 MIN (STAT)

## 2017-09-19 PROCEDURE — 94660 CPAP INITIATION&MGMT: CPT

## 2017-09-19 PROCEDURE — 27000221 HC OXYGEN, UP TO 24 HOURS

## 2017-09-19 RX ORDER — IPRATROPIUM BROMIDE AND ALBUTEROL SULFATE 2.5; .5 MG/3ML; MG/3ML
3 SOLUTION RESPIRATORY (INHALATION) EVERY 4 HOURS PRN
Qty: 1 BOX | Refills: 0 | Status: SHIPPED | OUTPATIENT
Start: 2017-09-19 | End: 2017-09-27 | Stop reason: SDUPTHER

## 2017-09-19 RX ORDER — PREDNISONE 10 MG/1
10 TABLET ORAL 2 TIMES DAILY
Qty: 28 TABLET | Refills: 1 | Status: SHIPPED | OUTPATIENT
Start: 2017-09-19 | End: 2017-10-03

## 2017-09-19 RX ADMIN — MIRTAZAPINE 7.5 MG: 7.5 TABLET ORAL at 09:09

## 2017-09-19 RX ADMIN — BENZONATATE 100 MG: 100 CAPSULE ORAL at 01:09

## 2017-09-19 RX ADMIN — IPRATROPIUM BROMIDE AND ALBUTEROL SULFATE 3 ML: .5; 3 SOLUTION RESPIRATORY (INHALATION) at 11:09

## 2017-09-19 RX ADMIN — ALPRAZOLAM 0.25 MG: 0.25 TABLET ORAL at 09:09

## 2017-09-19 RX ADMIN — BISACODYL 10 MG: 10 SUPPOSITORY RECTAL at 07:09

## 2017-09-19 RX ADMIN — BENZONATATE 100 MG: 100 CAPSULE ORAL at 05:09

## 2017-09-19 RX ADMIN — Medication 12.5 MG: at 09:09

## 2017-09-19 RX ADMIN — IPRATROPIUM BROMIDE AND ALBUTEROL SULFATE 3 ML: .5; 3 SOLUTION RESPIRATORY (INHALATION) at 03:09

## 2017-09-19 RX ADMIN — AZITHROMYCIN 250 MG: 250 TABLET, FILM COATED ORAL at 09:09

## 2017-09-19 RX ADMIN — WARFARIN SODIUM 4 MG: 2 TABLET ORAL at 05:09

## 2017-09-19 RX ADMIN — VANCOMYCIN HYDROCHLORIDE 1000 MG: 1 INJECTION, POWDER, LYOPHILIZED, FOR SOLUTION INTRAVENOUS at 05:09

## 2017-09-19 RX ADMIN — DILTIAZEM HYDROCHLORIDE 300 MG: 180 CAPSULE, COATED, EXTENDED RELEASE ORAL at 09:09

## 2017-09-19 RX ADMIN — PREDNISONE 40 MG: 20 TABLET ORAL at 09:09

## 2017-09-19 RX ADMIN — POLYETHYLENE GLYCOL 3350 17 G: 17 POWDER, FOR SOLUTION ORAL at 09:09

## 2017-09-19 RX ADMIN — BENZONATATE 100 MG: 100 CAPSULE ORAL at 09:09

## 2017-09-19 RX ADMIN — FLUTICASONE FUROATE AND VILANTEROL TRIFENATATE 1 PUFF: 100; 25 POWDER RESPIRATORY (INHALATION) at 09:09

## 2017-09-19 NOTE — PROGRESS NOTES
7308  Consult received from Dr Chung for home o2, nebulizer and home NIV.  This  had Dr Chung sign the NIV order form.  Left voicemail for Aaliyah Clay.

## 2017-09-19 NOTE — PLAN OF CARE
Problem: Patient Care Overview  Goal: Plan of Care Review  Outcome: Ongoing (interventions implemented as appropriate)  Patient remained safe and free from falls this shift. MIN A x2 for transfer to w/c and BSC. BM this date. 3-4L O2 per NC. AF per tele monitor. 0.9% NaCL running at 50 mL/hour.

## 2017-09-19 NOTE — PROGRESS NOTES
Progress Note  PULMONARY    Admit Date: 9/11/2017 09/19/2017      SUBJECTIVE:     Sept 19, feels better yet, no c/o      PFSH and Allergies reviewed.    OBJECTIVE:     Vitals (Most recent):  Vitals:    09/19/17 0950   BP: 130/80   Pulse: 88   Resp: 20   Temp: 97.5 °F (36.4 °C)       Vitals (24 hour range):  Temp:  [96.6 °F (35.9 °C)-97.7 °F (36.5 °C)]   Pulse:  []   Resp:  [16-22]   BP: (117-135)/(58-80)   SpO2:  [92 %-98 %]       Intake/Output Summary (Last 24 hours) at 09/19/17 1225  Last data filed at 09/18/17 1800   Gross per 24 hour   Intake              840 ml   Output             1450 ml   Net             -610 ml          Physical Exam:  The patient's neuro status (alertness,orientation,cognitive function,motor skills,), pharyngeal exam (oral lesions, hygiene, abn dentition,), Neck (jvd,mass,thyroid,nodes in neck and above/below clavicle),RESPIRATORY(symmetry,effort,fremitus,percussion,auscultation),  Cor(rhythm,heart tones including gallops,perfusion,edema)ABD(distention,hepatic&splenomegaly,tenderness,masses), Skin(rash,cyanosis),Psyc(affect,judgement,).  Exam negative except for these pertinent findings:    Morbid obese, good bs,no wheeze, chr lymphedema, alert and talkative    Radiographs reviewed: view by direct vision  V/q good,   Results for orders placed during the hospital encounter of 09/11/17   X-Ray Chest 1 View    Narrative Comparison study: 9/16/2017  One view chest  There is right basilar infiltrate suggesting pneumonia.  There is mild left basilar infiltrate which may also be new with left lung base not well visualized on the portable film.  There is no pleural effusion.  The cardiomediastinal silhouette is stable    Impression Interval development of right basilar infiltrate suggesting pneumonia.  Probable new mild left basilar infiltrate as well differential includes CHF      Electronically signed by: FADIA GUERRA MD  Date:     09/18/17  Time:    09:09    ]    Labs     No results  for input(s): WBC, HGB, HCT, PLT, BAND, METAMYELOCYT, MYELOPCT, HGBA1C in the last 24 hours.  Recent Labs  Lab 09/19/17  0449      K 4.7      CO2 30*   BUN 66*   CREATININE 1.6*      CALCIUM 9.1   INR 2.6*   No results for input(s): PH, PCO2, PO2, HCO3 in the last 24 hours.  Microbiology Results (last 7 days)     Procedure Component Value Units Date/Time    Blood culture [[id number]] Collected:  09/11/17 1728    Order Status:  Completed Specimen:  Blood Updated:  09/16/17 2312     Blood Culture, Routine No growth after 5 days.    Blood culture [[id number]] Collected:  09/11/17 1728    Order Status:  Completed Specimen:  Blood Updated:  09/16/17 2312     Blood Culture, Routine No growth after 5 days.    Culture, Respiratory with Gram Stain [[id number]] Collected:  09/13/17 0748    Order Status:  Completed Specimen:  Respiratory from Sputum Updated:  09/15/17 0928     Respiratory Culture Normal respiratory marisol     Gram Stain (Respiratory) <10 epithelial cells per low power field.     Gram Stain (Respiratory) Few WBC's     Gram Stain (Respiratory) Few Gram positive cocci     Gram Stain (Respiratory) Few Gram positive rods     Gram Stain (Respiratory) Few Gram negative rods          Impression:  Active Hospital Problems    Diagnosis  POA    *Acute on chronic respiratory failure with hypoxia [J96.21]  Yes    COPD exacerbation [J44.1]  Yes    TETO (acute kidney injury) [N17.9]  Yes    Acute diastolic heart failure [I50.31]  Yes    CAP (community acquired pneumonia) [J18.9]  Yes    Morbid obesity with BMI of 40.0-44.9, adult [E66.01, Z68.41]  Not Applicable    Essential hypertension [I10]  Yes     Chronic    Obesity, morbid, BMI 40.0-49.9 [E66.01]  Yes     Chronic    Atrial fibrillation with rapid ventricular response [I48.91]  Yes    Lymphedema [I89.0]  Yes     Chronic    Physical deconditioning [R53.81]  Yes    S/P insertion of IVC (inferior vena caval) filter 3/22/10 [Z95.828]  Not  Applicable     Chronic      Resolved Hospital Problems    Diagnosis Date Resolved POA   No resolved problems to display.               Plan:     Sept 19- fev1 44% with no dramatic positional changes, and v/q is good,  abg with high co2- niv would be prudent.  Likely needs home o2.     Would d/c on prednisone 10 bid for 2 weeks, f/u office a month. Sleep study may be needed.                                    .

## 2017-09-19 NOTE — PT/OT/SLP PROGRESS
Physical Therapy  Treatment    Nicole Lala   MRN: [id number]   Admitting Diagnosis: Acute on chronic respiratory failure with hypoxia    PT Received On: 09/19/17  PT Start Time: 1120     PT Stop Time: 1141    PT Total Time (min): 21 min       Billable Minutes:  Therapeutic Activity 11min and Therapeutic Exercise 10min    Treatment Type: Treatment  PT/PTA: PTA     PTA Visit Number: 2       General Precautions: Standard, fall, respiratory  Orthopedic Precautions: N/A   Braces:           Subjective:  Communicated with nurse Ge prior to session.  Agreed to mobilize.    Pain/Comfort  Pain Rating 1: 0/10    Objective:   Patient found with: PICC line, costa catheter, telemetry    Functional Mobility:  Bed Mobility:        Transfers:  Sit <> Stand Assistance: Minimum Assistance  Sit <> Stand Assistive Device: Rolling Walker    Gait:   Gait Distance:   3 steps  Assistance 1: Minimum assistance  Gait Assistive Device: Rolling walker  Gait Pattern: 3-point gait  Gait Deviation(s): decreased darryn, increased time in double stance, decreased velocity of limb motion, decreased step length, decreased weight-shifting ability    Stairs:      Balance:   Static Sit: GOOD-: Takes MODERATE challenges from all directions but inconsistently  Dynamic Sit: FAIR+: Maintains balance through MINIMAL excursions of active trunk motion  Static Stand: FAIR: Maintains without assist but unable to take challenges  Dynamic stand: POOR: N/A     Therapeutic Activities and Exercises:  Performed LE exercises seated in chair with rests between each for better breathing: TKE's, AP's, Hip abd / add/ flexion, Marches. Performed sit to stand with rw and Min A. Stood ~ 1min.     AM-PAC 6 CLICK MOBILITY  How much help from another person does this patient currently need?   1 = Unable, Total/Dependent Assistance  2 = A lot, Maximum/Moderate Assistance  3 = A little, Minimum/Contact Guard/Supervision  4 = None, Modified [place]/Independent          AM-PAC Raw Score CMS G-Code Modifier Level of Impairment Assistance   6 % Total / Unable   7 - 9 CM 80 - 100% Maximal Assist   10 - 14 CL 60 - 80% Moderate Assist   15 - 19 CK 40 - 60% Moderate Assist   20 - 22 CJ 20 - 40% Minimal Assist   23 CI 1-20% SBA / CGA   24 CH 0% Independent/ Mod I     Patient left up in chair with all lines intact, call button in reach and nurse Luma notified.    Assessment:  Nicole Lala is a 84 y.o. female with a medical diagnosis of Acute on chronic respiratory failure with hypoxia and presents with weakness, limited endurance. Mobilty greatly improved requiring decreased A for mobility..    Rehab identified problem list/impairments: Rehab identified problem list/impairments: weakness, impaired endurance, impaired functional mobilty, gait instability, impaired balance, decreased lower extremity function, impaired cardiopulmonary response to activity    Rehab potential is good.    Activity tolerance: Fair    Discharge recommendations: Discharge Facility/Level Of Care Needs: nursing facility, skilled     Barriers to discharge: Barriers to Discharge: Decreased caregiver support    Equipment recommendations: Equipment Needed After Discharge: none     GOALS:    Physical Therapy Goals        Problem: Physical Therapy Goal    Goal Priority Disciplines Outcome Goal Variances Interventions   Physical Therapy Goal    High PT/OT, PT Ongoing (interventions implemented as appropriate)     Description:  Goals to be met by: 2017     Patient will increase functional independence with mobility by performin. Supine to sit with MInimal Assistance  2. Sit to supine with Moderate Assistance  3. Sit to stand transfer with Minimal Assistance  4. Bed to chair transfer with Minimal Assistance using Rolling Walker  5. Sitting at edge of bed x15 minutes with Minimal Assistance  6. Lower extremity exercise program x10 reps per handout, with assistance as needed                       PLAN:    Patient to be seen 6 x/week  to address the above listed problems via therapeutic activities, therapeutic exercises  Plan of Care expires: 09/29/17  Plan of Care reviewed with: patient         La [place], PTA  09/19/2017

## 2017-09-19 NOTE — SUBJECTIVE & OBJECTIVE
Interval History: CT chest today relatively normal lung parenchyma without much disease, minimal possible interstitial process present, TETO from lasix which is on hold. Discussed case with Dr. Chung given sluggish improvement who feels primarily COPD exacerbation in play    Review of Systems   Constitutional: Negative for fever.   Respiratory: Positive for cough and shortness of breath (improving). Negative for wheezing.    Cardiovascular: Negative for chest pain.   Gastrointestinal: Negative for abdominal pain.     Objective:     Vital Signs (Most Recent):  Temp: 97.7 °F (36.5 °C) (09/18/17 1505)  Pulse: 101 (09/18/17 2000)  Resp: 20 (09/18/17 1505)  BP: 135/70 (09/18/17 1953)  SpO2: 95 % (09/18/17 1953) Vital Signs (24h Range):  Temp:  [82 °F (27.8 °C)-98 °F (36.7 °C)] 97.7 °F (36.5 °C)  Pulse:  [] 101  Resp:  [18-25] 20  SpO2:  [94 %-97 %] 95 %  BP: (117-135)/(56-85) 135/70     Weight: 125.2 kg (276 lb)  Body mass index is 43.23 kg/m².    Intake/Output Summary (Last 24 hours) at 09/18/17 2032  Last data filed at 09/18/17 1800   Gross per 24 hour   Intake             1200 ml   Output             2600 ml   Net            -1400 ml      Physical Exam   Constitutional: She is oriented to person, place, and time. No distress.   HENT:   Head: Normocephalic and atraumatic.   Eyes: EOM are normal. Right eye exhibits no discharge. Left eye exhibits no discharge.   Neck: Normal range of motion. Neck supple. No JVD present.   Cardiovascular: Normal rate.  An irregularly irregular rhythm present.   No murmur heard.  Pulmonary/Chest: Effort normal. No accessory muscle usage. No tachypnea. She has no decreased breath sounds. She has no wheezes. She has no rales. She exhibits no tenderness.   Decreased breath sounds throughout   Abdominal: Soft. Bowel sounds are normal. She exhibits no distension. There is no tenderness.   Musculoskeletal: She exhibits edema (lymphedema legs bilateral). She exhibits no deformity.    Neurological: She is alert and oriented to person, place, and time. No cranial nerve deficit.   Skin: Skin is warm and dry. No rash noted. She is not diaphoretic.   Psychiatric: She has a normal mood and affect. Her behavior is normal.       Significant Labs: All pertinent labs within the past 24 hours have been reviewed.    Significant Imaging: I have reviewed all pertinent imaging results/findings within the past 24 hours.

## 2017-09-19 NOTE — ASSESSMENT & PLAN NOTE
Infiltrate on Xray this am, Not apparent on CT chest  On azithromycin for possible COPD exacerbation

## 2017-09-19 NOTE — PLAN OF CARE
09/18/17 2230   Patient Assessment/Suction   Level of Consciousness (AVPU) alert   Respiratory Effort Normal   Expansion/Accessory Muscles/Retractions expansion symmetric   All Lung Fields Breath Sounds diminished   Rhythm/Pattern, Respiratory pattern regular   Cough Frequency infrequent   Cough Type nonproductive   PRE-TX-O2-ETCO2   O2 Device (Oxygen Therapy) BiPAP   $ Is the patient on Oxygen? Yes   Oxygen Concentration (%) 40   SpO2 97 %   Pulse Oximetry Type Intermittent   $ Pulse Oximetry - Multiple Charge Pulse Oximetry - Multiple   Pulse 81   Resp (!) 22   Aerosol Therapy   $ Aerosol Therapy Charges Aerosol Treatment   Respiratory Treatment Status given   SVN/Inhaler Treatment Route mask   Position During Treatment Sitting in bed   Patient Tolerance good   Post-Treatment   Post-treatment Heart Rate (beats/min) 80   Post-treatment Resp Rate (breaths/min) 22   All Fields Breath Sounds unchanged   Ready to Wean/Extubation Screen   FIO2<=50 (chart decimal) 0.4   Preset CPAP/BiPAP Settings   Mode Of Delivery BiPAP S/T   $ CPAP/BiPAP Daily Charge BiPAP/CPAP Daily   $ Initial CPAP/BiPAP Setup? No   $ Is patient using? Yes   Equipment Type V60   Airway Device Type medium full face mask   Humidifier not applicable   Ipap 10   EPAP (cm H2O) 5   Pressure Support (cm H2O) 5   Set Rate (Breaths/Min) 8   ITime (sec) 1   Rise Time (sec) 2   Patient CPAP/BiPAP Settings   RR Total (Breaths/Min) 26   Tidal Volume (mL) 380   VE Minute Ventilation (L/min) 10 L/min   Peak Inspiratory Pressure (cm H2O) 10   TiTOT (%) 34   Total Leak (L/Min) 15   Patient Trigger - ST Mode Only (%) 82   CPAP/BiPAP Alarms   High Pressure (cm H2O) 15   Low Pressure (cm H2O) 5   Low Pressure Delay (Sec) 20   Minute Ventilation (L/Min) 2   High RR (breaths/min) 40   Low RR (breaths/min) 8

## 2017-09-19 NOTE — ASSESSMENT & PLAN NOTE
Was present on admission.  BUN and Cr are elevated, so will discontinue the Lasix infusion.  Echo shows normal EF.    Hold loop diuretics, maintain euvolemia

## 2017-09-19 NOTE — CONSULTS
Nicole Lala [id number] is a 84 y.o. female who has been consulted for vancomycin dosing.    The patient has the following labs:     Date Creatinine (mg/dl)    WBC Count   9/19/2017 Estimated Creatinine Clearance: 35.9 mL/min (based on SCr of 1.6 mg/dL (H)). Lab Results   Component Value Date    WBC 7.00 09/12/2017        Current weight is 125.2 kg (276 lb)    Pt's renal function is not stable but SCr trending down.  Pharmacy will dose by level. Target goal is 15-20 mg/dL.   Vanc level 9/19/2017 at 1120 was 15.5 mg/dL.  Level was drawn 1.5 hours early.  Pharmacy will dose vancomycin 1000 mg x1.  A vancomycin level will be ordered on 9/20/17 at 1600.      Patient will be followed by pharmacy for changes in renal function, toxicity, and efficacy.    Thank you for allowing us to participate in this patient's care.     Leopoldo Weldon, Pharmacist

## 2017-09-19 NOTE — ASSESSMENT & PLAN NOTE
Nebs, steroids, azithromycin  Spirometry tomorrow  Also plan for VQ scan to determine probability of chronic thromboembolic disease given distended appearance of pulmonary Vascular tree

## 2017-09-19 NOTE — PROGRESS NOTES
[facility]  Adult Nutrition  Progress Note    SUMMARY     Recommendations  Recommendation/Intervention: 1.) Continue with Cardiac diet   Goals: 1.) patient will consume 75% of meals  Nutrition Goal Status: new  Communication of RD Recs: discussed on rounds    1. CAP (community acquired pneumonia)    2. Atrial fibrillation    3. Acute on chronic respiratory failure with hypoxia    4. COPD exacerbation    5. 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 Patient eating well. Discharging tomorrow. May go to LTAC. 100% intake at meals since admit.     Nutrition Prescription Ordered  Current Diet Order: Cardiac     Evaluation of Received Nutrients/Fluid Intake  Oral Fluid (mL): 1200  Energy Calories Required: meeting needs  Protein Required: meeting needs  Tolerance: tolerating  % Intake of Estimated Energy Needs: 75 - 100 %  % Meal Intake: 100%     Nutrition Risk Screen  Nutrition Risk Screen: no indicators present    Nutrition/Diet History  Food Preferences: no cultural or Mormonism food preferences noted    Labs/Tests/Procedures/Meds  Diagnostic Test/Procedure Review: reviewed, pertinent  Pertinent Labs Reviewed: reviewed, pertinent  BMP  Lab Results   Component Value Date     09/19/2017    K 4.7 09/19/2017     09/19/2017     "CO2 30 (H) 09/19/2017    BUN 66 (H) 09/19/2017    CREATININE 1.6 (H) 09/19/2017    CALCIUM 9.1 09/19/2017    ANIONGAP 11 09/19/2017    ESTGFRAFRICA 34 (A) 09/19/2017    EGFRNONAA 29 (A) 09/19/2017     Lab Results   Component Value Date    ALBUMIN 3.1 (L) 06/21/2017     Lab Results   Component Value Date    CALCIUM 9.1 09/19/2017    PHOS 2.4 (L) 06/21/2017     No results for input(s): POCTGLUCOSE in the last 24 hours.    Pertinent Medications Reviewed: reviewed  Scheduled Meds:   albuterol-ipratropium 2.5mg-0.5mg/3mL  3 mL Nebulization Q8H    azithromycin  250 mg Oral Daily    benzonatate  100 mg Oral TID    bisacodyl  10 mg Rectal Daily    diltiaZEM  300 mg Oral Daily    fluticasone-vilanterol  1 puff Inhalation Daily    metoprolol tartrate  12.5 mg Oral BID    mirtazapine  7.5 mg Oral QHS    polyethylene glycol  17 g Oral BID    predniSONE  40 mg Oral Daily    vancomycin (VANCOCIN) IVPB  1,000 mg Intravenous Once    warfarin  4 mg Oral Daily         Physical Findings  Overall Physical Appearance: obese  Skin: edema (Romario score 16)    Anthropometrics  Temp: 97.8 °F (36.6 °C)  Height: 5' 7"  Weight Method: Bed Scale  Weight: 125.2 kg (276 lb 0.3 oz)  Ideal Body Weight (IBW), Female: 135 lb  % Ideal Body Weight, Female (lb): 204.46 lb  BMI (Calculated): 43.3  BMI Grade: greater than 40 - morbid obesity      Estimated/Assessed Needs  Weight Used For Calorie Calculations: 125.2 kg (276 lb 0.3 oz)   Energy Need Method: [place]-St Jeor (No AF required with obesity)  RMR ([place]-St. Jeor Equation): 1734.63  Weight Used For Protein Calculations: 61.3 kg (135 lb 2.3 oz) (ideal body weight)  1.2 gm Protein (gm): 73.71 and 1.5 gm Protein (gm): 92.14  Fluid Need Method: RDA Method (or per MD)      Assessment and Plan    Morbid obesity with BMI of 40.0-44.9, adult    Related to (etiology):   Excessive energy intake    Signs and Symptoms (as evidenced by):   BMI >40    Interventions/Recommendations (treatment " strategy):  Continue Cardiac diet     Nutrition Diagnosis Status:   New              Monitor and Evaluation  Food and Nutrient Intake: energy intake, food and beverage intake  Food and Nutrient Adminstration: diet order  Anthropometric Measurements: weight, weight change, body mass index  Biochemical Data, Medical Tests and Procedures: electrolyte and renal panel, glucose/endocrine profile, lipid profile  Nutrition-Focused Physical Findings: overall appearance    Nutrition Risk  Level of Risk:  (x1 weekly)    Nutrition Follow-Up  RD Follow-up?: Yes     Discharge planning: discharge on cardiac diet

## 2017-09-19 NOTE — PLAN OF CARE
Problem: Physical Therapy Goal  Goal: Physical Therapy Goal  Goals to be met by: 2017     Patient will increase functional independence with mobility by performin. Supine to sit with MInimal Assistance  2. Sit to supine with Moderate Assistance  3. Sit to stand transfer with Minimal Assistance  4. Bed to chair transfer with Minimal Assistance using Rolling Walker  5. Sitting at edge of bed x15 minutes with Minimal Assistance  6. Lower extremity exercise program x10 reps per handout, with assistance as needed     Outcome: Ongoing (interventions implemented as appropriate)  Performed LE exercises and standing with rw.

## 2017-09-19 NOTE — PROGRESS NOTES
0931  Left voicemail for Migue at [facility].    1052  Called Migue at [facility] and per Migue the referral is under review.  Informed Migue that patient is ready for discharge and also that the patient has [employer]; Migue verbalized understanding.  [phone]

## 2017-09-19 NOTE — ASSESSMENT & PLAN NOTE
Body mass index is 43.23 kg/m². Morbid obesity complicates all aspects of disease management from diagnostic modalities to treatment. Weight loss encouraged and health benefits explained to patient.

## 2017-09-19 NOTE — PROGRESS NOTES
Contacted Mame Langley.  # [phone]; per Mame they are still reviewing pt's information.  This  requested that they submit an auth request to [employer] by the end of the day so that [employer] will give a determination tomorrow; verbalized understanding.

## 2017-09-19 NOTE — PROGRESS NOTES
"[facility] Medicine  Progress Note    Patient Name: Nicole Lala  MRN: [id number]  Patient Class: IP- Inpatient   Admission Date: 9/11/2017  Length of Stay: 7 days  Attending Physician: James Chahal MD  Primary Care Provider: Brandon Gray MD        Subjective:     Principal Problem:Acute on chronic respiratory failure with hypoxia    HPI:  Nicole Lala is a 84 y.o. female with a history of HTN, CKD, A-fib (on coumadin), Lymphedema and DVT.  She was admitted to the service of hospital medicine with acute on chronic hypoxic respiratory failure.  She presented to the ED with complaints of severe SOB while getting into the car from her wheelchair.  She reports a week or so of a "cold" describing cough with brown phlegm associated with general weakness and an intermittentently sore throat. She denies any attempted OTC medications for symptoms. She had an PCP appointment today but presented to the ED after becoming SOB. The SOB is notable worse with exertion. Pt also reports she had blood work yesterday and was advised to take 1/2 of her coumadin daily 5 mg Rx yesterday and return to the 5 mg q.d. Pt reports 2L of oxygen use at home which she wears mainly when resting. She denies any worsening of her chronic LE swelling, chest pain, or syncope. Pt denies fever/chills, abd pain, and tobacco use.     Hospital Course:  Patient was admitted with pneumonia and CHF.  Was given IVAB and placed on Lasix IV infusion.  Her breathing improved.  She was taken off the Lasix drip.  She was also given IV Cardizem for rapid atrial fibrillation.  Once her oral Cardizem took over, she was taken off that infusion.  She was started on prednisone for bronchospasms.    Interval History: CT chest today relatively normal lung parenchyma without much disease, minimal possible interstitial process present, TETO from lasix which is on hold. Discussed case with Dr. Chung given sluggish improvement who feels " primarily COPD exacerbation in play    Review of Systems   Constitutional: Negative for fever.   Respiratory: Positive for cough and shortness of breath (improving). Negative for wheezing.    Cardiovascular: Negative for chest pain.   Gastrointestinal: Negative for abdominal pain.     Objective:     Vital Signs (Most Recent):  Temp: 97.7 °F (36.5 °C) (09/18/17 1505)  Pulse: 101 (09/18/17 2000)  Resp: 20 (09/18/17 1505)  BP: 135/70 (09/18/17 1953)  SpO2: 95 % (09/18/17 1953) Vital Signs (24h Range):  Temp:  [82 °F (27.8 °C)-98 °F (36.7 °C)] 97.7 °F (36.5 °C)  Pulse:  [] 101  Resp:  [18-25] 20  SpO2:  [94 %-97 %] 95 %  BP: (117-135)/(56-85) 135/70     Weight: 125.2 kg (276 lb)  Body mass index is 43.23 kg/m².    Intake/Output Summary (Last 24 hours) at 09/18/17 2032  Last data filed at 09/18/17 1800   Gross per 24 hour   Intake             1200 ml   Output             2600 ml   Net            -1400 ml      Physical Exam   Constitutional: She is oriented to person, place, and time. No distress.   HENT:   Head: Normocephalic and atraumatic.   Eyes: EOM are normal. Right eye exhibits no discharge. Left eye exhibits no discharge.   Neck: Normal range of motion. Neck supple. No JVD present.   Cardiovascular: Normal rate.  An irregularly irregular rhythm present.   No murmur heard.  Pulmonary/Chest: Effort normal. No accessory muscle usage. No tachypnea. She has no decreased breath sounds. She has no wheezes. She has no rales. She exhibits no tenderness.   Decreased breath sounds throughout   Abdominal: Soft. Bowel sounds are normal. She exhibits no distension. There is no tenderness.   Musculoskeletal: She exhibits edema (lymphedema legs bilateral). She exhibits no deformity.   Neurological: She is alert and oriented to person, place, and time. No cranial nerve deficit.   Skin: Skin is warm and dry. No rash noted. She is not diaphoretic.   Psychiatric: She has a normal mood and affect. Her behavior is normal.        Significant Labs: All pertinent labs within the past 24 hours have been reviewed.    Significant Imaging: I have reviewed all pertinent imaging results/findings within the past 24 hours.    Assessment/Plan:      * Acute on chronic respiratory failure with hypoxia    Due to pna and CHF, also due to probable underlying COPD.  Monitor work of breathing and o2 sats.        TETO (acute kidney injury)    TETO  Present is prerenal. Gentle IVF, hold loop diuretic            COPD exacerbation    Nebs, steroids, azithromycin  Spirometry tomorrow  Also plan for VQ scan to determine probability of chronic thromboembolic disease given distended appearance of pulmonary Vascular tree        Acute diastolic heart failure    Was present on admission.  BUN and Cr are elevated, so will discontinue the Lasix infusion.  Echo shows normal EF.    Hold loop diuretics, maintain euvolemia        CAP (community acquired pneumonia)    Infiltrate on Xray this am, Not apparent on CT chest  On azithromycin for possible COPD exacerbation        Morbid obesity with BMI of 40.0-44.9, adult    BMI 43.2.  Obesity compounds patient co-morbidities and complicates treatment course.  Counseling given regarding diet modification and exercise recommendations.            Obesity, morbid, BMI 40.0-49.9    Body mass index is 43.23 kg/m². Morbid obesity complicates all aspects of disease management from diagnostic modalities to treatment. Weight loss encouraged and health benefits explained to patient.            Essential hypertension    Chronic, continue antihypertensive regimen.  Monitor BP closely, titrate medication as required for sustained BP control.          Atrial fibrillation with rapid ventricular response    Off the Cardizem drip.  Pt restarted on her oral CCB..  Continue warfarin- therapeutic at this time.  Monitor closely on monitor.         Lymphedema    Continue ACE wrapping as per home regimen. Keep extremities elevated.          Physical  deconditioning    PT/OT consultation. Recommend SNF on discharge, patient wishes LTAC which may not be possible          Venous stasis of lower extremity              S/P insertion of IVC (inferior vena caval) filter 3/22/10    Contributory towards lymphedema            VTE Risk Mitigation         Ordered     warfarin (COUMADIN) tablet 4 mg  Daily     Route:  Oral        09/18/17 1534     Medium Risk of VTE  Once      09/12/17 0150     Reason for No Pharmacological VTE Prophylaxis  Once      09/12/17 0150              James Chahal MD  Department of Hospital Medicine   [facility]

## 2017-09-19 NOTE — ASSESSMENT & PLAN NOTE
Related to (etiology):   Excessive energy intake    Signs and Symptoms (as evidenced by):   BMI >40    Interventions/Recommendations (treatment strategy):  Continue Cardiac diet     Nutrition Diagnosis Status:   New

## 2017-09-20 ENCOUNTER — TELEPHONE (OUTPATIENT)
Dept: FAMILY MEDICINE | Facility: CLINIC | Age: 82
End: 2017-09-20

## 2017-09-20 VITALS
SYSTOLIC BLOOD PRESSURE: 131 MMHG | HEART RATE: 79 BPM | TEMPERATURE: 97 F | WEIGHT: 276 LBS | OXYGEN SATURATION: 90 % | BODY MASS INDEX: 43.32 KG/M2 | HEIGHT: 67 IN | DIASTOLIC BLOOD PRESSURE: 56 MMHG | RESPIRATION RATE: 20 BRPM

## 2017-09-20 LAB
ALLENS TEST: ABNORMAL
ANION GAP SERPL CALC-SCNC: 8 MMOL/L
BUN SERPL-MCNC: 70 MG/DL
CALCIUM SERPL-MCNC: 9.3 MG/DL
CHLORIDE SERPL-SCNC: 102 MMOL/L
CO2 SERPL-SCNC: 33 MMOL/L
CREAT SERPL-MCNC: 1.6 MG/DL
DELSYS: ABNORMAL
EST. GFR  (AFRICAN AMERICAN): 34 ML/MIN/1.73 M^2
EST. GFR  (NON AFRICAN AMERICAN): 29 ML/MIN/1.73 M^2
FIO2: 32
FLOW: 3
GLUCOSE SERPL-MCNC: 100 MG/DL
HCO3 UR-SCNC: 33.2 MMOL/L (ref 24–28)
INR PPP: 2
MODE: ABNORMAL
PCO2 BLDA: 51.9 MMHG (ref 35–45)
PH SMN: 7.41 [PH] (ref 7.35–7.45)
PO2 BLDA: 115 MMHG (ref 80–100)
POC BE: 9 MMOL/L
POC SATURATED O2: 98 % (ref 95–100)
POC TCO2: 35 MMOL/L (ref 23–27)
POTASSIUM SERPL-SCNC: 5 MMOL/L
PROTHROMBIN TIME: 20.7 SEC
SAMPLE: ABNORMAL
SITE: ABNORMAL
SODIUM SERPL-SCNC: 143 MMOL/L
VANCOMYCIN SERPL-MCNC: 16 UG/ML

## 2017-09-20 PROCEDURE — 94761 N-INVAS EAR/PLS OXIMETRY MLT: CPT

## 2017-09-20 PROCEDURE — 36415 COLL VENOUS BLD VENIPUNCTURE: CPT

## 2017-09-20 PROCEDURE — 25000242 PHARM REV CODE 250 ALT 637 W/ HCPCS: Performed by: INTERNAL MEDICINE

## 2017-09-20 PROCEDURE — 25000003 PHARM REV CODE 250: Performed by: HOSPITALIST

## 2017-09-20 PROCEDURE — 99232 SBSQ HOSP IP/OBS MODERATE 35: CPT | Mod: ,,, | Performed by: INTERNAL MEDICINE

## 2017-09-20 PROCEDURE — 94660 CPAP INITIATION&MGMT: CPT

## 2017-09-20 PROCEDURE — 27000221 HC OXYGEN, UP TO 24 HOURS

## 2017-09-20 PROCEDURE — 85610 PROTHROMBIN TIME: CPT

## 2017-09-20 PROCEDURE — 82803 BLOOD GASES ANY COMBINATION: CPT

## 2017-09-20 PROCEDURE — 25000003 PHARM REV CODE 250: Performed by: NURSE PRACTITIONER

## 2017-09-20 PROCEDURE — 80048 BASIC METABOLIC PNL TOTAL CA: CPT

## 2017-09-20 PROCEDURE — 99900035 HC TECH TIME PER 15 MIN (STAT)

## 2017-09-20 PROCEDURE — 94640 AIRWAY INHALATION TREATMENT: CPT

## 2017-09-20 PROCEDURE — 63600175 PHARM REV CODE 636 W HCPCS: Performed by: HOSPITALIST

## 2017-09-20 PROCEDURE — 80202 ASSAY OF VANCOMYCIN: CPT

## 2017-09-20 PROCEDURE — 36600 WITHDRAWAL OF ARTERIAL BLOOD: CPT

## 2017-09-20 RX ORDER — DILTIAZEM HYDROCHLORIDE 300 MG/1
300 CAPSULE, COATED, EXTENDED RELEASE ORAL DAILY
Qty: 30 CAPSULE | Refills: 2 | Status: SHIPPED | OUTPATIENT
Start: 2017-09-20 | End: 2017-09-22 | Stop reason: SDUPTHER

## 2017-09-20 RX ADMIN — FLUTICASONE FUROATE AND VILANTEROL TRIFENATATE 1 PUFF: 100; 25 POWDER RESPIRATORY (INHALATION) at 08:09

## 2017-09-20 RX ADMIN — IPRATROPIUM BROMIDE AND ALBUTEROL SULFATE 3 ML: .5; 3 SOLUTION RESPIRATORY (INHALATION) at 07:09

## 2017-09-20 RX ADMIN — PREDNISONE 40 MG: 20 TABLET ORAL at 08:09

## 2017-09-20 RX ADMIN — DILTIAZEM HYDROCHLORIDE 300 MG: 180 CAPSULE, COATED, EXTENDED RELEASE ORAL at 08:09

## 2017-09-20 RX ADMIN — ACETAMINOPHEN 1000 MG: 500 TABLET, FILM COATED ORAL at 09:09

## 2017-09-20 RX ADMIN — WARFARIN SODIUM 4 MG: 2 TABLET ORAL at 04:09

## 2017-09-20 RX ADMIN — AZITHROMYCIN 250 MG: 250 TABLET, FILM COATED ORAL at 08:09

## 2017-09-20 RX ADMIN — BENZONATATE 100 MG: 100 CAPSULE ORAL at 01:09

## 2017-09-20 RX ADMIN — BENZONATATE 100 MG: 100 CAPSULE ORAL at 04:09

## 2017-09-20 RX ADMIN — Medication 12.5 MG: at 08:09

## 2017-09-20 RX ADMIN — IPRATROPIUM BROMIDE AND ALBUTEROL SULFATE 3 ML: .5; 3 SOLUTION RESPIRATORY (INHALATION) at 03:09

## 2017-09-20 NOTE — ASSESSMENT & PLAN NOTE
Nebs, steroids, azithromycin  Spirometry appears consistent with COPD  VQ scan low probability of chronic thromboembolic disease of any significant degree  Would benefit from nightly BiPAP due to likely obesity hypoventiation, MELISSA, COPD overlap syndrome, much improved with usage nightly in house

## 2017-09-20 NOTE — NURSING
Tele monitor and midline catheter removed with tip intact. D/C instructions provided. Escorted by w/c to lobby in NAD.

## 2017-09-20 NOTE — PROGRESS NOTES
"Dr Chahal entered an order for a wheelchair.  Patient has a wheelchair that belongs to her in the room.  Met with patient and spouse at bedside and asked how old pt's wheelchair is; patient stated 4 1/2 years old.  Informed patient that medicare will not cover a new wheelchair until it is 5 years old.  Patient and spouse stated that they believe it only has to be 3 or 4 years old.  Patient stated that "if they won't give me a new one then they need to replace or fix this one".  Patient states that the brakes do not work and that it is too small.    Contacted [facility]; per Luz the w/c has to be at least 5 years old to get a new one.  Requested that Luz check in the system and see if they supplied pt's wheelchair previously.  Luz stated that they did provide patient with a wheelchair on 10/14/2013 therefore patient can receive a new one in October 2018.  Informed Luz that patient stated the w/c is broken.  Luz will submit a request to [facility] for repairs.    Updated patient.    Luz with [facility] is requesting a prescription stating 'repair wheelchair'.  Will obtain from Dr Chahal and fax to [facility].  "

## 2017-09-20 NOTE — PROGRESS NOTES
1200  Per Aaliyah at [facility] they do not accept [employer] mgd medicare.    1220  Placed completed [facility] ([facility]) form for NIV in pt's blue folder for Dr Chung to sign.    1411  Faxed facesheet, NIV order form, PFT results, ABG's, H&P, pulmo notes to [facility] ([phone]).

## 2017-09-20 NOTE — PLAN OF CARE
Problem: Patient Care Overview  Goal: Plan of Care Review  PATIENT ALERT AND ORIENTED.  AFIB RHYTHM.  UP TO BEDSIDE COMMODE WITH 2 PERSON ASSIST.  BIPAP Q HS.  CALL LIGHT IN REACH.  BED IN LOW/LOCKED POSITION.

## 2017-09-20 NOTE — PROGRESS NOTES
1008  Call received from Yuliet at [facility] stating that the LTAC request is being sent for review.

## 2017-09-20 NOTE — PLAN OF CARE
09/20/17 1609   Final Note   Assessment Type Final Discharge Note   Discharge Disposition Home-Health

## 2017-09-20 NOTE — TELEPHONE ENCOUNTER
----- Message from Barbara Watson RN sent at 9/20/2017  4:11 PM CDT -----  Patient is discharging today and needs a hospital f/u; please contact patient to schedule.  Thank you

## 2017-09-20 NOTE — SUBJECTIVE & OBJECTIVE
Interval History: awaiting eval for LTAC placement, creatinine improving    Review of Systems   Constitutional: Negative for fever.   Respiratory: Positive for shortness of breath (improving, at baseline per patient). Negative for cough and wheezing.    Cardiovascular: Negative for chest pain.   Gastrointestinal: Negative for abdominal pain.     Objective:     Vital Signs (Most Recent):  Temp: 97.9 °F (36.6 °C) (09/19/17 1656)  Pulse: 84 (09/19/17 1656)  Resp: 20 (09/19/17 1656)  BP: 125/64 (09/19/17 1656)  SpO2: (!) 94 % (09/19/17 1656) Vital Signs (24h Range):  Temp:  [96.6 °F (35.9 °C)-97.9 °F (36.6 °C)] 97.9 °F (36.6 °C)  Pulse:  [] 84  Resp:  [14-22] 20  SpO2:  [92 %-98 %] 94 %  BP: (113-135)/(61-80) 125/64     Weight: 125.2 kg (276 lb 0.3 oz)  Body mass index is 43.23 kg/m².    Intake/Output Summary (Last 24 hours) at 09/19/17 1937  Last data filed at 09/19/17 1710   Gross per 24 hour   Intake             1210 ml   Output                0 ml   Net             1210 ml      Physical Exam   Constitutional: She is oriented to person, place, and time. No distress.   HENT:   Head: Normocephalic and atraumatic.   Eyes: EOM are normal. Right eye exhibits no discharge. Left eye exhibits no discharge.   Neck: Normal range of motion. Neck supple. No JVD present.   Cardiovascular: Normal rate.  An irregularly irregular rhythm present.   No murmur heard.  Pulmonary/Chest: Effort normal. No accessory muscle usage. No tachypnea. She has no decreased breath sounds. She has no wheezes. She has no rales. She exhibits no tenderness.   Decreased breath sounds throughout   Abdominal: Soft. Bowel sounds are normal. She exhibits no distension. There is no tenderness.   Musculoskeletal: She exhibits edema (lymphedema legs bilateral). She exhibits no deformity.   Neurological: She is alert and oriented to person, place, and time. No cranial nerve deficit.   Skin: Skin is warm and dry. No rash noted. She is not diaphoretic.    Psychiatric: She has a normal mood and affect. Her behavior is normal.       Significant Labs: All pertinent labs within the past 24 hours have been reviewed.    Significant Imaging: I have reviewed all pertinent imaging results/findings within the past 24 hours.

## 2017-09-20 NOTE — PROGRESS NOTES
Aerosol tx q8, mdi qday     09/20/17 0759   Patient Assessment/Suction   Level of Consciousness (AVPU) alert   Respiratory Effort Unlabored   Expansion/Accessory Muscles/Retractions no use of accessory muscles   All Lung Fields Breath Sounds diminished   PRE-TX-O2-ETCO2   O2 Device (Oxygen Therapy) nasal cannula   $ Is the patient on Oxygen? Yes   Flow (L/min) 3   Oxygen Concentration (%) 32   SpO2 95 %   Pulse Oximetry Type Intermittent   $ Pulse Oximetry - Multiple Charge Pulse Oximetry - Multiple   Pulse 79   Resp 17   Aerosol Therapy   $ Aerosol Therapy Charges Aerosol Treatment   Respiratory Treatment Status given   SVN/Inhaler Treatment Route mask   Position During Treatment Sitting in chair   Patient Tolerance good   Post-Treatment   Post-treatment Heart Rate (beats/min) 81   Post-treatment Resp Rate (breaths/min) 18   All Fields Breath Sounds aeration increased   Ready to Wean/Extubation Screen   FIO2<=50 (chart decimal) 0.32

## 2017-09-20 NOTE — DISCHARGE INSTRUCTIONS
Hold Torsemide and Lisinopril for next 4 days then resume!    Thank you for choosing [facility] for your medical care. The primary doctor who is taking care of you at the time of your discharge is James Chahal MD.     You were admitted to the hospital with Acute on chronic respiratory failure with hypoxia.     Please note your discharge instructions, including diet/activity restrictions, follow-up appointments, and medication changes.  If you have any questions about your medical issues, prescriptions, or any other questions, please feel free to contact the [facility] Medicine Dept at [phone] and we will help.    If you are previously with Home health, outpatient PT/OT or under a therapy program, you are cleared to return to those programs.    Please direct all long term medication refills and follow up to your primary care provider, Brandon Gray MD. Thank you again for letting us take care of your health care needs.

## 2017-09-20 NOTE — PROGRESS NOTES
"[facility] Medicine  Progress Note    Patient Name: Nicole Lala  MRN: [id number]  Patient Class: IP- Inpatient   Admission Date: 9/11/2017  Length of Stay: 8 days  Attending Physician: James Chahal MD  Primary Care Provider: Brandon Gray MD        Subjective:     Principal Problem:Acute on chronic respiratory failure with hypoxia    HPI:  Nicole Lala is a 84 y.o. female with a history of HTN, CKD, A-fib (on coumadin), Lymphedema and DVT.  She was admitted to the service of hospital medicine with acute on chronic hypoxic respiratory failure.  She presented to the ED with complaints of severe SOB while getting into the car from her wheelchair.  She reports a week or so of a "cold" describing cough with brown phlegm associated with general weakness and an intermittentently sore throat. She denies any attempted OTC medications for symptoms. She had an PCP appointment today but presented to the ED after becoming SOB. The SOB is notable worse with exertion. Pt also reports she had blood work yesterday and was advised to take 1/2 of her coumadin daily 5 mg Rx yesterday and return to the 5 mg q.d. Pt reports 2L of oxygen use at home which she wears mainly when resting. She denies any worsening of her chronic LE swelling, chest pain, or syncope. Pt denies fever/chills, abd pain, and tobacco use.     Hospital Course:  Patient was admitted with pneumonia and CHF.  Was given IVAB and placed on Lasix IV infusion.  Her breathing improved.  She was taken off the Lasix drip.  She was also given IV Cardizem for rapid atrial fibrillation.  Once her oral Cardizem took over, she was taken off that infusion.  She was started on prednisone for bronchospasms.    Interval History: awaiting eval for LTAC placement, creatinine improving    Review of Systems   Constitutional: Negative for fever.   Respiratory: Positive for shortness of breath (improving, at baseline per patient). Negative for cough " and wheezing.    Cardiovascular: Negative for chest pain.   Gastrointestinal: Negative for abdominal pain.     Objective:     Vital Signs (Most Recent):  Temp: 97.9 °F (36.6 °C) (09/19/17 1656)  Pulse: 84 (09/19/17 1656)  Resp: 20 (09/19/17 1656)  BP: 125/64 (09/19/17 1656)  SpO2: (!) 94 % (09/19/17 1656) Vital Signs (24h Range):  Temp:  [96.6 °F (35.9 °C)-97.9 °F (36.6 °C)] 97.9 °F (36.6 °C)  Pulse:  [] 84  Resp:  [14-22] 20  SpO2:  [92 %-98 %] 94 %  BP: (113-135)/(61-80) 125/64     Weight: 125.2 kg (276 lb 0.3 oz)  Body mass index is 43.23 kg/m².    Intake/Output Summary (Last 24 hours) at 09/19/17 1937  Last data filed at 09/19/17 1710   Gross per 24 hour   Intake             1210 ml   Output                0 ml   Net             1210 ml      Physical Exam   Constitutional: She is oriented to person, place, and time. No distress.   HENT:   Head: Normocephalic and atraumatic.   Eyes: EOM are normal. Right eye exhibits no discharge. Left eye exhibits no discharge.   Neck: Normal range of motion. Neck supple. No JVD present.   Cardiovascular: Normal rate.  An irregularly irregular rhythm present.   No murmur heard.  Pulmonary/Chest: Effort normal. No accessory muscle usage. No tachypnea. She has no decreased breath sounds. She has no wheezes. She has no rales. She exhibits no tenderness.   Decreased breath sounds throughout   Abdominal: Soft. Bowel sounds are normal. She exhibits no distension. There is no tenderness.   Musculoskeletal: She exhibits edema (lymphedema legs bilateral). She exhibits no deformity.   Neurological: She is alert and oriented to person, place, and time. No cranial nerve deficit.   Skin: Skin is warm and dry. No rash noted. She is not diaphoretic.   Psychiatric: She has a normal mood and affect. Her behavior is normal.       Significant Labs: All pertinent labs within the past 24 hours have been reviewed.    Significant Imaging: I have reviewed all pertinent imaging results/findings  within the past 24 hours.    Assessment/Plan:      * Acute on chronic respiratory failure with hypoxia    Due to pna and CHF, also due to probable underlying COPD.  Monitor work of breathing and o2 sats.        TETO (acute kidney injury)    TETO  Present is prerenal. Gentle IVF, hold loop diuretic  Improving          COPD exacerbation    Nebs, steroids, azithromycin  Spirometry appears consistent with COPD  VQ scan low probability of chronic thromboembolic disease of any significant degree  Would benefit from nightly BiPAP due to likely obesity hypoventiation, MELISSA, COPD overlap syndrome, much improved with usage nightly in house        Acute diastolic heart failure    Was present on admission.  BUN and Cr are elevated, so will discontinue the Lasix infusion.  Echo shows normal EF.    Hold loop diuretics, maintain euvolemia        CAP (community acquired pneumonia)    Infiltrate on Xray this am, Not apparent on CT chest  On azithromycin for possible COPD exacerbation        Morbid obesity with BMI of 40.0-44.9, adult    BMI 43.2.  Obesity compounds patient co-morbidities and complicates treatment course.  Counseling given regarding diet modification and exercise recommendations.            Obesity, morbid, BMI 40.0-49.9    Body mass index is 43.23 kg/m². Morbid obesity complicates all aspects of disease management from diagnostic modalities to treatment. Weight loss encouraged and health benefits explained to patient.            Essential hypertension    Chronic, continue antihypertensive regimen.  Monitor BP closely, titrate medication as required for sustained BP control.          Atrial fibrillation with rapid ventricular response    Off the Cardizem drip.  Pt restarted on her oral CCB..  Continue warfarin- therapeutic at this time.  Monitor closely on monitor.         Lymphedema    Continue ACE wrapping as per home regimen. Keep extremities elevated.          Physical deconditioning    PT/OT consultation. Recommend  SNF on discharge, patient wishes LTAC which may not be possible          Venous stasis of lower extremity              S/P insertion of IVC (inferior vena caval) filter 3/22/10    Contributory towards lymphedema            VTE Risk Mitigation         Ordered     warfarin (COUMADIN) tablet 4 mg  Daily     Route:  Oral        09/18/17 1534     Medium Risk of VTE  Once      09/12/17 0150     Reason for No Pharmacological VTE Prophylaxis  Once      09/12/17 0150              James Chahal MD  Department of Hospital Medicine   [facility]

## 2017-09-20 NOTE — PROGRESS NOTES
Progress Note  PULMONARY    Admit Date: 9/11/2017 09/20/2017      SUBJECTIVE:     Sept 19, feels better yet, no c/o      PFSH and Allergies reviewed.    OBJECTIVE:     Vitals (Most recent):  Vitals:    09/20/17 1138   BP: 113/67   Pulse: 91   Resp: 20   Temp: 97.5 °F (36.4 °C)       Vitals (24 hour range):  Temp:  [97.5 °F (36.4 °C)-98.4 °F (36.9 °C)]   Pulse:  [72-92]   Resp:  [14-20]   BP: (113-156)/(60-73)   SpO2:  [93 %-98 %]       Intake/Output Summary (Last 24 hours) at 09/20/17 1410  Last data filed at 09/20/17 0330   Gross per 24 hour   Intake          3293.33 ml   Output              401 ml   Net          2892.33 ml          Physical Exam:  The patient's neuro status (alertness,orientation,cognitive function,motor skills,), pharyngeal exam (oral lesions, hygiene, abn dentition,), Neck (jvd,mass,thyroid,nodes in neck and above/below clavicle),RESPIRATORY(symmetry,effort,fremitus,percussion,auscultation),  Cor(rhythm,heart tones including gallops,perfusion,edema)ABD(distention,hepatic&splenomegaly,tenderness,masses), Skin(rash,cyanosis),Psyc(affect,judgement,).  Exam negative except for these pertinent findings:    Morbid obese, good bs,no wheeze, chr lymphedema, alert and talkative    Radiographs reviewed: view by direct vision  V/q good,   Results for orders placed during the hospital encounter of 09/11/17   X-Ray Chest 1 View    Narrative Comparison study: 9/16/2017  One view chest  There is right basilar infiltrate suggesting pneumonia.  There is mild left basilar infiltrate which may also be new with left lung base not well visualized on the portable film.  There is no pleural effusion.  The cardiomediastinal silhouette is stable    Impression Interval development of right basilar infiltrate suggesting pneumonia.  Probable new mild left basilar infiltrate as well differential includes CHF      Electronically signed by: FADIA GUERRA MD  Date:     09/18/17  Time:    09:09    ]    Labs     No results for  input(s): WBC, HGB, HCT, PLT, BAND, METAMYELOCYT, MYELOPCT, HGBA1C in the last 24 hours.    Recent Labs  Lab 09/20/17  0552      K 5.0      CO2 33*   BUN 70*   CREATININE 1.6*      CALCIUM 9.3   INR 2.0*       Recent Labs  Lab 09/20/17  1311   PH 7.414   PCO2 51.9*   PO2 115*   HCO3 33.2*     Microbiology Results (last 7 days)     Procedure Component Value Units Date/Time    Blood culture [[id number]] Collected:  09/11/17 1728    Order Status:  Completed Specimen:  Blood Updated:  09/16/17 2312     Blood Culture, Routine No growth after 5 days.    Blood culture [[id number]] Collected:  09/11/17 1728    Order Status:  Completed Specimen:  Blood Updated:  09/16/17 2312     Blood Culture, Routine No growth after 5 days.    Culture, Respiratory with Gram Stain [[id number]] Collected:  09/13/17 0748    Order Status:  Completed Specimen:  Respiratory from Sputum Updated:  09/15/17 0928     Respiratory Culture Normal respiratory marisol     Gram Stain (Respiratory) <10 epithelial cells per low power field.     Gram Stain (Respiratory) Few WBC's     Gram Stain (Respiratory) Few Gram positive cocci     Gram Stain (Respiratory) Few Gram positive rods     Gram Stain (Respiratory) Few Gram negative rods          Impression:  Active Hospital Problems    Diagnosis  POA    *Acute on chronic respiratory failure with hypoxia [J96.21]  Yes    COPD exacerbation [J44.1]  Yes    TETO (acute kidney injury) [N17.9]  Yes    Acute diastolic heart failure [I50.31]  Yes    CAP (community acquired pneumonia) [J18.9]  Yes    Morbid obesity with BMI of 40.0-44.9, adult [E66.01, Z68.41]  Not Applicable    Essential hypertension [I10]  Yes     Chronic    Obesity, morbid, BMI 40.0-49.9 [E66.01]  Yes     Chronic    Atrial fibrillation with rapid ventricular response [I48.91]  Yes    Lymphedema [I89.0]  Yes     Chronic    Physical deconditioning [R53.81]  Yes    S/P insertion of IVC (inferior vena caval) filter 3/22/10  [Z95.828]  Not Applicable     Chronic      Resolved Hospital Problems    Diagnosis Date Resolved POA   No resolved problems to display.               Plan:     Sept 19- fev1 44% with no dramatic positional changes, and v/q is good,  abg with high co2- niv would be prudent.  Likely needs home o2.     Would d/c on prednisone 10 bid for 2 weeks, f/u office a month. Sleep study may be needed.    Sept 20 , talkative and seems to be thriving,  abg with Paco2 52 today.  Pt should have NIv.  outpt soon would be good, suggest office f/u.    Due to patient's copd and chronic respiratory failure, patient's conditioning is worsening and requires a non invasive home ventilator to prevent co2 retention and untimely hospital re admits.                                   .

## 2017-09-21 ENCOUNTER — PATIENT OUTREACH (OUTPATIENT)
Dept: ADMINISTRATIVE | Facility: CLINIC | Age: 82
End: 2017-09-21

## 2017-09-21 NOTE — TELEPHONE ENCOUNTER
Reviewed Epic note from case management. Information sent to [facility] for processing and acceptance for [facility]. Awaiting approval.

## 2017-09-21 NOTE — TELEPHONE ENCOUNTER
----- Message from Mick Ayers RN sent at 9/21/2017 12:41 PM CDT -----  Contact: Mick Ayers RN  Spoke with patient for a TCC follow up call. Patient requesting a call back from physician office regarding clarification on Cardizem medication. Please contact patient and advise. Thanks in advance for your assistance.

## 2017-09-21 NOTE — PROGRESS NOTES
Late Entry:  09/20/2017 @ 3:39pm    SSC spoke to Keyanna BAGLEY With [facility] [phone] regarding nebulizer.  OK to pull from [facility] DME closet.  SSC delivered nebulizer to patient's hospital room.  Patient signed delivery ticket and rental agreement.  SSC placed completed paperwork in tray in DME closet.    09/21/2017 @ 10:25am    SSC sent patient information to [facility] through [facility] system for processing and acceptance.NAYELI Saenz    10:32am  The referral for the patient in [facility]/PCU, room 221, bed 221 A admitted at 9/11/2017 3:03 PM has been updated by [email or web address].  Update: Accepted.

## 2017-09-21 NOTE — TELEPHONE ENCOUNTER
----- Message from Marine Bhatt sent at 9/21/2017  2:02 PM CDT -----  Please call patient in reference to her medications.  Call patient at [phone].

## 2017-09-22 NOTE — TELEPHONE ENCOUNTER
----- Message from Lex Brady sent at 9/22/2017  1:46 PM CDT -----  Contact: self  Patient returning call from your office please call back at [phone] (dhda)

## 2017-09-22 NOTE — TELEPHONE ENCOUNTER
----- Message from Yojana Peterson sent at 9/21/2017  3:18 PM CDT -----  Contact: self   [phone]  Patient states she is returning the nurse call. Thanks!

## 2017-09-22 NOTE — TELEPHONE ENCOUNTER
Spoke to pt. Pt requesting a hospital follow up with Dr. Gray. Scheduled pt to see Dr. Gray on 10/6. Pt also requesting a refill of diltiazem and prednisone to be sent to a local pharmacy because it was sent to [facility] and it will take about a week to come in. Refill pended. Please advise.

## 2017-09-23 ENCOUNTER — NURSE TRIAGE (OUTPATIENT)
Dept: ADMINISTRATIVE | Facility: CLINIC | Age: 82
End: 2017-09-23

## 2017-09-23 NOTE — ASSESSMENT & PLAN NOTE
Off the Cardizem drip.  Pt restarted on her oral CCB..  Continue warfarin- therapeutic at this time.  Monitor closely on monitor. HR well controlled on discharge.

## 2017-09-23 NOTE — TELEPHONE ENCOUNTER
Patient calling would like Cardizem and prednisone medication to be phoned to [facility]'s pharmacy.  Medication ordered during hospitalization.  Medications phoned to the pharmacy.

## 2017-09-23 NOTE — ASSESSMENT & PLAN NOTE
TETO was pre-renal from over diuresis. She was given gentle IVF and this improved significant by time of discharge, she was to subsequently resume loop diuretic and appropriate dosing at home.

## 2017-09-23 NOTE — ASSESSMENT & PLAN NOTE
Nebs, steroids, azithromycin  Spirometry appears consistent with COPD  VQ scan low probability of chronic thromboembolic disease of any significant degree  Would benefit from nightly NIPPV due to likely obesity hypoventiation, MELISSA, COPD overlap syndrome, much improved with usage nightly in house    CO2 retention on ABG, obstruction shows on spirometry consistent with COPD as well as history of many years smoking.

## 2017-09-23 NOTE — ASSESSMENT & PLAN NOTE
PT/OT consultation. Improved here, PT recommending home health, arranged services on discharge to be carried out

## 2017-09-24 RX ORDER — DILTIAZEM HYDROCHLORIDE 300 MG/1
300 CAPSULE, COATED, EXTENDED RELEASE ORAL DAILY
Qty: 30 CAPSULE | Refills: 2 | Status: SHIPPED | OUTPATIENT
Start: 2017-09-24 | End: 2017-12-21

## 2017-09-24 RX ORDER — PREDNISONE 10 MG/1
10 TABLET ORAL 2 TIMES DAILY
Qty: 28 TABLET | Refills: 1 | OUTPATIENT
Start: 2017-09-24 | End: 2017-10-08

## 2017-09-25 ENCOUNTER — ANTI-COAG VISIT (OUTPATIENT)
Dept: CARDIOLOGY | Facility: CLINIC | Age: 82
End: 2017-09-25

## 2017-09-25 ENCOUNTER — LAB VISIT (OUTPATIENT)
Dept: LAB | Facility: HOSPITAL | Age: 82
End: 2017-09-25
Attending: FAMILY MEDICINE
Payer: MEDICARE

## 2017-09-25 DIAGNOSIS — Z86.718 PERSONAL HISTORY OF DVT (DEEP VEIN THROMBOSIS): ICD-10-CM

## 2017-09-25 DIAGNOSIS — J44.1 OBSTRUCTIVE CHRONIC BRONCHITIS WITH EXACERBATION: Primary | ICD-10-CM

## 2017-09-25 LAB
ANION GAP SERPL CALC-SCNC: 11 MMOL/L
BUN SERPL-MCNC: 46 MG/DL
CALCIUM SERPL-MCNC: 9.6 MG/DL
CHLORIDE SERPL-SCNC: 110 MMOL/L
CO2 SERPL-SCNC: 21 MMOL/L
CREAT SERPL-MCNC: 1.5 MG/DL
EST. GFR  (AFRICAN AMERICAN): 36.6 ML/MIN/1.73 M^2
EST. GFR  (NON AFRICAN AMERICAN): 31.8 ML/MIN/1.73 M^2
GLUCOSE SERPL-MCNC: 63 MG/DL
INR PPP: 4 (ref 1.8–2.2)
POTASSIUM SERPL-SCNC: 5.4 MMOL/L
SODIUM SERPL-SCNC: 142 MMOL/L

## 2017-09-25 PROCEDURE — 80048 BASIC METABOLIC PNL TOTAL CA: CPT

## 2017-09-25 NOTE — TELEPHONE ENCOUNTER
Patient advised, voices understanding.  States she did get the prednisone yesterday, she will  cardizem today.  Also requesting to reschedule hosp f/u appt, appt changed from 9:30 to 2:30 on 10/6/17.

## 2017-09-27 ENCOUNTER — ANTI-COAG VISIT (OUTPATIENT)
Dept: CARDIOLOGY | Facility: CLINIC | Age: 82
End: 2017-09-27

## 2017-09-27 DIAGNOSIS — Z86.718 PERSONAL HISTORY OF DVT (DEEP VEIN THROMBOSIS): ICD-10-CM

## 2017-09-27 LAB — INR PPP: 2.2 (ref 1.8–2.2)

## 2017-09-27 NOTE — TELEPHONE ENCOUNTER
----- Message from Paco Bettencourt sent at 9/27/2017  2:32 PM CDT -----  Contact: Liset/[facility] called in regarding the attached patient.  Liset stated patient needs a refill on her medication for her breathing treatments ( Albuterol).      [facility] [address] [place] [address] & [address] [address]  Phone: [phone] Fax: [phone]    Liset's call back number, if needed, is [phone]

## 2017-09-27 NOTE — PROGRESS NOTES
Left voice message with new dosing instructions and next INR date. Patient to return call and confirm understanding.  hh faxed

## 2017-09-28 ENCOUNTER — TELEPHONE (OUTPATIENT)
Dept: FAMILY MEDICINE | Facility: CLINIC | Age: 82
End: 2017-09-28

## 2017-09-28 ENCOUNTER — DOCUMENTATION ONLY (OUTPATIENT)
Dept: PULMONOLOGY | Facility: CLINIC | Age: 82
End: 2017-09-28

## 2017-09-28 ENCOUNTER — OFFICE VISIT (OUTPATIENT)
Dept: NEPHROLOGY | Facility: CLINIC | Age: 82
End: 2017-09-28
Payer: MEDICARE

## 2017-09-28 VITALS
HEART RATE: 79 BPM | HEIGHT: 67 IN | DIASTOLIC BLOOD PRESSURE: 62 MMHG | WEIGHT: 292.31 LBS | OXYGEN SATURATION: 95 % | SYSTOLIC BLOOD PRESSURE: 138 MMHG | BODY MASS INDEX: 45.88 KG/M2

## 2017-09-28 DIAGNOSIS — I89.0 LYMPHEDEMA: ICD-10-CM

## 2017-09-28 DIAGNOSIS — N18.30 CKD (CHRONIC KIDNEY DISEASE), STAGE III: Primary | ICD-10-CM

## 2017-09-28 DIAGNOSIS — E87.5 HYPERPOTASSEMIA: ICD-10-CM

## 2017-09-28 PROCEDURE — 99999 PR PBB SHADOW E&M-EST. PATIENT-LVL III: CPT | Mod: PBBFAC,,, | Performed by: INTERNAL MEDICINE

## 2017-09-28 PROCEDURE — 99214 OFFICE O/P EST MOD 30 MIN: CPT | Mod: S$GLB,,, | Performed by: INTERNAL MEDICINE

## 2017-09-28 PROCEDURE — 1126F AMNT PAIN NOTED NONE PRSNT: CPT | Mod: S$GLB,,, | Performed by: INTERNAL MEDICINE

## 2017-09-28 PROCEDURE — 3008F BODY MASS INDEX DOCD: CPT | Mod: S$GLB,,, | Performed by: INTERNAL MEDICINE

## 2017-09-28 PROCEDURE — 99499 UNLISTED E&M SERVICE: CPT | Mod: S$GLB,,, | Performed by: INTERNAL MEDICINE

## 2017-09-28 PROCEDURE — 3078F DIAST BP <80 MM HG: CPT | Mod: S$GLB,,, | Performed by: INTERNAL MEDICINE

## 2017-09-28 PROCEDURE — 1159F MED LIST DOCD IN RCRD: CPT | Mod: S$GLB,,, | Performed by: INTERNAL MEDICINE

## 2017-09-28 PROCEDURE — 3075F SYST BP GE 130 - 139MM HG: CPT | Mod: S$GLB,,, | Performed by: INTERNAL MEDICINE

## 2017-09-28 RX ORDER — IPRATROPIUM BROMIDE AND ALBUTEROL SULFATE 2.5; .5 MG/3ML; MG/3ML
3 SOLUTION RESPIRATORY (INHALATION) EVERY 4 HOURS PRN
Qty: 1 BOX | Refills: 11 | Status: SHIPPED | OUTPATIENT
Start: 2017-09-28 | End: 2018-09-28

## 2017-09-28 NOTE — PROGRESS NOTES
Due to patient's copd and chronic respiratory failure, patient's conditioning is worsening and requires a non invasive home ventilator to prevent co2 retention and untimely hospital re admits.

## 2017-09-28 NOTE — PROGRESS NOTES
"Subjective:       Patient ID: Nicole Lala is a 84 y.o. White female who presents for return patient evaluation for chronic renal failure.    She was just in [facility] for CAP.  She has no uremic or urinary symptoms and is in her usual state of health.  She does have home oxygen as needed.    Review of Systems   Constitutional: Negative for appetite change, chills and fever.   Eyes: Negative for visual disturbance.   Respiratory: Positive for shortness of breath (with exertion). Negative for cough.    Cardiovascular: Positive for leg swelling. Negative for chest pain and palpitations.   Gastrointestinal: Negative for abdominal pain, diarrhea, nausea and vomiting.   Genitourinary: Negative for difficulty urinating, dysuria and hematuria.   Musculoskeletal: Positive for arthralgias (knees, B hip) and gait problem. Negative for myalgias.   Skin: Negative for rash.   Neurological: Negative for headaches.   Hematological: Bruises/bleeds easily.   Psychiatric/Behavioral: Negative for sleep disturbance.       The past medical, family and social histories were reviewed for this encounter.     /62   Pulse 79   Ht 5' 7" (1.702 m)   Wt 132.6 kg (292 lb 5.3 oz)   LMP  (LMP Unknown) Comment: post menopausal  SpO2 95%   BMI 45.79 kg/m²     Objective:      Physical Exam   Constitutional: She appears well-developed and well-nourished. No distress.   HENT:   Head: Normocephalic and atraumatic.   Eyes: Conjunctivae are normal. No scleral icterus.   Neck: Normal range of motion. No JVD present.   Cardiovascular: Normal rate, regular rhythm and normal heart sounds.  Exam reveals no gallop and no friction rub.    No murmur heard.  Decreased tones with irregularly irregular rhythm   Pulmonary/Chest: Effort normal and breath sounds normal. No respiratory distress. She has no wheezes.   Abdominal: Soft. Bowel sounds are normal. She exhibits no distension. There is no tenderness.   Musculoskeletal: She exhibits edema.   Skin: " Skin is warm and dry. No rash noted.   Psychiatric: She has a normal mood and affect.   Vitals reviewed.      Assessment:       1. CKD (chronic kidney disease), stage III    2. Lymphedema    3. Hyperpotassemia        Plan:   Return to clinic in 3 months.  Labs for next visit include rp, pth. She gets her labs in [place].  Baseline creatinine is 1.2-1.6 since 2013.  PTH is 210 with a calcium of 9.6.  Her function is labile. She does not seem to have an active disease which is affecting her renal function however her cardiac function may be implicated in her labile function. She has pulmonary HTN in the past and this would explain both her edema and a labile kidney function.  We discussed a low potassium diet.

## 2017-09-28 NOTE — TELEPHONE ENCOUNTER
----- Message from Saray Gar sent at 9/28/2017  1:06 PM CDT -----  Contact: Melinda- [facility]   Faxed over order for DME just need signed and sent back as soon as possible, [phone] thanks!

## 2017-09-29 ENCOUNTER — TELEPHONE (OUTPATIENT)
Dept: FAMILY MEDICINE | Facility: CLINIC | Age: 82
End: 2017-09-29

## 2017-09-29 NOTE — TELEPHONE ENCOUNTER
----- Message from Paco Bettencourt sent at 9/29/2017 12:53 PM CDT -----  Contact: Janae/[facility]  Janae called in regarding the attached patient.  Janae stated that she sent a fax over for orders for a wheelchair.  Janae's call back number is [phone] and fax number is [phone]

## 2017-10-02 ENCOUNTER — ANTI-COAG VISIT (OUTPATIENT)
Dept: CARDIOLOGY | Facility: CLINIC | Age: 82
End: 2017-10-02

## 2017-10-02 ENCOUNTER — TELEPHONE (OUTPATIENT)
Dept: FAMILY MEDICINE | Facility: CLINIC | Age: 82
End: 2017-10-02

## 2017-10-02 ENCOUNTER — LAB VISIT (OUTPATIENT)
Dept: LAB | Facility: HOSPITAL | Age: 82
End: 2017-10-02
Attending: FAMILY MEDICINE
Payer: MEDICARE

## 2017-10-02 DIAGNOSIS — J44.1 OBSTRUCTIVE CHRONIC BRONCHITIS WITH EXACERBATION: Primary | ICD-10-CM

## 2017-10-02 DIAGNOSIS — Z86.718 PERSONAL HISTORY OF DVT (DEEP VEIN THROMBOSIS): ICD-10-CM

## 2017-10-02 LAB
ANION GAP SERPL CALC-SCNC: 9 MMOL/L
BUN SERPL-MCNC: 40 MG/DL
CALCIUM SERPL-MCNC: 9.1 MG/DL
CHLORIDE SERPL-SCNC: 107 MMOL/L
CO2 SERPL-SCNC: 25 MMOL/L
CREAT SERPL-MCNC: 1.3 MG/DL
EST. GFR  (AFRICAN AMERICAN): 43.5 ML/MIN/1.73 M^2
EST. GFR  (NON AFRICAN AMERICAN): 37.8 ML/MIN/1.73 M^2
GLUCOSE SERPL-MCNC: 92 MG/DL
INR PPP: 1.5 (ref 1.8–2.2)
POTASSIUM SERPL-SCNC: 4.6 MMOL/L
SODIUM SERPL-SCNC: 141 MMOL/L

## 2017-10-02 PROCEDURE — 80048 BASIC METABOLIC PNL TOTAL CA: CPT

## 2017-10-02 NOTE — TELEPHONE ENCOUNTER
----- Message from Avis Moses sent at 10/2/2017  1:20 PM CDT -----  Contact: Mame Agrawal - Occupational Therapist w/ [facility]  Mame Agrawal - Occupational Therapist w/ [facility] calling to let the Doctor know that the patient fell yesterday, transferring from the toilet to the wheelchair. She stayed on the floor for about 3 hours until her spouse found her. He then called 911, in which they came and helped her up. She didn't go to the Hospital. Please advise.  Call back Mame   Thanks!

## 2017-10-04 ENCOUNTER — TELEPHONE (OUTPATIENT)
Dept: FAMILY MEDICINE | Facility: CLINIC | Age: 82
End: 2017-10-04

## 2017-10-04 NOTE — TELEPHONE ENCOUNTER
----- Message from Betsy Vanegas sent at 10/4/2017  8:49 AM CDT -----  Contact: pt  Pt states that she just received call from nurse and returning the call...[phone] (home)

## 2017-10-04 NOTE — TELEPHONE ENCOUNTER
----- Message from Dori See sent at 10/4/2017  8:12 AM CDT -----  Contact: self [phone]  Call placed to pod. Patient returned your call, please call back.  Thank you!

## 2017-10-04 NOTE — TELEPHONE ENCOUNTER
See result note. Attempted to contact pt. Left message on voicemail for pt to return call to clinic.

## 2017-10-11 ENCOUNTER — ANTI-COAG VISIT (OUTPATIENT)
Dept: CARDIOLOGY | Facility: CLINIC | Age: 82
End: 2017-10-11

## 2017-10-11 DIAGNOSIS — Z86.718 PERSONAL HISTORY OF DVT (DEEP VEIN THROMBOSIS): ICD-10-CM

## 2017-10-11 LAB — INR PPP: 4.6 (ref 1.8–2.2)

## 2017-10-16 ENCOUNTER — TELEPHONE (OUTPATIENT)
Dept: FAMILY MEDICINE | Facility: CLINIC | Age: 82
End: 2017-10-16

## 2017-10-16 NOTE — TELEPHONE ENCOUNTER
Phoned patient for additional symptoms (possibly urinary). Left message asking pt return call.       Spoke to Mame.  Requesting order to extend home visits; patient scheduled for discharge this week however home health nurse states patient has been having more issues over this past week (not able to get out of her chair to ambulate to toilet). Pt also reported   8/10 knee pain while nurse was there today.

## 2017-10-16 NOTE — TELEPHONE ENCOUNTER
----- Message from Lex Brady sent at 10/16/2017 10:57 AM CDT -----  Contact: [facility], Mame Vilchis want to speak with a nurse regarding more visits twice a week please call back at [phone]

## 2017-10-16 NOTE — TELEPHONE ENCOUNTER
Spoke to Mame.  Requesting order to extend home visits; patient scheduled for discharge this week however home health nurse states patient has been having more issues over this past week (not able to get out of her chair to ambulate to toilet). Pt also reported   8/10 knee pain while nurse was there today.

## 2017-10-16 NOTE — TELEPHONE ENCOUNTER
----- Message from Betsy Vanegas sent at 10/16/2017 12:57 PM CDT -----  [facility] calling states pt has a complaint pain in her back,having discomfort,complained on Friday....[phone]

## 2017-10-17 LAB — INR PPP: 2.4 (ref 1.8–2.2)

## 2017-10-17 NOTE — TELEPHONE ENCOUNTER
"Spoke to patient.   She denies urinary symptoms (frequency, urgency, burning, hematuria, nocturia, or dysuria) and states her back pain is better today, "no pain today and was able to do everything I wanted to do today".   Pain scale 0/10.    Please advise on initial request to extend home health order (pt scheduled for discharge this week)  "

## 2017-10-18 ENCOUNTER — ANTI-COAG VISIT (OUTPATIENT)
Dept: CARDIOLOGY | Facility: CLINIC | Age: 82
End: 2017-10-18

## 2017-10-18 DIAGNOSIS — Z86.718 PERSONAL HISTORY OF DVT (DEEP VEIN THROMBOSIS): ICD-10-CM

## 2017-10-18 NOTE — TELEPHONE ENCOUNTER
"Spoke to patient Malinda [facility].  Per Jessica, order to extend services will be sent out electronically for MD to sign, "no need to fax an order".  "

## 2017-10-24 ENCOUNTER — ANTI-COAG VISIT (OUTPATIENT)
Dept: CARDIOLOGY | Facility: CLINIC | Age: 82
End: 2017-10-24

## 2017-10-24 DIAGNOSIS — Z86.718 PERSONAL HISTORY OF DVT (DEEP VEIN THROMBOSIS): ICD-10-CM

## 2017-10-24 LAB — INR PPP: 3.8 (ref 1.8–2.2)

## 2017-10-27 ENCOUNTER — HOSPITAL ENCOUNTER (INPATIENT)
Facility: HOSPITAL | Age: 82
LOS: 3 days | Discharge: HOME HEALTH | DRG: 603 | End: 2017-10-30
Attending: EMERGENCY MEDICINE | Admitting: HOSPITALIST
Payer: MEDICARE

## 2017-10-27 DIAGNOSIS — L03.116 LEFT LEG CELLULITIS: Primary | ICD-10-CM

## 2017-10-27 DIAGNOSIS — I89.0 LYMPHEDEMA: Chronic | ICD-10-CM

## 2017-10-27 PROBLEM — J96.11 CHRONIC RESPIRATORY FAILURE WITH HYPOXIA: Status: ACTIVE | Noted: 2017-10-27

## 2017-10-27 LAB
ALBUMIN SERPL BCP-MCNC: 3 G/DL
ALP SERPL-CCNC: 73 U/L
ALT SERPL W/O P-5'-P-CCNC: 19 U/L
ANION GAP SERPL CALC-SCNC: 12 MMOL/L
AST SERPL-CCNC: 13 U/L
BASOPHILS # BLD AUTO: 0 K/UL
BASOPHILS NFR BLD: 0.1 %
BILIRUB SERPL-MCNC: 0.5 MG/DL
BUN SERPL-MCNC: 49 MG/DL
CALCIUM SERPL-MCNC: 9.7 MG/DL
CHLORIDE SERPL-SCNC: 103 MMOL/L
CO2 SERPL-SCNC: 29 MMOL/L
CREAT SERPL-MCNC: 1.7 MG/DL
DIFFERENTIAL METHOD: ABNORMAL
EOSINOPHIL # BLD AUTO: 0 K/UL
EOSINOPHIL NFR BLD: 0 %
ERYTHROCYTE [DISTWIDTH] IN BLOOD BY AUTOMATED COUNT: 18.5 %
EST. GFR  (AFRICAN AMERICAN): 31 ML/MIN/1.73 M^2
EST. GFR  (NON AFRICAN AMERICAN): 27 ML/MIN/1.73 M^2
GLUCOSE SERPL-MCNC: 119 MG/DL
HCT VFR BLD AUTO: 38.7 %
HGB BLD-MCNC: 12.6 G/DL
INR PPP: 2.3
LYMPHOCYTES # BLD AUTO: 0.6 K/UL
LYMPHOCYTES NFR BLD: 8 %
MCH RBC QN AUTO: 30.8 PG
MCHC RBC AUTO-ENTMCNC: 32.6 G/DL
MCV RBC AUTO: 94 FL
MONOCYTES # BLD AUTO: 0.4 K/UL
MONOCYTES NFR BLD: 5.8 %
NEUTROPHILS # BLD AUTO: 6.5 K/UL
NEUTROPHILS NFR BLD: 86.1 %
PLATELET # BLD AUTO: 210 K/UL
PMV BLD AUTO: 8.8 FL
POTASSIUM SERPL-SCNC: 4.1 MMOL/L
PROT SERPL-MCNC: 6.7 G/DL
PROTHROMBIN TIME: 23.7 SEC
RBC # BLD AUTO: 4.11 M/UL
SODIUM SERPL-SCNC: 144 MMOL/L
WBC # BLD AUTO: 7.6 K/UL

## 2017-10-27 PROCEDURE — 36415 COLL VENOUS BLD VENIPUNCTURE: CPT

## 2017-10-27 PROCEDURE — 25000003 PHARM REV CODE 250: Performed by: HOSPITALIST

## 2017-10-27 PROCEDURE — 80053 COMPREHEN METABOLIC PANEL: CPT

## 2017-10-27 PROCEDURE — 99284 EMERGENCY DEPT VISIT MOD MDM: CPT | Mod: 25

## 2017-10-27 PROCEDURE — 94761 N-INVAS EAR/PLS OXIMETRY MLT: CPT

## 2017-10-27 PROCEDURE — 96365 THER/PROPH/DIAG IV INF INIT: CPT

## 2017-10-27 PROCEDURE — S0077 INJECTION, CLINDAMYCIN PHOSP: HCPCS | Performed by: EMERGENCY MEDICINE

## 2017-10-27 PROCEDURE — 85610 PROTHROMBIN TIME: CPT

## 2017-10-27 PROCEDURE — 25000242 PHARM REV CODE 250 ALT 637 W/ HCPCS: Performed by: NURSE PRACTITIONER

## 2017-10-27 PROCEDURE — 94640 AIRWAY INHALATION TREATMENT: CPT

## 2017-10-27 PROCEDURE — 25000003 PHARM REV CODE 250: Performed by: EMERGENCY MEDICINE

## 2017-10-27 PROCEDURE — 12000002 HC ACUTE/MED SURGE SEMI-PRIVATE ROOM

## 2017-10-27 PROCEDURE — 27000221 HC OXYGEN, UP TO 24 HOURS

## 2017-10-27 PROCEDURE — 85025 COMPLETE CBC W/AUTO DIFF WBC: CPT

## 2017-10-27 RX ORDER — CLINDAMYCIN PHOSPHATE 600 MG/50ML
600 INJECTION, SOLUTION INTRAVENOUS
Status: DISCONTINUED | OUTPATIENT
Start: 2017-10-28 | End: 2017-10-30 | Stop reason: HOSPADM

## 2017-10-27 RX ORDER — TORSEMIDE 20 MG/1
20 TABLET ORAL EVERY MORNING
Status: DISCONTINUED | OUTPATIENT
Start: 2017-10-28 | End: 2017-10-30 | Stop reason: HOSPADM

## 2017-10-27 RX ORDER — ALBUTEROL SULFATE 2.5 MG/.5ML
2.5 SOLUTION RESPIRATORY (INHALATION) EVERY 6 HOURS
Status: DISCONTINUED | OUTPATIENT
Start: 2017-10-27 | End: 2017-10-30 | Stop reason: HOSPADM

## 2017-10-27 RX ORDER — ACETAMINOPHEN 325 MG/1
650 TABLET ORAL EVERY 6 HOURS PRN
Status: DISCONTINUED | OUTPATIENT
Start: 2017-10-27 | End: 2017-10-30 | Stop reason: HOSPADM

## 2017-10-27 RX ORDER — ONDANSETRON 2 MG/ML
4 INJECTION INTRAMUSCULAR; INTRAVENOUS EVERY 8 HOURS PRN
Status: DISCONTINUED | OUTPATIENT
Start: 2017-10-27 | End: 2017-10-30 | Stop reason: HOSPADM

## 2017-10-27 RX ORDER — AMOXICILLIN 250 MG
1 CAPSULE ORAL DAILY PRN
Status: DISCONTINUED | OUTPATIENT
Start: 2017-10-27 | End: 2017-10-30 | Stop reason: HOSPADM

## 2017-10-27 RX ORDER — WARFARIN SODIUM 5 MG/1
5 TABLET ORAL DAILY
Status: DISCONTINUED | OUTPATIENT
Start: 2017-10-27 | End: 2017-10-28

## 2017-10-27 RX ORDER — LISINOPRIL 10 MG/1
20 TABLET ORAL DAILY
Status: DISCONTINUED | OUTPATIENT
Start: 2017-10-28 | End: 2017-10-30 | Stop reason: HOSPADM

## 2017-10-27 RX ORDER — CLINDAMYCIN PHOSPHATE 900 MG/50ML
900 INJECTION, SOLUTION INTRAVENOUS
Status: COMPLETED | OUTPATIENT
Start: 2017-10-27 | End: 2017-10-27

## 2017-10-27 RX ADMIN — WARFARIN SODIUM 5 MG: 5 TABLET ORAL at 07:10

## 2017-10-27 RX ADMIN — ALBUTEROL SULFATE 2.5 MG: 2.5 SOLUTION RESPIRATORY (INHALATION) at 09:10

## 2017-10-27 RX ADMIN — CLINDAMYCIN IN 5 PERCENT DEXTROSE 900 MG: 18 INJECTION, SOLUTION INTRAVENOUS at 04:10

## 2017-10-27 NOTE — ED PROVIDER NOTES
Encounter Date: 10/27/2017    SCRIBE #1 NOTE: Delilah LOGAN, am scribing for, and in the presence of, Dr. Harvey.       History     Chief Complaint   Patient presents with    Cellulitis     worsening of currently tx'd cellulitis (failing course of antibiotics)       10/27/2017 4:01 PM     Chief complaint: Cellulitis of Left Thigh      Nicole Lala is a 85 y.o. Female who presents to the ED with an onset of worsening cellulitis of the left thigh x a few days with associated symptom of erythema. Pt has been on abx for 2 days without improvement. Pt was being seen by wound care specialist Dr. Hernandez. Her PCP is Dr. Brandon Moncada. Allergic to penicillin (urticaria). Denies fever. PMHx includes HTN, CHF, A-fib, cellulitis and abscess of LE, lymphedema, CKD. No pertinent surgical history.        The history is provided by the patient and the spouse.     Review of patient's allergies indicates:   Allergen Reactions    Penicillins Hives     Other reaction(s): Hives     Past Medical History:   Diagnosis Date    A-fib     Acute CHF 9/25/2013    Anticoagulant long-term use     Arthritis     Blood transfusion     Branch retinal vein occlusion of right eye     Cellulitis and abscess of lower extremity     Cerebral aneurysm     S/p repair    CKD (chronic kidney disease)     Followed by Dr. Rey Muhammad    Elevated serum creatinine     HTN (hypertension)     Lymphedema     Macular degeneration - Right Eye 1/31/2013    Nuclear sclerosis - Both Eyes 1/31/2013    Done OU    Squamous cell carcinoma excised 11/16/16    R forearm     Past Surgical History:   Procedure Laterality Date    ABDOMINAL SURGERY      APPENDECTOMY      APPENDECTOMY      CATARACT EXTRACTION W/  INTRAOCULAR LENS IMPLANT Right 04/25/2017    Rupali    CATARACT EXTRACTION W/  INTRAOCULAR LENS IMPLANT Left 06/13/2017    Rupali    CEREBRAL ANEURYSM REPAIR  10/16/1994    RIGHT FRONTOTEMPORAL CRANIOTOMY WITH LIPPING OF SUPRACLINOID  CAROTID ARTERY ANEURYSM      CEREBRAL ANEURYSM REPAIR  10/27/1994    RIGHT FRONTOTEMPORAL CRANIOTOMY WITH CLIPPING OF RIGHT MIDDLE CEREBRAL ARTERY ANEURYSM     COMPLICATED TOTAL HIP PROSTHESIS AND METHYLMETHACRALATE REMOVAL W/ SPACER INSERTION  8/29/2013    left    EYE SURGERY      Focal laser      OD    ADOLFO FILTER PLACEMENT  3/22/2010    HIP FRACTURE SURGERY  11/2009    left    JOINT REPLACEMENT Left     hip    Patent PI      Both Eye     SKIN BIOPSY Right     foream    TONSILLECTOMY      TOTAL ABDOMINAL HYSTERECTOMY      TOTAL HIP ARTHROPLASTY  3/19/2010    left    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 father    Aneurysm       maternal aunt-Brain    Glaucoma Daughter      Narrow Angle Glaucoma    Hypertension      Amblyopia Neg Hx     Blindness Neg Hx     Cataracts Neg Hx     Macular degeneration Neg Hx     Retinal detachment Neg Hx     Strabismus Neg Hx     Anesthesia problems Neg Hx     Malignant hypertension Neg Hx     Hypotension Neg Hx     Malignant hyperthermia Neg Hx     Pseudochol deficiency Neg Hx     Cancer Neg Hx     Thyroid disease Neg Hx     Melanoma Neg Hx     Psoriasis Neg Hx     Lupus Neg Hx      Social History   Substance Use Topics    Smoking status: Former Smoker     Quit date: 11/7/1992    Smokeless tobacco: Never Used    Alcohol use Yes      Comment: socially     Review of Systems   Constitutional: Negative for fever.   Respiratory: Negative for shortness of breath.    Cardiovascular: Negative for chest pain.   Gastrointestinal: Negative for nausea.   Musculoskeletal: Negative for back pain.   Skin: Positive for color change.        Positive for Cellulitis of Left Thigh.   Neurological: Negative for weakness.       Physical Exam     Initial Vitals [10/27/17 1516]   BP Pulse Resp Temp SpO2   (!) 143/63 87 18 97.9  °F (36.6 °C) (!) 92 %      MAP       89.67         Physical Exam    Nursing note and vitals reviewed.  Constitutional: She appears well-developed and well-nourished.   HENT:   Head: Normocephalic and atraumatic.   Eyes: EOM are normal.   Neck: Normal range of motion. Neck supple.   Cardiovascular: Normal rate, regular rhythm and normal heart sounds. Exam reveals no gallop and no friction rub.    No murmur heard.  Pulmonary/Chest: Breath sounds normal. No respiratory distress. She has no wheezes. She has no rhonchi. She has no rales.   Abdominal: Soft. There is no tenderness.   Musculoskeletal: Normal range of motion.   Neurological: She is alert and oriented to person, place, and time.   Skin:   Bilateral lymphedema. Cellulitis of left medial leg from upper calf to lower thigh.   Psychiatric: She has a normal mood and affect. Her behavior is normal. Judgment and thought content normal.         ED Course   Procedures  Labs Reviewed   CBC W/ AUTO DIFFERENTIAL - Abnormal; Notable for the following:        Result Value    RDW 18.5 (*)     MPV 8.8 (*)     Lymph # 0.6 (*)     Gran% 86.1 (*)     Lymph% 8.0 (*)     All other components within normal limits   COMPREHENSIVE METABOLIC PANEL - Abnormal; Notable for the following:     Glucose 119 (*)     BUN, Bld 49 (*)     Creatinine 1.7 (*)     Albumin 3.0 (*)     eGFR if  31 (*)     eGFR if non  27 (*)     All other components within normal limits             Medical Decision Making:   History:   Old Medical Records: I decided to obtain old medical records.  Clinical Tests:   Lab Tests: Ordered and Reviewed            Scribe Attestation:   Scribe #1: I performed the above scribed service and the documentation accurately describes the services I performed. I attest to the accuracy of the note.    I, Dr. Harvey, personally performed the services described in this documentation. All medical record entries made by the scribe were at my direction  and in my presence.  I have reviewed the chart and agree that the record reflects my personal performance and is accurate and complete.5:08 PM 10/27/2017            ED Course as of Oct 27 1626   Fri Oct 27, 2017   1619 Case discussed with Dr. Dong of hospital medicine for admission.  No sign of sepsis.  Failing outpatient doxycycline.  [EF]      ED Course User Index  [EF] Caesar Harvey MD     Clinical Impression:     1. Left leg cellulitis          Disposition:   Disposition: Admitted       85-year-old female presents to the emergency room for cellulitis failing outpatient therapy.  No sign of sepsis.  Patient will be given IV antibiotics in the ER and admitted to hospital medicine.                 Caesar Harvey MD  10/27/17 3728

## 2017-10-27 NOTE — NURSING
Pt was transferred from ED via wheelchair, pt is aaox3, pt has spouse at bedside. VSs and noted, pt is oriented to room call light within reach bed in lowest nad noted will cont

## 2017-10-28 LAB
INR PPP: 2.5
PROTHROMBIN TIME: 25.2 SEC

## 2017-10-28 PROCEDURE — 85610 PROTHROMBIN TIME: CPT

## 2017-10-28 PROCEDURE — 25000003 PHARM REV CODE 250: Performed by: EMERGENCY MEDICINE

## 2017-10-28 PROCEDURE — 12000002 HC ACUTE/MED SURGE SEMI-PRIVATE ROOM

## 2017-10-28 PROCEDURE — 25000003 PHARM REV CODE 250: Performed by: INTERNAL MEDICINE

## 2017-10-28 PROCEDURE — 25000242 PHARM REV CODE 250 ALT 637 W/ HCPCS: Performed by: NURSE PRACTITIONER

## 2017-10-28 PROCEDURE — 94640 AIRWAY INHALATION TREATMENT: CPT

## 2017-10-28 PROCEDURE — 25000003 PHARM REV CODE 250: Performed by: HOSPITALIST

## 2017-10-28 PROCEDURE — 94761 N-INVAS EAR/PLS OXIMETRY MLT: CPT

## 2017-10-28 PROCEDURE — S0077 INJECTION, CLINDAMYCIN PHOSP: HCPCS | Performed by: HOSPITALIST

## 2017-10-28 PROCEDURE — 27000221 HC OXYGEN, UP TO 24 HOURS

## 2017-10-28 PROCEDURE — 36415 COLL VENOUS BLD VENIPUNCTURE: CPT

## 2017-10-28 RX ORDER — SENNOSIDES 8.6 MG/1
8.6 TABLET ORAL DAILY PRN
Status: DISCONTINUED | OUTPATIENT
Start: 2017-10-28 | End: 2017-10-30 | Stop reason: HOSPADM

## 2017-10-28 RX ORDER — WARFARIN SODIUM 5 MG/1
5 TABLET ORAL EVERY OTHER DAY
Status: DISCONTINUED | OUTPATIENT
Start: 2017-10-29 | End: 2017-10-29

## 2017-10-28 RX ORDER — WARFARIN 2.5 MG/1
2.5 TABLET ORAL EVERY OTHER DAY
Status: DISCONTINUED | OUTPATIENT
Start: 2017-10-28 | End: 2017-10-30

## 2017-10-28 RX ADMIN — ACETAMINOPHEN 650 MG: 325 TABLET, FILM COATED ORAL at 08:10

## 2017-10-28 RX ADMIN — CLINDAMYCIN IN 5 PERCENT DEXTROSE 600 MG: 12 INJECTION, SOLUTION INTRAVENOUS at 04:10

## 2017-10-28 RX ADMIN — TORSEMIDE 20 MG: 20 TABLET ORAL at 07:10

## 2017-10-28 RX ADMIN — ALBUTEROL SULFATE 2.5 MG: 2.5 SOLUTION RESPIRATORY (INHALATION) at 08:10

## 2017-10-28 RX ADMIN — DILTIAZEM HYDROCHLORIDE 300 MG: 120 CAPSULE, EXTENDED RELEASE ORAL at 08:10

## 2017-10-28 RX ADMIN — CLINDAMYCIN IN 5 PERCENT DEXTROSE 600 MG: 12 INJECTION, SOLUTION INTRAVENOUS at 12:10

## 2017-10-28 RX ADMIN — WARFARIN SODIUM 2.5 MG: 2.5 TABLET ORAL at 08:10

## 2017-10-28 RX ADMIN — CLINDAMYCIN IN 5 PERCENT DEXTROSE 600 MG: 12 INJECTION, SOLUTION INTRAVENOUS at 07:10

## 2017-10-28 RX ADMIN — LISINOPRIL 20 MG: 10 TABLET ORAL at 08:10

## 2017-10-28 RX ADMIN — ALBUTEROL SULFATE 2.5 MG: 2.5 SOLUTION RESPIRATORY (INHALATION) at 02:10

## 2017-10-28 RX ADMIN — ALBUTEROL SULFATE 2.5 MG: 2.5 SOLUTION RESPIRATORY (INHALATION) at 01:10

## 2017-10-28 RX ADMIN — ALBUTEROL SULFATE 2.5 MG: 2.5 SOLUTION RESPIRATORY (INHALATION) at 07:10

## 2017-10-28 NOTE — PROGRESS NOTES
[facility] Medicine  Progress Note    Patient Name: Nicole Lala  MRN: [id number]  Patient Class: IP- Inpatient   Admission Date: 10/27/2017  Length of Stay: 1 days  Attending Physician: Dalia Cardoso MD  Primary Care Provider: Brandon Gray MD        Subjective:     Principal Problem:Left leg cellulitis    HPI:  Ms. Lala has been going to wound care for her chronic bilateral leg lymphedema.  She was diagnosed with left leg cellulitis by physician at the clinic and was prescribed doxycycline.  She's taken about two days' worth of it but has not noticed any improvement.  Therefore, she presented to our ER.  Redness has been present above the knee; associated with pain, blistering, and weeping of clear fluid.  No fevers.    Hospital Course:  Pt was started on IV clindamycin with some improvement of her cellulitis.     Interval Hisotry: Still having some drainage from left thigh area, denies any fevers or chills. Feels that redness is improving.     Review of patient's allergies indicates:   Allergen Reactions    Penicillins Hives     Other reaction(s): Hives       No current facility-administered medications on file prior to encounter.      Current Outpatient Prescriptions on File Prior to Encounter   Medication Sig    diltiaZEM (CARDIZEM CD) 300 MG 24 hr capsule Take 1 capsule (300 mg total) by mouth once daily.    lisinopril (PRINIVIL,ZESTRIL) 20 MG tablet TAKE 1 TABLET EVERY DAY    torsemide (DEMADEX) 20 MG Tab TAKE 1 TABLET EVERY MORNING (Patient taking differently: TAKE 2 TABLETS EVERY MORNING)    warfarin (COUMADIN) 2.5 MG tablet Take 1-2 tablets (2.5-5 mg total) by mouth Daily. (Patient taking differently: Take 2.5 mg by mouth every other day. Takes as directed by coumadin clInic / Th, Sat, Mon)    warfarin (COUMADIN) 5 MG tablet TAKE 1 TABLET EVERY EVENING AS INSTRUCTED BY COUMADIN CLINIC (Patient taking differently: TAKE 1 TABLET EVERY  OTHER EVENING AS  INSTRUCTED BY COUMADIN CLINIC Wed, Fri, Sun, Tues)    albuterol-ipratropium 2.5mg-0.5mg/3mL (DUO-NEB) 0.5 mg-3 mg(2.5 mg base)/3 mL nebulizer solution Take 3 mLs by nebulization every 4 (four) hours as needed for Wheezing. Rescue     Family History     Problem Relation (Age of Onset)    Aneurysm Mother,     Coronary artery disease     Glaucoma Daughter    Hypertension     Stroke         Social History Main Topics    Smoking status: Former Smoker     Quit date: 11/7/1992    Smokeless tobacco: Never Used    Alcohol use Yes      Comment: socially    Drug use: No    Sexual activity: No     Review of Systems   Constitutional: Negative for chills and fever.   Respiratory: Positive for shortness of breath (exertional).    Cardiovascular: Positive for leg swelling. Negative for chest pain.   Gastrointestinal: Negative for abdominal pain, nausea and vomiting.   Hematological: Bruises/bleeds easily (warfarin).   All other systems reviewed and are negative.    Objective:     Vital Signs (Most Recent):  Temp: 97.8 °F (36.6 °C) (10/28/17 0753)  Pulse: 83 (10/28/17 1329)  Resp: 16 (10/28/17 1329)  BP: 127/67 (10/28/17 0753)  SpO2: 99 % (10/28/17 1329) Vital Signs (24h Range):  Temp:  [97.6 °F (36.4 °C)-98 °F (36.7 °C)] 97.8 °F (36.6 °C)  Pulse:  [] 83  Resp:  [14-22] 16  SpO2:  [89 %-99 %] 99 %  BP: (114-143)/(60-67) 127/67     Weight: 132.5 kg (292 lb)  Body mass index is 45.73 kg/m².    Physical Exam   Constitutional: She is oriented to person, place, and time. She appears well-developed and well-nourished. No distress.   HENT:   Head: Normocephalic and atraumatic.   Right Ear: External ear normal.   Left Ear: External ear normal.   Eyes: EOM are normal. Right eye exhibits no discharge. Left eye exhibits no discharge. No scleral icterus.   Neck: Normal range of motion. Neck supple. No JVD present.   Cardiovascular: Normal rate.  An irregular rhythm present.   No murmur heard.  Pulmonary/Chest: No respiratory  distress. She has decreased breath sounds. She has no rhonchi. She has no rales.   Abdominal: Soft. Bowel sounds are normal. She exhibits no distension. There is no tenderness.   Musculoskeletal:        Legs:  Both legs below knees are wrapped for lymphedema treatment   Neurological: She is alert and oriented to person, place, and time. No cranial nerve deficit.   Psychiatric: She has a normal mood and affect. Her behavior is normal.        Significant Labs:   BMP:     Recent Labs  Lab 10/27/17  1540   *      K 4.1      CO2 29   BUN 49*   CREATININE 1.7*   CALCIUM 9.7     CBC:     Recent Labs  Lab 10/27/17  1540   WBC 7.60   HGB 12.6   HCT 38.7          Significant Imaging: none    Assessment/Plan:      * Left leg cellulitis    Failed outpatient treatment.  Antibiotics     Start     Stop Route Frequency Ordered    10/28/17 0030  clindamycin 600 MG/50 ML D5W 600 mg/50 mL IVPB 600 mg      -- IV Every 8 hours (non-standard times) 10/27/17 1748        Will monitor the area of erythema for improvement. Wound care, will continue IV abx for now, if improves may switch to PO clindamycin          Chronic respiratory failure with hypoxia    Monitor o2 sats and use oxygen as needed.        Chronic kidney disease, stage IV (severe)    Creatinine is at baseline.  Monitor cr level q48 hours.          Morbid obesity with BMI of 40.0-44.9, adult    Body mass index is 45.73 kg/m². Morbid obesity complicates all aspects of disease management from diagnostic modalities to treatment. Weight loss encouraged and health benefits explained to patient.            Permanent atrial fibrillation    Rate controlled.  No issues.          Essential hypertension    Chronic, controlled.  Monitor BP closely.  Will continue BP medications as needed for sustained BP control.            Lymphedema    Continue wraps.  Goes to outpt wound care.  Wound care consulted in house  PT/OT as pt does not move around much          S/P  insertion of IVC (inferior vena caval) filter 3/22/10              Personal history of DVT (deep vein thrombosis)    Continue warfarin.  Monitor INR while getting antibiotics.            VTE Risk Mitigation         Ordered     warfarin (COUMADIN) tablet 5 mg  Daily     Route:  Oral        10/27/17 1748     Low Risk of VTE  Once      10/27/17 1748              Dalia Cardoso MD  Department of Hospital Medicine   [facility]

## 2017-10-28 NOTE — PLAN OF CARE
CM met with patient at bedside, instructed on discharge planning folder and left folder in room, verified address and insurance information. Patient lives with spouse, Robert Lala, who has POA. Patient does not have a living will and does not want one. Patient has standard walker, hospital bed, shower chair, nebulizer, BSC and oxygen( [facility]) that she uses at home. Patient is current with [facility]  And would like to resume services at discharge. Patient choice disclosure signed and placed on chart. Dr. Gray is PCP and [facility] is [facility].      10/28/17 1122   Discharge Assessment   Assessment Type Discharge Planning Assessment   Confirmed/corrected address and phone number on facesheet? Yes   Assessment information obtained from? Patient   Communicated expected length of stay with patient/caregiver yes   Prior to hospitilization cognitive status: Alert/Oriented   Prior to hospitalization functional status: Assistive Equipment   Current cognitive status: Alert/Oriented   Current Functional Status: Assistive Equipment   Lives With spouse   Able to Return to Prior Arrangements yes   Is patient able to care for self after discharge? Yes   Patient's perception of discharge disposition home health   Readmission Within The Last 30 Days no previous admission in last 30 days   Patient currently being followed by outpatient case management? No   Patient currently receives any other outside agency services? No   Equipment Currently Used at Home 3-in-1 commode;shower chair;nebulizer;oxygen;wheelchair;hospital bed;walker, standard   Do you have any problems affording any of your prescribed medications? No   Is the patient taking medications as prescribed? yes   Does the patient have transportation home? Yes   Transportation Available family or friend will provide   Discharge Plan A Home Health   Patient/Family In Agreement With Plan yes

## 2017-10-28 NOTE — HPI
Ms. Lala has been going to wound care for her chronic bilateral leg lymphedema.  She was diagnosed with left leg cellulitis by physician at the clinic and was prescribed doxycycline.  She's taken about two days' worth of it but has not noticed any improvement.  Therefore, she presented to our ER.  Redness has been present above the knee; associated with pain, blistering, and weeping of clear fluid.  No fevers.

## 2017-10-28 NOTE — PLAN OF CARE
Problem: Patient Care Overview  Goal: Plan of Care Review  Patient's pain controlled with PRN tylenol, IV antibiotics given per orders, patient monitored for s/s of CHF, no IV fluids infusing, VSS, bed in the lowest and locked position, side rails up x2, call light in reach, will continue to monitor.

## 2017-10-28 NOTE — SUBJECTIVE & OBJECTIVE
Past Medical History:   Diagnosis Date    A-fib     Acute CHF 9/25/2013    Anticoagulant long-term use     Arthritis     Blood transfusion     Branch retinal vein occlusion of right eye     Cellulitis and abscess of lower extremity     Cerebral aneurysm     S/p repair    CKD (chronic kidney disease)     Followed by Dr. Rey Muhammad    Elevated serum creatinine     HTN (hypertension)     Lymphedema     Macular degeneration - Right Eye 1/31/2013    Nuclear sclerosis - Both Eyes 1/31/2013    Done OU    Squamous cell carcinoma excised 11/16/16    R forearm       Past Surgical History:   Procedure Laterality Date    ABDOMINAL SURGERY      APPENDECTOMY      APPENDECTOMY      CATARACT EXTRACTION W/  INTRAOCULAR LENS IMPLANT Right 04/25/2017    [facility]    CATARACT EXTRACTION W/  INTRAOCULAR LENS IMPLANT Left 06/13/2017    [facility]    CEREBRAL ANEURYSM REPAIR  10/16/1994    RIGHT FRONTOTEMPORAL CRANIOTOMY WITH LIPPING OF SUPRACLINOID CAROTID ARTERY ANEURYSM      CEREBRAL ANEURYSM REPAIR  10/27/1994    RIGHT FRONTOTEMPORAL CRANIOTOMY WITH CLIPPING OF RIGHT MIDDLE CEREBRAL ARTERY ANEURYSM     COMPLICATED TOTAL HIP PROSTHESIS AND METHYLMETHACRALATE REMOVAL W/ SPACER INSERTION  8/29/2013    left    EYE SURGERY      Focal laser      OD    ADOLFO FILTER PLACEMENT  3/22/2010    HIP FRACTURE SURGERY  11/2009    left    JOINT REPLACEMENT Left     hip    Patent PI      Both Eye     SKIN BIOPSY Right     foream    TONSILLECTOMY      TOTAL ABDOMINAL HYSTERECTOMY      TOTAL HIP ARTHROPLASTY  3/19/2010    left    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      Current Outpatient Prescriptions on File Prior to Encounter   Medication Sig    diltiaZEM (CARDIZEM CD) 300 MG 24 hr capsule Take 1 capsule (300  mg total) by mouth once daily.    lisinopril (PRINIVIL,ZESTRIL) 20 MG tablet TAKE 1 TABLET EVERY DAY    torsemide (DEMADEX) 20 MG Tab TAKE 1 TABLET EVERY MORNING (Patient taking differently: TAKE 2 TABLETS EVERY MORNING)    warfarin (COUMADIN) 2.5 MG tablet Take 1-2 tablets (2.5-5 mg total) by mouth Daily. (Patient taking differently: Take 2.5 mg by mouth every other day. Takes as directed by coumadin clInic / Th, Sat, Mon)    warfarin (COUMADIN) 5 MG tablet TAKE 1 TABLET EVERY EVENING AS INSTRUCTED BY COUMADIN CLINIC (Patient taking differently: TAKE 1 TABLET EVERY  OTHER EVENING AS INSTRUCTED BY COUMADIN CLINIC Wed, Fri, Sun, Tues)    albuterol-ipratropium 2.5mg-0.5mg/3mL (DUO-NEB) 0.5 mg-3 mg(2.5 mg base)/3 mL nebulizer solution Take 3 mLs by nebulization every 4 (four) hours as needed for Wheezing. Rescue     Family History     Problem Relation (Age of Onset)    Aneurysm Mother,     Coronary artery disease     Glaucoma Daughter    Hypertension     Stroke         Social History Main Topics    Smoking status: Former Smoker     Quit date: 11/7/1992    Smokeless tobacco: Never Used    Alcohol use Yes      Comment: socially    Drug use: No    Sexual activity: No     Review of Systems   Constitutional: Negative for chills and fever.   Respiratory: Positive for shortness of breath (exertional).    Cardiovascular: Positive for leg swelling. Negative for chest pain.   Gastrointestinal: Negative for abdominal pain, nausea and vomiting.   Hematological: Bruises/bleeds easily (warfarin).   All other systems reviewed and are negative.    Objective:     Vital Signs (Most Recent):  Temp: 97.7 °F (36.5 °C) (10/27/17 1932)  Pulse: 89 (10/27/17 1932)  Resp: (!) 22 (10/27/17 1932)  BP: 114/67 (10/27/17 1932)  SpO2: 95 % (10/27/17 2006) Vital Signs (24h Range):  Temp:  [97.7 °F (36.5 °C)-97.9 °F (36.6 °C)] 97.7 °F (36.5 °C)  Pulse:  [87-89] 89  Resp:  [18-22] 22  SpO2:  [89 %-95 %] 95 %  BP: (114-143)/(63-67) 114/67      Weight: 132.5 kg (292 lb)  Body mass index is 45.73 kg/m².    Physical Exam   Constitutional: She is oriented to person, place, and time. She appears well-developed and well-nourished. No distress.   HENT:   Head: Normocephalic and atraumatic.   Right Ear: External ear normal.   Left Ear: External ear normal.   Eyes: EOM are normal. Right eye exhibits no discharge. Left eye exhibits no discharge. No scleral icterus.   Neck: Normal range of motion. Neck supple. No JVD present.   Cardiovascular: Normal rate.  An irregular rhythm present.   No murmur heard.  Pulmonary/Chest: No respiratory distress. She has decreased breath sounds. She has no rhonchi. She has no rales.   Abdominal: Soft. Bowel sounds are normal. She exhibits no distension. There is no tenderness.   Musculoskeletal:        Legs:  Both legs below knees are wrapped for lymphedema treatment   Neurological: She is alert and oriented to person, place, and time. No cranial nerve deficit.   Psychiatric: She has a normal mood and affect. Her behavior is normal.        Significant Labs:   BMP:   Recent Labs  Lab 10/27/17  1540   *      K 4.1      CO2 29   BUN 49*   CREATININE 1.7*   CALCIUM 9.7     CBC:   Recent Labs  Lab 10/27/17  1540   WBC 7.60   HGB 12.6   HCT 38.7          Significant Imaging: none

## 2017-10-28 NOTE — HOSPITAL COURSE
Patient is an 85-year-old female who was admitted to the hospital with chronic lymphedema and a secondary left leg cellulitis which has been unresponsive to oral antibiotics.  The patient was taken onto the hospitalist service and was started on IV antibiotics for cellulitis and underwent a total of 5 days of IV antibiotics with clindamycin with resulting clinical improvement of the patient's cellulitis.  Patient and continue to have severe lymphedema, and lymphedema specialist was consulted while patient was in the hospital and therapy was delivered prior to discharge.  Patient was discussed extensively about the care of her lymphedema as well as managing her chronic lymphedema for which she sees a specialist in the clinic 2 times a week.  Patient's creatinine did increase throughout hospitalization, but this was likely due to overdiuresis in the face of chronic lymphedema.  Patient was instructed to hold her diuretic medications for the next 5 days and a BMP was ordered from home health to recheck the patient's kidney function.  Patient was afebrile feeling well and her baseline level of symptomatology at time of discharge.  Patient be discharged with home health PT OT and skilled nurse.

## 2017-10-28 NOTE — PLAN OF CARE
Problem: Patient Care Overview  Goal: Plan of Care Review  Outcome: Ongoing (interventions implemented as appropriate)  Plan of care is ongoing. Pt is alert and oriented. Verbalizes understanding to call for assist out of bed as needed. Pain is monitored every 1 to 2 hours with rounds. o2 continued. Pt's respiratory status being assessed with rounds. O2 sat 98%. Pt resting comfortably with resp 20/minute  Will continue to monitor

## 2017-10-28 NOTE — H&P
[facility] Medicine  History & Physical    Patient Name: Nicole Lala  MRN: [id number]  Admission Date: 10/27/2017  Attending Physician: Artie Dong MD   Primary Care Provider: Brandon Gray MD         Patient information was obtained from patient, past medical records and ER records.     Subjective:     Principal Problem:Left leg cellulitis    Chief Complaint:   Chief Complaint   Patient presents with    Cellulitis     worsening of currently tx'd cellulitis (failing course of antibiotics)        HPI: Ms. Lala has been going to wound care for her chronic bilateral leg lymphedema.  She was diagnosed with left leg cellulitis by physician at the clinic and was prescribed doxycycline.  She's taken about two days' worth of it but has not noticed any improvement.  Therefore, she presented to our ER.  Redness has been present above the knee; associated with pain, blistering, and weeping of clear fluid.  No fevers.    Past Medical History:   Diagnosis Date    A-fib     Acute CHF 9/25/2013    Anticoagulant long-term use     Arthritis     Blood transfusion     Branch retinal vein occlusion of right eye     Cellulitis and abscess of lower extremity     Cerebral aneurysm     S/p repair    CKD (chronic kidney disease)     Followed by Dr. Rey Muhammad    Elevated serum creatinine     HTN (hypertension)     Lymphedema     Macular degeneration - Right Eye 1/31/2013    Nuclear sclerosis - Both Eyes 1/31/2013    Done OU    Squamous cell carcinoma excised 11/16/16    R forearm       Past Surgical History:   Procedure Laterality Date    ABDOMINAL SURGERY      APPENDECTOMY      APPENDECTOMY      CATARACT EXTRACTION W/  INTRAOCULAR LENS IMPLANT Right 04/25/2017    [facility]    CATARACT EXTRACTION W/  INTRAOCULAR LENS IMPLANT Left 06/13/2017    [facility]    CEREBRAL ANEURYSM REPAIR  10/16/1994    RIGHT FRONTOTEMPORAL CRANIOTOMY WITH LIPPING OF SUPRACLINOID CAROTID ARTERY  ANEURYSM      CEREBRAL ANEURYSM REPAIR  10/27/1994    RIGHT FRONTOTEMPORAL CRANIOTOMY WITH CLIPPING OF RIGHT MIDDLE CEREBRAL ARTERY ANEURYSM     COMPLICATED TOTAL HIP PROSTHESIS AND METHYLMETHACRALATE REMOVAL W/ SPACER INSERTION  8/29/2013    left    EYE SURGERY      Focal laser      OD    ADOLFO FILTER PLACEMENT  3/22/2010    HIP FRACTURE SURGERY  11/2009    left    JOINT REPLACEMENT Left     hip    Patent PI      Both Eye     SKIN BIOPSY Right     foream    TONSILLECTOMY      TOTAL ABDOMINAL HYSTERECTOMY      TOTAL HIP ARTHROPLASTY  3/19/2010    left    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      Current Outpatient Prescriptions on File Prior to Encounter   Medication Sig    diltiaZEM (CARDIZEM CD) 300 MG 24 hr capsule Take 1 capsule (300 mg total) by mouth once daily.    lisinopril (PRINIVIL,ZESTRIL) 20 MG tablet TAKE 1 TABLET EVERY DAY    torsemide (DEMADEX) 20 MG Tab TAKE 1 TABLET EVERY MORNING (Patient taking differently: TAKE 2 TABLETS EVERY MORNING)    warfarin (COUMADIN) 2.5 MG tablet Take 1-2 tablets (2.5-5 mg total) by mouth Daily. (Patient taking differently: Take 2.5 mg by mouth every other day. Takes as directed by coumadin clInic / Th, Sat, Mon)    warfarin (COUMADIN) 5 MG tablet TAKE 1 TABLET EVERY EVENING AS INSTRUCTED BY COUMADIN CLINIC (Patient taking differently: TAKE 1 TABLET EVERY  OTHER EVENING AS INSTRUCTED BY COUMADIN CLINIC Wed, Fri, Sun, Tues)    albuterol-ipratropium 2.5mg-0.5mg/3mL (DUO-NEB) 0.5 mg-3 mg(2.5 mg base)/3 mL nebulizer solution Take 3 mLs by nebulization every 4 (four) hours as needed for Wheezing. Rescue     Family History     Problem Relation (Age of Onset)    Aneurysm Mother,     Coronary artery disease     Glaucoma Daughter    Hypertension      Stroke         Social History Main Topics    Smoking status: Former Smoker     Quit date: 11/7/1992    Smokeless tobacco: Never Used    Alcohol use Yes      Comment: socially    Drug use: No    Sexual activity: No     Review of Systems   Constitutional: Negative for chills and fever.   Respiratory: Positive for shortness of breath (exertional).    Cardiovascular: Positive for leg swelling. Negative for chest pain.   Gastrointestinal: Negative for abdominal pain, nausea and vomiting.   Hematological: Bruises/bleeds easily (warfarin).   All other systems reviewed and are negative.    Objective:     Vital Signs (Most Recent):  Temp: 97.7 °F (36.5 °C) (10/27/17 1932)  Pulse: 89 (10/27/17 1932)  Resp: (!) 22 (10/27/17 1932)  BP: 114/67 (10/27/17 1932)  SpO2: 95 % (10/27/17 2006) Vital Signs (24h Range):  Temp:  [97.7 °F (36.5 °C)-97.9 °F (36.6 °C)] 97.7 °F (36.5 °C)  Pulse:  [87-89] 89  Resp:  [18-22] 22  SpO2:  [89 %-95 %] 95 %  BP: (114-143)/(63-67) 114/67     Weight: 132.5 kg (292 lb)  Body mass index is 45.73 kg/m².    Physical Exam   Constitutional: She is oriented to person, place, and time. She appears well-developed and well-nourished. No distress.   HENT:   Head: Normocephalic and atraumatic.   Right Ear: External ear normal.   Left Ear: External ear normal.   Eyes: EOM are normal. Right eye exhibits no discharge. Left eye exhibits no discharge. No scleral icterus.   Neck: Normal range of motion. Neck supple. No JVD present.   Cardiovascular: Normal rate.  An irregular rhythm present.   No murmur heard.  Pulmonary/Chest: No respiratory distress. She has decreased breath sounds. She has no rhonchi. She has no rales.   Abdominal: Soft. Bowel sounds are normal. She exhibits no distension. There is no tenderness.   Musculoskeletal:        Legs:  Both legs below knees are wrapped for lymphedema treatment   Neurological: She is alert and oriented to person, place, and time. No cranial nerve deficit.    Psychiatric: She has a normal mood and affect. Her behavior is normal.        Significant Labs:   BMP:   Recent Labs  Lab 10/27/17  1540   *      K 4.1      CO2 29   BUN 49*   CREATININE 1.7*   CALCIUM 9.7     CBC:   Recent Labs  Lab 10/27/17  1540   WBC 7.60   HGB 12.6   HCT 38.7          Significant Imaging: none    Assessment/Plan:     * Left leg cellulitis    Failed outpatient treatment.  Antibiotics     Start     Stop Route Frequency Ordered    10/28/17 0030  clindamycin 600 MG/50 ML D5W 600 mg/50 mL IVPB 600 mg      -- IV Every 8 hours (non-standard times) 10/27/17 1748        Will monitor the area of erythema for improvement.          Chronic respiratory failure with hypoxia    Monitor o2 sats and use oxygen as needed.        Chronic kidney disease, stage IV (severe)    Creatinine is at baseline.  Monitor cr level q48 hours.          Morbid obesity with BMI of 40.0-44.9, adult    Body mass index is 45.73 kg/m². Morbid obesity complicates all aspects of disease management from diagnostic modalities to treatment. Weight loss encouraged and health benefits explained to patient.            Permanent atrial fibrillation    Rate controlled.  No issues.          Essential hypertension    Chronic, controlled.  Monitor BP closely.  Will continue BP medications as needed for sustained BP control.            Lymphedema    Continue wraps.  Goes to outpt wound care.          Personal history of DVT (deep vein thrombosis)    Continue warfarin.  Monitor INR while getting antibiotics.            VTE Risk Mitigation         Ordered     warfarin (COUMADIN) tablet 5 mg  Daily     Route:  Oral        10/27/17 1748     Low Risk of VTE  Once      10/27/17 1748             Artie Dong MD  Department of Hospital Medicine   [facility]

## 2017-10-28 NOTE — SUBJECTIVE & OBJECTIVE
Interval Hisotry: Still having some drainage from left thigh area, denies any fevers or chills. Feels that redness is improving.     Review of patient's allergies indicates:   Allergen Reactions    Penicillins Hives     Other reaction(s): Hives       No current facility-administered medications on file prior to encounter.      Current Outpatient Prescriptions on File Prior to Encounter   Medication Sig    diltiaZEM (CARDIZEM CD) 300 MG 24 hr capsule Take 1 capsule (300 mg total) by mouth once daily.    lisinopril (PRINIVIL,ZESTRIL) 20 MG tablet TAKE 1 TABLET EVERY DAY    torsemide (DEMADEX) 20 MG Tab TAKE 1 TABLET EVERY MORNING (Patient taking differently: TAKE 2 TABLETS EVERY MORNING)    warfarin (COUMADIN) 2.5 MG tablet Take 1-2 tablets (2.5-5 mg total) by mouth Daily. (Patient taking differently: Take 2.5 mg by mouth every other day. Takes as directed by coumadin clInic / Th, Sat, Mon)    warfarin (COUMADIN) 5 MG tablet TAKE 1 TABLET EVERY EVENING AS INSTRUCTED BY COUMADIN CLINIC (Patient taking differently: TAKE 1 TABLET EVERY  OTHER EVENING AS INSTRUCTED BY COUMADIN CLINIC Wed, Fri, Sun, Tues)    albuterol-ipratropium 2.5mg-0.5mg/3mL (DUO-NEB) 0.5 mg-3 mg(2.5 mg base)/3 mL nebulizer solution Take 3 mLs by nebulization every 4 (four) hours as needed for Wheezing. Rescue     Family History     Problem Relation (Age of Onset)    Aneurysm Mother,     Coronary artery disease     Glaucoma Daughter    Hypertension     Stroke         Social History Main Topics    Smoking status: Former Smoker     Quit date: 11/7/1992    Smokeless tobacco: Never Used    Alcohol use Yes      Comment: socially    Drug use: No    Sexual activity: No     Review of Systems   Constitutional: Negative for chills and fever.   Respiratory: Positive for shortness of breath (exertional).    Cardiovascular: Positive for leg swelling. Negative for chest pain.   Gastrointestinal: Negative for abdominal pain, nausea and vomiting.    Hematological: Bruises/bleeds easily (warfarin).   All other systems reviewed and are negative.    Objective:     Vital Signs (Most Recent):  Temp: 97.8 °F (36.6 °C) (10/28/17 0753)  Pulse: 83 (10/28/17 1329)  Resp: 16 (10/28/17 1329)  BP: 127/67 (10/28/17 0753)  SpO2: 99 % (10/28/17 1329) Vital Signs (24h Range):  Temp:  [97.6 °F (36.4 °C)-98 °F (36.7 °C)] 97.8 °F (36.6 °C)  Pulse:  [] 83  Resp:  [14-22] 16  SpO2:  [89 %-99 %] 99 %  BP: (114-143)/(60-67) 127/67     Weight: 132.5 kg (292 lb)  Body mass index is 45.73 kg/m².    Physical Exam   Constitutional: She is oriented to person, place, and time. She appears well-developed and well-nourished. No distress.   HENT:   Head: Normocephalic and atraumatic.   Right Ear: External ear normal.   Left Ear: External ear normal.   Eyes: EOM are normal. Right eye exhibits no discharge. Left eye exhibits no discharge. No scleral icterus.   Neck: Normal range of motion. Neck supple. No JVD present.   Cardiovascular: Normal rate.  An irregular rhythm present.   No murmur heard.  Pulmonary/Chest: No respiratory distress. She has decreased breath sounds. She has no rhonchi. She has no rales.   Abdominal: Soft. Bowel sounds are normal. She exhibits no distension. There is no tenderness.   Musculoskeletal:        Legs:  Both legs below knees are wrapped for lymphedema treatment   Neurological: She is alert and oriented to person, place, and time. No cranial nerve deficit.   Psychiatric: She has a normal mood and affect. Her behavior is normal.        Significant Labs:   BMP:     Recent Labs  Lab 10/27/17  1540   *      K 4.1      CO2 29   BUN 49*   CREATININE 1.7*   CALCIUM 9.7     CBC:     Recent Labs  Lab 10/27/17  1540   WBC 7.60   HGB 12.6   HCT 38.7          Significant Imaging: none

## 2017-10-28 NOTE — ASSESSMENT & PLAN NOTE
Continue wraps.  Goes to outpt wound care.  Wound care consulted in house  PT/OT as pt does not move around much

## 2017-10-28 NOTE — ASSESSMENT & PLAN NOTE
Failed outpatient treatment.  Antibiotics     Start     Stop Route Frequency Ordered    10/28/17 0030  clindamycin 600 MG/50 ML D5W 600 mg/50 mL IVPB 600 mg      -- IV Every 8 hours (non-standard times) 10/27/17 9894        Will monitor the area of erythema for improvement.

## 2017-10-28 NOTE — CONSULTS
Consult received for Coumadin education. Rd covering remotely. Will f/u with education Monday 10/30.

## 2017-10-28 NOTE — ASSESSMENT & PLAN NOTE
Failed outpatient treatment.  Antibiotics     Start     Stop Route Frequency Ordered    10/28/17 0030  clindamycin 600 MG/50 ML D5W 600 mg/50 mL IVPB 600 mg      -- IV Every 8 hours (non-standard times) 10/27/17 6120        Will monitor the area of erythema for improvement. Wound care, will continue IV abx for now, if improves may switch to PO clindamycin

## 2017-10-28 NOTE — ASSESSMENT & PLAN NOTE
Body mass index is 45.73 kg/m². Morbid obesity complicates all aspects of disease management from diagnostic modalities to treatment. Weight loss encouraged and health benefits explained to patient.

## 2017-10-29 LAB
ANION GAP SERPL CALC-SCNC: 10 MMOL/L
BASOPHILS # BLD AUTO: 0 K/UL
BASOPHILS NFR BLD: 0.5 %
BILIRUB UR QL STRIP: NEGATIVE
BUN SERPL-MCNC: 50 MG/DL
CALCIUM SERPL-MCNC: 9.6 MG/DL
CHLORIDE SERPL-SCNC: 102 MMOL/L
CLARITY UR: CLEAR
CO2 SERPL-SCNC: 30 MMOL/L
COLOR UR: YELLOW
CREAT SERPL-MCNC: 1.9 MG/DL
DIFFERENTIAL METHOD: ABNORMAL
EOSINOPHIL # BLD AUTO: 0.1 K/UL
EOSINOPHIL NFR BLD: 1.3 %
ERYTHROCYTE [DISTWIDTH] IN BLOOD BY AUTOMATED COUNT: 18.1 %
EST. GFR  (AFRICAN AMERICAN): 27 ML/MIN/1.73 M^2
EST. GFR  (NON AFRICAN AMERICAN): 24 ML/MIN/1.73 M^2
GLUCOSE SERPL-MCNC: 119 MG/DL
GLUCOSE UR QL STRIP: NEGATIVE
HCT VFR BLD AUTO: 36.2 %
HGB BLD-MCNC: 11.8 G/DL
HGB UR QL STRIP: NEGATIVE
INR PPP: 3.4
KETONES UR QL STRIP: NEGATIVE
LEUKOCYTE ESTERASE UR QL STRIP: NEGATIVE
LYMPHOCYTES # BLD AUTO: 0.7 K/UL
LYMPHOCYTES NFR BLD: 12.7 %
MCH RBC QN AUTO: 30.7 PG
MCHC RBC AUTO-ENTMCNC: 32.6 G/DL
MCV RBC AUTO: 94 FL
MONOCYTES # BLD AUTO: 0.7 K/UL
MONOCYTES NFR BLD: 12 %
NEUTROPHILS # BLD AUTO: 4.3 K/UL
NEUTROPHILS NFR BLD: 73.5 %
NITRITE UR QL STRIP: NEGATIVE
PH UR STRIP: 6 [PH] (ref 5–8)
PLATELET # BLD AUTO: 183 K/UL
PMV BLD AUTO: 8.7 FL
POTASSIUM SERPL-SCNC: 4.1 MMOL/L
PROT UR QL STRIP: NEGATIVE
PROTHROMBIN TIME: 34.1 SEC
RBC # BLD AUTO: 3.83 M/UL
SODIUM SERPL-SCNC: 142 MMOL/L
SP GR UR STRIP: 1.01 (ref 1–1.03)
URN SPEC COLLECT METH UR: NORMAL
UROBILINOGEN UR STRIP-ACNC: NEGATIVE EU/DL
WBC # BLD AUTO: 5.8 K/UL

## 2017-10-29 PROCEDURE — 80048 BASIC METABOLIC PNL TOTAL CA: CPT

## 2017-10-29 PROCEDURE — 25000242 PHARM REV CODE 250 ALT 637 W/ HCPCS: Performed by: NURSE PRACTITIONER

## 2017-10-29 PROCEDURE — 97161 PT EVAL LOW COMPLEX 20 MIN: CPT

## 2017-10-29 PROCEDURE — 85025 COMPLETE CBC W/AUTO DIFF WBC: CPT

## 2017-10-29 PROCEDURE — 36415 COLL VENOUS BLD VENIPUNCTURE: CPT

## 2017-10-29 PROCEDURE — 27000221 HC OXYGEN, UP TO 24 HOURS

## 2017-10-29 PROCEDURE — 85610 PROTHROMBIN TIME: CPT

## 2017-10-29 PROCEDURE — 94640 AIRWAY INHALATION TREATMENT: CPT

## 2017-10-29 PROCEDURE — S0077 INJECTION, CLINDAMYCIN PHOSP: HCPCS | Performed by: HOSPITALIST

## 2017-10-29 PROCEDURE — 12000002 HC ACUTE/MED SURGE SEMI-PRIVATE ROOM

## 2017-10-29 PROCEDURE — 25000003 PHARM REV CODE 250: Performed by: HOSPITALIST

## 2017-10-29 PROCEDURE — 25000003 PHARM REV CODE 250: Performed by: EMERGENCY MEDICINE

## 2017-10-29 PROCEDURE — G8978 MOBILITY CURRENT STATUS: HCPCS | Mod: CN

## 2017-10-29 PROCEDURE — 81003 URINALYSIS AUTO W/O SCOPE: CPT

## 2017-10-29 PROCEDURE — 94761 N-INVAS EAR/PLS OXIMETRY MLT: CPT

## 2017-10-29 PROCEDURE — 99900035 HC TECH TIME PER 15 MIN (STAT)

## 2017-10-29 PROCEDURE — G8979 MOBILITY GOAL STATUS: HCPCS | Mod: CK

## 2017-10-29 RX ORDER — WARFARIN SODIUM 5 MG/1
5 TABLET ORAL EVERY OTHER DAY
Status: DISCONTINUED | OUTPATIENT
Start: 2017-10-31 | End: 2017-10-30

## 2017-10-29 RX ADMIN — ALBUTEROL SULFATE 2.5 MG: 2.5 SOLUTION RESPIRATORY (INHALATION) at 12:10

## 2017-10-29 RX ADMIN — CLINDAMYCIN IN 5 PERCENT DEXTROSE 600 MG: 12 INJECTION, SOLUTION INTRAVENOUS at 08:10

## 2017-10-29 RX ADMIN — ALBUTEROL SULFATE 2.5 MG: 2.5 SOLUTION RESPIRATORY (INHALATION) at 07:10

## 2017-10-29 RX ADMIN — ALBUTEROL SULFATE 2.5 MG: 2.5 SOLUTION RESPIRATORY (INHALATION) at 01:10

## 2017-10-29 RX ADMIN — TORSEMIDE 20 MG: 20 TABLET ORAL at 06:10

## 2017-10-29 RX ADMIN — ACETAMINOPHEN 650 MG: 325 TABLET, FILM COATED ORAL at 11:10

## 2017-10-29 RX ADMIN — DILTIAZEM HYDROCHLORIDE 300 MG: 120 CAPSULE, EXTENDED RELEASE ORAL at 08:10

## 2017-10-29 RX ADMIN — CLINDAMYCIN IN 5 PERCENT DEXTROSE 600 MG: 12 INJECTION, SOLUTION INTRAVENOUS at 11:10

## 2017-10-29 RX ADMIN — CLINDAMYCIN IN 5 PERCENT DEXTROSE 600 MG: 12 INJECTION, SOLUTION INTRAVENOUS at 01:10

## 2017-10-29 RX ADMIN — LISINOPRIL 20 MG: 10 TABLET ORAL at 08:10

## 2017-10-29 RX ADMIN — CLINDAMYCIN IN 5 PERCENT DEXTROSE 600 MG: 12 INJECTION, SOLUTION INTRAVENOUS at 04:10

## 2017-10-29 NOTE — ASSESSMENT & PLAN NOTE
Continue wraps.  Goes to outpt wound care.  Wound care consulted in house, still not seen  PT/OT as pt does not move around much

## 2017-10-29 NOTE — PROGRESS NOTES
[facility] Hospital Medicine  Progress Note    Patient Name: Nicole Lala  MRN: [id number]  Patient Class: IP- Inpatient   Admission Date: 10/27/2017  Length of Stay: 2 days  Attending Physician: Dalia Cardoso MD  Primary Care Provider: Brandon Gray MD        Subjective:     Principal Problem:Left leg cellulitis    HPI:  Ms. Lala has been going to wound care for her chronic bilateral leg lymphedema.  She was diagnosed with left leg cellulitis by physician at the clinic and was prescribed doxycycline.  She's taken about two days' worth of it but has not noticed any improvement.  Therefore, she presented to our ER.  Redness has been present above the knee; associated with pain, blistering, and weeping of clear fluid.  No fevers.    Hospital Course:  Pt was started on IV clindamycin with some improvement of her cellulitis. Still awaiting wound care.    Interval Hisotry: No overnight events. Still having some drainage from left thigh area, denies any fevers or chills. Continues to see redness is improving.     Review of patient's allergies indicates:   Allergen Reactions    Penicillins Hives     Other reaction(s): Hives       No current facility-administered medications on file prior to encounter.      Current Outpatient Prescriptions on File Prior to Encounter   Medication Sig    diltiaZEM (CARDIZEM CD) 300 MG 24 hr capsule Take 1 capsule (300 mg total) by mouth once daily.    lisinopril (PRINIVIL,ZESTRIL) 20 MG tablet TAKE 1 TABLET EVERY DAY    torsemide (DEMADEX) 20 MG Tab TAKE 1 TABLET EVERY MORNING (Patient taking differently: TAKE 2 TABLETS EVERY MORNING)    warfarin (COUMADIN) 2.5 MG tablet Take 1-2 tablets (2.5-5 mg total) by mouth Daily. (Patient taking differently: Take 2.5 mg by mouth every other day. Takes as directed by coumadin clInic / Th, Sat, Mon)    warfarin (COUMADIN) 5 MG tablet TAKE 1 TABLET EVERY EVENING AS INSTRUCTED BY COUMADIN CLINIC (Patient taking  differently: TAKE 1 TABLET EVERY  OTHER EVENING AS INSTRUCTED BY COUMADIN CLINIC Wed, Fri, Sun, Tues)    albuterol-ipratropium 2.5mg-0.5mg/3mL (DUO-NEB) 0.5 mg-3 mg(2.5 mg base)/3 mL nebulizer solution Take 3 mLs by nebulization every 4 (four) hours as needed for Wheezing. Rescue     Family History     Problem Relation (Age of Onset)    Aneurysm Mother,     Coronary artery disease     Glaucoma Daughter    Hypertension     Stroke         Social History Main Topics    Smoking status: Former Smoker     Quit date: 11/7/1992    Smokeless tobacco: Never Used    Alcohol use Yes      Comment: socially    Drug use: No    Sexual activity: No     Review of Systems   Constitutional: Negative for chills and fever.   Respiratory: Positive for shortness of breath (exertional).    Cardiovascular: Positive for leg swelling. Negative for chest pain.   Gastrointestinal: Negative for abdominal pain, nausea and vomiting.   Hematological: Bruises/bleeds easily (warfarin).   All other systems reviewed and are negative.    Objective:     Vital Signs (Most Recent):  Temp: 97.8 °F (36.6 °C) (10/29/17 1142)  Pulse: 77 (10/29/17 0807)  Resp: (!) 24 (10/29/17 1142)  BP: 115/76 (10/29/17 0742)  SpO2: 98 % (10/29/17 1142) Vital Signs (24h Range):  Temp:  [97.6 °F (36.4 °C)-97.8 °F (36.6 °C)] 97.8 °F (36.6 °C)  Pulse:  [] 77  Resp:  [14-24] 24  SpO2:  [88 %-99 %] 98 %  BP: (110-122)/(72-77) 115/76     Weight: 131.1 kg (289 lb 1.6 oz)  Body mass index is 45.28 kg/m².    Physical Exam   Constitutional: She is oriented to person, place, and time. She appears well-developed and well-nourished. No distress.   HENT:   Head: Normocephalic and atraumatic.   Right Ear: External ear normal.   Left Ear: External ear normal.   Eyes: EOM are normal. Right eye exhibits no discharge. Left eye exhibits no discharge. No scleral icterus.   Neck: Normal range of motion. Neck supple. No JVD present.   Cardiovascular: Normal rate.  An irregular rhythm  present.   No murmur heard.  Pulmonary/Chest: No respiratory distress. She has decreased breath sounds. She has no rhonchi. She has no rales.   Abdominal: Soft. Bowel sounds are normal. She exhibits no distension. There is no tenderness.   Musculoskeletal:        Legs:  Both legs below knees are wrapped for lymphedema treatment   Neurological: She is alert and oriented to person, place, and time. No cranial nerve deficit.   Psychiatric: She has a normal mood and affect. Her behavior is normal.        Significant Labs:   BMP:     Recent Labs  Lab 10/29/17  0441   *      K 4.1      CO2 30*   BUN 50*   CREATININE 1.9*   CALCIUM 9.6     CBC:     Recent Labs  Lab 10/27/17  1540 10/29/17  0441   WBC 7.60 5.80   HGB 12.6 11.8*   HCT 38.7 36.2*    183       Significant Imaging: none    Assessment/Plan:      * Left leg cellulitis    Failed outpatient treatment.  Antibiotics     Start     Stop Route Frequency Ordered    10/28/17 0030  clindamycin 600 MG/50 ML D5W 600 mg/50 mL IVPB 600 mg      -- IV Every 8 hours (non-standard times) 10/27/17 1748        Will monitor the area of erythema for improvement. Wound care, will continue IV abx for now as still having drainage.           Chronic respiratory failure with hypoxia    Monitor o2 sats and use oxygen as needed.        Chronic kidney disease, stage IV (severe)    Creatinine is at baseline.  Monitor cr level q48 hours.          Morbid obesity with BMI of 40.0-44.9, adult    Body mass index is 45.28 kg/m². Morbid obesity complicates all aspects of disease management from diagnostic modalities to treatment. Weight loss encouraged and health benefits explained to patient.            Permanent atrial fibrillation    Rate controlled.  No issues.          Essential hypertension    Chronic, controlled.  Monitor BP closely.  Will continue BP medications as needed for sustained BP control.            Lymphedema    Continue wraps.  Goes to outpt wound  care.  Wound care consulted in house, still not seen  PT/OT as pt does not move around much          S/P insertion of IVC (inferior vena caval) filter 3/22/10              Personal history of DVT (deep vein thrombosis)    Continue warfarin.  Monitor INR while getting antibiotics.            VTE Risk Mitigation         Ordered     warfarin (COUMADIN) tablet 5 mg  Every other day     Route:  Oral        10/28/17 1751     warfarin (COUMADIN) tablet 2.5 mg  Every other day     Route:  Oral        10/28/17 1751     Low Risk of VTE  Once      10/27/17 1748              Dalia Cardoso MD  Department of Hospital Medicine   [facility]

## 2017-10-29 NOTE — PROGRESS NOTES
10/28/17 1914   Patient Assessment/Suction   Level of Consciousness (AVPU) alert   Respiratory Effort Normal;Unlabored   All Lung Fields Breath Sounds diminished;clear   PRE-TX-O2-ETCO2   O2 Device (Oxygen Therapy) room air   SpO2 (!) 93 %   Pulse Oximetry Type Intermittent   Pulse 95   Resp 18   Aerosol Therapy   $ Aerosol Therapy Charges Aerosol Treatment   Respiratory Treatment Status given   SVN/Inhaler Treatment Route mask   Position During Treatment Sitting in chair   Patient Tolerance good   Post-Treatment   Post-treatment Heart Rate (beats/min) 96   Post-treatment Resp Rate (breaths/min) 18   All Fields Breath Sounds unchanged

## 2017-10-29 NOTE — PT/OT/SLP EVAL
Physical Therapy  Evaluation    Nicole Lala   MRN: [id number]   Admitting Diagnosis: Left leg cellulitis    PT Received On: 10/29/17  PT Start Time: 0945     PT Stop Time: 1005    PT Total Time (min): 20 min       Billable Minutes:  20     Diagnosis: Left leg cellulitis  Swelling, stiffness, weakness and decreased endurance B LE    Past Medical History:   Diagnosis Date    A-fib     Acute CHF 9/25/2013    Anticoagulant long-term use     Arthritis     Blood transfusion     Branch retinal vein occlusion of right eye     Cellulitis and abscess of lower extremity     Cerebral aneurysm     S/p repair    CKD (chronic kidney disease)     Followed by Dr. eRy Muhammad    Elevated serum creatinine     HTN (hypertension)     Lymphedema     Macular degeneration - Right Eye 1/31/2013    Nuclear sclerosis - Both Eyes 1/31/2013    Done OU    Squamous cell carcinoma excised 11/16/16    R forearm      Past Surgical History:   Procedure Laterality Date    ABDOMINAL SURGERY      APPENDECTOMY      APPENDECTOMY      CATARACT EXTRACTION W/  INTRAOCULAR LENS IMPLANT Right 04/25/2017    Rupali    CATARACT EXTRACTION W/  INTRAOCULAR LENS IMPLANT Left 06/13/2017    Rupali    CEREBRAL ANEURYSM REPAIR  10/16/1994    RIGHT FRONTOTEMPORAL CRANIOTOMY WITH LIPPING OF SUPRACLINOID CAROTID ARTERY ANEURYSM      CEREBRAL ANEURYSM REPAIR  10/27/1994    RIGHT FRONTOTEMPORAL CRANIOTOMY WITH CLIPPING OF RIGHT MIDDLE CEREBRAL ARTERY ANEURYSM     COMPLICATED TOTAL HIP PROSTHESIS AND METHYLMETHACRALATE REMOVAL W/ SPACER INSERTION  8/29/2013    left    EYE SURGERY      Focal laser      OD    ADOLFO FILTER PLACEMENT  3/22/2010    HIP FRACTURE SURGERY  11/2009    left    JOINT REPLACEMENT Left     hip    Patent PI      Both Eye     SKIN BIOPSY Right     foream    TONSILLECTOMY      TOTAL ABDOMINAL HYSTERECTOMY      TOTAL HIP ARTHROPLASTY  3/19/2010    left    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    RIGHT  "      Referring physician: MD Walker  Date referred to PT: 10/28/17    General Precautions: Standard, fall  Orthopedic Precautions: N/A   Braces:         Do you have any cultural, spiritual, Nondenominational conflicts, given your current situation?: none reported     Patient History:  Lives With: spouse  Living Arrangements: house  Home Layout: Able to live on 1st floor  Stair Railings at Home: inside, present at both sides  Transportation Available: family or friend will provide  Equipment Currently Used at Home: 3-in-1 commode, shower chair, oxygen, wheelchair, hospital bed, walker, standard  DME owned (not currently used):     Previous Level of Function:  Ambulation Skills: needs device and assist  Transfer Skills: needs device and assist  ADL Skills: needs device and assist  Work/Leisure Activity: needs device and assist    Subjective:  Communicated with RN prior to session.  Chart reviewed , pt. Agreed to have Eval.   Chief Complaint: weakness and stiffness  Patient goals: to improve ability to Sit<->stand and walking     Pain/Comfort  Pain Rating 1: 6/10 ("all over joint pain.")  Pain Addressed 1: Pre-medicate for activity, Reposition, Distraction, Nurse notified  Pain Rating Post-Intervention 1: 6/10      Objective:   Patient found with: peripheral IV     Cognitive Exam:  Oriented to: AA x 4     Follows Commands/attention: Follows multistep  commands  Communication: clear/fluent  Safety awareness/insight to disability: intact    Physical Exam:  Postural examination/scapula alignment: Rounded shoulder and Head forward    Skin integrity: Open scar L side  LE being treated , well covered today with ace and dressing   Edema: Severe L>R  LE     Sensation:   Intact    Upper Extremity Range of Motion:  Right Upper Extremity: WFL  Left Upper Extremity: WFL    Upper Extremity Strength:  Right Upper Extremity: WFL  Left Upper Extremity: WFL    Lower Extremity Range of Motion:  Right Lower Extremity: Deficits: unable to " "extend and flex full, 30% limited , " knee problem"   Left Lower Extremity: WFL    Lower Extremity Strength:  Right Lower Extremity: Deficits: 3+/5 B LE   Left Lower Extremity: Deficits: 3+/5 B         Gross motor coordination: intact , but leans back with sit<->stand, educated on using upper body to lean unto standing up to help assist     Functional Mobility:  Bed Mobility:  NT     Transfers:  Sit <> Stand Assistance: Maximum Assistance  Sit <> Stand Assistive Device: Rolling Walker    Gait:   Gait Distance: No gait today, stood three times but she was unabel to take steps today.     Stairs:  NT     Balance:   Static Sit: FAIR+: Able to take MINIMAL challenges from all directions  Dynamic Sit: FAIR: Cannot move trunk without losing balance  Static Stand: 0: Needs MAXIMAL assist to maintain   Dynamic stand: 0: N/A    Therapeutic Activities and Exercises:  Attempted sit<->stand x 3 , was able to stand on the last try with MAX A, VCs/ MCs     AM-PAC 6 CLICK MOBILITY  How much help from another person does this patient currently need?   1 = Unable, Total/Dependent Assistance  2 = A lot, Maximum/Moderate Assistance  3 = A little, Minimum/Contact Guard/Supervision  4 = None, Modified [facility]/Independent    Turning over in bed (including adjusting bedclothes, sheets and blankets)?: 2  Sitting down on and standing up from a chair with arms (e.g., wheelchair, bedside commode, etc.): 2  Moving from lying on back to sitting on the side of the bed?: 2  Moving to and from a bed to a chair (including a wheelchair)?: 2  Need to walk in hospital room?: 1  Climbing 3-5 steps with a railing?: 1  Total Score: 10     AM-PAC Raw Score CMS G-Code Modifier Level of Impairment Assistance   6 % Total / Unable   7 - 9 CM 80 - 100% Maximal Assist   10 - 14 CL 60 - 80% Moderate Assist   15 - 19 CK 40 - 60% Moderate Assist   20 - 22 CJ 20 - 40% Minimal Assist   23 CI 1-20% SBA / CGA   24 CH 0% Independent/ Mod I     Patient left " up in chair with all lines intact and call button in reach.    Assessment:   Nicole Lala is a 85 y.o. female with a medical diagnosis of Left leg cellulitis and presents with weakness and stiffness, decreased conditioning.    Rehab identified problem list/impairments: Rehab identified problem list/impairments: weakness, impaired endurance, impaired self care skills, gait instability, impaired functional mobilty, impaired balance, impaired muscle length, decreased lower extremity function, decreased safety awareness    Rehab potential is good     Activity tolerance: fair     Discharge recommendations: Discharge Facility/Level Of Care Needs: home health PT, home health OT     Barriers to discharge: Barriers to Discharge: None    Equipment recommendations: Equipment Needed After Discharge: other (see comments) (pt. currently has evertyhing she needs. )     GOALS:    Physical Therapy Goals        Problem: Physical Therapy Goal    Goal Priority Disciplines Outcome Goal Variances Interventions   Physical Therapy Goal     PT/OT, PT      Description:  Goals to be met by: 11/10/17    Patient will increase functional independence with mobility by performin)Pt I with bedside/ chair HEP  2)Pt. To demonstrate ability to transfer sit<->stand with MIN A , RW   3)Pt. To ambulate 150 ft. With RW , MIN A                         PLAN:    Patient to be seen daily to address the above listed problems via gait training, therapeutic activities, therapeutic exercises, therapeutic groups  Plan of Care expires: 11/10/17  Plan of Care reviewed with: patient    Functional Assessment Tool Used: FIM  Score: 1  Functional Limitation: Mobility: Walking and moving around  Mobility: Walking and Moving Around Current Status (): CN  Mobility: Walking and Moving Around Goal Status (): EDWIN Velasquez, PT  10/29/2017

## 2017-10-29 NOTE — ASSESSMENT & PLAN NOTE
Body mass index is 45.28 kg/m². Morbid obesity complicates all aspects of disease management from diagnostic modalities to treatment. Weight loss encouraged and health benefits explained to patient.

## 2017-10-29 NOTE — SUBJECTIVE & OBJECTIVE
Interval Hisotry: No overnight events. Still having some drainage from left thigh area, denies any fevers or chills. Continues to see redness is improving.     Review of patient's allergies indicates:   Allergen Reactions    Penicillins Hives     Other reaction(s): Hives       No current facility-administered medications on file prior to encounter.      Current Outpatient Prescriptions on File Prior to Encounter   Medication Sig    diltiaZEM (CARDIZEM CD) 300 MG 24 hr capsule Take 1 capsule (300 mg total) by mouth once daily.    lisinopril (PRINIVIL,ZESTRIL) 20 MG tablet TAKE 1 TABLET EVERY DAY    torsemide (DEMADEX) 20 MG Tab TAKE 1 TABLET EVERY MORNING (Patient taking differently: TAKE 2 TABLETS EVERY MORNING)    warfarin (COUMADIN) 2.5 MG tablet Take 1-2 tablets (2.5-5 mg total) by mouth Daily. (Patient taking differently: Take 2.5 mg by mouth every other day. Takes as directed by coumadin clInic / Th, Sat, Mon)    warfarin (COUMADIN) 5 MG tablet TAKE 1 TABLET EVERY EVENING AS INSTRUCTED BY COUMADIN CLINIC (Patient taking differently: TAKE 1 TABLET EVERY  OTHER EVENING AS INSTRUCTED BY COUMADIN CLINIC Wed, Fri, Sun, Tues)    albuterol-ipratropium 2.5mg-0.5mg/3mL (DUO-NEB) 0.5 mg-3 mg(2.5 mg base)/3 mL nebulizer solution Take 3 mLs by nebulization every 4 (four) hours as needed for Wheezing. Rescue     Family History     Problem Relation (Age of Onset)    Aneurysm Mother,     Coronary artery disease     Glaucoma Daughter    Hypertension     Stroke         Social History Main Topics    Smoking status: Former Smoker     Quit date: 11/7/1992    Smokeless tobacco: Never Used    Alcohol use Yes      Comment: socially    Drug use: No    Sexual activity: No     Review of Systems   Constitutional: Negative for chills and fever.   Respiratory: Positive for shortness of breath (exertional).    Cardiovascular: Positive for leg swelling. Negative for chest pain.   Gastrointestinal: Negative for abdominal pain,  nausea and vomiting.   Hematological: Bruises/bleeds easily (warfarin).   All other systems reviewed and are negative.    Objective:     Vital Signs (Most Recent):  Temp: 97.8 °F (36.6 °C) (10/29/17 1142)  Pulse: 77 (10/29/17 0807)  Resp: (!) 24 (10/29/17 1142)  BP: 115/76 (10/29/17 0742)  SpO2: 98 % (10/29/17 1142) Vital Signs (24h Range):  Temp:  [97.6 °F (36.4 °C)-97.8 °F (36.6 °C)] 97.8 °F (36.6 °C)  Pulse:  [] 77  Resp:  [14-24] 24  SpO2:  [88 %-99 %] 98 %  BP: (110-122)/(72-77) 115/76     Weight: 131.1 kg (289 lb 1.6 oz)  Body mass index is 45.28 kg/m².    Physical Exam   Constitutional: She is oriented to person, place, and time. She appears well-developed and well-nourished. No distress.   HENT:   Head: Normocephalic and atraumatic.   Right Ear: External ear normal.   Left Ear: External ear normal.   Eyes: EOM are normal. Right eye exhibits no discharge. Left eye exhibits no discharge. No scleral icterus.   Neck: Normal range of motion. Neck supple. No JVD present.   Cardiovascular: Normal rate.  An irregular rhythm present.   No murmur heard.  Pulmonary/Chest: No respiratory distress. She has decreased breath sounds. She has no rhonchi. She has no rales.   Abdominal: Soft. Bowel sounds are normal. She exhibits no distension. There is no tenderness.   Musculoskeletal:        Legs:  Both legs below knees are wrapped for lymphedema treatment   Neurological: She is alert and oriented to person, place, and time. No cranial nerve deficit.   Psychiatric: She has a normal mood and affect. Her behavior is normal.        Significant Labs:   BMP:     Recent Labs  Lab 10/29/17  0441   *      K 4.1      CO2 30*   BUN 50*   CREATININE 1.9*   CALCIUM 9.6     CBC:     Recent Labs  Lab 10/27/17  1540 10/29/17  0441   WBC 7.60 5.80   HGB 12.6 11.8*   HCT 38.7 36.2*    183       Significant Imaging: none

## 2017-10-29 NOTE — CLINICAL REVIEW
PT Dustin completed today. She will benefit from skilled PT to help improve transfer ability , gait, balance, B LE strength  And mm. Endurance.   She will benefit from PT QD7.

## 2017-10-29 NOTE — ASSESSMENT & PLAN NOTE
Failed outpatient treatment.  Antibiotics     Start     Stop Route Frequency Ordered    10/28/17 0030  clindamycin 600 MG/50 ML D5W 600 mg/50 mL IVPB 600 mg      -- IV Every 8 hours (non-standard times) 10/27/17 2918        Will monitor the area of erythema for improvement. Wound care, will continue IV abx for now as still having drainage.

## 2017-10-30 ENCOUNTER — TELEPHONE (OUTPATIENT)
Dept: FAMILY MEDICINE | Facility: CLINIC | Age: 82
End: 2017-10-30

## 2017-10-30 VITALS
RESPIRATION RATE: 18 BRPM | SYSTOLIC BLOOD PRESSURE: 130 MMHG | HEART RATE: 112 BPM | TEMPERATURE: 98 F | DIASTOLIC BLOOD PRESSURE: 68 MMHG | WEIGHT: 289.13 LBS | BODY MASS INDEX: 45.38 KG/M2 | HEIGHT: 67 IN | OXYGEN SATURATION: 97 %

## 2017-10-30 LAB
ANION GAP SERPL CALC-SCNC: 12 MMOL/L
BASOPHILS # BLD AUTO: 0 K/UL
BASOPHILS NFR BLD: 0.2 %
BUN SERPL-MCNC: 52 MG/DL
CALCIUM SERPL-MCNC: 9.5 MG/DL
CHLORIDE SERPL-SCNC: 101 MMOL/L
CO2 SERPL-SCNC: 28 MMOL/L
CREAT SERPL-MCNC: 2.1 MG/DL
DIFFERENTIAL METHOD: ABNORMAL
EOSINOPHIL # BLD AUTO: 0.1 K/UL
EOSINOPHIL NFR BLD: 1.3 %
ERYTHROCYTE [DISTWIDTH] IN BLOOD BY AUTOMATED COUNT: 18.5 %
EST. GFR  (AFRICAN AMERICAN): 24 ML/MIN/1.73 M^2
EST. GFR  (NON AFRICAN AMERICAN): 21 ML/MIN/1.73 M^2
GLUCOSE SERPL-MCNC: 127 MG/DL
HCT VFR BLD AUTO: 36.6 %
HGB BLD-MCNC: 12.1 G/DL
INR PPP: 3.4
LYMPHOCYTES # BLD AUTO: 0.7 K/UL
LYMPHOCYTES NFR BLD: 11.9 %
MCH RBC QN AUTO: 31.3 PG
MCHC RBC AUTO-ENTMCNC: 33.1 G/DL
MCV RBC AUTO: 95 FL
MONOCYTES # BLD AUTO: 0.5 K/UL
MONOCYTES NFR BLD: 8.7 %
NEUTROPHILS # BLD AUTO: 4.8 K/UL
NEUTROPHILS NFR BLD: 77.9 %
PLATELET # BLD AUTO: 208 K/UL
PMV BLD AUTO: 8.2 FL
POTASSIUM SERPL-SCNC: 4 MMOL/L
PROTHROMBIN TIME: 34 SEC
RBC # BLD AUTO: 3.86 M/UL
SODIUM SERPL-SCNC: 141 MMOL/L
WBC # BLD AUTO: 6.2 K/UL

## 2017-10-30 PROCEDURE — 97535 SELF CARE MNGMENT TRAINING: CPT

## 2017-10-30 PROCEDURE — 25000003 PHARM REV CODE 250: Performed by: EMERGENCY MEDICINE

## 2017-10-30 PROCEDURE — 36415 COLL VENOUS BLD VENIPUNCTURE: CPT

## 2017-10-30 PROCEDURE — 90662 IIV NO PRSV INCREASED AG IM: CPT | Performed by: HOSPITALIST

## 2017-10-30 PROCEDURE — 97165 OT EVAL LOW COMPLEX 30 MIN: CPT

## 2017-10-30 PROCEDURE — 94761 N-INVAS EAR/PLS OXIMETRY MLT: CPT

## 2017-10-30 PROCEDURE — G8988 SELF CARE GOAL STATUS: HCPCS | Mod: CK

## 2017-10-30 PROCEDURE — G8987 SELF CARE CURRENT STATUS: HCPCS | Mod: CK

## 2017-10-30 PROCEDURE — 25000003 PHARM REV CODE 250: Performed by: NURSE PRACTITIONER

## 2017-10-30 PROCEDURE — 80048 BASIC METABOLIC PNL TOTAL CA: CPT

## 2017-10-30 PROCEDURE — 3E0234Z INTRODUCTION OF SERUM, TOXOID AND VACCINE INTO MUSCLE, PERCUTANEOUS APPROACH: ICD-10-PCS | Performed by: HOSPITALIST

## 2017-10-30 PROCEDURE — 25000242 PHARM REV CODE 250 ALT 637 W/ HCPCS: Performed by: NURSE PRACTITIONER

## 2017-10-30 PROCEDURE — S0077 INJECTION, CLINDAMYCIN PHOSP: HCPCS | Performed by: HOSPITALIST

## 2017-10-30 PROCEDURE — 85610 PROTHROMBIN TIME: CPT

## 2017-10-30 PROCEDURE — 94640 AIRWAY INHALATION TREATMENT: CPT

## 2017-10-30 PROCEDURE — 85025 COMPLETE CBC W/AUTO DIFF WBC: CPT

## 2017-10-30 PROCEDURE — 63600175 PHARM REV CODE 636 W HCPCS: Performed by: HOSPITALIST

## 2017-10-30 PROCEDURE — G0008 ADMIN INFLUENZA VIRUS VAC: HCPCS | Performed by: HOSPITALIST

## 2017-10-30 PROCEDURE — 27000221 HC OXYGEN, UP TO 24 HOURS

## 2017-10-30 PROCEDURE — 90471 IMMUNIZATION ADMIN: CPT | Performed by: HOSPITALIST

## 2017-10-30 PROCEDURE — 97530 THERAPEUTIC ACTIVITIES: CPT

## 2017-10-30 PROCEDURE — 25000003 PHARM REV CODE 250: Performed by: HOSPITALIST

## 2017-10-30 RX ORDER — CLINDAMYCIN HYDROCHLORIDE 300 MG/1
300 CAPSULE ORAL 3 TIMES DAILY
Qty: 30 CAPSULE | Refills: 0 | Status: SHIPPED | OUTPATIENT
Start: 2017-10-30 | End: 2017-11-09

## 2017-10-30 RX ORDER — RAMELTEON 8 MG/1
8 TABLET ORAL NIGHTLY PRN
Status: DISCONTINUED | OUTPATIENT
Start: 2017-10-30 | End: 2017-10-30 | Stop reason: HOSPADM

## 2017-10-30 RX ADMIN — ALBUTEROL SULFATE 2.5 MG: 2.5 SOLUTION RESPIRATORY (INHALATION) at 07:10

## 2017-10-30 RX ADMIN — LISINOPRIL 20 MG: 10 TABLET ORAL at 08:10

## 2017-10-30 RX ADMIN — DILTIAZEM HYDROCHLORIDE 300 MG: 120 CAPSULE, EXTENDED RELEASE ORAL at 08:10

## 2017-10-30 RX ADMIN — ACETAMINOPHEN 650 MG: 325 TABLET, FILM COATED ORAL at 02:10

## 2017-10-30 RX ADMIN — ALBUTEROL SULFATE 2.5 MG: 2.5 SOLUTION RESPIRATORY (INHALATION) at 01:10

## 2017-10-30 RX ADMIN — TORSEMIDE 20 MG: 20 TABLET ORAL at 06:10

## 2017-10-30 RX ADMIN — RAMELTEON 8 MG: 8 TABLET, FILM COATED ORAL at 12:10

## 2017-10-30 RX ADMIN — CLINDAMYCIN IN 5 PERCENT DEXTROSE 600 MG: 12 INJECTION, SOLUTION INTRAVENOUS at 08:10

## 2017-10-30 RX ADMIN — INFLUENZA A VIRUSA/MICHIGAN/45/2015 X-275 (H1N1) ANTIGEN (FORMALDEHYDE INACTIVATED), INFLUENZA A VIRUS A/HONG KONG/4801/2014 X-263B (H3N2) ANTIGEN (FORMALDEHYDE INACTIVATED), AND INFLUENZA B VIRUS B/BRISBANE/60/2008 ANTIGEN (FORMALDEHYDE INACTIVATED) 0.5 ML: 60; 60; 60 INJECTION, SUSPENSION INTRAMUSCULAR at 02:10

## 2017-10-30 NOTE — PLAN OF CARE
Problem: Physical Therapy Goal  Goal: Physical Therapy Goal  Goals to be met by: 11/10/17    Patient will increase functional independence with mobility by performin)Pt I with bedside/ chair HEP  2)Pt. To demonstrate ability to transfer sit<->stand with MIN A , RW   3)Pt. To ambulate 150 ft. With RW , MIN A        Outcome: Ongoing (interventions implemented as appropriate)  SPT chair to bed with rw and Max A.

## 2017-10-30 NOTE — PLAN OF CARE
Problem: Patient Care Overview  Goal: Plan of Care Review  Outcome: Ongoing (interventions implemented as appropriate)  Patient oriented to room, call light within reach, wheels locked, bed in low position, side rails x 2, instructed to call for assistance prn. Patient up in wheelchair during shift. O2 1L NC in use, patient SOB on exertion. Bedside commode being used to limit exertion. POC reviewed with patient, all questions answered, verbalized understanding. Patient remained free of fall/injury. Comfort level established, c/o pain controlled with prn medication. IV abx infused per order. Patient repositioned independently, no new breakdown noted. Will continue to monitor.

## 2017-10-30 NOTE — TELEPHONE ENCOUNTER
----- Message from Marine Bhatt sent at 10/30/2017 12:12 PM CDT -----  Schedule a hospital f/u from [facility] in 1 week.  Please call patient at [phone].

## 2017-10-30 NOTE — DISCHARGE SUMMARY
[facility] Hospital Medicine  Discharge Summary      Patient Name: Nicole Lala  MRN: [id number]  Admission Date: 10/27/2017  Hospital Length of Stay: 3 days  Discharge Date and Time: 10/30/2017  5:24 PM  Attending Physician: Urszula att. providers found   Discharging Provider: Giancarlo Liriano MD  Primary Care Provider: Brandon Gray MD      HPI:   Ms. Lala has been going to wound care for her chronic bilateral leg lymphedema.  She was diagnosed with left leg cellulitis by physician at the clinic and was prescribed doxycycline.  She's taken about two days' worth of it but has not noticed any improvement.  Therefore, she presented to our ER.  Redness has been present above the knee; associated with pain, blistering, and weeping of clear fluid.  No fevers.    * No surgery found *      Hospital Course:   Patient is an 85-year-old female who was admitted to the hospital with chronic lymphedema and a secondary left leg cellulitis which has been unresponsive to oral antibiotics.  The patient was taken onto the hospitalist service and was started on IV antibiotics for cellulitis and underwent a total of 5 days of IV antibiotics with clindamycin with resulting clinical improvement of the patient's cellulitis.  Patient and continue to have severe lymphedema, and lymphedema specialist was consulted while patient was in the hospital and therapy was delivered prior to discharge.  Patient was discussed extensively about the care of her lymphedema as well as managing her chronic lymphedema for which she sees a specialist in the clinic 2 times a week.  Patient's creatinine did increase throughout hospitalization, but this was likely due to overdiuresis in the face of chronic lymphedema.  Patient was instructed to hold her diuretic medications for the next 5 days and a BMP was ordered from [facility] to recheck the patient's kidney function.  Patient was afebrile feeling well and her baseline level of  symptomatology at time of discharge.  Patient be discharged with home health PT OT and skilled nurse.     Consults:   Consults         Status Ordering Provider     IP consult to case management  Once     Provider:  (Not yet assigned)    Acknowledged YANI LUNA     Nutrition Services Referral  Once     Provider:  (Not yet assigned)    Completed YANI LUNA          No new Assessment & Plan notes have been filed under this hospital service since the last note was generated.  Service: Hospital Medicine    Final Active Diagnoses:    Diagnosis Date Noted POA    PRINCIPAL PROBLEM:  Left leg cellulitis [L03.116] 10/27/2017 Yes    Chronic respiratory failure with hypoxia [J96.11] 10/27/2017 Yes    Chronic kidney disease, stage IV (severe) [N18.4] 04/17/2017 Yes    Morbid obesity with BMI of 40.0-44.9, adult [E66.01, Z68.41] 03/14/2017 Not Applicable    Permanent atrial fibrillation [I48.2] 03/14/2017 Yes    Essential hypertension [I10] 04/12/2016 Yes     Chronic    Lymphedema [I89.0] 08/11/2014 Yes     Chronic    Personal history of DVT (deep vein thrombosis) [Z86.718] 08/28/2013 Not Applicable      Problems Resolved During this Admission:    Diagnosis Date Noted Date Resolved POA       Discharged Condition: stable    Disposition: Home-Health Care [facility]    Follow Up:  Follow-up Information     Brandon Gray MD In 1 week.    Specialty:  Family Medicine  Why:  Physicians nurse will call pt to schedule appointment.  Contact information:  [address] [facility] [address]  [phone]             [facility].    Specialty:  Home Health Services  Why:  Home Health  Contact information:  [address]  [facility] [address]  [phone]                 Patient Instructions:     Referral to Home health   Referral Priority: Routine Referral Type: Home Health Care   Referral Reason: Specialty Services Required    Requested Specialty: Home Health Services    Number of Visits Requested:  1      Diet general     Activity as tolerated     Call MD for:  temperature >100.4     Call MD for:  severe uncontrolled pain     Call MD for:  redness, tenderness, or signs of infection (pain, swelling, redness, odor or green/yellow discharge around incision site)     Change dressing (specify)   Order Comments: Dressing change: as indicated through lymphedema specialist         Significant Diagnostic Studies: Labs:   BMP:   Recent Labs  Lab 10/29/17  0441 10/30/17  0457   * 127*    141   K 4.1 4.0    101   CO2 30* 28   BUN 50* 52*   CREATININE 1.9* 2.1*   CALCIUM 9.6 9.5    and CBC   Recent Labs  Lab 10/29/17  0441 10/30/17  0458   WBC 5.80 6.20   HGB 11.8* 12.1   HCT 36.2* 36.6*    208       Pending Diagnostic Studies:     None         Medications:  Reconciled Home Medications:   Discharge Medication List as of 10/30/2017  2:52 PM      START taking these medications    Details   clindamycin (CLEOCIN) 300 MG capsule Take 1 capsule (300 mg total) by mouth 3 (three) times daily., Starting Mon 10/30/2017, Until Thu 11/9/2017, Normal         CONTINUE these medications which have NOT CHANGED    Details   diltiaZEM (CARDIZEM CD) 300 MG 24 hr capsule Take 1 capsule (300 mg total) by mouth once daily., Starting Sun 9/24/2017, Normal      lisinopril (PRINIVIL,ZESTRIL) 20 MG tablet TAKE 1 TABLET EVERY DAY, Normal      torsemide (DEMADEX) 20 MG Tab TAKE 1 TABLET EVERY MORNING, Normal      !! warfarin (COUMADIN) 2.5 MG tablet Take 1-2 tablets (2.5-5 mg total) by mouth Daily., Starting 8/17/2016, Until Discontinued, Normal      !! warfarin (COUMADIN) 5 MG tablet TAKE 1 TABLET EVERY EVENING AS INSTRUCTED BY COUMADIN CLINIC, Normal      albuterol-ipratropium 2.5mg-0.5mg/3mL (DUO-NEB) 0.5 mg-3 mg(2.5 mg base)/3 mL nebulizer solution Take 3 mLs by nebulization every 4 (four) hours as needed for Wheezing. Rescue, Starting Thu 9/28/2017, Until Fri 9/28/2018, Normal       !! - Potential duplicate medications  found. Please discuss with provider.          Indwelling Lines/Drains at time of discharge:   Lines/Drains/Airways          No matching active lines, drains, or airways          Time spent on the discharge of patient: 37 minutes  Patient was seen and examined on the date of discharge and determined to be suitable for discharge.         Giancarlo Liriano MD  Department of Hospital Medicine  [facility]

## 2017-10-30 NOTE — PT/OT/SLP EVAL
Occupational Therapy  Evaluation    Nicole Lala   MRN: [id number]   Admitting Diagnosis: Left leg cellulitis    OT Date of Treatment: 10/30/17   OT Start Time: 1455  OT Stop Time: 1527  OT Total Time (min): 32 min    Billable Minutes:  Evaluation 8  Self Care/Home Management 24    Diagnosis: Left leg cellulitis   Generalized weakness and impaired endurance    Past Medical History:   Diagnosis Date    A-fib     Acute CHF 9/25/2013    Anticoagulant long-term use     Arthritis     Blood transfusion     Branch retinal vein occlusion of right eye     Cellulitis and abscess of lower extremity     Cerebral aneurysm     S/p repair    CKD (chronic kidney disease)     Followed by Dr. Rey Muhammad    Elevated serum creatinine     HTN (hypertension)     Lymphedema     Macular degeneration - Right Eye 1/31/2013    Nuclear sclerosis - Both Eyes 1/31/2013    Done OU    Squamous cell carcinoma excised 11/16/16    R forearm      Past Surgical History:   Procedure Laterality Date    ABDOMINAL SURGERY      APPENDECTOMY      APPENDECTOMY      CATARACT EXTRACTION W/  INTRAOCULAR LENS IMPLANT Right 04/25/2017    Rupali    CATARACT EXTRACTION W/  INTRAOCULAR LENS IMPLANT Left 06/13/2017    Rupali    CEREBRAL ANEURYSM REPAIR  10/16/1994    RIGHT FRONTOTEMPORAL CRANIOTOMY WITH LIPPING OF SUPRACLINOID CAROTID ARTERY ANEURYSM      CEREBRAL ANEURYSM REPAIR  10/27/1994    RIGHT FRONTOTEMPORAL CRANIOTOMY WITH CLIPPING OF RIGHT MIDDLE CEREBRAL ARTERY ANEURYSM     COMPLICATED TOTAL HIP PROSTHESIS AND METHYLMETHACRALATE REMOVAL W/ SPACER INSERTION  8/29/2013    left    EYE SURGERY      Focal laser      OD    ADOLFO FILTER PLACEMENT  3/22/2010    HIP FRACTURE SURGERY  11/2009    left    JOINT REPLACEMENT Left     hip    Patent PI      Both Eye     SKIN BIOPSY Right     foream    TONSILLECTOMY      TOTAL ABDOMINAL HYSTERECTOMY      TOTAL HIP ARTHROPLASTY  3/19/2010    left    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  Equipment Currently Used at Home: shower chair, grab bar, walker, standard, walker, rolling, wheelchair, hospital bed, oxygen    Prior level of function:   Bed Mobility/Transfers: needs device and assist  Grooming: needs device  Bathing: needs device and assist  Upper Body Dressing: needs device  Lower Body Dressing: needs device and assist  Toileting: needs device and assist  Home Management Skills:  (Shares tasks with spouse.)  Driving License: No  Mode of Transportation: Family  IADL Comments: PTA, pt was wheelchair bound and used a walker to transfer. She was Mod I with all UB ADL's and toileting & her spouse helped her with LB ADL' and drove her to appointments. She stated she does some household tasks wheelchair level.     Dominant hand: right    Subjective:  Communicated with nurse Parchi prior to session.  Stated patient was cleared for OT today and waiting to have her legs wrapped before going home.  Chief Complaint: I need my legs wrapped.  Patient/Family stated goals: To get my legs wrapped today.    Pain/Comfort  Pain Rating 1: 0/10  Pain Rating Post-Intervention 1: 0/10    Objective:  Patient found with: oxygen seated in wheelchair at bedside with B legs uncovered.    Cognitive Exam:  Oriented to: Person, Place, Time and Situation  Follows Commands/attention: Follows multistep  commands  Communication: clear/fluent  Memory:  No Deficits noted  Safety  awareness/insight to disability: intact  Coping skills/emotional control: Appropriate to situation    Visual/perceptual:  Intact    Physical Exam:  Postural examination/scapula alignment: Rounded shoulder and Head forward  Skin integrity: B legs red with blisters and some fluid oozing from them  Edema: Severe B legs from thighs to toes    Sensation:   Intact    Upper Extremity Range of Motion:  Right Upper Extremity: WFL  Left Upper Extremity: WFL    Upper Extremity Strength:  Right Upper Extremity: WFL  Left Upper Extremity: WFL   Strength: WFL    Fine motor coordination:   Intact    Functional Mobility:  Bed Mobility:Refer to PT Notes       Transfers:Refer to PT Notes       Functional Ambulation:Refer to PT Notes    Activities of Daily Living:  Feeding Level of Assistance: Independent    UE Dressing Level of Assistance: Set-up Assistance     LE adaptive equipment: Pt has a Reacher at home  Grooming Position: Seated  Grooming Level of Assistance: Modified independent    Therapeutic Activities and Exercises:  OT ed pt on OT role & POC as well as discharge recommendations.  OT educated patient on energy conservation techniques to reduce demand on cardiovascular system with performance of ADL tasks. OT issued an instructional handout to patient & focused on activity pacing, environmental modifications, work simplification & use of adaptive equipment to reduce fatigue & SOB.   OT explained that Rupa, the lymphedema therapist was unavailable today and recommended to pt that she allow nurse, Prachi to wrap legs with pads and tubigrip and non-skid socks until she can get to her wound clinic appointment tomorrow. Pt was agreeable.    AM-PAC 6 CLICK ADL  How much help from another person does this patient currently need?  1 = Unable, Total/Dependent Assistance  2 = A lot, Maximum/Moderate Assistance  3 = A little, Minimum/Contact Guard/Supervision  4 = None, Modified [place]/Independent    Putting on and taking  "off regular lower body clothing? : 2  Bathing (including washing, rinsing, drying)?: 2  Toileting, which includes using toilet, bedpan, or urinal? : 2  Putting on and taking off regular upper body clothing?: 3  Taking care of personal grooming such as brushing teeth?: 3  Eating meals?: 4  Total Score: 16    AM-PAC Raw Score CMS "G-Code Modifier Level of Impairment Assistance   6 % Total / Unable   7 - 9 CM 80 - 100% Maximal Assist   10-14 CL 60 - 80% Moderate Assist   15 - 19 CK 40 - 60% Moderate Assist   20 - 22 CJ 20 - 40% Minimal Assist   23 CI 1-20% SBA / CGA   24 CH 0% Independent/ Mod I       Patient left in wheelchair with all lines intact, call button in reach and Prachi, nurse notified    Assessment:  Nicole Lala is a 85 y.o. female with a medical diagnosis of Left leg cellulitis and presents with a decline in functional status due to the below listed impairments, impacting ADLs and functional mobility. Recommend OT treatment to maximize endurance, safety & independence with ADL's & functional mobility. Pt will benefit from HH OT & PT to increase ADL independence and safety in pt's home environment.    Rehab identified problem list/impairments: Rehab identified problem list/impairments: weakness, impaired self care skills, impaired functional mobilty, impaired endurance, edema, impaired skin, decreased lower extremity function, gait instability    Rehab potential is fair.    Activity tolerance: Fair    Discharge recommendations: Discharge Facility/Level Of Care Needs: home health PT, home health OT     Barriers to discharge: Barriers to Discharge: None    Equipment recommendations:  (has all needed equipment)     GOALS:    Occupational Therapy Goals        Problem: Occupational Therapy Goal    Goal Priority Disciplines Outcome Interventions   Occupational Therapy Goal     OT, PT/OT Ongoing (interventions implemented as appropriate)    Description:  Goals to be met by: 11/6/17     Patient will " increase functional independence with ADLs by performing:    Toileting from bedside commode with Minimal Assistance and Assistive Devices as needed for hygiene and clothing management.   Toilet transfer to bedside commode with Minimal Assistance.  Upper extremity exercise program x15 reps per handout, with supervision.  Pt to utilize energy conservation techniques during ADL task performance with occasional cues.                        PLAN:  Patient to be seen 3 x/week, 4 x/week to address the above listed problems via self-care/home management, therapeutic activities, therapeutic exercises  Plan of Care expires: 11/06/17  Plan of Care reviewed with: patient    OT G-codes  Functional Assessment Tool Used: [facility]  Score: 16  Functional Limitation: Self care  Self Care Current Status (): EDWIN  Self Care Goal Status (): JARRED Abreu  10/30/2017

## 2017-10-30 NOTE — PLAN OF CARE
10/29/17 1937   Patient Assessment/Suction   Level of Consciousness (AVPU) alert   Respiratory Effort Normal;Unlabored   Expansion/Accessory Muscles/Retractions expansion symmetric   All Lung Fields Breath Sounds diminished   Rhythm/Pattern, Respiratory pattern regular   Cough Frequency infrequent   Cough Type nonproductive   PRE-TX-O2-ETCO2   O2 Device (Oxygen Therapy) room air   Flow (L/min) 2   SpO2 95 %   Pulse Oximetry Type Intermittent   Pulse 91   Resp 18   Aerosol Therapy   $ Aerosol Therapy Charges Aerosol Treatment   Respiratory Treatment Status given   SVN/Inhaler Treatment Route mask   Position During Treatment Sitting in chair   Patient Tolerance good   Post-Treatment   Post-treatment Heart Rate (beats/min) 89   Post-treatment Resp Rate (breaths/min) 18   All Fields Breath Sounds aeration increased

## 2017-10-30 NOTE — PROGRESS NOTES
Discharge instructions given to patient. Educated patient on lymphedema, cellulitis, pneumonia, heart failure, and kidney disease. Patient instructed that prescription antibiotics would be called in to her pharmacy. IV removed. Patient in w/c awaiting  to arrive. Brakes locked, call light within reach. Will continue to monitor.

## 2017-10-30 NOTE — PROGRESS NOTES
Food & Nutrition  Education    Diet Education: Coumadin education  Time Spent: 15 min  Learners: patient       Nutrition Education provided with handouts: Vitamin K  and medication interactions      Comments:Educated patient on coumadin/vitamin K interaction. Reviewed foods high/moderate/low in vitamin K. Reviewed importance of keeping vitamin K intake consistent. Provided nutrition handout for after discharge. All questions and concerns answered. Dietitian's contact information provided.       Follow-Up: x1 weekly    Please Re-consult as needed

## 2017-10-30 NOTE — PLAN OF CARE
Bilateral lower extremity lymphedema, cellulitis with drainage. Has seen Rupa Saini OT previously. Will consult her for wraps.

## 2017-10-30 NOTE — PLAN OF CARE
Problem: Patient Care Overview  Goal: Plan of Care Review  Outcome: Ongoing (interventions implemented as appropriate)  A&Ox4. Up with assist to bedside commode. IV antibiotics infused per order. VSS. Remains afebrile throughout shift. Remains fall-free throughout shift. Comfort level established. Good pain control with prn medications. Bed low, brakes locked, SR up x2, call light within reach. Verbalized understanding of poc. Open communication facilitated. Will continue to monitor.

## 2017-10-30 NOTE — PROGRESS NOTES
[facility] sent patient information to [facility] through [email or web address] system for processing and acceptance.NAYELI Saenz    The referral for the patient in [facility], room 402, bed 402 A admitted at 10/27/2017 3:18 PM has been updated by [email or web address].  Update: Accepted.

## 2017-10-30 NOTE — PROGRESS NOTES
Wound cleansed with wound cleanser. Applied mepilex. Feet elevated on three pillows. Patient tolerated well. Bed low, brakes locked, SR up x3, call light within reach.

## 2017-10-30 NOTE — PLAN OF CARE
Attempted to schedule the hospital follow up. MD office did not have an available one week appointment and will call pt to schedule the appointment.  Updated pt's nurse.      10/30/17 1213   Final Note   Assessment Type Final Discharge Note   Discharge Disposition Home-Health   What phone number can be called within the next 1-3 days to see how you are doing after discharge? ([phone])   Hospital Follow Up  Appt(s) scheduled? No  (MD office will call to schedul appointment.)   Discharge plans and expectations educations in teach back method with documentation complete? Yes

## 2017-10-30 NOTE — PLAN OF CARE
Problem: Patient Care Overview  Goal: Plan of Care Review  Outcome: Ongoing (interventions implemented as appropriate)  Patient receiving aerosol tx via 2.5mg ventolin and nacl now and q6hr.  Hr 69  And 02 saturation 97% on nc at 2lpm

## 2017-10-30 NOTE — PT/OT/SLP PROGRESS
Physical Therapy  Treatment    Nicole Lala   MRN: [id number]   Admitting Diagnosis: Left leg cellulitis    PT Received On: 10/30/17  PT Start Time: 0900     PT Stop Time: 0930    PT Total Time (min): 30 min       Billable Minutes:  Therapeutic Activity 30min    Treatment Type: Treatment  PT/PTA: PTA     PTA Visit Number: 1       General Precautions: Standard, fall  Orthopedic Precautions: N/A   Braces:      Do you have any cultural, spiritual, Advent conflicts, given your current situation?: none reported     Subjective:  Communicated with nurse Velarde prior to session.  Agreed to mobilize. Reports being up in chair for a while    Pain/Comfort  Pain Rating 1: 0/10    Objective:   Patient found with: peripheral IV (O2 at bedside)    Functional Mobility:  Bed Mobility:   Sit to Supine: Moderate Assistance (required A with LE's onto bed.)    Transfers:  Sit <> Stand Assistance: Maximum Assistance  Sit <> Stand Assistive Device: Rolling Walker  Bed <> Chair Technique: Stand Pivot  Bed <> Chair Assistance: Maximum Assistance  Bed <> Chair Assistive Device: Rolling Walker    Gait:        Stairs:      Balance:   Static Sit: FAIR+: Able to take MINIMAL challenges from all directions  Dynamic Sit: FAIR: Cannot move trunk without losing balance  Static Stand: POOR: Needs MODERATE assist to maintain  Dynamic stand: POOR: N/A     Therapeutic Activities and Exercises:  Seated in chair at bedside. LE's dependent and edematous. Sit to stand x 3 trials with rw and Max A. Required extra time to complete due to fatigue and weakness. SPT to bed with Max A and LE's onto bed with Max A. Able to use UE's to scoot to HOB with assistance using draw sheets from PTA. LE's elevated on pillows and bed elevated as well.    AM-PAC 6 CLICK MOBILITY  How much help from another person does this patient currently need?   1 = Unable, Total/Dependent Assistance  2 = A lot, Maximum/Moderate Assistance  3 = A little, Minimum/Contact  Guard/Supervision  4 = None, Modified [place]/Independent         AM-PAC Raw Score CMS G-Code Modifier Level of Impairment Assistance   6 % Total / Unable   7 - 9 CM 80 - 100% Maximal Assist   10 - 14 CL 60 - 80% Moderate Assist   15 - 19 CK 40 - 60% Moderate Assist   20 - 22 CJ 20 - 40% Minimal Assist   23 CI 1-20% SBA / CGA   24 CH 0% Independent/ Mod I     Patient left supine with all lines intact, call button in reach and nurse Prachi notified.    Assessment:  Nicole Lala is a 85 y.o. female with a medical diagnosis of Left leg cellulitis and presents with weakness, limited endurance.    Rehab identified problem list/impairments: Rehab identified problem list/impairments: weakness, impaired endurance, impaired self care skills, gait instability, impaired functional mobilty, impaired balance, decreased lower extremity function, edema    Rehab potential is fair.    Activity tolerance: Fair    Discharge recommendations: Discharge Facility/Level Of Care Needs: home health PT     Barriers to discharge: Barriers to Discharge: None    Equipment recommendations:       GOALS:    Physical Therapy Goals        Problem: Physical Therapy Goal    Goal Priority Disciplines Outcome Goal Variances Interventions   Physical Therapy Goal     PT/OT, PT Ongoing (interventions implemented as appropriate)     Description:  Goals to be met by: 11/10/17    Patient will increase functional independence with mobility by performin)Pt I with bedside/ chair HEP  2)Pt. To demonstrate ability to transfer sit<->stand with MIN A , RW   3)Pt. To ambulate 150 ft. With RW , MIN A                         PLAN:    Patient to be seen daily  to address the above listed problems via gait training, therapeutic activities, therapeutic exercises  Plan of Care expires: 11/10/17  Plan of Care reviewed with: patient         [place], PTA  10/30/2017

## 2017-10-30 NOTE — PROGRESS NOTES
Wrapped patient's legs with abd pads, kerlix, and compression stockings per JANNIE Bell. Verified with Dr. Liriano that these measures were ok. He gave the ok to d/c patient when her  arrives. Notified patient that I would wrap her legs and that Dr. Liriano gave verbal orders to do so. Patient agreed, and called  at this time. Will continue to monitor. Patient tolerated well.

## 2017-10-31 ENCOUNTER — PATIENT OUTREACH (OUTPATIENT)
Dept: ADMINISTRATIVE | Facility: CLINIC | Age: 82
End: 2017-10-31

## 2017-10-31 ENCOUNTER — TELEPHONE (OUTPATIENT)
Dept: MEDSURG UNIT | Facility: HOSPITAL | Age: 82
End: 2017-10-31

## 2017-10-31 NOTE — PROGRESS NOTES
C3 nurse attempted to contact patient. No answer. The following message was left for the patient to return the call:  Good afternoon  I am a nurse calling on behalf of [facility] from the [facility].  This is a Transitional Care Call for Nicole Lala . When you have a moment please contact us at [phone] or [phone] Monday through Friday, between the hours of 8 am to 4 pm. We look forward to speaking with you. On behalf of [facility] have a nice day.    The patient has a scheduled HOSFU appointment with Stacy QUICK  on 11/3/17 @ 1pm. Message sent to Physician staff.

## 2017-11-01 NOTE — PROGRESS NOTES
Pt released from hospital Monday; on clindamycin 300mg TID x 10 days; INR Monday 3.6, pt instructed to hold coumadin Tuesday per Hermila Smith, ERON to get level today

## 2017-11-03 ENCOUNTER — LAB VISIT (OUTPATIENT)
Dept: LAB | Facility: HOSPITAL | Age: 82
End: 2017-11-03
Attending: PHYSICIAN ASSISTANT
Payer: MEDICARE

## 2017-11-03 ENCOUNTER — OFFICE VISIT (OUTPATIENT)
Dept: FAMILY MEDICINE | Facility: CLINIC | Age: 82
End: 2017-11-03
Payer: MEDICARE

## 2017-11-03 ENCOUNTER — TELEPHONE (OUTPATIENT)
Dept: FAMILY MEDICINE | Facility: CLINIC | Age: 82
End: 2017-11-03

## 2017-11-03 VITALS
BODY MASS INDEX: 45.36 KG/M2 | HEART RATE: 88 BPM | WEIGHT: 289 LBS | DIASTOLIC BLOOD PRESSURE: 60 MMHG | HEIGHT: 67 IN | SYSTOLIC BLOOD PRESSURE: 100 MMHG

## 2017-11-03 DIAGNOSIS — N17.9 AKI (ACUTE KIDNEY INJURY): ICD-10-CM

## 2017-11-03 DIAGNOSIS — L03.116 CELLULITIS OF LEFT LEG: ICD-10-CM

## 2017-11-03 DIAGNOSIS — L03.116 CELLULITIS OF LEFT LEG: Primary | ICD-10-CM

## 2017-11-03 LAB
ALBUMIN SERPL BCP-MCNC: 2.8 G/DL
ALP SERPL-CCNC: 78 U/L
ALT SERPL W/O P-5'-P-CCNC: 14 U/L
ANION GAP SERPL CALC-SCNC: 7 MMOL/L
AST SERPL-CCNC: 10 U/L
BASOPHILS # BLD AUTO: 0.03 K/UL
BASOPHILS NFR BLD: 0.5 %
BILIRUB SERPL-MCNC: 0.7 MG/DL
BUN SERPL-MCNC: 43 MG/DL
CALCIUM SERPL-MCNC: 9.8 MG/DL
CHLORIDE SERPL-SCNC: 106 MMOL/L
CO2 SERPL-SCNC: 30 MMOL/L
CREAT SERPL-MCNC: 1.8 MG/DL
DIFFERENTIAL METHOD: ABNORMAL
EOSINOPHIL # BLD AUTO: 0.1 K/UL
EOSINOPHIL NFR BLD: 1.6 %
ERYTHROCYTE [DISTWIDTH] IN BLOOD BY AUTOMATED COUNT: 17.7 %
EST. GFR  (AFRICAN AMERICAN): 29.2 ML/MIN/1.73 M^2
EST. GFR  (NON AFRICAN AMERICAN): 25.3 ML/MIN/1.73 M^2
GLUCOSE SERPL-MCNC: 85 MG/DL
HCT VFR BLD AUTO: 37.8 %
HGB BLD-MCNC: 11.6 G/DL
IMM GRANULOCYTES # BLD AUTO: 0.12 K/UL
IMM GRANULOCYTES NFR BLD AUTO: 2 %
LYMPHOCYTES # BLD AUTO: 0.8 K/UL
LYMPHOCYTES NFR BLD: 13.4 %
MCH RBC QN AUTO: 30.8 PG
MCHC RBC AUTO-ENTMCNC: 30.7 G/DL
MCV RBC AUTO: 100 FL
MONOCYTES # BLD AUTO: 0.8 K/UL
MONOCYTES NFR BLD: 13.7 %
NEUTROPHILS # BLD AUTO: 4.2 K/UL
NEUTROPHILS NFR BLD: 68.8 %
NRBC BLD-RTO: 0 /100 WBC
PLATELET # BLD AUTO: 222 K/UL
PMV BLD AUTO: 10.8 FL
POTASSIUM SERPL-SCNC: 5.2 MMOL/L
PROT SERPL-MCNC: 6.8 G/DL
RBC # BLD AUTO: 3.77 M/UL
SODIUM SERPL-SCNC: 143 MMOL/L
WBC # BLD AUTO: 6.14 K/UL

## 2017-11-03 PROCEDURE — 99214 OFFICE O/P EST MOD 30 MIN: CPT | Mod: S$GLB,,, | Performed by: PHYSICIAN ASSISTANT

## 2017-11-03 PROCEDURE — 99999 PR PBB SHADOW E&M-EST. PATIENT-LVL III: CPT | Mod: PBBFAC,,, | Performed by: PHYSICIAN ASSISTANT

## 2017-11-03 PROCEDURE — 85025 COMPLETE CBC W/AUTO DIFF WBC: CPT

## 2017-11-03 PROCEDURE — 36415 COLL VENOUS BLD VENIPUNCTURE: CPT | Mod: PO

## 2017-11-03 PROCEDURE — 99499 UNLISTED E&M SERVICE: CPT | Mod: S$GLB,,, | Performed by: PHYSICIAN ASSISTANT

## 2017-11-03 PROCEDURE — 80053 COMPREHEN METABOLIC PANEL: CPT

## 2017-11-03 RX ORDER — DOXYCYCLINE 100 MG/1
CAPSULE ORAL
Refills: 0 | COMMUNITY
Start: 2017-10-24 | End: 2017-11-03 | Stop reason: ALTCHOICE

## 2017-11-03 NOTE — TELEPHONE ENCOUNTER
----- Message from Laureen Hutton sent at 11/3/2017 11:03 AM CDT -----  Contact: Chela with [facility] [phone]  Chela with [facility] [phone] calling to let the office know that the patient is coming in for a hospital follow up today 11/3/17, she was told to hold the fluid pill for 5 days, and she is wanting the office to assess to make sure that she should not be taking it. Contact Chela if she should be taking the fluid pill or not. Please advise. Thanks.

## 2017-11-03 NOTE — TELEPHONE ENCOUNTER
Spoke to Marivel at Wound Care [phone].   Pt had appt with Sergio Cervantes NP today.   She will also see YNES Jack for hospital follow up at 1300 (patient hospitalized Oct 27-30th)    Per Marivel, patient has bilateral cellulitis to both legs as well as cellulitis to her upper, inner thigh. She also reports bilateral weeping and states Sergio Cervantes NP is concerned because patient's diuretic was discontinued x5 days.     Please advise if you would like patient to resume diuretic.

## 2017-11-03 NOTE — PROGRESS NOTES
Subjective:       Patient ID: Nicole Lala is a 85 y.o. female    Chief Complaint: Hospital Follow Up and Recurrent Skin Infections (cellultis bilateral legs)    HPI  Mrs Lala presents today for follow up after her recent hospital admission for cellulitis of her left leg from 10/27/10-10/30/17.  She was supposed to stop her torsemide once discharged for 5 days per hospital medicine but she did not get the message and she has continues taking it.  Her creatinine at discharge on 10/30/2017 was 2.1.  She sees wound care twice a week for her chronic lymphedema.      Review of Systems   Constitutional: Negative for activity change, chills and fever.   HENT: Negative for congestion and sore throat.    Eyes: Negative for visual disturbance.   Respiratory: Negative for cough and shortness of breath.    Cardiovascular: Negative for chest pain and palpitations.   Gastrointestinal: Negative for abdominal pain, diarrhea, nausea and vomiting.   Endocrine: Negative for polydipsia and polyuria.   Musculoskeletal: Negative for myalgias.   Skin: Negative for rash.   Neurological: Negative for headaches.   Psychiatric/Behavioral: The patient is not nervous/anxious.         Objective:   Physical Exam   Constitutional: She is oriented to person, place, and time. She appears well-developed. No distress.   Morbidly obese female in wheel chair   HENT:   Head: Normocephalic and atraumatic.   Eyes: EOM are normal. Pupils are equal, round, and reactive to light.   Neck: Normal range of motion. Neck supple.   Cardiovascular: Normal rate, regular rhythm, normal heart sounds and intact distal pulses.  Exam reveals no gallop and no friction rub.    No murmur heard.  Pulmonary/Chest: Effort normal and breath sounds normal. No respiratory distress. She has no wheezes. She has no rales. She exhibits no tenderness.   Abdominal: Soft. Bowel sounds are normal. She exhibits no distension and no mass. There is no tenderness. There is no  guarding.   Musculoskeletal: She exhibits deformity (severe edema present in LEs, the legs are wrapped).   Neurological: She is alert and oriented to person, place, and time.   Skin: Skin is warm and dry. No rash noted. She is not diaphoretic.   Psychiatric: She has a normal mood and affect. Her behavior is normal. Judgment and thought content normal.        Assessment:       1. Cellulitis of left leg  Comprehensive metabolic panel    CBC auto differential   2. TETO (acute kidney injury)          Plan:       Cellulitis of left leg  -     Comprehensive metabolic panel; Future; Expected date: 11/03/2017  -     CBC auto differential; Future; Expected date: 11/03/2017  -     continue to follow up with wound care    TETO (acute kidney injury)  -     STOP torsemide until cleared by nephrology  -     Follow up with nephrology next week on 11/7/2017

## 2017-11-03 NOTE — TELEPHONE ENCOUNTER
Hold Torsemide until patient sees nephrology, Dr. Muhammad (appt 11/7/17)   Spoke to Chela at [facility] and informed.   Also spoke with Sergio Cervantes NP and informed.

## 2017-11-03 NOTE — TELEPHONE ENCOUNTER
See all below messages.   Chela ([facility] ) states hospitalist held Torsemide x5 days upon discharge secondary to decreased kidney function.     Chela states as of yesterday the patient had not held medication because patient reported she did not know she was ordered to stop diuretic.     Per Chela, because it was ordered, she advised the patient to hold torsemide x5 days until otherwise informed by Dr. Gray (see below message from Wound care nurse)    [facility] scheduled to draw BMP Monday

## 2017-11-03 NOTE — TELEPHONE ENCOUNTER
----- Message from Laureen Hutton sent at 11/3/2017 11:03 AM CDT -----  Contact: Chela with [facility] [phone]  Chela with [facility] [phone] calling to let the office know that the patient is coming in for a hospital follow up today 11/3/17, she was told to hold the fluid pill for 5 days, and she is wanting the office to assess to make sure that she should not be taking it. Contact Chela if she should be taking the fluid pill or not. Please advise. Thanks.

## 2017-11-06 ENCOUNTER — ANTI-COAG VISIT (OUTPATIENT)
Dept: CARDIOLOGY | Facility: CLINIC | Age: 82
End: 2017-11-06

## 2017-11-06 DIAGNOSIS — Z86.718 PERSONAL HISTORY OF DVT (DEEP VEIN THROMBOSIS): ICD-10-CM

## 2017-11-06 LAB — INR PPP: 1.6 (ref 1.8–2.2)

## 2017-11-08 ENCOUNTER — OFFICE VISIT (OUTPATIENT)
Dept: NEPHROLOGY | Facility: CLINIC | Age: 82
End: 2017-11-08
Payer: MEDICARE

## 2017-11-08 VITALS — OXYGEN SATURATION: 96 % | HEART RATE: 84 BPM | DIASTOLIC BLOOD PRESSURE: 66 MMHG | SYSTOLIC BLOOD PRESSURE: 124 MMHG

## 2017-11-08 DIAGNOSIS — N18.30 CKD (CHRONIC KIDNEY DISEASE) STAGE 3, GFR 30-59 ML/MIN: Primary | ICD-10-CM

## 2017-11-08 DIAGNOSIS — I89.0 LYMPHEDEMA: ICD-10-CM

## 2017-11-08 DIAGNOSIS — E87.5 HYPERPOTASSEMIA: ICD-10-CM

## 2017-11-08 PROCEDURE — 99499 UNLISTED E&M SERVICE: CPT | Mod: S$GLB,,, | Performed by: INTERNAL MEDICINE

## 2017-11-08 PROCEDURE — 99999 PR PBB SHADOW E&M-EST. PATIENT-LVL II: CPT | Mod: PBBFAC,,, | Performed by: INTERNAL MEDICINE

## 2017-11-08 PROCEDURE — 99214 OFFICE O/P EST MOD 30 MIN: CPT | Mod: S$GLB,,, | Performed by: INTERNAL MEDICINE

## 2017-11-14 ENCOUNTER — HOSPITAL ENCOUNTER (EMERGENCY)
Facility: HOSPITAL | Age: 82
Discharge: HOME | End: 2017-11-14
Attending: EMERGENCY MEDICINE
Payer: MEDICARE

## 2017-11-14 ENCOUNTER — TELEPHONE (OUTPATIENT)
Dept: ORTHOPEDICS | Facility: CLINIC | Age: 82
End: 2017-11-14

## 2017-11-14 VITALS
OXYGEN SATURATION: 96 % | DIASTOLIC BLOOD PRESSURE: 68 MMHG | HEART RATE: 84 BPM | RESPIRATION RATE: 20 BRPM | TEMPERATURE: 97 F | BODY MASS INDEX: 45.36 KG/M2 | SYSTOLIC BLOOD PRESSURE: 132 MMHG | WEIGHT: 289 LBS | HEIGHT: 67 IN

## 2017-11-14 DIAGNOSIS — R53.83 FATIGUE, UNSPECIFIED TYPE: Primary | ICD-10-CM

## 2017-11-14 DIAGNOSIS — M25.579 ANKLE PAIN: ICD-10-CM

## 2017-11-14 PROCEDURE — 99284 EMERGENCY DEPT VISIT MOD MDM: CPT

## 2017-11-14 NOTE — ED NOTES
Patient back in room 8; 4 people attempted to get patient in van. Per Iván Dong RN patient twisted her ankle when she was attempted to get into van.

## 2017-11-14 NOTE — ED NOTES
Presents to the ER with c/o bilateral lower extremity weakness that started this morning after patient went to wound care for her legs. Patient reports that she was unable to get into her care for one hour after leaving wound care today. Patient left wound care and reports going to have her hair done and did not have any difficulty getting out of car. Patient is SOB with exertion. Mucous membranes are pink and moist. Skin is warm, dry and intact. Lungs are clear bilaterally, respirations are regular and unlabored. Denies cough, congestion, rhinorrhea or SOB. BS active x4, no tenderness with palpation, abd is soft and not distended. Denies any appetite or activity change. S1S2, capillary refill is < 2 seconds. Denies dysuria, difficulty urinating, frequency, numbness, tingling or weakness. MARTIN PITTS

## 2017-11-14 NOTE — ED NOTES
Patient assisted to car via wheelchair by Iván Dong RN.   Upon discharge, patient is AAOx4, no cardiac or respiratory complications. Follow up care reviewed with patient and has been instructed to return to the ER if needed. Patient verbalized understanding and ambulated to the lobby without difficulty. HONG SALAZAR

## 2017-11-14 NOTE — ED PROVIDER NOTES
"Encounter Date: 11/14/2017    SCRIBE #1 NOTE: I, Yanni Perea, am scribing for, and in the presence of, Dr. Mcneill .       History     Chief Complaint   Patient presents with    Fatigue     weakness after wound care / [facility] today       11/14/2017 3:16 PM     Chief complaint: Weakness      Nicole Lala is a 85 y.o. female with a hx of Lymphedema (in BLE), Cerebral aneurysm, HTN, CHF, Cellulitis and abscess of lower extremity, A-fib, Arthritis, and CKD who presents to the ED via EMS with complaints of BLE weakness PTA. Pt states her "legs collapsed" as she attempted for nearly a hour to transport from a wheelchair to her vehicle after leaving wound care. Prior to wound care pt went to a beauty parlor and did not have trouble transporting from the vehicle to her wheelchair. She walks with assistance of a walker but typically uses a wheelchair. Allergens include Penicillin.       The history is provided by the patient.     Review of patient's allergies indicates:   Allergen Reactions    Penicillins Hives     Other reaction(s): Hives     Past Medical History:   Diagnosis Date    A-fib     Acute CHF 9/25/2013    Anticoagulant long-term use     Arthritis     Blood transfusion     Branch retinal vein occlusion of right eye     Cellulitis and abscess of lower extremity     Cerebral aneurysm     S/p repair    CKD (chronic kidney disease)     Followed by Dr. Rey Muhammad    Elevated serum creatinine     HTN (hypertension)     Lymphedema     Macular degeneration - Right Eye 1/31/2013    Nuclear sclerosis - Both Eyes 1/31/2013    Done OU    Squamous cell carcinoma excised 11/16/16    R forearm     Past Surgical History:   Procedure Laterality Date    ABDOMINAL SURGERY      APPENDECTOMY      APPENDECTOMY      CATARACT EXTRACTION W/  INTRAOCULAR LENS IMPLANT Right 04/25/2017    [facility]    CATARACT EXTRACTION W/  INTRAOCULAR LENS IMPLANT Left 06/13/2017    [facility]    CEREBRAL ANEURYSM REPAIR  " 10/16/1994    RIGHT FRONTOTEMPORAL CRANIOTOMY WITH LIPPING OF SUPRACLINOID CAROTID ARTERY ANEURYSM      CEREBRAL ANEURYSM REPAIR  10/27/1994    RIGHT FRONTOTEMPORAL CRANIOTOMY WITH CLIPPING OF RIGHT MIDDLE CEREBRAL ARTERY ANEURYSM     COMPLICATED TOTAL HIP PROSTHESIS AND METHYLMETHACRALATE REMOVAL W/ SPACER INSERTION  8/29/2013    left    EYE SURGERY      Focal laser      OD    ADOLFO FILTER PLACEMENT  3/22/2010    HIP FRACTURE SURGERY  11/2009    left    JOINT REPLACEMENT Left     hip    Patent PI      Both Eye     SKIN BIOPSY Right     foream    TONSILLECTOMY      TOTAL ABDOMINAL HYSTERECTOMY      TOTAL HIP ARTHROPLASTY  3/19/2010    left    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 father    Aneurysm       maternal aunt-Brain    Glaucoma Daughter      Narrow Angle Glaucoma    Hypertension      Amblyopia Neg Hx     Blindness Neg Hx     Cataracts Neg Hx     Macular degeneration Neg Hx     Retinal detachment Neg Hx     Strabismus Neg Hx     Anesthesia problems Neg Hx     Malignant hypertension Neg Hx     Hypotension Neg Hx     Malignant hyperthermia Neg Hx     Pseudochol deficiency Neg Hx     Cancer Neg Hx     Thyroid disease Neg Hx     Melanoma Neg Hx     Psoriasis Neg Hx     Lupus Neg Hx      Social History   Substance Use Topics    Smoking status: Former Smoker     Quit date: 11/7/1992    Smokeless tobacco: Never Used    Alcohol use Yes      Comment: socially     Review of Systems   Constitutional: Negative for fever.   HENT: Negative for sore throat.    Eyes: Negative for redness.   Respiratory: Negative for shortness of breath.    Cardiovascular: Negative for chest pain.   Gastrointestinal: Negative for nausea.   Genitourinary: Negative for dysuria.   Musculoskeletal: Negative for back pain.   Skin: Negative for rash.   Neurological:  Positive for weakness.   Hematological: Does not bruise/bleed easily.       Physical Exam     Vitals:    11/14/17 1509   BP: 129/65   Pulse: 83   Resp: 20   Temp: 97.4 °F (36.3 °C)       Physical Exam    Nursing note and vitals reviewed.  Constitutional: She appears well-developed and well-nourished. She is not diaphoretic. No distress.   HENT:   Head: Normocephalic and atraumatic.   Eyes: EOM are normal. Pupils are equal, round, and reactive to light.   Neck: Normal range of motion. Neck supple.   Cardiovascular: Normal rate, normal heart sounds and intact distal pulses. An irregular rhythm present. Exam reveals no gallop and no friction rub.    No murmur heard.  Pulmonary/Chest: Breath sounds normal. No respiratory distress. She has no wheezes. She has no rhonchi. She has no rales.   Abdominal: Soft. Bowel sounds are normal. There is no tenderness. There is no rebound and no guarding.   Musculoskeletal: Normal range of motion.   Unna boot in place to BLE.    Neurological: She is alert and oriented to person, place, and time.   Skin: Skin is warm and dry.   Psychiatric: She has a normal mood and affect. Her behavior is normal. Judgment and thought content normal.         ED Course   Procedures  Labs Reviewed - No data to display          Medical Decision Making:   Initial Assessment:   85-year-old female presented with some generalized weakness to her legs after sitting in multiple different chairs today.  Differential Diagnosis:   Initial differential diagnosis included but not limited to deconditioning , and leg weakness.  ED Management:  The patient was urgently evaluated in the emergency Department, her evaluation was significant for an elderly female with dressings in place to bilateral lower extremities.  There is no evidence of acute infection or DVT.  The etiology the patient's weakness is likely some deconditioning and her sitting in a chair all day.  She is stable for discharge to home.  She is to  follow-up with her PCP for further care.  There is no need for further workup at this time.    During discharge the patient required multiple attempts with being helped with multiple different people from the wheelchair to her vehicle.  After the first attempt was unsuccessful, the patient was brought back into the emergency Department and reevaluated because of some mild twisting in her ankle.  Her x-rays showed no acute bony abnormalities per my independent interpretation.  The patient was offered admission for PT evaluation and treatment of her leg weakness multiple times, but refused admission at this time saying that she wanted to go home.  A second attempt to get the patient into the vehicle with assistance was successful and the patient left the property in her vehicle.            Scribe Attestation:   Scribe #1: I performed the above scribed service and the documentation accurately describes the services I performed. I attest to the accuracy of the note.    I, Dr. Krunal Mcneill, personally performed the services described in this documentation. All medical record entries made by the scribe were at my direction and in my presence.  I have reviewed the chart and agree that the record reflects my personal performance and is accurate and complete. Krunal Mcneill MD.  8:05 PM 11/14/2017          ED Course      Clinical Impression:   No diagnosis found.                           Krunal Mcneill MD  11/14/17 2009

## 2017-11-14 NOTE — ED NOTES
Patient has been updated on plan of care and results. No needs or questions at this time. Patient and  are deciding on admission.

## 2017-11-15 ENCOUNTER — TELEPHONE (OUTPATIENT)
Dept: FAMILY MEDICINE | Facility: CLINIC | Age: 82
End: 2017-11-15

## 2017-11-15 NOTE — TELEPHONE ENCOUNTER
Spoke to Chela and states pt goes to wound care 2x weekly. States when she left wound care yesterday she could not get her legs up to get  Her into car. States her legs felt like cement. Sent patient to ER at [facility]. Assessed patient and they did not find anything wrong. Chela saw patient this morning and states patient had to sleep in her wheelchair because she could not transfer. Chela is very concerned because of the decline and the quickness of this issue. Vitals signs are normal. Pt states that at the hospital they did not do much, no labs,etc. States they did an x-ray on foot because she was having some foot pain. Chela states therapist will go out to patient's home tomorrow to see her. Just wanted  to be aware of this and see if he has any other advice on what to do?

## 2017-11-15 NOTE — TELEPHONE ENCOUNTER
Ortho Telephone Triage Follow Up Call [phone]  Spoke with pt and advised that 11/21/17 Ortho appt scheduled with HAYDE Mcgrath NP  has been rescheduled -per her review - to Dr. Montgomery's first available appt on 1/3/17 at 2:45pm. Advised pt that her appt with Dr. Montgomery has been placed on Wait List and that she will be notified should an earlier appt date become available due to a cancellation. Pt state understanding. Appt slip mailed and pt is active in [facility] for appt information.

## 2017-11-15 NOTE — ED NOTES
Upon discharge, patient is AAOx4, no cardiac or respiratory complications. Follow up care reviewed with patient and has been instructed to return to the ER if needed. Patient verbalized understanding and ambulated to the lobby without difficulty. HONG SALAZAR

## 2017-11-15 NOTE — TELEPHONE ENCOUNTER
----- Message from Saray Gar sent at 11/15/2017  3:43 PM CST -----  Contact: Grace- [facility]  Weakness to lower extremities. Went to ER and they sent her home. Her symptoms have not changed. Please call [phone] thanks!

## 2017-11-16 NOTE — TELEPHONE ENCOUNTER
Unfortunately only treatment is aggressive elevation and wrapping of her legs related to treatment of her lymphedema

## 2017-12-21 ENCOUNTER — HOSPITAL ENCOUNTER (INPATIENT)
Facility: HOSPITAL | Age: 82
LOS: 9 days | Discharge: HOSPICE | DRG: 291 | End: 2017-12-30
Attending: EMERGENCY MEDICINE | Admitting: HOSPITALIST
Payer: MEDICARE

## 2017-12-21 DIAGNOSIS — R53.83 FATIGUE: ICD-10-CM

## 2017-12-21 DIAGNOSIS — M25.561 PAIN IN BOTH KNEES, UNSPECIFIED CHRONICITY: Primary | ICD-10-CM

## 2017-12-21 DIAGNOSIS — J96.02 ACUTE RESPIRATORY FAILURE WITH HYPOXIA AND HYPERCARBIA: Primary | ICD-10-CM

## 2017-12-21 DIAGNOSIS — M25.562 PAIN IN BOTH KNEES, UNSPECIFIED CHRONICITY: Primary | ICD-10-CM

## 2017-12-21 DIAGNOSIS — J96.01 ACUTE RESPIRATORY FAILURE WITH HYPOXIA AND HYPERCARBIA: Primary | ICD-10-CM

## 2017-12-21 DIAGNOSIS — I48.91 ATRIAL FIBRILLATION: ICD-10-CM

## 2017-12-21 PROBLEM — J96.11 CHRONIC RESPIRATORY FAILURE WITH HYPOXIA: Status: RESOLVED | Noted: 2017-10-27 | Resolved: 2017-12-21

## 2017-12-21 PROBLEM — J96.21 ACUTE ON CHRONIC RESPIRATORY FAILURE WITH HYPOXIA: Status: RESOLVED | Noted: 2017-09-12 | Resolved: 2017-12-21

## 2017-12-21 PROBLEM — I50.31 ACUTE DIASTOLIC HEART FAILURE: Status: RESOLVED | Noted: 2017-09-13 | Resolved: 2017-12-21

## 2017-12-21 PROBLEM — I50.33 ACUTE ON CHRONIC DIASTOLIC HEART FAILURE: Status: ACTIVE | Noted: 2017-12-21

## 2017-12-21 PROBLEM — I50.32 CHRONIC DIASTOLIC HEART FAILURE: Status: ACTIVE | Noted: 2017-12-21

## 2017-12-21 PROBLEM — N39.0 URINARY TRACT INFECTION WITHOUT HEMATURIA: Status: ACTIVE | Noted: 2017-12-21

## 2017-12-21 PROBLEM — R79.1 SUPRATHERAPEUTIC INR: Status: ACTIVE | Noted: 2017-12-21

## 2017-12-21 PROBLEM — J44.1 COPD EXACERBATION: Status: RESOLVED | Noted: 2017-09-18 | Resolved: 2017-12-21

## 2017-12-21 PROBLEM — N17.9 AKI (ACUTE KIDNEY INJURY): Status: RESOLVED | Noted: 2017-09-18 | Resolved: 2017-12-21

## 2017-12-21 PROBLEM — J18.9 CAP (COMMUNITY ACQUIRED PNEUMONIA): Status: RESOLVED | Noted: 2017-09-11 | Resolved: 2017-12-21

## 2017-12-21 LAB
ALBUMIN SERPL BCP-MCNC: 2.9 G/DL
ALLENS TEST: ABNORMAL
ALLENS TEST: ABNORMAL
ALP SERPL-CCNC: 110 U/L
ALT SERPL W/O P-5'-P-CCNC: 39 U/L
ANION GAP SERPL CALC-SCNC: 10 MMOL/L
ANION GAP SERPL CALC-SCNC: 8 MMOL/L
ANISOCYTOSIS BLD QL SMEAR: SLIGHT
AST SERPL-CCNC: 30 U/L
BACTERIA #/AREA URNS HPF: ABNORMAL /HPF
BASOPHILS # BLD AUTO: 0 K/UL
BASOPHILS NFR BLD: 0.3 %
BILIRUB SERPL-MCNC: 0.9 MG/DL
BILIRUB UR QL STRIP: NEGATIVE
BILIRUB UR QL STRIP: NEGATIVE
BNP SERPL-MCNC: 1727 PG/ML
BUN SERPL-MCNC: 79 MG/DL
BUN SERPL-MCNC: 84 MG/DL
CALCIUM SERPL-MCNC: 8.8 MG/DL
CALCIUM SERPL-MCNC: 9.5 MG/DL
CHLORIDE SERPL-SCNC: 104 MMOL/L
CHLORIDE SERPL-SCNC: 107 MMOL/L
CLARITY UR: ABNORMAL
CLARITY UR: ABNORMAL
CO2 SERPL-SCNC: 32 MMOL/L
CO2 SERPL-SCNC: 36 MMOL/L
COLOR UR: YELLOW
COLOR UR: YELLOW
CREAT SERPL-MCNC: 1.9 MG/DL
CREAT SERPL-MCNC: 2.1 MG/DL
DELSYS: ABNORMAL
DELSYS: ABNORMAL
DIFFERENTIAL METHOD: ABNORMAL
EOSINOPHIL # BLD AUTO: 0.1 K/UL
EOSINOPHIL NFR BLD: 0.7 %
EP: 6
ERYTHROCYTE [DISTWIDTH] IN BLOOD BY AUTOMATED COUNT: 18.1 %
ERYTHROCYTE [SEDIMENTATION RATE] IN BLOOD BY WESTERGREN METHOD: 8 MM/H
EST. GFR  (AFRICAN AMERICAN): 24 ML/MIN/1.73 M^2
EST. GFR  (AFRICAN AMERICAN): 27 ML/MIN/1.73 M^2
EST. GFR  (NON AFRICAN AMERICAN): 21 ML/MIN/1.73 M^2
EST. GFR  (NON AFRICAN AMERICAN): 24 ML/MIN/1.73 M^2
FIO2: 100
FIO2: 50
FLOW: 15
GLUCOSE SERPL-MCNC: 128 MG/DL
GLUCOSE SERPL-MCNC: 129 MG/DL
GLUCOSE UR QL STRIP: NEGATIVE
GLUCOSE UR QL STRIP: NEGATIVE
HCO3 UR-SCNC: 36.2 MMOL/L (ref 24–28)
HCO3 UR-SCNC: 39.3 MMOL/L (ref 24–28)
HCT VFR BLD AUTO: 37.6 %
HGB BLD-MCNC: 11.9 G/DL
HGB UR QL STRIP: ABNORMAL
HGB UR QL STRIP: ABNORMAL
INR PPP: >10
INR PPP: >10
IP: 12
KETONES UR QL STRIP: NEGATIVE
KETONES UR QL STRIP: NEGATIVE
LACTATE SERPL-SCNC: 1.4 MMOL/L
LEUKOCYTE ESTERASE UR QL STRIP: ABNORMAL
LEUKOCYTE ESTERASE UR QL STRIP: ABNORMAL
LYMPHOCYTES # BLD AUTO: 0.9 K/UL
LYMPHOCYTES NFR BLD: 11.4 %
MCH RBC QN AUTO: 30.6 PG
MCHC RBC AUTO-ENTMCNC: 31.6 G/DL
MCV RBC AUTO: 97 FL
MICROSCOPIC COMMENT: ABNORMAL
MODE: ABNORMAL
MODE: ABNORMAL
MONOCYTES # BLD AUTO: 0.9 K/UL
MONOCYTES NFR BLD: 11.2 %
NEUTROPHILS # BLD AUTO: 6.1 K/UL
NEUTROPHILS NFR BLD: 76.4 %
NITRITE UR QL STRIP: POSITIVE
NITRITE UR QL STRIP: POSITIVE
OVALOCYTES BLD QL SMEAR: ABNORMAL
PCO2 BLDA: 70.6 MMHG (ref 35–45)
PCO2 BLDA: 70.7 MMHG (ref 35–45)
PH SMN: 7.32 [PH] (ref 7.35–7.45)
PH SMN: 7.35 [PH] (ref 7.35–7.45)
PH UR STRIP: 6 [PH] (ref 5–8)
PH UR STRIP: 6 [PH] (ref 5–8)
PLATELET # BLD AUTO: 168 K/UL
PLATELET BLD QL SMEAR: ABNORMAL
PMV BLD AUTO: 10.6 FL
PO2 BLDA: 178 MMHG (ref 80–100)
PO2 BLDA: 97 MMHG (ref 80–100)
POC BE: 10 MMOL/L
POC BE: 14 MMOL/L
POC SATURATED O2: 97 % (ref 95–100)
POC SATURATED O2: 99 % (ref 95–100)
POC TCO2: 38 MMOL/L (ref 23–27)
POC TCO2: 41 MMOL/L (ref 23–27)
POCT GLUCOSE: 111 MG/DL (ref 70–110)
POCT GLUCOSE: 31 MG/DL (ref 70–110)
POIKILOCYTOSIS BLD QL SMEAR: SLIGHT
POTASSIUM SERPL-SCNC: 4 MMOL/L
POTASSIUM SERPL-SCNC: 4.1 MMOL/L
PROT SERPL-MCNC: 6.4 G/DL
PROT UR QL STRIP: NEGATIVE
PROT UR QL STRIP: NEGATIVE
PROTHROMBIN TIME: >100 SEC
PROTHROMBIN TIME: >100 SEC
RBC # BLD AUTO: 3.88 M/UL
RBC #/AREA URNS HPF: 5 /HPF (ref 0–4)
SAMPLE: ABNORMAL
SAMPLE: ABNORMAL
SITE: ABNORMAL
SITE: ABNORMAL
SODIUM SERPL-SCNC: 148 MMOL/L
SODIUM SERPL-SCNC: 149 MMOL/L
SP GR UR STRIP: 1.01 (ref 1–1.03)
SP GR UR STRIP: 1.01 (ref 1–1.03)
SPONT RATE: 11
TROPONIN I SERPL DL<=0.01 NG/ML-MCNC: 0.04 NG/ML
TROPONIN I SERPL DL<=0.01 NG/ML-MCNC: 0.04 NG/ML
TSH SERPL DL<=0.005 MIU/L-ACNC: 1.37 UIU/ML
URN SPEC COLLECT METH UR: ABNORMAL
URN SPEC COLLECT METH UR: ABNORMAL
UROBILINOGEN UR STRIP-ACNC: NEGATIVE EU/DL
UROBILINOGEN UR STRIP-ACNC: NEGATIVE EU/DL
WBC # BLD AUTO: 7.9 K/UL
WBC #/AREA URNS HPF: >100 /HPF (ref 0–5)

## 2017-12-21 PROCEDURE — 84484 ASSAY OF TROPONIN QUANT: CPT | Mod: 91

## 2017-12-21 PROCEDURE — 85610 PROTHROMBIN TIME: CPT

## 2017-12-21 PROCEDURE — 83880 ASSAY OF NATRIURETIC PEPTIDE: CPT

## 2017-12-21 PROCEDURE — 63600175 PHARM REV CODE 636 W HCPCS: Performed by: HOSPITALIST

## 2017-12-21 PROCEDURE — 82962 GLUCOSE BLOOD TEST: CPT

## 2017-12-21 PROCEDURE — 93010 ELECTROCARDIOGRAM REPORT: CPT | Mod: ,,, | Performed by: INTERNAL MEDICINE

## 2017-12-21 PROCEDURE — 27000190 HC CPAP FULL FACE MASK W/VALVE

## 2017-12-21 PROCEDURE — 20000000 HC ICU ROOM

## 2017-12-21 PROCEDURE — 81000 URINALYSIS NONAUTO W/SCOPE: CPT

## 2017-12-21 PROCEDURE — 87077 CULTURE AEROBIC IDENTIFY: CPT

## 2017-12-21 PROCEDURE — 96372 THER/PROPH/DIAG INJ SC/IM: CPT

## 2017-12-21 PROCEDURE — 87186 SC STD MICRODIL/AGAR DIL: CPT

## 2017-12-21 PROCEDURE — 63600175 PHARM REV CODE 636 W HCPCS: Performed by: NURSE PRACTITIONER

## 2017-12-21 PROCEDURE — 96361 HYDRATE IV INFUSION ADD-ON: CPT

## 2017-12-21 PROCEDURE — P9612 CATHETERIZE FOR URINE SPEC: HCPCS

## 2017-12-21 PROCEDURE — 94660 CPAP INITIATION&MGMT: CPT

## 2017-12-21 PROCEDURE — 84443 ASSAY THYROID STIM HORMONE: CPT

## 2017-12-21 PROCEDURE — 99291 CRITICAL CARE FIRST HOUR: CPT | Mod: 25

## 2017-12-21 PROCEDURE — 80048 BASIC METABOLIC PNL TOTAL CA: CPT

## 2017-12-21 PROCEDURE — 82803 BLOOD GASES ANY COMBINATION: CPT

## 2017-12-21 PROCEDURE — 25000003 PHARM REV CODE 250: Performed by: NURSE PRACTITIONER

## 2017-12-21 PROCEDURE — 25000003 PHARM REV CODE 250: Performed by: EMERGENCY MEDICINE

## 2017-12-21 PROCEDURE — 84484 ASSAY OF TROPONIN QUANT: CPT

## 2017-12-21 PROCEDURE — 36415 COLL VENOUS BLD VENIPUNCTURE: CPT

## 2017-12-21 PROCEDURE — 96375 TX/PRO/DX INJ NEW DRUG ADDON: CPT

## 2017-12-21 PROCEDURE — 85610 PROTHROMBIN TIME: CPT | Mod: 91

## 2017-12-21 PROCEDURE — 87088 URINE BACTERIA CULTURE: CPT

## 2017-12-21 PROCEDURE — 25000003 PHARM REV CODE 250: Performed by: HOSPITALIST

## 2017-12-21 PROCEDURE — 63600175 PHARM REV CODE 636 W HCPCS: Performed by: EMERGENCY MEDICINE

## 2017-12-21 PROCEDURE — S5010 5% DEXTROSE AND 0.45% SALINE: HCPCS | Performed by: HOSPITALIST

## 2017-12-21 PROCEDURE — 93005 ELECTROCARDIOGRAM TRACING: CPT

## 2017-12-21 PROCEDURE — 80053 COMPREHEN METABOLIC PANEL: CPT

## 2017-12-21 PROCEDURE — 99900035 HC TECH TIME PER 15 MIN (STAT)

## 2017-12-21 PROCEDURE — 87086 URINE CULTURE/COLONY COUNT: CPT

## 2017-12-21 PROCEDURE — 36600 WITHDRAWAL OF ARTERIAL BLOOD: CPT

## 2017-12-21 PROCEDURE — 96365 THER/PROPH/DIAG IV INF INIT: CPT

## 2017-12-21 PROCEDURE — 83605 ASSAY OF LACTIC ACID: CPT

## 2017-12-21 PROCEDURE — 85025 COMPLETE CBC W/AUTO DIFF WBC: CPT

## 2017-12-21 RX ORDER — FUROSEMIDE 40 MG/1
40 TABLET ORAL DAILY
Status: ON HOLD | COMMUNITY
End: 2017-12-30

## 2017-12-21 RX ORDER — ARFORMOTEROL TARTRATE 15 UG/2ML
15 SOLUTION RESPIRATORY (INHALATION) 2 TIMES DAILY
Status: ON HOLD | COMMUNITY
End: 2017-12-30 | Stop reason: HOSPADM

## 2017-12-21 RX ORDER — ALPRAZOLAM 0.5 MG/1
0.5 TABLET ORAL NIGHTLY
COMMUNITY

## 2017-12-21 RX ORDER — ATENOLOL 50 MG/1
50 TABLET ORAL 2 TIMES DAILY
Status: ON HOLD | COMMUNITY
End: 2017-12-30 | Stop reason: HOSPADM

## 2017-12-21 RX ORDER — PHYTONADIONE 5 MG/1
5 TABLET ORAL
Status: DISCONTINUED | OUTPATIENT
Start: 2017-12-21 | End: 2017-12-21

## 2017-12-21 RX ORDER — PHYTONADIONE 10 MG/ML
5 INJECTION, EMULSION INTRAMUSCULAR; INTRAVENOUS; SUBCUTANEOUS ONCE
Status: COMPLETED | OUTPATIENT
Start: 2017-12-21 | End: 2017-12-21

## 2017-12-21 RX ORDER — DILTIAZEM HYDROCHLORIDE 5 MG/ML
25 INJECTION INTRAVENOUS
Status: COMPLETED | OUTPATIENT
Start: 2017-12-21 | End: 2017-12-21

## 2017-12-21 RX ORDER — ALPRAZOLAM 0.25 MG/1
0.25 TABLET ORAL DAILY PRN
Status: ON HOLD | COMMUNITY
End: 2017-12-29 | Stop reason: HOSPADM

## 2017-12-21 RX ORDER — MORPHINE SULFATE 4 MG/ML
2 INJECTION, SOLUTION INTRAMUSCULAR; INTRAVENOUS EVERY 4 HOURS PRN
Status: DISCONTINUED | OUTPATIENT
Start: 2017-12-21 | End: 2017-12-30 | Stop reason: HOSPADM

## 2017-12-21 RX ORDER — METOPROLOL TARTRATE 1 MG/ML
2.5 INJECTION, SOLUTION INTRAVENOUS EVERY 6 HOURS PRN
Status: DISCONTINUED | OUTPATIENT
Start: 2017-12-21 | End: 2017-12-28

## 2017-12-21 RX ORDER — LACTULOSE 10 G/15ML
20 SOLUTION ORAL; RECTAL 2 TIMES DAILY PRN
COMMUNITY

## 2017-12-21 RX ORDER — DILTIAZEM HYDROCHLORIDE 5 MG/ML
0.25 INJECTION INTRAVENOUS ONCE
Status: COMPLETED | OUTPATIENT
Start: 2017-12-21 | End: 2017-12-21

## 2017-12-21 RX ORDER — DOCUSATE SODIUM 100 MG/1
100 CAPSULE, LIQUID FILLED ORAL 2 TIMES DAILY
Status: ON HOLD | COMMUNITY
End: 2017-12-30

## 2017-12-21 RX ORDER — FUROSEMIDE 10 MG/ML
60 INJECTION INTRAMUSCULAR; INTRAVENOUS ONCE
Status: COMPLETED | OUTPATIENT
Start: 2017-12-21 | End: 2017-12-21

## 2017-12-21 RX ORDER — DEXTROSE MONOHYDRATE AND SODIUM CHLORIDE 5; .45 G/100ML; G/100ML
INJECTION, SOLUTION INTRAVENOUS CONTINUOUS
Status: DISCONTINUED | OUTPATIENT
Start: 2017-12-21 | End: 2017-12-22

## 2017-12-21 RX ORDER — PANTOPRAZOLE SODIUM 40 MG/10ML
40 INJECTION, POWDER, LYOPHILIZED, FOR SOLUTION INTRAVENOUS DAILY
Status: DISCONTINUED | OUTPATIENT
Start: 2017-12-22 | End: 2017-12-29

## 2017-12-21 RX ORDER — MORPHINE SULFATE 4 MG/ML
4 INJECTION, SOLUTION INTRAMUSCULAR; INTRAVENOUS EVERY 4 HOURS PRN
Status: DISCONTINUED | OUTPATIENT
Start: 2017-12-21 | End: 2017-12-30 | Stop reason: HOSPADM

## 2017-12-21 RX ORDER — NAPROXEN SODIUM 220 MG/1
81 TABLET, FILM COATED ORAL DAILY
COMMUNITY

## 2017-12-21 RX ORDER — IPRATROPIUM BROMIDE AND ALBUTEROL SULFATE 2.5; .5 MG/3ML; MG/3ML
3 SOLUTION RESPIRATORY (INHALATION) EVERY 4 HOURS PRN
Status: DISCONTINUED | OUTPATIENT
Start: 2017-12-21 | End: 2017-12-30 | Stop reason: HOSPADM

## 2017-12-21 RX ORDER — TALC
6 POWDER (GRAM) TOPICAL NIGHTLY
COMMUNITY

## 2017-12-21 RX ORDER — ONDANSETRON 2 MG/ML
8 INJECTION INTRAMUSCULAR; INTRAVENOUS EVERY 6 HOURS PRN
Status: DISCONTINUED | OUTPATIENT
Start: 2017-12-21 | End: 2017-12-30 | Stop reason: HOSPADM

## 2017-12-21 RX ORDER — HEPARIN SODIUM 5000 [USP'U]/ML
7500 INJECTION, SOLUTION INTRAVENOUS; SUBCUTANEOUS EVERY 8 HOURS
Status: CANCELLED | OUTPATIENT
Start: 2017-12-21

## 2017-12-21 RX ORDER — SODIUM CHLORIDE 0.9 % (FLUSH) 0.9 %
3 SYRINGE (ML) INJECTION
Status: DISCONTINUED | OUTPATIENT
Start: 2017-12-21 | End: 2017-12-30 | Stop reason: HOSPADM

## 2017-12-21 RX ORDER — MORPHINE SULFATE 2 MG/ML
2 INJECTION, SOLUTION INTRAMUSCULAR; INTRAVENOUS EVERY 4 HOURS PRN
Status: DISCONTINUED | OUTPATIENT
Start: 2017-12-21 | End: 2017-12-21 | Stop reason: RX

## 2017-12-21 RX ORDER — WARFARIN SODIUM 5 MG/1
5 TABLET ORAL DAILY
Status: ON HOLD | COMMUNITY
End: 2017-12-30 | Stop reason: HOSPADM

## 2017-12-21 RX ORDER — MAGNESIUM HYDROXIDE 2400 MG/10ML
10 SUSPENSION ORAL 2 TIMES DAILY PRN
Status: ON HOLD | COMMUNITY
End: 2017-12-30

## 2017-12-21 RX ADMIN — DILTIAZEM HYDROCHLORIDE 25 MG: 5 INJECTION INTRAVENOUS at 01:12

## 2017-12-21 RX ADMIN — PHYTONADIONE 5 MG: 10 INJECTION, EMULSION INTRAMUSCULAR; INTRAVENOUS; SUBCUTANEOUS at 02:12

## 2017-12-21 RX ADMIN — SODIUM CHLORIDE 500 ML: 900 INJECTION, SOLUTION INTRAVENOUS at 02:12

## 2017-12-21 RX ADMIN — DEXTROSE MONOHYDRATE AND SODIUM CHLORIDE: 5; .45 INJECTION, SOLUTION INTRAVENOUS at 06:12

## 2017-12-21 RX ADMIN — FUROSEMIDE 60 MG: 10 INJECTION, SOLUTION INTRAVENOUS at 06:12

## 2017-12-21 RX ADMIN — DILTIAZEM HYDROCHLORIDE 33 MG: 5 INJECTION INTRAVENOUS at 07:12

## 2017-12-21 RX ADMIN — METOPROLOL TARTRATE 2.5 MG: 5 INJECTION, SOLUTION INTRAVENOUS at 11:12

## 2017-12-21 RX ADMIN — PHYTONADIONE 5 MG: 10 INJECTION, EMULSION INTRAMUSCULAR; INTRAVENOUS; SUBCUTANEOUS at 09:12

## 2017-12-21 RX ADMIN — CEFTRIAXONE SODIUM 1 G: 1 INJECTION, POWDER, FOR SOLUTION INTRAMUSCULAR; INTRAVENOUS at 03:12

## 2017-12-21 RX ADMIN — MORPHINE SULFATE 4 MG: 4 INJECTION INTRAVENOUS at 09:12

## 2017-12-21 NOTE — PROGRESS NOTES
Results for SANJIV LOMAS (MRN [id number]) as of 12/21/2017 14:24   Ref. Range 12/21/2017 14:01   POC PH Latest Ref Range: 7.35 - 7.45  7.354   POC PCO2 Latest Ref Range: 35 - 45 mmHg 70.6 (HH)   POC PO2 Latest Ref Range: 80 - 100 mmHg 178 (H)   POC BE Latest Ref Range: -2 to 2 mmol/L 14   POC HCO3 Latest Ref Range: 24 - 28 mmol/L 39.3 (H)   POC SATURATED O2 Latest Ref Range: 95 - 100 % 99   POC TCO2 Latest Ref Range: 23 - 27 mmol/L 41 (H)   FiO2 Unknown 100   Flow Unknown 15   Sample Unknown ARTERIAL   DelSys Unknown NRB   Allens Test Unknown Pass   Site Unknown RR   Mode Unknown SPONT

## 2017-12-21 NOTE — ED PROVIDER NOTES
Encounter Date: 12/21/2017    SCRIBE #1 NOTE: I, Delilah Valle, am scribing for, and in the presence of, Dr. Harvey.       History     Chief Complaint   Patient presents with    Fatigue       12/21/2017  12:10 PM    Chief Complaint: Fatigue      The patient is a 85 y.o. female who presents with fatigue. She states she knows she is in the ER for her kidneys. Pt is falling asleep during the exam and is unable to provide more information or a timeline of events. Denies recent fall. PMHx includes cerebral aneurysm, HTN, CHF, lymphedema, A-fib, long-term use of anticoagulants, and CKD. PSHx includes hysterectomy, appendectomy, cerebral aneurysm repair, ventriculoperitoneal shunt, sloane filter placement.         The history is provided by the patient. The history is limited by the condition of the patient (Pt is falling asleep during the exam.).     Review of patient's allergies indicates:   Allergen Reactions    Penicillins Hives     Other reaction(s): Hives     Past Medical History:   Diagnosis Date    A-fib     Acute CHF 9/25/2013    Anticoagulant long-term use     Arthritis     Blood transfusion     Branch retinal vein occlusion of right eye     Cellulitis and abscess of lower extremity     Cerebral aneurysm     S/p repair    CKD (chronic kidney disease)     Followed by Dr. Rey Muhammad    Elevated serum creatinine     HTN (hypertension)     Lymphedema     Macular degeneration - Right Eye 1/31/2013    Nuclear sclerosis - Both Eyes 1/31/2013    Done OU    Squamous cell carcinoma excised 11/16/16    R forearm     Past Surgical History:   Procedure Laterality Date    ABDOMINAL SURGERY      APPENDECTOMY      APPENDECTOMY      CATARACT EXTRACTION W/  INTRAOCULAR LENS IMPLANT Right 04/25/2017    Rupali    CATARACT EXTRACTION W/  INTRAOCULAR LENS IMPLANT Left 06/13/2017    Rupali    CEREBRAL ANEURYSM REPAIR  10/16/1994    RIGHT FRONTOTEMPORAL CRANIOTOMY WITH LIPPING OF SUPRACLINOID CAROTID  ARTERY ANEURYSM      CEREBRAL ANEURYSM REPAIR  10/27/1994    RIGHT FRONTOTEMPORAL CRANIOTOMY WITH CLIPPING OF RIGHT MIDDLE CEREBRAL ARTERY ANEURYSM     COMPLICATED TOTAL HIP PROSTHESIS AND METHYLMETHACRALATE REMOVAL W/ SPACER INSERTION  8/29/2013    left    EYE SURGERY      Focal laser      OD    ADOLFO FILTER PLACEMENT  3/22/2010    HIP FRACTURE SURGERY  11/2009    left    JOINT REPLACEMENT Left     hip    Patent PI      Both Eye     SKIN BIOPSY Right     foream    TONSILLECTOMY      TOTAL ABDOMINAL HYSTERECTOMY      TOTAL HIP ARTHROPLASTY  3/19/2010    left    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 father    Aneurysm       maternal aunt-Brain    Glaucoma Daughter      Narrow Angle Glaucoma    Hypertension      Amblyopia Neg Hx     Blindness Neg Hx     Cataracts Neg Hx     Macular degeneration Neg Hx     Retinal detachment Neg Hx     Strabismus Neg Hx     Anesthesia problems Neg Hx     Malignant hypertension Neg Hx     Hypotension Neg Hx     Malignant hyperthermia Neg Hx     Pseudochol deficiency Neg Hx     Cancer Neg Hx     Thyroid disease Neg Hx     Melanoma Neg Hx     Psoriasis Neg Hx     Lupus Neg Hx      Social History   Substance Use Topics    Smoking status: Former Smoker     Quit date: 11/7/1992    Smokeless tobacco: Never Used    Alcohol use Yes      Comment: socially     Review of Systems   Unable to perform ROS: Patient unresponsive (Pt falling asleep during the exam.)   Constitutional: Positive for fatigue.       Physical Exam     Initial Vitals [12/21/17 1150]   BP Pulse Resp Temp SpO2   128/80 88 18 97.7 °F (36.5 °C) (!) 91 %      MAP       96         Physical Exam    Nursing note and vitals reviewed.  Constitutional: She appears well-developed and well-nourished. She appears lethargic. She is not diaphoretic.  Non-toxic  appearance. She has a sickly appearance. She appears ill. No distress.   HENT:   Head: Normocephalic.   Right-sided facial bruising.   Eyes: EOM are normal. Right conjunctiva has a hemorrhage.   Neck: Normal range of motion. Neck supple.   Cardiovascular: Normal heart sounds. An irregularly irregular rhythm present. Tachycardia present.  Exam reveals no gallop and no friction rub.    No murmur heard.  Swollen BLE with venous congestion.   Pulmonary/Chest: Breath sounds normal. No respiratory distress. She has no wheezes. She has no rhonchi. She has no rales.   Abdominal: Soft. Bowel sounds are normal. She exhibits no distension. There is no tenderness.   Musculoskeletal: Normal range of motion.   Neurological: She appears lethargic. She displays no tremor.   Skin: Skin is warm and dry.         ED Course   US - ED Performed - IV  Date/Time: 12/21/2017 1:07 PM  Performed by: RAJ MATTHEW  Authorized by: RAJ MATTHEW   Comments: Right brachial IV under us 20ga by MD    Critical Care  Date/Time: 12/21/2017 11:41 PM  Performed by: RAJ MATTHEW  Authorized by: DIMITRIOS ZIMMERMAN   Direct patient critical care time: 35 minutes  Additional history critical care time: 20 minutes  Ordering / reviewing critical care time: 15 minutes  Documentation critical care time: 15 minutes  Consulting other physicians critical care time: 8 minutes  Consult with family critical care time: 13 minutes  Total critical care time (exclusive of procedural time) : 106 minutes  Critical care was necessary to treat or prevent imminent or life-threatening deterioration of the following conditions: elevated INR, CO2 narcosis afib with rvr.  Critical care was time spent personally by me on the following activities: review of old charts, pulse oximetry, vascular access procedures, ordering and review of laboratory studies, obtaining history from patient or surrogate, evaluation of patient's response to treatment, discussions with  consultants, development of treatment plan with patient or surrogate, examination of patient, ordering and performing treatments and interventions, ordering and review of radiographic studies, re-evaluation of patient's condition and ventilator management.        Labs Reviewed   CBC W/ AUTO DIFFERENTIAL - Abnormal; Notable for the following:        Result Value    RBC 3.88 (*)     Hemoglobin 11.9 (*)     MCHC 31.6 (*)     RDW 18.1 (*)     Lymph # 0.9 (*)     Gran% 76.4 (*)     Lymph% 11.4 (*)     All other components within normal limits   COMPREHENSIVE METABOLIC PANEL - Abnormal; Notable for the following:     Sodium 148 (*)     CO2 36 (*)     Glucose 128 (*)     BUN, Bld 84 (*)     Creatinine 2.1 (*)     Albumin 2.9 (*)     eGFR if  24 (*)     eGFR if non  21 (*)     All other components within normal limits   URINALYSIS - Abnormal; Notable for the following:     Appearance, UA Cloudy (*)     Occult Blood UA 2+ (*)     Nitrite, UA Positive (*)     Leukocytes, UA 3+ (*)     All other components within normal limits   URINALYSIS - Abnormal; Notable for the following:     Appearance, UA Cloudy (*)     Occult Blood UA 2+ (*)     Nitrite, UA Positive (*)     Leukocytes, UA 3+ (*)     All other components within normal limits   PROTIME-INR - Abnormal; Notable for the following:     Prothrombin Time >100.0 (*)     INR >10.0 (*)     All other components within normal limits    Narrative:      PT/INR  critical result(s) called and verbal readback obtained from   DR MATTHEW., 12/21/2017 14:18   TROPONIN I - Abnormal; Notable for the following:     Troponin I 0.041 (*)     All other components within normal limits   B-TYPE NATRIURETIC PEPTIDE - Abnormal; Notable for the following:     BNP 1,727 (*)     All other components within normal limits   URINALYSIS MICROSCOPIC - Abnormal; Notable for the following:     RBC, UA 5 (*)     WBC, UA >100 (*)     Bacteria, UA Many (*)     All other  components within normal limits   POCT GLUCOSE - Abnormal; Notable for the following:     POCT Glucose 31 (*)     All other components within normal limits   POCT GLUCOSE - Abnormal; Notable for the following:     POCT Glucose 111 (*)     All other components within normal limits   ISTAT PROCEDURE - Abnormal; Notable for the following:     POC PCO2 70.6 (*)     POC PO2 178 (*)     POC HCO3 39.3 (*)     POC TCO2 41 (*)     All other components within normal limits   CULTURE, URINE   TSH   POCT GLUCOSE MONITORING CONTINUOUS             Medical Decision Making:   History:   Old Medical Records: I decided to obtain old medical records.  Clinical Tests:   Lab Tests: Ordered and Reviewed  Radiological Study: Ordered and Reviewed  Medical Tests: Ordered and Reviewed            Scribe Attestation:   Scribe #1: I performed the above scribed service and the documentation accurately describes the services I performed. I attest to the accuracy of the note.    I, Dr. Harvey, personally performed the services described in this documentation. All medical record entries made by the scribe were at my direction and in my presence.  I have reviewed the chart and agree that the record reflects my personal performance and is accurate and complete.11:42 PM 12/21/2017            ED Course as of Dec 21 1621   Thu Dec 21, 2017   1220 85-year-old female with numerous chronic health issues presents to the ER with a chief complaint of fatigue.  Patient can provide virtually no history at all and review of systems is unobtainable.  She states she is in the emergency room because of her kidneys.  She knows it is December 2017 but falls asleep during exam.  [EF]   1333 BNP: (!) 1,727 [EF]   1333 Heart rate 95 after diltiazem Creatinine: (!) 2.1 [EF]   1422 Urine collected by MSHREYA  Elevated PCO2 on ABG patient placed on BiPAP for possible CO2 narcosis  [EF]   1422 POC PCO2: (!!) 70.6 [EF]   1433 POC PCO2: (!!) 70.6 [EF]   1534 Patient tolerating  BiPAP well, IV fluids are infusing.  Given vitamin K to reverse supratherapeutic INR, IV antibiotics for UTI.  No distress at this time.  [EF]   1534 Bacteria, UA: (!) Many [EF]   1534 WBC, UA: (!) >100 [EF]   1602 85-year-old female with a history of CK D CHF presents to the ER for ostensibly renal dysfunction but her creatinine is baseline.  Found to be in A. fib with RVR given dilt with good response.  UTI has been treated elevated INR has been treated.  Patient placed on BiPAP for possible CO2 narcosis and she is doing well on the BiPAP.  Right upper extremity IV started by M.D. under ultrasound.  Dr. Dong will admit the patient to the ICU, no distress at this time. Do not think septic at this time.  [EF]      ED Course User Index  [EF] Caesar Harvey MD     Clinical Impression:     1. Atrial fibrillation    2. Fatigue        Disposition:   Disposition: Admitted                        Caesar Harvey MD  12/21/17 3717

## 2017-12-22 LAB
ALLENS TEST: ABNORMAL
ALLENS TEST: ABNORMAL
ANION GAP SERPL CALC-SCNC: 10 MMOL/L
BASOPHILS # BLD AUTO: 0 K/UL
BASOPHILS NFR BLD: 0.2 %
BUN SERPL-MCNC: 80 MG/DL
CALCIUM SERPL-MCNC: 8.8 MG/DL
CHLORIDE SERPL-SCNC: 108 MMOL/L
CO2 SERPL-SCNC: 31 MMOL/L
CREAT SERPL-MCNC: 1.8 MG/DL
DELSYS: ABNORMAL
DELSYS: ABNORMAL
DIFFERENTIAL METHOD: ABNORMAL
EOSINOPHIL # BLD AUTO: 0.2 K/UL
EOSINOPHIL NFR BLD: 2.6 %
EP: 6
EP: 6
ERYTHROCYTE [DISTWIDTH] IN BLOOD BY AUTOMATED COUNT: 18.1 %
ERYTHROCYTE [SEDIMENTATION RATE] IN BLOOD BY WESTERGREN METHOD: 16 MM/H
ERYTHROCYTE [SEDIMENTATION RATE] IN BLOOD BY WESTERGREN METHOD: 19 MM/H
EST. GFR  (AFRICAN AMERICAN): 29 ML/MIN/1.73 M^2
EST. GFR  (NON AFRICAN AMERICAN): 25 ML/MIN/1.73 M^2
FIO2: 35
FIO2: 40
GLUCOSE SERPL-MCNC: 129 MG/DL
HCO3 UR-SCNC: 37.9 MMOL/L (ref 24–28)
HCO3 UR-SCNC: 38.2 MMOL/L (ref 24–28)
HCT VFR BLD AUTO: 34.6 %
HGB BLD-MCNC: 10.8 G/DL
INR PPP: >10
IP: 12
IP: 12
LYMPHOCYTES # BLD AUTO: 1 K/UL
LYMPHOCYTES NFR BLD: 14.6 %
MAGNESIUM SERPL-MCNC: 2.6 MG/DL
MCH RBC QN AUTO: 30.3 PG
MCHC RBC AUTO-ENTMCNC: 31.3 G/DL
MCV RBC AUTO: 97 FL
MIN VOL: 4.7
MIN VOL: 5.9
MODE: ABNORMAL
MODE: ABNORMAL
MONOCYTES # BLD AUTO: 0.8 K/UL
MONOCYTES NFR BLD: 11.5 %
NEUTROPHILS # BLD AUTO: 4.8 K/UL
NEUTROPHILS NFR BLD: 71.1 %
PCO2 BLDA: 68.9 MMHG (ref 35–45)
PCO2 BLDA: 71.3 MMHG (ref 35–45)
PH SMN: 7.34 [PH] (ref 7.35–7.45)
PH SMN: 7.35 [PH] (ref 7.35–7.45)
PLATELET # BLD AUTO: 143 K/UL
PMV BLD AUTO: 10.7 FL
PO2 BLDA: 103 MMHG (ref 80–100)
PO2 BLDA: 81 MMHG (ref 80–100)
POC BE: 12 MMOL/L
POC BE: 12 MMOL/L
POC SATURATED O2: 95 % (ref 95–100)
POC SATURATED O2: 97 % (ref 95–100)
POC TCO2: 40 MMOL/L (ref 23–27)
POC TCO2: 40 MMOL/L (ref 23–27)
POTASSIUM SERPL-SCNC: 4 MMOL/L
PROTHROMBIN TIME: >100 SEC
RBC # BLD AUTO: 3.56 M/UL
SAMPLE: ABNORMAL
SAMPLE: ABNORMAL
SITE: ABNORMAL
SITE: ABNORMAL
SODIUM SERPL-SCNC: 149 MMOL/L
SP02: 100
SP02: 95
SPONT RATE: 7
TROPONIN I SERPL DL<=0.01 NG/ML-MCNC: 0.05 NG/ML
WBC # BLD AUTO: 6.8 K/UL

## 2017-12-22 PROCEDURE — 63600175 PHARM REV CODE 636 W HCPCS: Performed by: NURSE PRACTITIONER

## 2017-12-22 PROCEDURE — C9113 INJ PANTOPRAZOLE SODIUM, VIA: HCPCS | Performed by: HOSPITALIST

## 2017-12-22 PROCEDURE — 94660 CPAP INITIATION&MGMT: CPT

## 2017-12-22 PROCEDURE — 94640 AIRWAY INHALATION TREATMENT: CPT

## 2017-12-22 PROCEDURE — 99900035 HC TECH TIME PER 15 MIN (STAT)

## 2017-12-22 PROCEDURE — 85025 COMPLETE CBC W/AUTO DIFF WBC: CPT

## 2017-12-22 PROCEDURE — 25000242 PHARM REV CODE 250 ALT 637 W/ HCPCS: Performed by: HOSPITALIST

## 2017-12-22 PROCEDURE — 82803 BLOOD GASES ANY COMBINATION: CPT

## 2017-12-22 PROCEDURE — 20000000 HC ICU ROOM

## 2017-12-22 PROCEDURE — 36415 COLL VENOUS BLD VENIPUNCTURE: CPT

## 2017-12-22 PROCEDURE — 63600175 PHARM REV CODE 636 W HCPCS: Performed by: HOSPITALIST

## 2017-12-22 PROCEDURE — 99291 CRITICAL CARE FIRST HOUR: CPT | Mod: ,,, | Performed by: INTERNAL MEDICINE

## 2017-12-22 PROCEDURE — 84484 ASSAY OF TROPONIN QUANT: CPT

## 2017-12-22 PROCEDURE — 25000003 PHARM REV CODE 250: Performed by: HOSPITALIST

## 2017-12-22 PROCEDURE — 25000003 PHARM REV CODE 250: Performed by: INTERNAL MEDICINE

## 2017-12-22 PROCEDURE — 85610 PROTHROMBIN TIME: CPT

## 2017-12-22 PROCEDURE — 27000221 HC OXYGEN, UP TO 24 HOURS

## 2017-12-22 PROCEDURE — S5010 5% DEXTROSE AND 0.45% SALINE: HCPCS | Performed by: HOSPITALIST

## 2017-12-22 PROCEDURE — 82530 CORTISOL FREE: CPT

## 2017-12-22 PROCEDURE — 94761 N-INVAS EAR/PLS OXIMETRY MLT: CPT

## 2017-12-22 PROCEDURE — 83735 ASSAY OF MAGNESIUM: CPT

## 2017-12-22 PROCEDURE — 36600 WITHDRAWAL OF ARTERIAL BLOOD: CPT

## 2017-12-22 PROCEDURE — 80048 BASIC METABOLIC PNL TOTAL CA: CPT

## 2017-12-22 RX ORDER — DIGOXIN 0.25 MG/ML
250 INJECTION INTRAMUSCULAR; INTRAVENOUS ONCE
Status: COMPLETED | OUTPATIENT
Start: 2017-12-22 | End: 2017-12-22

## 2017-12-22 RX ORDER — DILTIAZEM HCL 1 MG/ML
5 INJECTION, SOLUTION INTRAVENOUS CONTINUOUS
Status: DISCONTINUED | OUTPATIENT
Start: 2017-12-22 | End: 2017-12-23 | Stop reason: SDUPTHER

## 2017-12-22 RX ORDER — DILTIAZEM HYDROCHLORIDE 5 MG/ML
0.35 INJECTION INTRAVENOUS ONCE
Status: COMPLETED | OUTPATIENT
Start: 2017-12-22 | End: 2017-12-22

## 2017-12-22 RX ORDER — FUROSEMIDE 10 MG/ML
40 INJECTION INTRAMUSCULAR; INTRAVENOUS ONCE
Status: COMPLETED | OUTPATIENT
Start: 2017-12-22 | End: 2017-12-22

## 2017-12-22 RX ORDER — DEXTROSE MONOHYDRATE 50 MG/ML
INJECTION, SOLUTION INTRAVENOUS CONTINUOUS
Status: DISCONTINUED | OUTPATIENT
Start: 2017-12-22 | End: 2017-12-24

## 2017-12-22 RX ADMIN — DEXTROSE MONOHYDRATE: 5 INJECTION, SOLUTION INTRAVENOUS at 03:12

## 2017-12-22 RX ADMIN — FUROSEMIDE 40 MG: 10 INJECTION, SOLUTION INTRAMUSCULAR; INTRAVENOUS at 03:12

## 2017-12-22 RX ADMIN — PANTOPRAZOLE SODIUM 40 MG: 40 INJECTION, POWDER, FOR SOLUTION INTRAVENOUS at 09:12

## 2017-12-22 RX ADMIN — DILTIAZEM HYDROCHLORIDE 46 MG: 5 INJECTION INTRAVENOUS at 10:12

## 2017-12-22 RX ADMIN — IPRATROPIUM BROMIDE AND ALBUTEROL SULFATE 3 ML: .5; 3 SOLUTION RESPIRATORY (INHALATION) at 11:12

## 2017-12-22 RX ADMIN — PHYTONADIONE 15 MG: 10 INJECTION, EMULSION INTRAMUSCULAR; INTRAVENOUS; SUBCUTANEOUS at 11:12

## 2017-12-22 RX ADMIN — DEXTROSE MONOHYDRATE 1 G: 5 INJECTION, SOLUTION INTRAVENOUS at 04:12

## 2017-12-22 RX ADMIN — DIGOXIN 250 MCG: 0.25 INJECTION INTRAMUSCULAR; INTRAVENOUS at 02:12

## 2017-12-22 RX ADMIN — DEXTROSE MONOHYDRATE AND SODIUM CHLORIDE 1 ML: 5; .45 INJECTION, SOLUTION INTRAVENOUS at 07:12

## 2017-12-22 RX ADMIN — Medication 5 MG/HR: at 06:12

## 2017-12-22 RX ADMIN — MORPHINE SULFATE 4 MG: 4 INJECTION INTRAVENOUS at 09:12

## 2017-12-22 NOTE — ASSESSMENT & PLAN NOTE
Related to (etiology):   Excessive energy intake    Signs and Symptoms (as evidenced by):   BMI >40    Interventions/Recommendations (treatment strategy):  Advance to cardiac diet     Nutrition Diagnosis Status:   New

## 2017-12-22 NOTE — ASSESSMENT & PLAN NOTE
Due to COPD, obesity hypoventilation, and CHF.  Will use BiPAP.  PaCO2 of 70, but pH normal (suggesting chronic resp acidosis that doesn't appear to be acutely worse).  PaO2 low.  Large A-a gradient.  Monitor ABG.

## 2017-12-22 NOTE — SUBJECTIVE & OBJECTIVE
Past Medical History:   Diagnosis Date    A-fib     Acute CHF 9/25/2013    Anticoagulant long-term use     Arthritis     Blood transfusion     Branch retinal vein occlusion of right eye     Cellulitis and abscess of lower extremity     Cerebral aneurysm     S/p repair    CKD (chronic kidney disease)     Followed by Dr. Rey Muhammad    Elevated serum creatinine     HTN (hypertension)     Lymphedema     Macular degeneration - Right Eye 1/31/2013    Nuclear sclerosis - Both Eyes 1/31/2013    Done OU    Squamous cell carcinoma excised 11/16/16    R forearm       Past Surgical History:   Procedure Laterality Date    ABDOMINAL SURGERY      APPENDECTOMY      APPENDECTOMY      CATARACT EXTRACTION W/  INTRAOCULAR LENS IMPLANT Right 04/25/2017    K[facility]man    CATARACT EXTRACTION W/  INTRAOCULAR LENS IMPLANT Left 06/13/2017    Raúlllman    CEREBRAL ANEURYSM REPAIR  10/16/1994    RIGHT FRONTOTEMPORAL CRANIOTOMY WITH LIPPING OF SUPRACLINOID CAROTID ARTERY ANEURYSM      CEREBRAL ANEURYSM REPAIR  10/27/1994    RIGHT FRONTOTEMPORAL CRANIOTOMY WITH CLIPPING OF RIGHT MIDDLE CEREBRAL ARTERY ANEURYSM     COMPLICATED TOTAL HIP PROSTHESIS AND METHYLMETHACRALATE REMOVAL W/ SPACER INSERTION  8/29/2013    left    EYE SURGERY      Focal laser      OD    ADOLFO FILTER PLACEMENT  3/22/2010    HIP FRACTURE SURGERY  11/2009    left    JOINT REPLACEMENT Left     hip    Patent PI      Both Eye     SKIN BIOPSY Right     foream    TONSILLECTOMY      TOTAL ABDOMINAL HYSTERECTOMY      TOTAL HIP ARTHROPLASTY  3/19/2010    left    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      Current Outpatient Prescriptions on File Prior to Encounter   Medication Sig    albuterol-ipratropium 2.5mg-0.5mg/3mL (DUO-NEB) 0.5 mg-3 mg(2.5  mg base)/3 mL nebulizer solution Take 3 mLs by nebulization every 4 (four) hours as needed for Wheezing. Rescue (Patient taking differently: Take 3 mLs by nebulization every 8 (eight) hours. Rescue)     Family History     Problem Relation (Age of Onset)    Aneurysm Mother,     Coronary artery disease     Glaucoma Daughter    Hypertension     Stroke         Social History Main Topics    Smoking status: Former Smoker     Quit date: 11/7/1992    Smokeless tobacco: Never Used    Alcohol use Yes      Comment: socially    Drug use: No    Sexual activity: No     Review of Systems   Unable to perform ROS: mental status change     Objective:     Vital Signs (Most Recent):  Temp: (!) 95.5 °F (35.3 °C) (12/22/17 0829)  Pulse: (!) 114 (12/22/17 0829)  Resp: (!) 23 (12/22/17 0829)  BP: (!) 92/54 (12/22/17 0600)  SpO2: (!) 84 % (12/22/17 0829) Vital Signs (24h Range):  Temp:  [95.5 °F (35.3 °C)-97.8 °F (36.6 °C)] 95.5 °F (35.3 °C)  Pulse:  [] 114  Resp:  [14-30] 23  SpO2:  [79 %-100 %] 84 %  BP: ()/(50-94) 92/54     Weight: 131 kg (288 lb 12.8 oz)  Body mass index is 45.23 kg/m².    SpO2: (!) 84 %  O2 Device (Oxygen Therapy): BiPAP      Intake/Output Summary (Last 24 hours) at 12/22/17 0982  Last data filed at 12/22/17 0520   Gross per 24 hour   Intake          1388.75 ml   Output             2750 ml   Net         -1361.25 ml       Lines/Drains/Airways     Drain                 Urethral Catheter 12/21/17 1 day          Peripheral Intravenous Line                 Midline Catheter Insertion/Assessment  - Single Lumen 09/14/17 1630 Left brachial vein 18g x 10cm 98 days         Peripheral IV - Single Lumen 12/21/17 2200 Right Wrist less than 1 day                Physical Exam  Constitutional: Lethargic, moaning at times. Mittens on hands, pt rubbing arms.   HENT:   Head: Normocephalic, BiPAP in place. Dry mucus membranes.   Eyes: Conjunctivae normal. Right eye exhibits no discharge. Left eye exhibits no discharge. No  "scleral icterus.   Neck:Unable to adequately assess JVD d/t body habitus    Cardiovascular: An irregularly irregular rhythm present. Tachycardia present. Distant heart tones.    Pulses:       Radial pulses are 2+ on the right side, and 2+ on the left side.        Dorsalis pedis pulses are 1+ on the right side, and 1+ on the left side.   Pulmonary/Chest: BiPAP. She has decreased breath sounds.    Abdominal: Soft. Bowel sounds are normal.There is no guarding.   : Le catheter in place. Yellow urine with + sediment noted in gravity container.   Musculoskeletal: 1+ BLE edema.     Neurological: Eyes open to speech, confused, moaning. Shakes head "no" to pain. Follows simple commands, moves all extremities.    Skin: Skin is warm and dry. She is not diaphoretic. + erythema with scaly appearance to right and left lower legs.   Vitals reviewed.    Significant Labs:   ABG:   Recent Labs  Lab 12/21/17  1401 12/21/17  1825 12/22/17  0551   PH 7.354 7.318* 7.338*   PCO2 70.6* 70.7* 71.3*   HCO3 39.3* 36.2* 38.2*   POCSATURATED 99 97 97   BE 14 10 12   , Blood Culture: No results for input(s): LABBLOO in the last 48 hours., BMP:   Recent Labs  Lab 12/21/17  1226 12/21/17  2220 12/22/17  0358   * 129* 129*   * 149* 149*   K 4.1 4.0 4.0    107 108   CO2 36* 32* 31*   BUN 84* 79* 80*   CREATININE 2.1* 1.9* 1.8*   CALCIUM 9.5 8.8 8.8   , CMP   Recent Labs  Lab 12/21/17  1226 12/21/17  2220 12/22/17  0358   * 149* 149*   K 4.1 4.0 4.0    107 108   CO2 36* 32* 31*   * 129* 129*   BUN 84* 79* 80*   CREATININE 2.1* 1.9* 1.8*   CALCIUM 9.5 8.8 8.8   PROT 6.4  --   --    ALBUMIN 2.9*  --   --    BILITOT 0.9  --   --    ALKPHOS 110  --   --    AST 30  --   --    ALT 39  --   --    ANIONGAP 8 10 10   ESTGFRAFRICA 24* 27* 29*   EGFRNONAA 21* 24* 25*   , CBC   Recent Labs  Lab 12/21/17  1226 12/22/17  0358   WBC 7.90 6.80   HGB 11.9* 10.8*   HCT 37.6 34.6*    143*   , INR   Recent Labs  Lab " 12/21/17  1226 12/21/17  1922 12/22/17  0358   INR >10.0* >10.0* >10.0*   , Lipid Panel No results for input(s): CHOL, HDL, LDLCALC, TRIG, CHOLHDL in the last 48 hours. and Troponin   Recent Labs  Lab 12/21/17  1226 12/21/17  2220 12/22/17  0358   TROPONINI 0.041* 0.040* 0.047*       Significant Imaging:   Head CT 12/21/2017:  1. There is no definite acute abnormality.  There is no intracranial hemorrhage, mass, obvious acute infarction.    2.  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  Aneurysm clips result in significant beam hardening artifact.  There is an overlying right pterional craniotomy.  There is inferior posterior lateral frontal and anterior medial temporal lobe encephalomalacia on the right.  These findings have progressed compared to the prior study but are not acute.    3.  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  There is no midline shift or mass effect upon the underlying brain.    4. There is trace fluid in the right sphenoid sinus.    5.  There is no change in appearance of the right parietal approach ventriculostomy catheter with the tip in the left lateral ventricle.  There is no hydrocephalus.    CXR 12/21/2017:  There has been decrease in the right basal infiltrate which is mild residual infiltrate and possible also trace pleural effusion.  There is limited visualization of the left lung base on the portable film with a mild left perihilar infiltrate.  The cardiomediastinal silhouette is stable with heart enlarged    Impression  Partial clearing right lung base compared to the prior exam

## 2017-12-22 NOTE — PROGRESS NOTES
Secure message sent to Lizeth Butcher Np on call for hospitalist to report continued elevated HR despite two boluses of cardizem since arrival to ER and one dose of IV metoprolol.  Awaiting orders.

## 2017-12-22 NOTE — PLAN OF CARE
Problem: Patient Care Overview  Goal: Plan of Care Review  Outcome: Ongoing (interventions implemented as appropriate)  Patient has been dependent on the bipap overnight.  ABg this am reveals Co2 remains around 70 with bicarb in the upper 30's.  Patient has only had mask removed for oral care and to assess orientation.  Patient is nonverbal, manages only to grunt.  Cannot tell me her name.  But she does turn to look when her name is called.  24 hour urine in progress.  IVF continue at 75 ml/hr gently hydrating patient in light of chf.      Problem: Infection, Risk/Actual (Adult)  Goal: Infection Prevention/Resolution  Patient will demonstrate the desired outcomes by discharge/transition of care.   Outcome: Ongoing (interventions implemented as appropriate)  Temperature was subtherapeutic early in shift and noemí hugger was utilized as necessary.  Axillary temp now is wnl.  Receiving antibiotic of rocephin every 24 hour.  Lactate was normal.    Problem: Pressure Ulcer Risk (Romario Scale) (Adult,Obstetrics,Pediatric)  Goal: Skin Integrity  Patient will demonstrate the desired outcomes by discharge/transition of care.   Outcome: Ongoing (interventions implemented as appropriate)  Skin integrity maintained although patient has some issues with her skin that were preexisting before admission.  Left hip incision from prior hip surgery is suspicious for potential infection.  Entirety of left leg is edematous and zi, thick.  Chart and old records from [facility] and [facility] report history of lymphedema of bilateral lower extremities.  Also note in [facility] records that refers to prior dehiscence of left hip incision.  Right lower leg is zi, thick, rough and edematous from the knee down.    Problem: Fall Risk (Adult)  Goal: Absence of Falls  Patient will demonstrate the desired outcomes by discharge/transition of care.   Outcome: Ongoing (interventions implemented as appropriate)  No falls or injuries this shift although mittens were  placed to bilateral hands to prevent patient from scratching her skin off in light of her elevated INR.    Problem: Arrhythmia/Dysrhythmia (Symptomatic) (Adult)  Goal: Signs and Symptoms of Listed Potential Problems Will be Absent, Minimized or Managed (Arrhythmia/Dysrhythmia)  Signs and symptoms of listed potential problems will be absent, minimized or managed by discharge/transition of care (reference Arrhythmia/Dysrhythmia (Symptomatic) (Adult) CPG).  Outcome: Ongoing (interventions implemented as appropriate)  Patient has remained in Atrial fib with RVR most of the night although rate ranges from 80's to 130's.  Multiple different medications have been tried but aguirre her bp is marginal.  Decision was made by NP to consult cardiology this am and to just monitor unless patient becomes unstable.

## 2017-12-22 NOTE — PROGRESS NOTES
AILYN Ta, NP with Dr. Dowling at bedside and aware that patient is too somnolent of oral meds and that diltiazem scheduled this am was not given.

## 2017-12-22 NOTE — ASSESSMENT & PLAN NOTE
Will see what urine grows.  Antibiotics     Start     Stop Route Frequency Ordered    12/22/17 1600  cefTRIAXone (ROCEPHIN) 1 g in dextrose 5 % 50 mL IVPB      -- IV Every 24 hours (non-standard times) 12/21/17 8591

## 2017-12-22 NOTE — CONSULTS
[facility]  Cardiology  Consult Note    Patient Name: Nicole Lala  MRN: [id number]  Admission Date: 12/21/2017  Hospital Length of Stay: 1 days  Code Status: Full Code   Attending Provider: Artie Dong MD   Consulting Provider: Yanet Ta NP  Primary Care Physician: Brandon Gray MD  Principal Problem:Acute respiratory failure with hypoxia and hypercarbia    Patient information was obtained from medical record.     Inpatient consult to Cardiology  Consult performed by: YANET TA  Consult ordered by: BARON QIU  Reason for consult: atrial fibrillation with RVR       This patient is known to our group. Pt previously seen while inpatient.   Subjective:   Unable to obtain history form patient d/t altered mental status and BiPAP.     Chief Complaint:  Fatigue     HPI:   PCP: Dr. Gray  Cardiology: Dr. Rivas last seen 03/2017    86 y/o WF with a h/o permanent atrial fibrillation, anticoagulated on coumadin, MELISSA, CHF, HTN, CKD stage 4, NADINE, personal h/o DVT, s/p IVC filter 03/2010, COPD, chronic respiratory failure, home O2 use, cerebral aneusym s/p repair, LE cellulitis, and lymphedema who presented to the ED yesterday via EMS from [facility] telling the ED staff that she was there for her kidneys. As per the initial H&P done yesterday: She was somnolent and was falling asleep when not stimulated.  ABG showed hypercarbia.  She was placed on NIPPV.  Recent hospital stays at [facility] were for cellulitis of her legs and COPD decompensation.  She was also recently hospitalized at [facility].    ______________________________________  Cardiology consultation was requested for atrial fibrillation with RVR. Patient with known h/o atrial fibrillation, followed by cardiology, Dr. Rivas in [place]. It appears that she was last seen by Dr. Rivas in 03/2017 at which point she was noted with permanent atrial fibrillation; her multaq was stopped and she was advised to continue  diltiazem and warfarin. Patient has experienced persistent RVR episodes during prior hospital admission. Now with supra therapeutic INR this admission, INR > 10, warfarin is on hold and patient is receiving vitamin K. Due to patient being on Bipap with altered mental status, she is not able to take po medications at this time. She has not received her usual CCB dose of long acting Diltaizem 300mg daily. Afib on telemetry with persistent RVR episodes, HR ranging from upper 80s to 130s. Minimal improvement in heart rate after IVFs, IV digoxin and multiple IV CCB and BB doses. Patient has been experiencing marginal BP readings at times. Noted with normal EF of 55% per echo done done here in 09/2017. EF of 60% while in afib per recent echo done 11/17/2017 while patient was inpatient at [facility] (additional results below). Serial troponin x 3 with mild and mostly flat elevation. EKG shows atrial fibrillation rate of 125bpm, low voltage QRS, LAD, PRWP. According to patient's medical record, she has a normal angiogram in 2008. Patient is also receiving abx for UTI. TSH wnl.     Past Medical History:   Diagnosis Date    A-fib     Acute CHF 9/25/2013    Anticoagulant long-term use     Arthritis     Blood transfusion     Branch retinal vein occlusion of right eye     Cellulitis and abscess of lower extremity     Cerebral aneurysm     S/p repair    CKD (chronic kidney disease)     Followed by Dr. Rey Muhammad    Elevated serum creatinine     HTN (hypertension)     Lymphedema     Macular degeneration - Right Eye 1/31/2013    Nuclear sclerosis - Both Eyes 1/31/2013    Done OU    Squamous cell carcinoma excised 11/16/16    R forearm       Past Surgical History:   Procedure Laterality Date    ABDOMINAL SURGERY      APPENDECTOMY      APPENDECTOMY      CATARACT EXTRACTION W/  INTRAOCULAR LENS IMPLANT Right 04/25/2017    [place]    CATARACT EXTRACTION W/  INTRAOCULAR LENS IMPLANT Left 06/13/2017    ace    CEREBRAL  ANEURYSM REPAIR  10/16/1994    RIGHT FRONTOTEMPORAL CRANIOTOMY WITH LIPPING OF SUPRACLINOID CAROTID ARTERY ANEURYSM      CEREBRAL ANEURYSM REPAIR  10/27/1994    RIGHT FRONTOTEMPORAL CRANIOTOMY WITH CLIPPING OF RIGHT MIDDLE CEREBRAL ARTERY ANEURYSM     COMPLICATED TOTAL HIP PROSTHESIS AND METHYLMETHACRALATE REMOVAL W/ SPACER INSERTION  8/29/2013    left    EYE SURGERY      Focal laser      OD    ADOLFO FILTER PLACEMENT  3/22/2010    HIP FRACTURE SURGERY  11/2009    left    JOINT REPLACEMENT Left     hip    Patent PI      Both Eye     SKIN BIOPSY Right     foream    TONSILLECTOMY      TOTAL ABDOMINAL HYSTERECTOMY      TOTAL HIP ARTHROPLASTY  3/19/2010    left    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      Current Outpatient Prescriptions on File Prior to Encounter   Medication Sig    albuterol-ipratropium 2.5mg-0.5mg/3mL (DUO-NEB) 0.5 mg-3 mg(2.5 mg base)/3 mL nebulizer solution Take 3 mLs by nebulization every 4 (four) hours as needed for Wheezing. Rescue (Patient taking differently: Take 3 mLs by nebulization every 8 (eight) hours. Rescue)     Family History     Problem Relation (Age of Onset)    Aneurysm Mother,     Coronary artery disease     Glaucoma Daughter    Hypertension     Stroke         Social History Main Topics    Smoking status: Former Smoker     Quit date: 11/7/1992    Smokeless tobacco: Never Used    Alcohol use Yes      Comment: socially    Drug use: No    Sexual activity: No     Review of Systems   Unable to perform ROS: mental status change     Objective:     Vital Signs (Most Recent):  Temp: (!) 95.5 °F (35.3 °C) (12/22/17 0829)  Pulse: (!) 114 (12/22/17 0829)  Resp: (!) 23 (12/22/17 0829)  BP: (!) 92/54 (12/22/17 0600)  SpO2: (!) 84 % (12/22/17 0829) Vital Signs (24h  "Range):  Temp:  [95.5 °F (35.3 °C)-97.8 °F (36.6 °C)] 95.5 °F (35.3 °C)  Pulse:  [] 114  Resp:  [14-30] 23  SpO2:  [79 %-100 %] 84 %  BP: ()/(50-94) 92/54     Weight: 131 kg (288 lb 12.8 oz)  Body mass index is 45.23 kg/m².    SpO2: (!) 84 %  O2 Device (Oxygen Therapy): BiPAP      Intake/Output Summary (Last 24 hours) at 12/22/17 0926  Last data filed at 12/22/17 0520   Gross per 24 hour   Intake          1388.75 ml   Output             2750 ml   Net         -1361.25 ml       Lines/Drains/Airways     Drain                 Urethral Catheter 12/21/17 1 day          Peripheral Intravenous Line                 Midline Catheter Insertion/Assessment  - Single Lumen 09/14/17 1630 Left brachial vein 18g x 10cm 98 days         Peripheral IV - Single Lumen 12/21/17 2200 Right Wrist less than 1 day                Physical Exam  Constitutional: Lethargic, moaning at times. Mittens on hands, pt rubbing arms.   HENT:   Head: Normocephalic, BiPAP in place. Dry mucus membranes.   Eyes: Conjunctivae normal. Right eye exhibits no discharge. Left eye exhibits no discharge. No scleral icterus.   Neck:Unable to adequately assess JVD d/t body habitus    Cardiovascular: An irregularly irregular rhythm present. Tachycardia present.  Distant heart tones.    Pulses:       Radial pulses are 2+ on the right side, and 2+ on the left side.        Dorsalis pedis pulses are 1+ on the right side, and 1+ on the left side.   Pulmonary/Chest: BiPAP. She has decreased breath sounds.    Abdominal: Soft. Bowel sounds are normal.There is no guarding.   : Le catheter in place. Yellow urine with + sediment noted in gravity container.   Musculoskeletal: 1+ BLE edema.     Neurological: Eyes open to speech, confused, moaning. Shakes head "no" to pain. Follows simple commands, moves all extremities.    Skin: Skin is warm and dry. She is not diaphoretic. + erythema with scaly appearance to right and left lower legs.   Vitals " reviewed.    Significant Labs:   ABG:   Recent Labs  Lab 12/21/17  1401 12/21/17  1825 12/22/17  0551   PH 7.354 7.318* 7.338*   PCO2 70.6* 70.7* 71.3*   HCO3 39.3* 36.2* 38.2*   POCSATURATED 99 97 97   BE 14 10 12   , Blood Culture: No results for input(s): LABBLOO in the last 48 hours., BMP:   Recent Labs  Lab 12/21/17  1226 12/21/17 2220 12/22/17  0358   * 129* 129*   * 149* 149*   K 4.1 4.0 4.0    107 108   CO2 36* 32* 31*   BUN 84* 79* 80*   CREATININE 2.1* 1.9* 1.8*   CALCIUM 9.5 8.8 8.8   , CMP   Recent Labs  Lab 12/21/17  1226 12/21/17 2220 12/22/17  0358   * 149* 149*   K 4.1 4.0 4.0    107 108   CO2 36* 32* 31*   * 129* 129*   BUN 84* 79* 80*   CREATININE 2.1* 1.9* 1.8*   CALCIUM 9.5 8.8 8.8   PROT 6.4  --   --    ALBUMIN 2.9*  --   --    BILITOT 0.9  --   --    ALKPHOS 110  --   --    AST 30  --   --    ALT 39  --   --    ANIONGAP 8 10 10   ESTGFRAFRICA 24* 27* 29*   EGFRNONAA 21* 24* 25*   , CBC   Recent Labs  Lab 12/21/17 1226 12/22/17  0358   WBC 7.90 6.80   HGB 11.9* 10.8*   HCT 37.6 34.6*    143*   , INR   Recent Labs  Lab 12/21/17  1226 12/21/17  1922 12/22/17  0358   INR >10.0* >10.0* >10.0*   , Lipid Panel No results for input(s): CHOL, HDL, LDLCALC, TRIG, CHOLHDL in the last 48 hours. and Troponin   Recent Labs  Lab 12/21/17  1226 12/21/17  2220 12/22/17  0358   TROPONINI 0.041* 0.040* 0.047*       Significant Imaging:   Head CT 12/21/2017:  1. There is no definite acute abnormality.  There is no intracranial hemorrhage, mass, obvious acute infarction.    2.  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  Aneurysm clips result in significant beam hardening artifact.  There is an overlying right pterional craniotomy.  There is inferior posterior lateral frontal and anterior medial temporal lobe encephalomalacia on the right.  These findings have progressed compared to the prior study but are not  acute.    3.  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  There is no midline shift or mass effect upon the underlying brain.    4. There is trace fluid in the right sphenoid sinus.    5.  There is no change in appearance of the right parietal approach ventriculostomy catheter with the tip in the left lateral ventricle.  There is no hydrocephalus.    CXR 12/21/2017:  There has been decrease in the right basal infiltrate which is mild residual infiltrate and possible also trace pleural effusion.  There is limited visualization of the left lung base on the portable film with a mild left perihilar infiltrate.  The cardiomediastinal silhouette is stable with heart enlarged    Impression  Partial clearing right lung base compared to the prior exam      Prior Echo 11/17/2017 at [facility]:  Conclusions:  Technically limited study  Normal left ventricular function. EF 60%.   Patient in atrial fibrillation during examination.   Mild aortic root sclerosis  Mild mitral regurgitation   Mild to moderate tricuspid regurgitation   Pulmonary artery systolic pressure of 40 -45 mmHg  Assessment and Plan:     Active Hospital Problems    Diagnosis    *Acute respiratory failure with hypoxia and hypercarbia    Acute on chronic diastolic heart failure    Supratherapeutic INR    Urinary tract infection without hematuria    Chronic kidney disease, stage IV (severe)    Long term current use of anticoagulant therapy    Morbid obesity with BMI of 40.0-44.9, adult    Essential hypertension    Atrial fibrillation with rapid ventricular response    Lymphedema of both lower extremities    Personal history of DVT (deep vein thrombosis)    S/P insertion of IVC (inferior vena caval) filter 3/22/10        Atrial fibrillation with rapid ventricular response  Known h/o of permanent atrial fibrillation.   - Administer additional IV CCB  (diltiazem) bolus for rate control being that patient unable to take po at this time  - Continue prn IV BB  - Supplemental O2, Bipap  - Warfarin on hold d/t supra therapeutic INR requiring Vit K.   - Recommend avoiding further use of Digoxin given patient's normal EF  - Monitor on telemetry   - Check Mg      This patient has been discussed with and evaluated by my collaborating physician, Dr. Dowling.  Please see Dr. Dowling's MD attestation above for additional information/recommendations.     Yanet Ta NP  Cardiology   [facility]

## 2017-12-22 NOTE — ASSESSMENT & PLAN NOTE
Chronic.  Was getting outpatient therapy at one time prior to going into the NH.  Skin is erythematous but not warm.  Doesn't appear to be cellulitic.  Will monitor for skin breakage.

## 2017-12-22 NOTE — PROGRESS NOTES
IV digoxin given as ordered by Lizeth Butcher, ROXANNE for one dose.  Bipap removed and oral care attempted, although patient is not very cooperative.  Some dried blood cleaned from mouth but unable to identify source of bleeding.  Patient is scratching her skin constantly.  Her skin is fragile and thin with bruising all over her arms, her right eye.  Mittens applied to hands when unable to get patient to understand that she is going to tear her skin and with her INR over 10 at last check, felt it was for her protection to apply the mittens.  Patient fell back asleep once on bipap and when left undisturbed.

## 2017-12-22 NOTE — PROGRESS NOTES
Spoke with Lizeth Butcher NP who is on the unit to see another patient regarding continued at fib with rvr.  BP currently 94/50.  Reluctant to start a cardizem gtt due to bp, but will consult cardiology and monitor closely until cardiology sees her in the am.

## 2017-12-22 NOTE — PLAN OF CARE
Problem: Patient Care Overview  Goal: Plan of Care Review  Outcome: Ongoing (interventions implemented as appropriate)  Pt on Bipap and rec PRN duoneb. 96% sats with 105 bpm HR.

## 2017-12-22 NOTE — CONSULTS
"  [facility]  Adult Nutrition  Consult Note    SUMMARY     Recommendations  Recommendation/Intervention: 1.) When appropriate per MD, recommend Cardiac diet   Goals: 1.) diet will advance within 48 hrs.   Nutrition Goal Status: new  Communication of RD Recs: discussed on rounds (sticky note)    1. Atrial fibrillation    2. Fatigue    3. Acute respiratory failure with hypoxia and hypercarbia      Past Medical History:   Diagnosis Date    A-fib     Acute CHF 9/25/2013    Anticoagulant long-term use     Arthritis     Blood transfusion     Branch retinal vein occlusion of right eye     Cellulitis and abscess of lower extremity     Cerebral aneurysm     S/p repair    CKD (chronic kidney disease)     Followed by Dr. Rey Muhammad    Elevated serum creatinine     HTN (hypertension)     Lymphedema     Macular degeneration - Right Eye 1/31/2013    Nuclear sclerosis - Both Eyes 1/31/2013    Done OU    Squamous cell carcinoma excised 11/16/16    R forearm       Reason for Assessment  Reason for Assessment: nurse/nurse practitioner consult  Interdisciplinary Rounds: attended  General Information Comments: admits with fatigue. Patient on bipap in the ICU. Sleeping with no family at bedside. Rd consulted for "on bipap". Patient known from past admission. Usually eats well. Per chart review, no weight loss noted.       Nutrition Prescription Ordered  Current Diet Order: NPO      Evaluation of Received Nutrients/Fluid Intake  Other Calories (kcal): 306  IV Fluid (mL): 1800  % Intake of Estimated Energy Needs: 0 - 25 %  % Meal Intake: NPO     Nutrition Risk Screen  Nutrition Risk Screen: no indicators present    Nutrition/Diet History  Food Preferences: no cultural or Yazidism food preferences noted in chart. NKFA.   Factors Affecting Nutritional Intake:  (oxygen status)    Labs/Tests/Procedures/Meds  Diagnostic Test/Procedure Review: reviewed, pertinent  Pertinent Labs Reviewed: reviewed, " "pertinent  BMP  Lab Results   Component Value Date     (H) 2017    K 4.0 2017     2017    CO2 31 (H) 2017    BUN 80 (H) 2017    CREATININE 1.8 (H) 2017    CALCIUM 8.8 2017    ANIONGAP 10 2017    ESTGFRAFRICA 29 (A) 2017    EGFRNONAA 25 (A) 2017     Lab Results   Component Value Date    ALBUMIN 2.9 (L) 2017     Lab Results   Component Value Date    CALCIUM 8.8 2017    PHOS 2.4 (L) 2017       Recent Labs  Lab 17  1247   POCTGLUCOSE 111*       Pertinent Medications Reviewed: reviewed  Scheduled Meds:   cefTRIAXone 1 g in dextrose 5 % 50 mL  1 g Intravenous Q24H    diltiaZEM  0.35 mg/kg Intravenous Once    pantoprazole  40 mg Intravenous Daily    phytonadione ((AQUA-MEPHYTON) IVPB  15 mg Intravenous Once     Continuous Infusions:   dextrose 5 % and 0.45 % NaCl 1 mL (17 0734)           Physical Findings  Overall Physical Appearance: on oxygen therapy, obese  Skin: edema, intact (Romario score 10)    Anthropometrics  Temp: (!) 95.5 °F (35.3 °C)  Height: 5' 7"  Weight Method: Bed Scale  Weight: 131 kg (288 lb 12.8 oz)  Ideal Body Weight (IBW), Female: 135 lb  % Ideal Body Weight, Female (lb): 213.93 lb  BMI (Calculated): 45.3  BMI Grade: greater than 40 - morbid obesity  Usual Body Weight (UBW), k.45 kg (september of this year)  % Usual Body Weight: 104.64  % Weight Change From Usual Weight: 4.42 %      Estimated/Assessed Needs  Weight Used For Calorie Calculations: 131 kg (288 lb 12.8 oz)   Energy Need Method: [place]  RMR ([place]-St. Jeor Equation): 1787.63- No activity factor required with obesity.   Weight Used For Protein Calculations: 61.3 kg (135 lb 2.3 oz) (ideal body weight)  1.2 gm Protein (gm): 73.71 and 1.5 gm Protein (gm): 92.14  Fluid Need Method: RDA Method (or per MD)        Assessment and Plan    Morbid obesity with BMI of 40.0-44.9, adult    Related to (etiology):   Excessive energy " intake    Signs and Symptoms (as evidenced by):   BMI >40    Interventions/Recommendations (treatment strategy):  Advance to cardiac diet     Nutrition Diagnosis Status:   New              Monitor and Evaluation  Food and Nutrient Intake: energy intake, food and beverage intake  Food and Nutrient Adminstration: diet order  Anthropometric Measurements: weight, weight change, body mass index  Biochemical Data, Medical Tests and Procedures: electrolyte and renal panel, glucose/endocrine profile, lipid profile  Nutrition-Focused Physical Findings: overall appearance    Nutrition Risk  Level of Risk:  (x2 weekly)    Nutrition Follow-Up  RD Follow-up?: Yes    Discharge Planning: unable to determine at this time

## 2017-12-22 NOTE — PLAN OF CARE
Completed assessment with pt's spouse, son, and daughter; patient on bipap at this time.  Per family patient was admitted from [facility] where she has been for the last 3 weeks.  Family states that if patient needs to be placed into a SNF on discharge, she is not going back to [facility] but they would want her to go to [facility].  If patient ends up discharging home, she has had [facility] in the past.  Discharge plan to be determined.       12/22/17 8754   Discharge Assessment   Assessment Type Discharge Planning Assessment   Assessment information obtained from? Other  (pt's spouse, son and daughter)   Prior to hospitilization cognitive status: Alert/Oriented   Prior to hospitalization functional status: Needs Assistance;Assistive Equipment   Current cognitive status: Unable to Assess  (bipap in place)   Current Functional Status: Needs Assistance   Lives With spouse   Able to Return to Prior Arrangements unable to determine at this time (comments)   Is patient able to care for self after discharge? No   Patient's perception of discharge disposition skilled nursing facility   Readmission Within The Last 30 Days (per family patient was at [facility] about 3 weeks ago)   Patient currently being followed by outpatient case management? No   Patient currently receives any other outside agency services? No   Equipment Currently Used at Home walker, standard;hospital bed;shower chair;bedside commode;nebulizer;wheelchair;oxygen   Do you have any problems affording any of your prescribed medications? No   Is the patient taking medications as prescribed? yes   Does the patient have transportation home? Yes   Transportation Available family or friend will provide   Discharge Plan A Skilled Nursing Facility   Discharge Plan B Home Health   Patient/Family In Agreement With Plan yes

## 2017-12-22 NOTE — ASSESSMENT & PLAN NOTE
Creatine stable for now. Monitor UOP and serial BMP and adjust therapy as needed. Renally dose meds.  Estimated Creatinine Clearance: 27.6 mL/min (based on SCr of 2.1 mg/dL (H)).

## 2017-12-22 NOTE — PROGRESS NOTES
Secure message sent to Lizeth Butcher NP on call for hospitalist requesting that continued high INR and elevated HR be addressed.  Also reported patient low rectal temp, use of noemí hugger, elevated BUN, Cr and BNP on admission in light of chronic CHF.  Concerned that patient could be septic due to elevated HR, elevated BUN, Cr, elevated RR, acidosis, and hypothermia.  Awaiting orders.

## 2017-12-22 NOTE — ASSESSMENT & PLAN NOTE
CHF currently uncontrolled. Latest ECHO shows ejection fraction of   EF   Date Value Ref Range Status   09/12/2017 55 55 - 65    ]. Give IV Lasix and monitor clinical status closely. Monitor on telemetry. Last BNP is   Recent Labs  Lab 12/21/17  1226   BNP 1,727*   . When awake, will continue to stress to patient importance of self efficacy and  on diet for CHF.

## 2017-12-22 NOTE — ASSESSMENT & PLAN NOTE
Body mass index is 45.23 kg/m². Morbid obesity complicates all aspects of disease management from diagnostic modalities to treatment.

## 2017-12-22 NOTE — PROGRESS NOTES
Results for SANJIV LOMAS (MRN [id number]) as of 12/22/2017 05:43   Ref. Range 12/22/2017 03:58   Protime Latest Ref Range: 9.0 - 12.5 sec >100.0 (H)   Coumadin Monitoring INR Latest Ref Range: 0.8 - 1.2  >10.0 (HH)     Lizeth Butcher NP notified.  Awaiting orders.

## 2017-12-22 NOTE — SIGNIFICANT EVENT
12/21/17 1825   Patient Assessment/Suction   Level of Consciousness (AVPU) responds to voice   PRE-TX-O2-ETCO2   SpO2 96 %   Pulse (!) 118   Resp (!) 25   Preset CPAP/BiPAP Settings   Mode Of Delivery BiPAP   $ Initial CPAP/BiPAP Setup? No   $ Is patient using? Yes   Equipment Type V60   Airway Device Type large full face mask   Ipap 12   EPAP (cm H2O) 6   Pressure Support (cm H2O) 6   ITime (sec) 1   Rise Time (sec) 2   Labs   $ Was an ABG obtained? Arterial Puncture   $ Labs Tech Time 15 min   Critical Value Communication   Notified Physician/Designee Dr. Dong   Date Result Received 12/21/17   Time Result Received 1825   Resulting Department of Critical Value resp   Who communicated critical value from resulting department? Michelle   Critical Test #1 pH    Critical Test #1 Result 7.31   Critical Test #2 PCO2   Critical Test #2 Result 70.7   Critical Test #3  HCO3   Critical Test #3 Result 36.2   Date Notified 12/21/17   Time Notified 1825   Read Back Verification Yes   Physician Directive increased Rate to 16.

## 2017-12-22 NOTE — HPI
Patient was brought to our ER via ambulance from [facility].  When she arrived, she told the ER staff that she was there for her kidneys.  She was somnolent and was falling asleep when not stimulated.  ABG showed hypercarbia.  She was placed on NIPPV.  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  Recent hospital stays at [facility] were for cellulitis of her legs and COPD decompensation.  She was also recently hospitalized at [facility].  No records of that stay are available at this time.

## 2017-12-22 NOTE — H&P
[facility] Medicine  History & Physical    Patient Name: Nicole Lala  MRN: [id number]  Admission Date: 12/21/2017  Attending Physician: Artie Dong MD   Primary Care Provider: Brandon Gray MD         Patient information was obtained from relative(s), past medical records and ER records.     Subjective:     Principal Problem:Acute respiratory failure with hypoxia and hypercarbia    Chief Complaint:   Chief Complaint   Patient presents with    Fatigue        HPI: Patient was brought to our ER via ambulance from [facility].  When she arrived, she told the ER staff that she was there for her kidneys.  She was somnolent and was falling asleep when not stimulated.  ABG showed hypercarbia.  She was placed on NIPPV.  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  Recent hospital stays at [facility] were for cellulitis of her legs and COPD decompensation.  She was also recently hospitalized at [facility].  No records of that stay are available at this time.    Past Medical History:   Diagnosis Date    A-fib     Acute CHF 9/25/2013    Anticoagulant long-term use     Arthritis     Blood transfusion     Branch retinal vein occlusion of right eye     Cellulitis and abscess of lower extremity     Cerebral aneurysm     S/p repair    CKD (chronic kidney disease)     Followed by Dr. Rey Muhammad    Elevated serum creatinine     HTN (hypertension)     Lymphedema     Macular degeneration - Right Eye 1/31/2013    Nuclear sclerosis - Both Eyes 1/31/2013    Done OU    Squamous cell carcinoma excised 11/16/16    R forearm       Past Surgical History:   Procedure Laterality Date    ABDOMINAL SURGERY      APPENDECTOMY      APPENDECTOMY      CATARACT EXTRACTION W/  INTRAOCULAR LENS IMPLANT Right 04/25/2017    [place]    CATARACT  EXTRACTION W/  INTRAOCULAR LENS IMPLANT Left 06/13/2017    [place]    CEREBRAL ANEURYSM REPAIR  10/16/1994    RIGHT FRONTOTEMPORAL CRANIOTOMY WITH LIPPING OF SUPRACLINOID CAROTID ARTERY ANEURYSM      CEREBRAL ANEURYSM REPAIR  10/27/1994    RIGHT FRONTOTEMPORAL CRANIOTOMY WITH CLIPPING OF RIGHT MIDDLE CEREBRAL ARTERY ANEURYSM     COMPLICATED TOTAL HIP PROSTHESIS AND METHYLMETHACRALATE REMOVAL W/ SPACER INSERTION  8/29/2013    left    EYE SURGERY      Focal laser      OD    ADOLFO FILTER PLACEMENT  3/22/2010    HIP FRACTURE SURGERY  11/2009    left    JOINT REPLACEMENT Left     hip    Patent PI      Both Eye     SKIN BIOPSY Right     foream    TONSILLECTOMY      TOTAL ABDOMINAL HYSTERECTOMY      TOTAL HIP ARTHROPLASTY  3/19/2010    left    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      Current Outpatient Prescriptions on File Prior to Encounter   Medication Sig    albuterol-ipratropium 2.5mg-0.5mg/3mL (DUO-NEB) 0.5 mg-3 mg(2.5 mg base)/3 mL nebulizer solution Take 3 mLs by nebulization every 4 (four) hours as needed for Wheezing. Rescue (Patient taking differently: Take 3 mLs by nebulization every 8 (eight) hours. Rescue)    [DISCONTINUED] diltiaZEM (CARDIZEM CD) 300 MG 24 hr capsule Take 1 capsule (300 mg total) by mouth once daily.    [DISCONTINUED] lisinopril (PRINIVIL,ZESTRIL) 20 MG tablet TAKE 1 TABLET EVERY DAY    [DISCONTINUED] warfarin (COUMADIN) 2.5 MG tablet Take 1-2 tablets (2.5-5 mg total) by mouth Daily. (Patient taking differently: Take 2.5 mg by mouth every other day. Takes as directed by coumadin clInic / Th, Sat, Mon)    [DISCONTINUED] warfarin (COUMADIN) 5 MG tablet TAKE 1 TABLET EVERY EVENING AS INSTRUCTED BY COUMADIN CLINIC (Patient taking differently: TAKE 1 TABLET EVERY   OTHER EVENING AS INSTRUCTED BY COUMADIN CLINIC Wed, Fri, Sun, Tues)     Family History     Problem Relation (Age of Onset)    Aneurysm Mother,     Coronary artery disease     Glaucoma Daughter    Hypertension     Stroke         Social History Main Topics    Smoking status: Former Smoker     Quit date: 11/7/1992    Smokeless tobacco: Never Used    Alcohol use Yes      Comment: socially    Drug use: No    Sexual activity: No     Review of Systems   Unable to perform ROS: Mental status change     Objective:     Vital Signs (Most Recent):  Temp: 97.2 °F (36.2 °C) (12/21/17 2130)  Pulse: 106 (12/21/17 2130)  Resp: (!) 30 (12/21/17 2130)  BP: 106/73 (12/21/17 2130)  SpO2: (!) 93 % (12/21/17 2130) Vital Signs (24h Range):  Temp:  [97.2 °F (36.2 °C)-97.8 °F (36.6 °C)] 97.2 °F (36.2 °C)  Pulse:  [] 106  Resp:  [17-30] 30  SpO2:  [79 %-100 %] 93 %  BP: ()/(51-91) 106/73     Weight: 131 kg (288 lb 12.8 oz)  Body mass index is 45.23 kg/m².    Physical Exam   Constitutional: She appears lethargic. She is sleeping. She appears ill.   BiPAP applied.  Cushingoid appearance.   HENT:   Head: Normocephalic and atraumatic.   Right Ear: External ear normal.   Left Ear: External ear normal.   Eyes: Conjunctivae are normal. Right eye exhibits no discharge. Left eye exhibits no discharge.   Neck: Neck supple.   Fat tissue in neck impedes my view of JVD   Cardiovascular: An irregularly irregular rhythm present. Tachycardia present.  Exam reveals distant heart sounds.    Pulmonary/Chest: No accessory muscle usage or stridor. No tachypnea. She has decreased breath sounds. She has rales.   Abdominal: Soft. Normal appearance and bowel sounds are normal. She exhibits no distension and no mass.   Genitourinary:   Genitourinary Comments: Cloudy yellow urine in collection bag   Musculoskeletal: She exhibits no deformity.        Right upper leg: She exhibits edema.        Left upper leg: She exhibits edema.   Neurological: She  appears lethargic.   Skin: Skin is warm. There is erythema (lower half of both lower legs).   Dry, thickened skin in lower legs           Significant Labs:   ABGs:   Recent Labs  Lab 12/21/17  1825   PH 7.318*   PCO2 70.7*   HCO3 36.2*   POCSATURATED 97   BE 10     CBC:   Recent Labs  Lab 12/21/17  1226   WBC 7.90   HGB 11.9*   HCT 37.6        CMP:   Recent Labs  Lab 12/21/17  1226   *   K 4.1      CO2 36*   *   BUN 84*   CREATININE 2.1*   CALCIUM 9.5   PROT 6.4   ALBUMIN 2.9*   BILITOT 0.9   ALKPHOS 110   AST 30   ALT 39   ANIONGAP 8   EGFRNONAA 21*     Urine Studies:   Recent Labs  Lab 12/21/17  1422   COLORU Yellow  Yellow   APPEARANCEUA Cloudy*  Cloudy*   PHUR 6.0  6.0   SPECGRAV 1.010  1.010   PROTEINUA Negative  Negative   GLUCUA Negative  Negative   KETONESU Negative  Negative   BILIRUBINUA Negative  Negative   OCCULTUA 2+*  2+*   NITRITE Positive*  Positive*   UROBILINOGEN Negative  Negative   LEUKOCYTESUR 3+*  3+*   RBCUA 5*   WBCUA >100*   BACTERIA Many*       Significant Imaging: I have reviewed all pertinent imaging results/findings within the past 24 hours.    Assessment/Plan:     * Acute respiratory failure with hypoxia and hypercarbia    Due to COPD, obesity hypoventilation, and CHF.  Will use BiPAP.  PaCO2 of 70, but pH normal (suggesting chronic resp acidosis that doesn't appear to be acutely worse).  PaO2 low.  Large A-a gradient.  Monitor ABG.          UTI without hematuria        Will see what urine grows.    Antibiotics     Start     Stop Route Frequency Ordered    12/22/17 1600  cefTRIAXone (ROCEPHIN) 1 g in dextrose 5 % 50 mL IVPB      -- IV Every 24 hours (non-standard times) 12/21/17 1651           Supratherapeutic INR    No evidence of bleeding.  Given Vit K in ER.  Will give more as long as INR is above therapeutic range.    Lab Results   Component Value Date    INR >10.0 (HH) 12/21/2017    INR >10.0 (HH) 12/21/2017    INR 2.0 11/13/2017              Acute on chronic diastolic heart failure    CHF currently uncontrolled. Latest ECHO shows ejection fraction of   EF   Date Value Ref Range Status   09/12/2017 55 55 - 65    ]. Give IV Lasix and monitor clinical status closely. Monitor on telemetry. Last BNP is   Recent Labs  Lab 12/21/17  1226   BNP 1,727*   . When awake, will continue to stress to patient importance of self efficacy and  on diet for CHF.            Long term current use of anticoagulant therapy    Keep INR between 2 and 3 for history of DVT and for prevention of embolic stroke.          Chronic kidney disease, stage IV (severe)    Creatine stable for now. Monitor UOP and serial BMP and adjust therapy as needed. Renally dose meds.  Estimated Creatinine Clearance: 27.6 mL/min (based on SCr of 2.1 mg/dL (H)).            Morbid obesity with BMI of 40.0-44.9, adult    Body mass index is 45.23 kg/m². Morbid obesity complicates all aspects of disease management from diagnostic modalities to treatment.           Essential hypertension    Chronic, controlled.  Monitor BP closely.            Atrial fibrillation with rapid ventricular response    Will use IV bolus doses of diltiazem when rate is uncontrolled.  Due to her being on NIV, she's unable to take her BB.          Lymphedema of both lower extremities    Chronic.  Was getting outpatient therapy at one time prior to going into the NH.  Skin is erythematous but not warm.  Doesn't appear to be cellulitic.  Will monitor for skin breakage.          S/P insertion of IVC (inferior vena caval) filter 3/22/10    Noted.          Personal history of DVT (deep vein thrombosis)    Noted.  Will keep her INR between 2 and 3.            VTE Risk Mitigation         Ordered     Medium Risk of VTE  Once      12/21/17 1651     Place sequential compression device  Until discontinued      12/21/17 1651     Reason for No Pharmacological VTE Prophylaxis  Already anticoagulated on warfarin     12/21/17 1651         Critical care time spent on the evaluation and treatment of severe organ dysfunction, review of pertinent labs and imaging studies, discussions with consulting providers and discussions with patient/family: 55 minutes.     Artie Dong MD  Department of Hospital Medicine   [facility]

## 2017-12-22 NOTE — ASSESSMENT & PLAN NOTE
No evidence of bleeding.  Given Vit K in ER.  Will give more as long as INR is above therapeutic range.

## 2017-12-22 NOTE — HPI
PCP: Dr. Gray  Cardiology: Dr. Rivas    86 y/o WF with a h/o permanent atrial fibrillation, anticoagulated on coumadin, MELISSA, CHF, HTN, CKD stage 4, NADINE, personal h/o DVT, s/p IVC filter 03/2010, COPD, chronic respiratory failure, home O2 use, cerebral aneusym s/p repair, LE cellulitis, and lymphedema who presented to the ED yesterday via EMS from [facility] telling the ED staff that she was there for her kidneys. As per the initial H&P done yesterday: She was somnolent and was falling asleep when not stimulated.  ABG showed hypercarbia.  She was placed on NIPPV.  Recent hospital stays at [facility] were for cellulitis of her legs and COPD decompensation.  She was also recently hospitalized at [facility].  No records of that stay are available at this time.    Cardiology consultation was requested for atrial fibrillation with RVR. Patient with known h/o atrial fibrillation, followed by cardiology, Dr. Rivas in [place]. It appears that she was last seen by Dr. Rivas in 03/2017 at which point she was noted with permanent atrial fibrillation; her multaq was stopped and she was advised to continue diltiazem and warfarin. Now with supra therapeutic INR this admission, INR > 10, warfarin is on hold and patient is receiving vitamin K. Due to patient being on Bipap with altered mental status, she is not able to take po medications at this time. She has not received her usual CCB dose of long acting Diltaizem 300mg daily. Afib on telemetry with persistent RVR episodes, HR ranging from upper 80s to 130s. Minimal improvement in rate after IVFs, IV digoxin and multiple IV CCB and BB doses. Patient has been experiencing marginal BP readings at times. Noted with normal EF of 55% per echo done in 09/2017. Serial troponin x 3 with mild and mostly flat elevation. EKG shows atrial fibrillation rate of 125bpm, low voltage QRS, LAD, PRWP. According to patient's medical record, she has a normal angiogram in 2008. Patient is also  receiving abx for UTI

## 2017-12-22 NOTE — PLAN OF CARE
Problem: Nutrition, Imbalanced: Inadequate Oral Intake (Adult)  Goal: Identify Related Risk Factors and Signs and Symptoms  Related risk factors and signs and symptoms are identified upon initiation of Human Response Clinical Practice Guideline (CPG)  Outcome: Ongoing (interventions implemented as appropriate)  Recommendations  Recommendation/Intervention: 1.) When appropriate per MD, recommend Cardiac diet   Goals: 1.) diet will advance within 48 hrs.   Nutrition Goal Status: new  Communication of RD Recs: discussed on rounds (sticky note)

## 2017-12-22 NOTE — ASSESSMENT & PLAN NOTE
Will use IV bolus doses of diltiazem when rate is uncontrolled.  Due to her being on NIV, she's unable to take her BB.

## 2017-12-23 LAB
ANION GAP SERPL CALC-SCNC: 8 MMOL/L
BACTERIA UR CULT: NORMAL
BASOPHILS # BLD AUTO: 0 K/UL
BASOPHILS NFR BLD: 0.2 %
BUN SERPL-MCNC: 64 MG/DL
CALCIUM SERPL-MCNC: 8.6 MG/DL
CHLORIDE SERPL-SCNC: 103 MMOL/L
CO2 SERPL-SCNC: 36 MMOL/L
CREAT SERPL-MCNC: 1.5 MG/DL
DIFFERENTIAL METHOD: ABNORMAL
EOSINOPHIL # BLD AUTO: 0.1 K/UL
EOSINOPHIL NFR BLD: 1 %
ERYTHROCYTE [DISTWIDTH] IN BLOOD BY AUTOMATED COUNT: 18.2 %
EST. GFR  (AFRICAN AMERICAN): 36 ML/MIN/1.73 M^2
EST. GFR  (NON AFRICAN AMERICAN): 32 ML/MIN/1.73 M^2
GLUCOSE SERPL-MCNC: 150 MG/DL
HCT VFR BLD AUTO: 33.9 %
HGB BLD-MCNC: 10.4 G/DL
INR PPP: 1.5
LYMPHOCYTES # BLD AUTO: 0.8 K/UL
LYMPHOCYTES NFR BLD: 10.4 %
MCH RBC QN AUTO: 30 PG
MCHC RBC AUTO-ENTMCNC: 30.8 G/DL
MCV RBC AUTO: 97 FL
MONOCYTES # BLD AUTO: 0.5 K/UL
MONOCYTES NFR BLD: 6.8 %
NEUTROPHILS # BLD AUTO: 6.6 K/UL
NEUTROPHILS NFR BLD: 81.6 %
PLATELET # BLD AUTO: 146 K/UL
PMV BLD AUTO: 10.6 FL
POTASSIUM SERPL-SCNC: 3.7 MMOL/L
PROTHROMBIN TIME: 15.4 SEC
RBC # BLD AUTO: 3.48 M/UL
SODIUM SERPL-SCNC: 147 MMOL/L
WBC # BLD AUTO: 8.1 K/UL

## 2017-12-23 PROCEDURE — 99900035 HC TECH TIME PER 15 MIN (STAT)

## 2017-12-23 PROCEDURE — 27000221 HC OXYGEN, UP TO 24 HOURS

## 2017-12-23 PROCEDURE — 94640 AIRWAY INHALATION TREATMENT: CPT

## 2017-12-23 PROCEDURE — 85610 PROTHROMBIN TIME: CPT

## 2017-12-23 PROCEDURE — 85025 COMPLETE CBC W/AUTO DIFF WBC: CPT

## 2017-12-23 PROCEDURE — 80048 BASIC METABOLIC PNL TOTAL CA: CPT

## 2017-12-23 PROCEDURE — 25000003 PHARM REV CODE 250: Performed by: HOSPITALIST

## 2017-12-23 PROCEDURE — 63600175 PHARM REV CODE 636 W HCPCS: Performed by: HOSPITALIST

## 2017-12-23 PROCEDURE — 25000242 PHARM REV CODE 250 ALT 637 W/ HCPCS: Performed by: HOSPITALIST

## 2017-12-23 PROCEDURE — 94761 N-INVAS EAR/PLS OXIMETRY MLT: CPT

## 2017-12-23 PROCEDURE — C9113 INJ PANTOPRAZOLE SODIUM, VIA: HCPCS | Performed by: HOSPITALIST

## 2017-12-23 PROCEDURE — 20000000 HC ICU ROOM

## 2017-12-23 PROCEDURE — 36415 COLL VENOUS BLD VENIPUNCTURE: CPT

## 2017-12-23 PROCEDURE — 94660 CPAP INITIATION&MGMT: CPT

## 2017-12-23 PROCEDURE — 99233 SBSQ HOSP IP/OBS HIGH 50: CPT | Mod: ,,, | Performed by: INTERNAL MEDICINE

## 2017-12-23 PROCEDURE — 25000003 PHARM REV CODE 250: Performed by: INTERNAL MEDICINE

## 2017-12-23 RX ORDER — WARFARIN SODIUM 5 MG/1
5 TABLET ORAL DAILY
Status: DISCONTINUED | OUTPATIENT
Start: 2017-12-23 | End: 2017-12-29

## 2017-12-23 RX ORDER — DILTIAZEM HCL 1 MG/ML
5 INJECTION, SOLUTION INTRAVENOUS CONTINUOUS
Status: DISCONTINUED | OUTPATIENT
Start: 2017-12-23 | End: 2017-12-23

## 2017-12-23 RX ADMIN — DEXTROSE MONOHYDRATE 1 ML: 5 INJECTION, SOLUTION INTRAVENOUS at 09:12

## 2017-12-23 RX ADMIN — DEXTROSE MONOHYDRATE: 5 INJECTION, SOLUTION INTRAVENOUS at 11:12

## 2017-12-23 RX ADMIN — WARFARIN SODIUM 5 MG: 5 TABLET ORAL at 06:12

## 2017-12-23 RX ADMIN — ONDANSETRON 8 MG: 2 INJECTION INTRAMUSCULAR; INTRAVENOUS at 11:12

## 2017-12-23 RX ADMIN — DILTIAZEM HYDROCHLORIDE 7.5 MG/HR: 5 INJECTION INTRAVENOUS at 04:12

## 2017-12-23 RX ADMIN — DEXTROSE MONOHYDRATE 1 ML: 5 INJECTION, SOLUTION INTRAVENOUS at 01:12

## 2017-12-23 RX ADMIN — DILTIAZEM HYDROCHLORIDE 10 MG/HR: 5 INJECTION INTRAVENOUS at 09:12

## 2017-12-23 RX ADMIN — MORPHINE SULFATE 4 MG: 4 INJECTION INTRAVENOUS at 11:12

## 2017-12-23 RX ADMIN — IPRATROPIUM BROMIDE AND ALBUTEROL SULFATE 3 ML: .5; 3 SOLUTION RESPIRATORY (INHALATION) at 01:12

## 2017-12-23 RX ADMIN — PANTOPRAZOLE SODIUM 40 MG: 40 INJECTION, POWDER, FOR SOLUTION INTRAVENOUS at 09:12

## 2017-12-23 RX ADMIN — DEXTROSE MONOHYDRATE 1 G: 5 INJECTION, SOLUTION INTRAVENOUS at 04:12

## 2017-12-23 RX ADMIN — PROMETHAZINE HYDROCHLORIDE 6.25 MG: 25 INJECTION INTRAMUSCULAR; INTRAVENOUS at 03:12

## 2017-12-23 NOTE — ASSESSMENT & PLAN NOTE
Severe.  Due to COPD, obesity hypoventilation, and CHF.  Will use BiPAP as needed for now.   ABG suggestive of chronic resp acidosis.

## 2017-12-23 NOTE — ASSESSMENT & PLAN NOTE
CHF currently uncontrolled. Latest ECHO shows ejection fraction of   EF   Date Value Ref Range Status   09/12/2017 55 55 - 65    ]. Give IV Lasix and monitor clinical status closely. Monitor on telemetry. Last BNP is     Recent Labs  Lab 12/21/17  1226   BNP 1,727*   . When awake, will continue to stress to patient importance of self efficacy and  on diet for CHF.  - Cardiology following

## 2017-12-23 NOTE — SUBJECTIVE & OBJECTIVE
Interval History:  More alert today.  Recognizes me and members of her family, but just nods her head.  On BiPAP.  Not able to carry a conversation with me.    Review of Systems   Unable to perform ROS: Mental status change     Objective:     Vital Signs (Most Recent):  Temp: 96.4 °F (35.8 °C) (12/22/17 1900)  Pulse: 97 (12/22/17 1945)  Resp: 15 (12/22/17 1945)  BP: (!) 108/57 (12/22/17 1945)  SpO2: 95 % (12/22/17 1945) Vital Signs (24h Range):  Temp:  [95.5 °F (35.3 °C)-97.5 °F (36.4 °C)] 96.4 °F (35.8 °C)  Pulse:  [] 97  Resp:  [12-33] 15  SpO2:  [84 %-100 %] 95 %  BP: ()/(50-80) 108/57     Weight: 131 kg (288 lb 12.8 oz)  Body mass index is 45.23 kg/m².    Intake/Output Summary (Last 24 hours) at 12/22/17 2003  Last data filed at 12/22/17 1940   Gross per 24 hour   Intake           1902.5 ml   Output             2825 ml   Net           -922.5 ml      Physical Exam   Constitutional: She appears lethargic. She is sleeping. She appears ill.   BiPAP applied.  Cushingoid appearance.   HENT:   Head: Normocephalic and atraumatic.   Eyes: Conjunctivae are normal. Right eye exhibits no discharge. Left eye exhibits no discharge.   Neck: Neck supple.   Fat tissue in neck impedes my view of JVD   Cardiovascular: An irregularly irregular rhythm present. Tachycardia present.  Exam reveals distant heart sounds.    Pulmonary/Chest: No accessory muscle usage or stridor. No tachypnea. She has decreased breath sounds. She has rales.   Abdominal: Soft. Normal appearance and bowel sounds are normal. She exhibits no distension and no mass.   Genitourinary:   Genitourinary Comments: Cloudy yellow urine in collection bag   Musculoskeletal: She exhibits no deformity.        Right upper leg: She exhibits edema.        Left upper leg: She exhibits edema.   Neurological: She appears lethargic.   Skin: Skin is warm. There is erythema (lower half of both lower legs).   Dry, thickened skin in lower legs       Significant Labs: All  pertinent labs within the past 24 hours have been reviewed.    Significant Imaging: none

## 2017-12-23 NOTE — ASSESSMENT & PLAN NOTE
Will see what urine grows.  Antibiotics     Start     Stop Route Frequency Ordered    12/22/17 1600  cefTRIAXone (ROCEPHIN) 1 g in dextrose 5 % 50 mL IVPB      -- IV Every 24 hours (non-standard times) 12/21/17 2218

## 2017-12-23 NOTE — PROGRESS NOTES
[facility]  Progress Note    Patient Name: Nicole Lala  MRN: [id number]  Patient Class: IP- Inpatient   Admission Date: 12/21/2017  Length of Stay: 1 days  Attending Physician: Artie Dong MD  Primary Care Provider: Brandon Gray MD        Subjective:     Principal Problem:Acute respiratory failure with hypoxia and hypercarbia    HPI:  Patient was brought to our ER via ambulance from [facility].  When she arrived, she told the ER staff that she was there for her kidneys.  She was somnolent and was falling asleep when not stimulated.  ABG showed hypercarbia.  She was placed on NIPPV.  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  Recent hospital stays at [facility] were for cellulitis of her legs and COPD decompensation.  She was also recently hospitalized at [facility].  No records of that stay are available at this time.    Hospital Course:  No notes on file    Interval History:  More alert today.  Recognizes me and members of her family, but just nods her head.  On BiPAP.  Not able to carry a conversation with me.    Review of Systems   Unable to perform ROS: Mental status change     Objective:     Vital Signs (Most Recent):  Temp: 96.4 °F (35.8 °C) (12/22/17 1900)  Pulse: 97 (12/22/17 1945)  Resp: 15 (12/22/17 1945)  BP: (!) 108/57 (12/22/17 1945)  SpO2: 95 % (12/22/17 1945) Vital Signs (24h Range):  Temp:  [95.5 °F (35.3 °C)-97.5 °F (36.4 °C)] 96.4 °F (35.8 °C)  Pulse:  [] 97  Resp:  [12-33] 15  SpO2:  [84 %-100 %] 95 %  BP: ()/(50-80) 108/57     Weight: 131 kg (288 lb 12.8 oz)  Body mass index is 45.23 kg/m².    Intake/Output Summary (Last 24 hours) at 12/22/17 2003  Last data filed at 12/22/17 1940   Gross per 24 hour   Intake           1902.5 ml   Output             2825 ml   Net           -922.5 ml       Physical Exam   Constitutional: She appears lethargic. She is sleeping. She appears ill.   BiPAP applied.  Cushingoid appearance.   HENT:   Head: Normocephalic and atraumatic.   Eyes: Conjunctivae are normal. Right eye exhibits no discharge. Left eye exhibits no discharge.   Neck: Neck supple.   Fat tissue in neck impedes my view of JVD   Cardiovascular: An irregularly irregular rhythm present. Tachycardia present.  Exam reveals distant heart sounds.    Pulmonary/Chest: No accessory muscle usage or stridor. No tachypnea. She has decreased breath sounds. She has rales.   Abdominal: Soft. Normal appearance and bowel sounds are normal. She exhibits no distension and no mass.   Genitourinary:   Genitourinary Comments: Cloudy yellow urine in collection bag   Musculoskeletal: She exhibits no deformity.        Right upper leg: She exhibits edema.        Left upper leg: She exhibits edema.   Neurological: She appears lethargic.   Skin: Skin is warm. There is erythema (lower half of both lower legs).   Dry, thickened skin in lower legs       Significant Labs: All pertinent labs within the past 24 hours have been reviewed.    Significant Imaging: none    Assessment/Plan:      * Acute respiratory failure with hypoxia and hypercarbia    Severe.  Due to COPD, obesity hypoventilation, and CHF.  Will use BiPAP for now and give her a break later this evening.  PaCO2 of 70, but pH normal (suggesting chronic resp acidosis that doesn't appear to be acutely worse).  I don't want to drive the PaCO2 down too far b/c it will lead to alkalosis.  PaO2 low.  Large A-a gradient.  Monitor ABG.        Urinary tract infection without hematuria    Will see what urine grows.  Antibiotics     Start     Stop Route Frequency Ordered    12/22/17 1600  cefTRIAXone (ROCEPHIN) 1 g in dextrose 5 % 50 mL IVPB      -- IV Every 24 hours (non-standard times) 12/21/17 1651                  Supratherapeutic INR    No evidence of bleeding.  Given Vit K in ER.   Will give more as long as INR is above therapeutic range.          Acute on chronic diastolic heart failure    CHF currently uncontrolled. Latest ECHO shows ejection fraction of   EF   Date Value Ref Range Status   09/12/2017 55 55 - 65    ]. Give IV Lasix and monitor clinical status closely. Monitor on telemetry. Last BNP is     Recent Labs  Lab 12/21/17  1226   BNP 1,727*   . When awake, will continue to stress to patient importance of self efficacy and  on diet for CHF.            Long term current use of anticoagulant therapy    Keep INR between 2 and 3 for history of DVT and for prevention of embolic stroke.          Chronic kidney disease, stage IV (severe)    Creatine stable for now. Monitor UOP and serial BMP and adjust therapy as needed. Renally dose meds.  Estimated Creatinine Clearance: 32.2 mL/min (based on SCr of 1.8 mg/dL (H)).            Morbid obesity with BMI of 40.0-44.9, adult    Body mass index is 45.23 kg/m². Morbid obesity complicates all aspects of disease management from diagnostic modalities to treatment.           Essential hypertension    Chronic, controlled.  Monitor BP closely.            Atrial fibrillation with rapid ventricular response    Will use IV bolus doses of diltiazem when rate is uncontrolled.  Probably will need a continuous infusion.  Due to her being on NIV, she's unable to take her BB.          Lymphedema of both lower extremities    Chronic.  Was getting outpatient therapy at one time prior to going into the NH.  Skin is erythematous but not warm.  Doesn't appear to be cellulitic.  Will monitor for skin breakage.          S/P insertion of IVC (inferior vena caval) filter 3/22/10    Noted.          Personal history of DVT (deep vein thrombosis)    Noted.  Will keep her INR between 2 and 3.            VTE Risk Mitigation         Ordered     Medium Risk of VTE  Once      12/21/17 1651     Place sequential compression device  Until discontinued      12/21/17 1651      Reason for No Pharmacological VTE Prophylaxis  Once      12/21/17 9433        I spent 25 minutes of face to face discussion regarding advance directives and end of life planning which included family. Family understands the seriousness of her condition and would like to make their end of life decisions known as follows-  requests full code for now (he says that she told him her wishes long ago).    Critical care time spent on the evaluation and treatment of severe organ dysfunction, review of pertinent labs and imaging studies, discussions with consulting providers and discussions with patient/family: 58 minutes.    Artie Dong MD  Department of Hospital Medicine   [facility]

## 2017-12-23 NOTE — PROGRESS NOTES
[facility] Hospital Medicine  Progress Note    Patient Name: Nicole Lala  MRN: [id number]  Patient Class: IP- Inpatient   Admission Date: 12/21/2017  Length of Stay: 2 days  Attending Physician: Dalia Cardoso MD  Primary Care Provider: Brandon Gray MD        Subjective:     Principal Problem:Acute respiratory failure with hypoxia and hypercarbia    HPI:  Patient was brought to our ER via ambulance from [facility].  When she arrived, she told the ER staff that she was there for her kidneys.  She was somnolent and was falling asleep when not stimulated.  ABG showed hypercarbia.  She was placed on NIPPV.  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  Recent hospital stays at [facility] were for cellulitis of her legs and COPD decompensation.  She was also recently hospitalized at [facility].  No records of that stay are available at this time.    Hospital Course:  No notes on file    Interval History:  Answering questions but complaining of cough and musus production. Off BIPAP this morning with good O2 sats.     Review of Systems   Unable to perform ROS: Mental status change     Objective:     Vital Signs (Most Recent):  Temp: 97.8 °F (36.6 °C) (12/23/17 0813)  Pulse: 106 (12/23/17 0930)  Resp: (!) 22 (12/23/17 0930)  BP: 112/74 (12/23/17 0930)  SpO2: (!) 78 % (12/23/17 0930) Vital Signs (24h Range):  Temp:  [95.7 °F (35.4 °C)-97.8 °F (36.6 °C)] 97.8 °F (36.6 °C)  Pulse:  [] 106  Resp:  [11-46] 22  SpO2:  [78 %-100 %] 78 %  BP: ()/(48-86) 112/74     Weight: 129.9 kg (286 lb 6 oz)  Body mass index is 44.85 kg/m².    Intake/Output Summary (Last 24 hours) at 12/23/17 1014  Last data filed at 12/23/17 0938   Gross per 24 hour   Intake          2190.67 ml   Output             2865 ml   Net          -674.33 ml      Physical Exam    Constitutional: She appears lethargic. She is sleeping. She appears ill.   HENT:   Head: Normocephalic and atraumatic.   Eyes: Conjunctivae are normal. Right eye exhibits no discharge. Left eye exhibits no discharge.   Neck: Neck supple.   Fat tissue in neck impedes my view of JVD   Cardiovascular: An irregularly irregular rhythm present. Tachycardia present.  Exam reveals distant heart sounds.    Pulmonary/Chest: No accessory muscle usage or stridor. No tachypnea. She has decreased breath sounds. She has rales.   Abdominal: Soft. Normal appearance and bowel sounds are normal. She exhibits no distension and no mass.   Genitourinary:   Genitourinary Comments: Cloudy yellow urine in collection bag   Musculoskeletal: She exhibits no deformity.        Right upper leg: She exhibits edema.        Left upper leg: She exhibits edema.   Neurological: She appears lethargic.   Skin: Skin is warm. There is erythema (lower half of both lower legs).   Dry, thickened skin in lower legs       Significant Labs: All pertinent labs within the past 24 hours have been reviewed.    Significant Imaging: none    Assessment/Plan:      * Acute respiratory failure with hypoxia and hypercarbia    Severe.  Due to COPD, obesity hypoventilation, and CHF.  Will use BiPAP as needed for now.   ABG suggestive of chronic resp acidosis.        Urinary tract infection without hematuria    Will see what urine grows.  Antibiotics     Start     Stop Route Frequency Ordered    12/22/17 1600  cefTRIAXone (ROCEPHIN) 1 g in dextrose 5 % 50 mL IVPB      -- IV Every 24 hours (non-standard times) 12/21/17 1651                  Supratherapeutic INR    No evidence of bleeding.  Given Vit K in ER. INR subtherapeutic now, will restart coumadin          Acute on chronic diastolic heart failure    CHF currently uncontrolled. Latest ECHO shows ejection fraction of   EF   Date Value Ref Range Status   09/12/2017 55 55 - 65    ]. Give IV Lasix and monitor clinical  status closely. Monitor on telemetry. Last BNP is     Recent Labs  Lab 12/21/17  1226   BNP 1,727*   . When awake, will continue to stress to patient importance of self efficacy and  on diet for CHF.  - Cardiology following            Long term current use of anticoagulant therapy    Keep INR between 2 and 3 for history of DVT and for prevention of embolic stroke.  Will restart coumadin          Chronic kidney disease, stage IV (severe)    Creatine stable for now. Monitor UOP and serial BMP and adjust therapy as needed. Renally dose meds.  Estimated Creatinine Clearance: 38.5 mL/min (based on SCr of 1.5 mg/dL (H)).            Morbid obesity with BMI of 40.0-44.9, adult    Body mass index is 44.85 kg/m². Morbid obesity complicates all aspects of disease management from diagnostic modalities to treatment.           Essential hypertension    Chronic, controlled.  Monitor BP closely.            Atrial fibrillation with rapid ventricular response    Continue diltiazem infusion, cardiology on board. Due to her being on NIV, she's unable to take her BB.          Lymphedema of both lower extremities    Chronic.  Was getting outpatient therapy at one time prior to going into the NH.  Skin is erythematous but not warm.  Doesn't appear to be cellulitic.  Will monitor for skin breakage.          Benign essential tremor              S/P insertion of IVC (inferior vena caval) filter 3/22/10    Noted.          Personal history of DVT (deep vein thrombosis)    Noted.  Will keep her INR between 2 and 3.            VTE Risk Mitigation         Ordered     warfarin (COUMADIN) tablet 5 mg  Daily     Route:  Oral        12/23/17 1021     Medium Risk of VTE  Once      12/21/17 1651     Place sequential compression device  Until discontinued      12/21/17 1651     Reason for No Pharmacological VTE Prophylaxis  Once      12/21/17 1651          Critical care time spent on the evaluation and treatment of severe organ dysfunction,  review of pertinent labs and imaging studies, discussions with consulting providers and discussions with patient/family: 35 minutes.    Dalia Cardoso MD  Department of Hospital Medicine   [facility]

## 2017-12-23 NOTE — ASSESSMENT & PLAN NOTE
Continue diltiazem infusion, cardiology on board. Due to her being on NIV, she's unable to take her BB.

## 2017-12-23 NOTE — PROGRESS NOTES
Spoke with Dr. Cardoso concerning diet orders for patient. Orders received and placed on chart for clear liquid diet.

## 2017-12-23 NOTE — ASSESSMENT & PLAN NOTE
Creatine stable for now. Monitor UOP and serial BMP and adjust therapy as needed. Renally dose meds.  Estimated Creatinine Clearance: 38.5 mL/min (based on SCr of 1.5 mg/dL (H)).

## 2017-12-23 NOTE — PLAN OF CARE
Problem: Patient Care Overview  Goal: Plan of Care Review  Outcome: Revised  Slept off and on throughout the day. Clear liquid diet ordered. Cardizem still infusing. Safety maintained.

## 2017-12-23 NOTE — PROGRESS NOTES
[facility]  Cardiology  Progress Note    Patient Name: Nicole Lala  MRN: [id number]  Admission Date: 12/21/2017  Hospital Length of Stay: 2 days  Code Status: Full Code   Attending Physician: Dalia Cardoso MD   Primary Care Physician: Brandon Gray MD  Expected Discharge Date:   Principal Problem:Acute respiratory failure with hypoxia and hypercarbia    Subjective:     Hospital Course: Off BiPAP remains with periodic somnolence with hypercarbia     Interval History: AF rates up and down on IV CCB at 7.5 mg /hour. Borderline BP. Productive cough of brownish / yellowish sputum on antibiotics. High sodium on D5W. Remains with pre-renal azotemia with decreasing Cr.   O2 sat at 96%. INR now 1.5 to restart OAC. Elevated troponin I but flat pattern.    ROS   No change from recent examination.    Objective:     Vital Signs (Most Recent):  Temp: 97.8 °F (36.6 °C) (12/23/17 0813)  Pulse: (!) 126 (12/23/17 1500)  Resp: 14 (12/23/17 1500)  BP: 128/78 (12/23/17 1500)  SpO2: (!) 88 % (12/23/17 1500) Vital Signs (24h Range):  Temp:  [96.4 °F (35.8 °C)-97.8 °F (36.6 °C)] 97.8 °F (36.6 °C)  Pulse:  [] 126  Resp:  [11-46] 14  SpO2:  [75 %-100 %] 88 %  BP: ()/(48-89) 128/78     Weight: 129.9 kg (286 lb 6 oz)  Body mass index is 44.85 kg/m².    SpO2: (!) 88 %  O2 Device (Oxygen Therapy): nasal cannula w/ humidification      Intake/Output Summary (Last 24 hours) at 12/23/17 1554  Last data filed at 12/23/17 1350   Gross per 24 hour   Intake          2190.67 ml   Output             2410 ml   Net          -219.33 ml       Lines/Drains/Airways     Drain                 Urethral Catheter 12/21/17 2 days          Peripheral Intravenous Line                 Midline Catheter Insertion/Assessment  - Single Lumen 09/14/17 1630 Left brachial vein 18g x 10cm 100 days         Peripheral IV - Single Lumen 12/22/17 1483 Left Upper Arm less than 1 day                Physical Exam  No change from recent  examination.  Except off BiPAP    Significant Labs:   ABG:   Recent Labs  Lab 12/21/17  1825 12/22/17  0551 12/22/17  1308   PH 7.318* 7.338* 7.348*   PCO2 70.7* 71.3* 68.9*   HCO3 36.2* 38.2* 37.9*   POCSATURATED 97 97 95   BE 10 12 12   , BMP:   Recent Labs  Lab 12/21/17  2220 12/22/17  0348 12/22/17  0358 12/23/17  0403   *  --  129* 150*   *  --  149* 147*   K 4.0  --  4.0 3.7     --  108 103   CO2 32*  --  31* 36*   BUN 79*  --  80* 64*   CREATININE 1.9*  --  1.8* 1.5*   CALCIUM 8.8  --  8.8 8.6*   MG  --  2.6  --   --    , CMP   Recent Labs  Lab 12/21/17 2220 12/22/17  0358 12/23/17  0403   * 149* 147*   K 4.0 4.0 3.7    108 103   CO2 32* 31* 36*   * 129* 150*   BUN 79* 80* 64*   CREATININE 1.9* 1.8* 1.5*   CALCIUM 8.8 8.8 8.6*   ANIONGAP 10 10 8   ESTGFRAFRICA 27* 29* 36*   EGFRNONAA 24* 25* 32*   , CBC   Recent Labs  Lab 12/22/17  0358 12/23/17  0403   WBC 6.80 8.10   HGB 10.8* 10.4*   HCT 34.6* 33.9*   * 146*   , INR   Recent Labs  Lab 12/21/17  1922 12/22/17  0358 12/23/17  0403   INR >10.0* >10.0* 1.5*   , Lipid Panel No results for input(s): CHOL, HDL, LDLCALC, TRIG, CHOLHDL in the last 48 hours., Troponin   Recent Labs  Lab 12/21/17 2220 12/22/17  0358   TROPONINI 0.040* 0.047*    and All pertinent lab results from the last 24 hours have been reviewed.    Significant Imaging: no further     Assessment and Plan:       Active Diagnoses:    Diagnosis Date Noted POA    PRINCIPAL PROBLEM:  Acute respiratory failure with hypoxia and hypercarbia [J96.01, J96.02] 12/21/2017 Yes    Acute on chronic diastolic heart failure [I50.33] 12/21/2017 Yes    Supratherapeutic INR [R79.1] 12/21/2017 Yes    Urinary tract infection without hematuria [N39.0] 12/21/2017 Yes    Chronic kidney disease, stage IV (severe) [N18.4] 04/17/2017 Yes    Long term current use of anticoagulant therapy [Z79.01] 04/17/2017 Not Applicable    Morbid obesity with BMI of 40.0-44.9, adult  [E66.01, Z68.41] 03/14/2017 Not Applicable    Essential hypertension [I10] 04/12/2016 Yes    Atrial fibrillation with rapid ventricular response [I48.91] 10/01/2015 Yes    Lymphedema of both lower extremities [I89.0] 08/11/2014 Yes    Personal history of DVT (deep vein thrombosis) [Z86.718] 08/28/2013 Not Applicable    S/P insertion of IVC (inferior vena caval) filter 3/22/10 [Z95.828] 08/28/2013 Not Applicable      Problems Resolved During this Admission:    Diagnosis Date Noted Date Resolved POA       VTE Risk Mitigation         Ordered     warfarin (COUMADIN) tablet 5 mg  Daily     Route:  Oral        12/23/17 1021     Medium Risk of VTE  Once      12/21/17 1651     Place sequential compression device  Until discontinued      12/21/17 1651     Reason for No Pharmacological VTE Prophylaxis  Once      12/21/17 1651        Plans: continue IV CCB for rate control.  Need to be careful with D5W IV supplement for hypernatremia due to risk for cerebral edema.    Radhames Dowling MD  Cardiology  [facility]    Patient Active Problem List   Diagnosis    Macular degeneration - Right Eye    Nuclear sclerosis - Both Eyes    Personal history of DVT (deep vein thrombosis)    S/P insertion of IVC (inferior vena caval) filter 3/22/10    Benign essential tremor    Venous stasis of lower extremity    Physical deconditioning    Iron deficiency anemia    Lymphedema of both lower extremities    Atrial fibrillation with rapid ventricular response    Hypoproteinemia    Essential hypertension    Obesity, morbid, BMI 40.0-49.9    Pulmonary HTN    Morbid obesity with BMI of 40.0-44.9, adult    Bilateral leg edema    Chronic kidney disease, stage IV (severe)    Long term current use of anticoagulant therapy    Senile nuclear sclerosis    Left leg cellulitis    Acute respiratory failure with hypoxia and hypercarbia    Acute on chronic diastolic heart failure    Supratherapeutic INR    Urinary tract  infection without hematuria

## 2017-12-23 NOTE — PLAN OF CARE
Problem: Patient Care Overview  Goal: Plan of Care Review  Outcome: Ongoing (interventions implemented as appropriate)  Patient is awake and interacts with staff although at times her response is delayed or she will ignore the question altogether.  At times she is friendly and smiling and at other times she seems paranoid and nontrusting.  Patient had a spell during the night where she grabbed her chest, looked panicked, couldn't seem to speak.  This passed spontanteously and the patient then coughed up some phelegm that was very thick and tenacious, yellow.  I think for a minute she may have had a plug.  Spoke with RT and patient was given a prn breathing treatment.  No further episodes.  24 hour urine collection was completed early in the shift and sent to lab.

## 2017-12-23 NOTE — ASSESSMENT & PLAN NOTE
Severe.  Due to COPD, obesity hypoventilation, and CHF.  Will use BiPAP for now and give her a break later this evening.  PaCO2 of 70, but pH normal (suggesting chronic resp acidosis that doesn't appear to be acutely worse).  I don't want to drive the PaCO2 down too far b/c it will lead to alkalosis.  PaO2 low.  Large A-a gradient.  Monitor ABG.

## 2017-12-23 NOTE — PLAN OF CARE
Patient coughing and making noise, producing a large amount of thick yellow sputum followed by another cough producing black and red tinged, thick sputum. Suctioned patients mouth and she seemed somewhat confused so I placed her on bipap at ordered settings.      12/23/17 0905   Patient Assessment/Suction   Level of Consciousness (AVPU) alert  (somewhat confused - placed on bipap)   Respiratory Effort Unlabored   All Lung Fields Breath Sounds diminished   Sputum Amount large   Sputum Color black;yellow   Sputum Consistency thick   PRE-TX-O2-ETCO2   O2 Device (Oxygen Therapy) BiPAP   $ Is the patient on Low Flow Oxygen? Yes   SpO2 (!) 88 %   Pulse Oximetry Type Continuous   $ Pulse Oximetry - Multiple Charge Pulse Oximetry - Multiple   Pulse 89   Resp (!) 21   Aerosol Therapy   $ Aerosol Therapy Charges PRN treatment not required   Preset CPAP/BiPAP Settings   Mode Of Delivery BiPAP S/T   $ Is patient using? Yes   Equipment Type V60   Airway Device Type total face mask   Humidifier not applicable   Ipap 12   EPAP (cm H2O) 6   Pressure Support (cm H2O) 6   Set Rate (Breaths/Min) 16   ITime (sec) 1   Rise Time (sec) 2   Patient CPAP/BiPAP Settings   Timed Inspiration (Sec) 1   IPAP Rise Time (sec) 2   RR Total (Breaths/Min) 17   Tidal Volume (mL) 615   VE Minute Ventilation (L/min) 10.4 L/min   Peak Inspiratory Pressure (cm H2O) 13   TiTOT (%) 27   Total Leak (L/Min) 3   Patient Trigger - ST Mode Only (%) 36   CPAP/BiPAP Alarms   High Pressure (cm H2O) 21   Low Pressure (cm H2O) 11   Low Pressure Delay (Sec) 20   Minute Ventilation (L/Min) 1.5   High RR (breaths/min) 40   Low RR (breaths/min) 8   Apnea (Sec) 20

## 2017-12-23 NOTE — ASSESSMENT & PLAN NOTE
Body mass index is 44.85 kg/m². Morbid obesity complicates all aspects of disease management from diagnostic modalities to treatment.

## 2017-12-23 NOTE — SUBJECTIVE & OBJECTIVE
Interval History:  Answering questions but complaining of cough and musus production. Off BIPAP this morning with good O2 sats.     Review of Systems   Unable to perform ROS: Mental status change     Objective:     Vital Signs (Most Recent):  Temp: 97.8 °F (36.6 °C) (12/23/17 0813)  Pulse: 106 (12/23/17 0930)  Resp: (!) 22 (12/23/17 0930)  BP: 112/74 (12/23/17 0930)  SpO2: (!) 78 % (12/23/17 0930) Vital Signs (24h Range):  Temp:  [95.7 °F (35.4 °C)-97.8 °F (36.6 °C)] 97.8 °F (36.6 °C)  Pulse:  [] 106  Resp:  [11-46] 22  SpO2:  [78 %-100 %] 78 %  BP: ()/(48-86) 112/74     Weight: 129.9 kg (286 lb 6 oz)  Body mass index is 44.85 kg/m².    Intake/Output Summary (Last 24 hours) at 12/23/17 1014  Last data filed at 12/23/17 0938   Gross per 24 hour   Intake          2190.67 ml   Output             2865 ml   Net          -674.33 ml      Physical Exam   Constitutional: She appears lethargic. She is sleeping. She appears ill.   HENT:   Head: Normocephalic and atraumatic.   Eyes: Conjunctivae are normal. Right eye exhibits no discharge. Left eye exhibits no discharge.   Neck: Neck supple.   Fat tissue in neck impedes my view of JVD   Cardiovascular: An irregularly irregular rhythm present. Tachycardia present.  Exam reveals distant heart sounds.    Pulmonary/Chest: No accessory muscle usage or stridor. No tachypnea. She has decreased breath sounds. She has rales.   Abdominal: Soft. Normal appearance and bowel sounds are normal. She exhibits no distension and no mass.   Genitourinary:   Genitourinary Comments: Cloudy yellow urine in collection bag   Musculoskeletal: She exhibits no deformity.        Right upper leg: She exhibits edema.        Left upper leg: She exhibits edema.   Neurological: She appears lethargic.   Skin: Skin is warm. There is erythema (lower half of both lower legs).   Dry, thickened skin in lower legs       Significant Labs: All pertinent labs within the past 24 hours have been  reviewed.    Significant Imaging: none

## 2017-12-23 NOTE — ASSESSMENT & PLAN NOTE
Creatine stable for now. Monitor UOP and serial BMP and adjust therapy as needed. Renally dose meds.  Estimated Creatinine Clearance: 32.2 mL/min (based on SCr of 1.8 mg/dL (H)).

## 2017-12-23 NOTE — ASSESSMENT & PLAN NOTE
Will use IV bolus doses of diltiazem when rate is uncontrolled.  Probably will need a continuous infusion.  Due to her being on NIV, she's unable to take her BB.

## 2017-12-23 NOTE — PROGRESS NOTES
Spoke with patient's daughter, Cayla concerning family concern for patient overall condition and questions regarding possible hospice care. Reassured Cayla that I would place consult to  for information to be forwarded to family concerning hospice care.

## 2017-12-23 NOTE — ASSESSMENT & PLAN NOTE
Keep INR between 2 and 3 for history of DVT and for prevention of embolic stroke.  Will restart coumadin

## 2017-12-24 LAB
ANION GAP SERPL CALC-SCNC: 6 MMOL/L
BASOPHILS # BLD AUTO: 0 K/UL
BASOPHILS NFR BLD: 0 %
BUN SERPL-MCNC: 50 MG/DL
CALCIUM SERPL-MCNC: 8.7 MG/DL
CHLORIDE SERPL-SCNC: 99 MMOL/L
CO2 SERPL-SCNC: 38 MMOL/L
CREAT SERPL-MCNC: 1.5 MG/DL
DIFFERENTIAL METHOD: ABNORMAL
EOSINOPHIL # BLD AUTO: 0.1 K/UL
EOSINOPHIL NFR BLD: 1.3 %
ERYTHROCYTE [DISTWIDTH] IN BLOOD BY AUTOMATED COUNT: 18 %
EST. GFR  (AFRICAN AMERICAN): 36 ML/MIN/1.73 M^2
EST. GFR  (NON AFRICAN AMERICAN): 32 ML/MIN/1.73 M^2
GLUCOSE SERPL-MCNC: 122 MG/DL
HCT VFR BLD AUTO: 34.6 %
HGB BLD-MCNC: 10.8 G/DL
INR PPP: 1.2
LYMPHOCYTES # BLD AUTO: 0.8 K/UL
LYMPHOCYTES NFR BLD: 7.8 %
MCH RBC QN AUTO: 30.5 PG
MCHC RBC AUTO-ENTMCNC: 31.3 G/DL
MCV RBC AUTO: 97 FL
MONOCYTES # BLD AUTO: 0.6 K/UL
MONOCYTES NFR BLD: 6.4 %
NEUTROPHILS # BLD AUTO: 8.3 K/UL
NEUTROPHILS NFR BLD: 84.5 %
PLATELET # BLD AUTO: 150 K/UL
PMV BLD AUTO: 9.9 FL
POTASSIUM SERPL-SCNC: 3.9 MMOL/L
PROTHROMBIN TIME: 12.2 SEC
RBC # BLD AUTO: 3.55 M/UL
SODIUM SERPL-SCNC: 143 MMOL/L
WBC # BLD AUTO: 9.8 K/UL

## 2017-12-24 PROCEDURE — 99900035 HC TECH TIME PER 15 MIN (STAT)

## 2017-12-24 PROCEDURE — 25000003 PHARM REV CODE 250: Performed by: INTERNAL MEDICINE

## 2017-12-24 PROCEDURE — 36415 COLL VENOUS BLD VENIPUNCTURE: CPT

## 2017-12-24 PROCEDURE — 85610 PROTHROMBIN TIME: CPT

## 2017-12-24 PROCEDURE — 80048 BASIC METABOLIC PNL TOTAL CA: CPT

## 2017-12-24 PROCEDURE — 63600175 PHARM REV CODE 636 W HCPCS: Performed by: HOSPITALIST

## 2017-12-24 PROCEDURE — 94640 AIRWAY INHALATION TREATMENT: CPT

## 2017-12-24 PROCEDURE — 25000242 PHARM REV CODE 250 ALT 637 W/ HCPCS: Performed by: HOSPITALIST

## 2017-12-24 PROCEDURE — 85025 COMPLETE CBC W/AUTO DIFF WBC: CPT

## 2017-12-24 PROCEDURE — 99232 SBSQ HOSP IP/OBS MODERATE 35: CPT | Mod: ,,, | Performed by: INTERNAL MEDICINE

## 2017-12-24 PROCEDURE — 27000221 HC OXYGEN, UP TO 24 HOURS

## 2017-12-24 PROCEDURE — C9113 INJ PANTOPRAZOLE SODIUM, VIA: HCPCS | Performed by: HOSPITALIST

## 2017-12-24 PROCEDURE — 25000003 PHARM REV CODE 250: Performed by: HOSPITALIST

## 2017-12-24 PROCEDURE — 94761 N-INVAS EAR/PLS OXIMETRY MLT: CPT

## 2017-12-24 PROCEDURE — 20000000 HC ICU ROOM

## 2017-12-24 PROCEDURE — 94660 CPAP INITIATION&MGMT: CPT

## 2017-12-24 RX ORDER — ENOXAPARIN SODIUM 150 MG/ML
1 INJECTION SUBCUTANEOUS
Status: DISCONTINUED | OUTPATIENT
Start: 2017-12-24 | End: 2017-12-25 | Stop reason: DRUGHIGH

## 2017-12-24 RX ORDER — DILTIAZEM HYDROCHLORIDE 120 MG/1
120 CAPSULE, COATED, EXTENDED RELEASE ORAL DAILY
Status: DISCONTINUED | OUTPATIENT
Start: 2017-12-24 | End: 2017-12-28

## 2017-12-24 RX ORDER — SODIUM CHLORIDE 450 MG/100ML
INJECTION, SOLUTION INTRAVENOUS CONTINUOUS
Status: DISCONTINUED | OUTPATIENT
Start: 2017-12-24 | End: 2017-12-25

## 2017-12-24 RX ORDER — WARFARIN SODIUM 5 MG/1
5 TABLET ORAL ONCE
Status: COMPLETED | OUTPATIENT
Start: 2017-12-24 | End: 2017-12-25

## 2017-12-24 RX ADMIN — IPRATROPIUM BROMIDE AND ALBUTEROL SULFATE 3 ML: .5; 3 SOLUTION RESPIRATORY (INHALATION) at 12:12

## 2017-12-24 RX ADMIN — WARFARIN SODIUM 5 MG: 5 TABLET ORAL at 05:12

## 2017-12-24 RX ADMIN — DILTIAZEM HYDROCHLORIDE 10 MG/HR: 5 INJECTION INTRAVENOUS at 11:12

## 2017-12-24 RX ADMIN — DILTIAZEM HYDROCHLORIDE 120 MG: 120 CAPSULE, COATED, EXTENDED RELEASE ORAL at 01:12

## 2017-12-24 RX ADMIN — DEXTROSE MONOHYDRATE: 5 INJECTION, SOLUTION INTRAVENOUS at 03:12

## 2017-12-24 RX ADMIN — DEXTROSE MONOHYDRATE 1 ML: 5 INJECTION, SOLUTION INTRAVENOUS at 05:12

## 2017-12-24 RX ADMIN — DEXTROSE MONOHYDRATE 1 G: 5 INJECTION, SOLUTION INTRAVENOUS at 03:12

## 2017-12-24 RX ADMIN — PANTOPRAZOLE SODIUM 40 MG: 40 INJECTION, POWDER, FOR SOLUTION INTRAVENOUS at 08:12

## 2017-12-24 NOTE — ASSESSMENT & PLAN NOTE
Keep INR between 2 and 3 for history of DVT and for prevention of embolic stroke.  Restarted coumadin

## 2017-12-24 NOTE — SUBJECTIVE & OBJECTIVE
Interval History:  No issues overnight, family consider hospice, called but no answer. Pt tolerated BIPAP well. Advancing diet.     Review of Systems   Unable to perform ROS: Mental status change     Objective:     Vital Signs (Most Recent):  Temp: 97.7 °F (36.5 °C) (12/24/17 1602)  Pulse: 74 (12/24/17 1602)  Resp: (!) 22 (12/24/17 1602)  BP: 107/64 (12/24/17 1602)  SpO2: 96 % (12/24/17 1602) Vital Signs (24h Range):  Temp:  [97.4 °F (36.3 °C)-98.1 °F (36.7 °C)] 97.7 °F (36.5 °C)  Pulse:  [] 74  Resp:  [11-37] 22  SpO2:  [80 %-100 %] 96 %  BP: ()/(50-94) 107/64     Weight: 132.2 kg (291 lb 7.2 oz)  Body mass index is 45.65 kg/m².    Intake/Output Summary (Last 24 hours) at 12/24/17 1653  Last data filed at 12/24/17 0400   Gross per 24 hour   Intake          3077.95 ml   Output              520 ml   Net          2557.95 ml      Physical Exam   Constitutional: She appears lethargic. She is sleeping. She appears ill.   HENT:   Head: Normocephalic and atraumatic.   Eyes: Conjunctivae are normal. Right eye exhibits no discharge. Left eye exhibits no discharge.   Neck: Neck supple.   Fat tissue in neck impedes my view of JVD   Cardiovascular: An irregularly irregular rhythm present. Tachycardia present.  Exam reveals distant heart sounds.    Pulmonary/Chest: No accessory muscle usage or stridor. No tachypnea. She has decreased breath sounds. She has rales.   Abdominal: Soft. Normal appearance and bowel sounds are normal. She exhibits no distension and no mass.   Genitourinary:   Genitourinary Comments: Cloudy yellow urine in collection bag   Musculoskeletal: She exhibits no deformity.        Right upper leg: She exhibits edema.        Left upper leg: She exhibits edema.   Neurological: She appears lethargic.   Skin: Skin is warm. There is erythema (lower half of both lower legs).   Dry, thickened skin in lower legs       Significant Labs: All pertinent labs within the past 24 hours have been  reviewed.    Significant Imaging: none

## 2017-12-24 NOTE — PROGRESS NOTES
[facility]  Cardiology  Progress Note    Patient Name: Nicole Lala  MRN: [id number]  Admission Date: 12/21/2017  Hospital Length of Stay: 3 days  Code Status: Full Code   Attending Physician: Dalia Cardoso MD   Primary Care Physician: Brandon Gray MD  Expected Discharge Date:   Principal Problem:Acute respiratory failure with hypoxia and hypercarbia    Subjective:     Hospital Course: Little change, remains mostly somnolent but easily arousible. HR up and down and borderline BP. Will transition to oral CCB from IV.    ROS   No change from recent examination.    Objective:     Vital Signs (Most Recent):  Temp: 97.7 °F (36.5 °C) (12/24/17 1602)  Pulse: 74 (12/24/17 1602)  Resp: (!) 22 (12/24/17 1602)  BP: 107/64 (12/24/17 1602)  SpO2: 96 % (12/24/17 1602) Vital Signs (24h Range):  Temp:  [97.4 °F (36.3 °C)-98.1 °F (36.7 °C)] 97.7 °F (36.5 °C)  Pulse:  [] 74  Resp:  [11-37] 22  SpO2:  [80 %-100 %] 96 %  BP: ()/(50-94) 107/64     Weight: 132.2 kg (291 lb 7.2 oz)  Body mass index is 45.65 kg/m².    SpO2: 96 %  O2 Device (Oxygen Therapy): nasal cannula      Intake/Output Summary (Last 24 hours) at 12/24/17 1609  Last data filed at 12/24/17 0400   Gross per 24 hour   Intake          3077.95 ml   Output              520 ml   Net          2557.95 ml       Lines/Drains/Airways     Drain                 Urethral Catheter 12/21/17 3 days          Peripheral Intravenous Line                 Midline Catheter Insertion/Assessment  - Single Lumen 09/14/17 1630 Left brachial vein 18g x 10cm 101 days         Peripheral IV - Single Lumen 12/22/17 2147 Left Upper Arm 1 day                Physical Exam  No change from recent examination.    Significant Labs:   ABG: No results for input(s): PH, PCO2, HCO3, POCSATURATED, BE in the last 48 hours., BMP:   Recent Labs  Lab 12/23/17  0403 12/24/17  0408   * 122*   * 143   K 3.7 3.9    99   CO2 36* 38*   BUN 64* 50*   CREATININE  1.5* 1.5*   CALCIUM 8.6* 8.7   , CMP   Recent Labs  Lab 12/23/17  0403 12/24/17  0408   * 143   K 3.7 3.9    99   CO2 36* 38*   * 122*   BUN 64* 50*   CREATININE 1.5* 1.5*   CALCIUM 8.6* 8.7   ANIONGAP 8 6*   ESTGFRAFRICA 36* 36*   EGFRNONAA 32* 32*   , CBC   Recent Labs  Lab 12/23/17  0403 12/24/17  0407   WBC 8.10 9.80   HGB 10.4* 10.8*   HCT 33.9* 34.6*   * 150   , INR   Recent Labs  Lab 12/23/17  0403 12/24/17  0408   INR 1.5* 1.2   , Lipid Panel No results for input(s): CHOL, HDL, LDLCALC, TRIG, CHOLHDL in the last 48 hours., Troponin No results for input(s): TROPONINI in the last 48 hours. and All pertinent lab results from the last 24 hours have been reviewed.    Significant Imaging: no further  Assessment and Plan:       Active Diagnoses:    Diagnosis Date Noted POA    PRINCIPAL PROBLEM:  Acute respiratory failure with hypoxia and hypercarbia [J96.01, J96.02] 12/21/2017 Yes    Acute on chronic diastolic heart failure [I50.33] 12/21/2017 Yes    Supratherapeutic INR [R79.1] 12/21/2017 Yes    Urinary tract infection without hematuria [N39.0] 12/21/2017 Yes    Chronic kidney disease, stage IV (severe) [N18.4] 04/17/2017 Yes    Long term current use of anticoagulant therapy [Z79.01] 04/17/2017 Not Applicable    Morbid obesity with BMI of 40.0-44.9, adult [E66.01, Z68.41] 03/14/2017 Not Applicable    Essential hypertension [I10] 04/12/2016 Yes    Atrial fibrillation with rapid ventricular response [I48.91] 10/01/2015 Yes    Lymphedema of both lower extremities [I89.0] 08/11/2014 Yes    Personal history of DVT (deep vein thrombosis) [Z86.718] 08/28/2013 Not Applicable    S/P insertion of IVC (inferior vena caval) filter 3/22/10 [Z95.828] 08/28/2013 Not Applicable      Problems Resolved During this Admission:    Diagnosis Date Noted Date Resolved POA       VTE Risk Mitigation         Ordered     warfarin (COUMADIN) tablet 5 mg  Daily     Route:  Oral        12/23/17 1024      Medium Risk of VTE  Once      12/21/17 1651     Place sequential compression device  Until discontinued      12/21/17 1651     Reason for No Pharmacological VTE Prophylaxis  Once      12/21/17 1651        Continue current Rx except transition to oral CCB.  Coumadin restarted.    Radhames Dowling MD  Cardiology  [facility]    Patient Active Problem List   Diagnosis    Macular degeneration - Right Eye    Nuclear sclerosis - Both Eyes    Personal history of DVT (deep vein thrombosis)    S/P insertion of IVC (inferior vena caval) filter 3/22/10    Benign essential tremor    Venous stasis of lower extremity    Physical deconditioning    Iron deficiency anemia    Lymphedema of both lower extremities    Atrial fibrillation with rapid ventricular response    Hypoproteinemia    Essential hypertension    Obesity, morbid, BMI 40.0-49.9    Pulmonary HTN    Morbid obesity with BMI of 40.0-44.9, adult    Bilateral leg edema    Chronic kidney disease, stage IV (severe)    Long term current use of anticoagulant therapy    Senile nuclear sclerosis    Left leg cellulitis    Acute respiratory failure with hypoxia and hypercarbia    Acute on chronic diastolic heart failure    Supratherapeutic INR    Urinary tract infection without hematuria

## 2017-12-24 NOTE — PLAN OF CARE
Problem: Patient Care Overview  Goal: Plan of Care Review  Outcome: Ongoing (interventions implemented as appropriate)  Patient stable overnight, wore bipap about 30% of the shift.  BP labile but mostly the systolic stays above 90 and 100.  Cardizem gtt is at 10 mg/hr.  Atrial fib with a more controlled rate that the night before although with brief periods of uncontrolled at fib.  UOP ok.  Bathed and linen change done.  Still no BM, will need to be addressed.  Patient has periods of anxiety but calms with verbal and physical reassurance.

## 2017-12-24 NOTE — PROGRESS NOTES
[facility] Hospital Medicine  Progress Note    Patient Name: Nicole Lala  MRN: [id number]  Patient Class: IP- Inpatient   Admission Date: 12/21/2017  Length of Stay: 3 days  Attending Physician: Dalia Cardoso MD  Primary Care Provider: Brandon Gray MD        Subjective:     Principal Problem:Acute respiratory failure with hypoxia and hypercarbia    HPI:  Patient was brought to our ER via ambulance from [facility].  When she arrived, she told the ER staff that she was there for her kidneys.  She was somnolent and was falling asleep when not stimulated.  ABG showed hypercarbia.  She was placed on NIPPV.  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  Recent hospital stays at [facility] were for cellulitis of her legs and COPD decompensation.  She was also recently hospitalized at [facility].  No records of that stay are available at this time.    Hospital Course:  No notes on file    Interval History:  No issues overnight, family consider hospice, called but no answer. Pt tolerated BIPAP well. Advancing diet.     Review of Systems   Unable to perform ROS: Mental status change     Objective:     Vital Signs (Most Recent):  Temp: 97.7 °F (36.5 °C) (12/24/17 1602)  Pulse: 74 (12/24/17 1602)  Resp: (!) 22 (12/24/17 1602)  BP: 107/64 (12/24/17 1602)  SpO2: 96 % (12/24/17 1602) Vital Signs (24h Range):  Temp:  [97.4 °F (36.3 °C)-98.1 °F (36.7 °C)] 97.7 °F (36.5 °C)  Pulse:  [] 74  Resp:  [11-37] 22  SpO2:  [80 %-100 %] 96 %  BP: ()/(50-94) 107/64     Weight: 132.2 kg (291 lb 7.2 oz)  Body mass index is 45.65 kg/m².    Intake/Output Summary (Last 24 hours) at 12/24/17 1653  Last data filed at 12/24/17 0400   Gross per 24 hour   Intake          3077.95 ml   Output              520 ml   Net          2557.95 ml      Physical Exam   Constitutional:  She appears lethargic. She is sleeping. She appears ill.   HENT:   Head: Normocephalic and atraumatic.   Eyes: Conjunctivae are normal. Right eye exhibits no discharge. Left eye exhibits no discharge.   Neck: Neck supple.   Fat tissue in neck impedes my view of JVD   Cardiovascular: An irregularly irregular rhythm present. Tachycardia present.  Exam reveals distant heart sounds.    Pulmonary/Chest: No accessory muscle usage or stridor. No tachypnea. She has decreased breath sounds. She has rales.   Abdominal: Soft. Normal appearance and bowel sounds are normal. She exhibits no distension and no mass.   Genitourinary:   Genitourinary Comments: Cloudy yellow urine in collection bag   Musculoskeletal: She exhibits no deformity.        Right upper leg: She exhibits edema.        Left upper leg: She exhibits edema.   Neurological: She appears lethargic.   Skin: Skin is warm. There is erythema (lower half of both lower legs).   Dry, thickened skin in lower legs       Significant Labs: All pertinent labs within the past 24 hours have been reviewed.    Significant Imaging: none    Assessment/Plan:      * Acute respiratory failure with hypoxia and hypercarbia    Severe.  Due to COPD, obesity hypoventilation, and CHF.  Will use BiPAP as needed for now.   ABG suggestive of chronic resp acidosis.  Stable this morning        Urinary tract infection without hematuria    Will see what urine grows.  Antibiotics     Start     Stop Route Frequency Ordered    12/22/17 1600  cefTRIAXone (ROCEPHIN) 1 g in dextrose 5 % 50 mL IVPB      -- IV Every 24 hours (non-standard times) 12/21/17 1651                  Supratherapeutic INR    No evidence of bleeding.  Given Vit K in ER. INR subtherapeutic now, back oncoumadin, check INR          Acute on chronic diastolic heart failure    CHF currently uncontrolled. Latest ECHO shows ejection fraction of   EF   Date Value Ref Range Status   09/12/2017 55 55 - 65    ]. Give IV Lasix and monitor  clinical status closely. Monitor on telemetry. Last BNP is     Recent Labs  Lab 12/21/17  1226   BNP 1,727*   . When awake, will continue to stress to patient importance of self efficacy and  on diet for CHF.  - Cardiology following            Long term current use of anticoagulant therapy    Keep INR between 2 and 3 for history of DVT and for prevention of embolic stroke.  Restarted coumadin          Chronic kidney disease, stage IV (severe)    Creatine stable for now. Monitor UOP and serial BMP and adjust therapy as needed. Renally dose meds.  Estimated Creatinine Clearance: 38.9 mL/min (based on SCr of 1.5 mg/dL (H)).            Morbid obesity with BMI of 40.0-44.9, adult    Body mass index is 45.65 kg/m². Morbid obesity complicates all aspects of disease management from diagnostic modalities to treatment.           Essential hypertension    Chronic, controlled.  Monitor BP closely.            Atrial fibrillation with rapid ventricular response    Continue diltiazem infusion, cardiology on board, plan to [facility] to PO  INR was high on admission but low now, restarted coumadin, check INR daily  Due to her being on NIV, she's unable to take her BB.          Lymphedema of both lower extremities    Chronic.  Was getting outpatient therapy at one time prior to going into the NH.  Skin is erythematous but not warm.  Doesn't appear to be cellulitic.  Will monitor for skin breakage.          Benign essential tremor              S/P insertion of IVC (inferior vena caval) filter 3/22/10    Noted.          Personal history of DVT (deep vein thrombosis)    Noted.  Will keep her INR between 2 and 3.            VTE Risk Mitigation         Ordered     warfarin (COUMADIN) tablet 5 mg  Daily     Route:  Oral        12/23/17 1021     Medium Risk of VTE  Once      12/21/17 1651     Place sequential compression device  Until discontinued      12/21/17 1651     Reason for No Pharmacological VTE Prophylaxis  Once      12/21/17  8972          Critical care time spent on the evaluation and treatment of severe organ dysfunction, review of pertinent labs and imaging studies, discussions with consulting providers and discussions with patient/family: 35 minutes.    Dalia Cardoso MD  Department of Hospital Medicine   [facility]

## 2017-12-24 NOTE — ASSESSMENT & PLAN NOTE
Severe.  Due to COPD, obesity hypoventilation, and CHF.  Will use BiPAP as needed for now.   ABG suggestive of chronic resp acidosis.  Stable this morning

## 2017-12-24 NOTE — NURSING
Patient remains in A fib with rate controlled in the 70's, occasional pauses noted.   Diltiazem drip remains off.   Patient is currently alert and conversing with son at bedside.   Will continue to monitor

## 2017-12-24 NOTE — ASSESSMENT & PLAN NOTE
Continue diltiazem infusion, cardiology on board, plan to [facility] to PO  INR was high on admission but low now, restarted coumadin, check INR daily  Due to her being on NIV, she's unable to take her BB.

## 2017-12-24 NOTE — ASSESSMENT & PLAN NOTE
Creatine stable for now. Monitor UOP and serial BMP and adjust therapy as needed. Renally dose meds.  Estimated Creatinine Clearance: 38.9 mL/min (based on SCr of 1.5 mg/dL (H)).

## 2017-12-24 NOTE — NURSING
Spoke with patients daughter Cayla via phone.  Cayla requests meeting with ALAN regarding Hospice placement for her mother.   Cayla has asked for her brother Oswaldo to be present when ALAN speaks with patients .  Consult to ALAN for hospice placement made per telephone order from Dr Cardoso

## 2017-12-24 NOTE — PLAN OF CARE
12/23/17 1921   OEE-FI-B5-ETCO2   SpO2 95 %   Pulse 83   Resp (!) 21   BP 94/60   Preset CPAP/BiPAP Settings   Mode Of Delivery BiPAP S/T   $ CPAP/BiPAP Daily Charge BiPAP/CPAP Daily   $ Is patient using? Yes   Equipment Type V60   Airway Device Type total face mask   Humidifier not applicable   Ipap 12   EPAP (cm H2O) 6   Pressure Support (cm H2O) 6   Set Rate (Breaths/Min) 16   ITime (sec) 1   Rise Time (sec) 2   Patient CPAP/BiPAP Settings   RR Total (Breaths/Min) 16   Tidal Volume (mL) 291   VE Minute Ventilation (L/min) 4.6 L/min   Peak Inspiratory Pressure (cm H2O) 12   TiTOT (%) 29   Total Leak (L/Min) 0   Patient Trigger - ST Mode Only (%) 36

## 2017-12-24 NOTE — PLAN OF CARE
12/24/17 0748   Patient Assessment/Suction   Level of Consciousness (AVPU) alert   Respiratory Effort Unlabored   All Lung Fields Breath Sounds diminished;clear   Rhythm/Pattern, Respiratory depth regular;pattern regular;unlabored   PRE-TX-O2-ETCO2   O2 Device (Oxygen Therapy) nasal cannula   $ Is the patient on Low Flow Oxygen? Yes   Flow (L/min) 3   Oxygen Concentration (%) 32   SpO2 97 %   Pulse Oximetry Type Continuous   $ Pulse Oximetry - Multiple Charge Pulse Oximetry - Multiple   Pulse (!) 132   Resp (!) 27   BP (!) 90/53   Aerosol Therapy   $ Aerosol Therapy Charges PRN treatment not required   Ready to Wean/Extubation Screen   FIO2<=50 (chart decimal) 0.32   Preset CPAP/BiPAP Settings   Mode Of Delivery Standby

## 2017-12-24 NOTE — ASSESSMENT & PLAN NOTE
Body mass index is 45.65 kg/m². Morbid obesity complicates all aspects of disease management from diagnostic modalities to treatment.

## 2017-12-24 NOTE — NURSING
Patient remains in A fib on diltiazem drip at 10mg/hr.   HR currently  70's with occasional pause.  Diltiazem drip decreased to 5mg/hr via pump   Will continue to monitor

## 2017-12-25 PROBLEM — R79.1 SUPRATHERAPEUTIC INR: Status: RESOLVED | Noted: 2017-12-21 | Resolved: 2017-12-25

## 2017-12-25 PROBLEM — I48.91 ATRIAL FIBRILLATION: Status: ACTIVE | Noted: 2017-12-25

## 2017-12-25 PROBLEM — I50.32 CHRONIC DIASTOLIC HEART FAILURE: Status: ACTIVE | Noted: 2017-12-25

## 2017-12-25 PROBLEM — I50.33 ACUTE ON CHRONIC DIASTOLIC HEART FAILURE: Status: RESOLVED | Noted: 2017-12-21 | Resolved: 2017-12-25

## 2017-12-25 LAB
ANION GAP SERPL CALC-SCNC: 7 MMOL/L
BASOPHILS # BLD AUTO: 0 K/UL
BASOPHILS NFR BLD: 0.2 %
BUN SERPL-MCNC: 38 MG/DL
CALCIUM SERPL-MCNC: 8.9 MG/DL
CHLORIDE SERPL-SCNC: 97 MMOL/L
CO2 SERPL-SCNC: 34 MMOL/L
CREAT SERPL-MCNC: 1.2 MG/DL
DIFFERENTIAL METHOD: ABNORMAL
EOSINOPHIL # BLD AUTO: 0.2 K/UL
EOSINOPHIL NFR BLD: 1.9 %
ERYTHROCYTE [DISTWIDTH] IN BLOOD BY AUTOMATED COUNT: 17.9 %
EST. GFR  (AFRICAN AMERICAN): 48 ML/MIN/1.73 M^2
EST. GFR  (NON AFRICAN AMERICAN): 41 ML/MIN/1.73 M^2
GLUCOSE SERPL-MCNC: 83 MG/DL
HCT VFR BLD AUTO: 34.2 %
HGB BLD-MCNC: 11.1 G/DL
INR PPP: 1.2
LYMPHOCYTES # BLD AUTO: 0.9 K/UL
LYMPHOCYTES NFR BLD: 9.6 %
MCH RBC QN AUTO: 30.9 PG
MCHC RBC AUTO-ENTMCNC: 32.3 G/DL
MCV RBC AUTO: 96 FL
MONOCYTES # BLD AUTO: 0.8 K/UL
MONOCYTES NFR BLD: 8.7 %
NEUTROPHILS # BLD AUTO: 7.5 K/UL
NEUTROPHILS NFR BLD: 79.6 %
PLATELET # BLD AUTO: 149 K/UL
PMV BLD AUTO: 10.1 FL
POTASSIUM SERPL-SCNC: 4 MMOL/L
PROTHROMBIN TIME: 12.2 SEC
RBC # BLD AUTO: 3.58 M/UL
SODIUM SERPL-SCNC: 138 MMOL/L
WBC # BLD AUTO: 9.4 K/UL

## 2017-12-25 PROCEDURE — 63600175 PHARM REV CODE 636 W HCPCS: Performed by: NURSE PRACTITIONER

## 2017-12-25 PROCEDURE — 25000003 PHARM REV CODE 250: Performed by: HOSPITALIST

## 2017-12-25 PROCEDURE — 94761 N-INVAS EAR/PLS OXIMETRY MLT: CPT

## 2017-12-25 PROCEDURE — 63600175 PHARM REV CODE 636 W HCPCS: Performed by: HOSPITALIST

## 2017-12-25 PROCEDURE — 36415 COLL VENOUS BLD VENIPUNCTURE: CPT

## 2017-12-25 PROCEDURE — C9113 INJ PANTOPRAZOLE SODIUM, VIA: HCPCS | Performed by: HOSPITALIST

## 2017-12-25 PROCEDURE — 99232 SBSQ HOSP IP/OBS MODERATE 35: CPT | Mod: ,,, | Performed by: INTERNAL MEDICINE

## 2017-12-25 PROCEDURE — 11000001 HC ACUTE MED/SURG PRIVATE ROOM

## 2017-12-25 PROCEDURE — 25000003 PHARM REV CODE 250: Performed by: INTERNAL MEDICINE

## 2017-12-25 PROCEDURE — 25000003 PHARM REV CODE 250: Performed by: NURSE PRACTITIONER

## 2017-12-25 PROCEDURE — 27000221 HC OXYGEN, UP TO 24 HOURS

## 2017-12-25 PROCEDURE — 85025 COMPLETE CBC W/AUTO DIFF WBC: CPT

## 2017-12-25 PROCEDURE — 99900035 HC TECH TIME PER 15 MIN (STAT)

## 2017-12-25 PROCEDURE — 80048 BASIC METABOLIC PNL TOTAL CA: CPT

## 2017-12-25 PROCEDURE — 94660 CPAP INITIATION&MGMT: CPT

## 2017-12-25 PROCEDURE — 85610 PROTHROMBIN TIME: CPT

## 2017-12-25 RX ORDER — DOCUSATE SODIUM 100 MG/1
100 CAPSULE, LIQUID FILLED ORAL 2 TIMES DAILY
Status: DISCONTINUED | OUTPATIENT
Start: 2017-12-25 | End: 2017-12-30 | Stop reason: HOSPADM

## 2017-12-25 RX ORDER — ENOXAPARIN SODIUM 150 MG/ML
1 INJECTION SUBCUTANEOUS
Status: DISCONTINUED | OUTPATIENT
Start: 2017-12-25 | End: 2017-12-29

## 2017-12-25 RX ORDER — MAGNESIUM HYDROXIDE 2400 MG/10ML
10 SUSPENSION ORAL 2 TIMES DAILY PRN
Status: DISCONTINUED | OUTPATIENT
Start: 2017-12-25 | End: 2017-12-26 | Stop reason: SDUPTHER

## 2017-12-25 RX ORDER — FUROSEMIDE 40 MG/1
40 TABLET ORAL DAILY
Status: DISCONTINUED | OUTPATIENT
Start: 2017-12-26 | End: 2017-12-28

## 2017-12-25 RX ORDER — MEROPENEM AND SODIUM CHLORIDE 500 MG/50ML
500 INJECTION, SOLUTION INTRAVENOUS
Status: DISCONTINUED | OUTPATIENT
Start: 2017-12-25 | End: 2017-12-30 | Stop reason: HOSPADM

## 2017-12-25 RX ORDER — NAPROXEN SODIUM 220 MG/1
81 TABLET, FILM COATED ORAL DAILY
Status: DISCONTINUED | OUTPATIENT
Start: 2017-12-26 | End: 2017-12-29

## 2017-12-25 RX ADMIN — WARFARIN SODIUM 5 MG: 5 TABLET ORAL at 12:12

## 2017-12-25 RX ADMIN — ENOXAPARIN SODIUM 130 MG: 150 INJECTION SUBCUTANEOUS at 05:12

## 2017-12-25 RX ADMIN — WARFARIN SODIUM 5 MG: 5 TABLET ORAL at 05:12

## 2017-12-25 RX ADMIN — SODIUM CHLORIDE 1 ML: 0.45 INJECTION, SOLUTION INTRAVENOUS at 12:12

## 2017-12-25 RX ADMIN — ENOXAPARIN SODIUM 130 MG: 150 INJECTION SUBCUTANEOUS at 12:12

## 2017-12-25 RX ADMIN — DEXTROSE MONOHYDRATE 1 G: 5 INJECTION, SOLUTION INTRAVENOUS at 05:12

## 2017-12-25 RX ADMIN — PANTOPRAZOLE SODIUM 40 MG: 40 INJECTION, POWDER, FOR SOLUTION INTRAVENOUS at 10:12

## 2017-12-25 RX ADMIN — DILTIAZEM HYDROCHLORIDE 120 MG: 120 CAPSULE, COATED, EXTENDED RELEASE ORAL at 10:12

## 2017-12-25 NOTE — PLAN OF CARE
Problem: Patient Care Overview  Goal: Plan of Care Review  Outcome: Ongoing (interventions implemented as appropriate)  Patient stable overnight.  Alert, pleasantly confused.  VSS.  Atrial fib mostly controlled, with occasional bursts above 100 but they are short lived.  Patient occasionally gets anxious but calms with reassurance.  UOP adequate.  New Iv sited this shift.  No fever.  Diet being advanced, needs to eat.  Plan is for family to meet with  regarding hospice at home.

## 2017-12-25 NOTE — PROGRESS NOTES
[facility]  Cardiology  Progress Note    Patient Name: Nicole Lala  MRN: [id number]  Admission Date: 12/21/2017  Hospital Length of Stay: 4 days  Code Status: Full Code   Attending Physician: Artie Dong MD   Primary Care Physician: Brandon Gray MD  Expected Discharge Date:   Principal Problem:Acute respiratory failure with hypoxia and hypercarbia    Subjective:     Hospital Course: Somewhat stable, hospitalist asking for possible transfer to Telemetry. AF rate reasonably controlled on oral medications.     ROS   No change from recent examination.  Denies any distress  Wants a Coke    Objective:     Vital Signs (Most Recent):  Temp: 98.1 °F (36.7 °C) (12/25/17 0600)  Pulse: (!) 113 (12/25/17 1041)  Resp: (!) 30 (12/25/17 1041)  BP: 125/79 (12/25/17 1000)  SpO2: 97 % (12/25/17 1041) Vital Signs (24h Range):  Temp:  [97.7 °F (36.5 °C)-98.1 °F (36.7 °C)] 98.1 °F (36.7 °C)  Pulse:  [] 113  Resp:  [14-34] 30  SpO2:  [86 %-100 %] 97 %  BP: ()/(50-88) 125/79     Weight: 132.2 kg (291 lb 7.2 oz)  Body mass index is 45.65 kg/m².    SpO2: 97 %  O2 Device (Oxygen Therapy): nasal cannula      Intake/Output Summary (Last 24 hours) at 12/25/17 1257  Last data filed at 12/25/17 0600   Gross per 24 hour   Intake          2331.67 ml   Output             1325 ml   Net          1006.67 ml       Lines/Drains/Airways     Drain                 Urethral Catheter 12/21/17 4 days          Peripheral Intravenous Line                 Midline Catheter Insertion/Assessment  - Single Lumen 09/14/17 1630 Left brachial vein 18g x 10cm 101 days         Peripheral IV - Single Lumen 12/25/17 0500 Right Wrist less than 1 day                Physical Exam  No change from recent examination.  Except Red eye OD and persistent mild peripheral edema with stasis dermatitis.    Significant Labs: All pertinent lab results from the last 24 hours have been reviewed.    Significant Imaging: no further      Assessment and Plan:       Active Diagnoses:    Diagnosis Date Noted POA    PRINCIPAL PROBLEM:  Acute respiratory failure with hypoxia and hypercarbia [J96.01, J96.02] 12/21/2017 Yes    Atrial fibrillation [I48.91] 12/25/2017 Yes    Acute on chronic diastolic heart failure [I50.33] 12/21/2017 Yes    Supratherapeutic INR [R79.1] 12/21/2017 Yes    Urinary tract infection without hematuria [N39.0] 12/21/2017 Yes    Chronic kidney disease, stage IV (severe) [N18.4] 04/17/2017 Yes    Long term current use of anticoagulant therapy [Z79.01] 04/17/2017 Not Applicable    Morbid obesity with BMI of 40.0-44.9, adult [E66.01, Z68.41] 03/14/2017 Not Applicable    Essential hypertension [I10] 04/12/2016 Yes    Atrial fibrillation with rapid ventricular response [I48.91] 10/01/2015 Yes    Lymphedema of both lower extremities [I89.0] 08/11/2014 Yes    Personal history of DVT (deep vein thrombosis) [Z86.718] 08/28/2013 Not Applicable    S/P insertion of IVC (inferior vena caval) filter 3/22/10 [Z95.828] 08/28/2013 Not Applicable      Problems Resolved During this Admission:    Diagnosis Date Noted Date Resolved POA       VTE Risk Mitigation         Ordered     enoxaparin injection 130 mg  Every 24 hours (non-standard times)     Route:  Subcutaneous        12/24/17 2224     Reason for No Pharmacological VTE Prophylaxis  Once      12/24/17 1943     warfarin (COUMADIN) tablet 5 mg  Daily     Route:  Oral        12/23/17 1021     Medium Risk of VTE  Once      12/21/17 1651          Little else to offer, Thank you for allowing me to participate in the care of this patient. Please call prn.    Radhames Dowling MD  Cardiology  [facility]

## 2017-12-25 NOTE — PROGRESS NOTES
Secure message sent to Dr. Cardoso notifying him that patient was started on D5w due to an elevated Na level.  Na level this am was 143 and patient is still receiving D5W @ 100 ml/hr.  Awaiting response.

## 2017-12-25 NOTE — PLAN OF CARE
12/25/17 1041   PRE-TX-O2-ETCO2   O2 Device (Oxygen Therapy) nasal cannula   $ Is the patient on Low Flow Oxygen? Yes   Flow (L/min) 3   Oxygen Concentration (%) 32   SpO2 97 %   Pulse Oximetry Type Continuous   $ Pulse Oximetry - Multiple Charge Pulse Oximetry - Multiple   Pulse (!) 113   Resp (!) 30   Ready to Wean/Extubation Screen   FIO2<=50 (chart decimal) 0.32

## 2017-12-25 NOTE — PROGRESS NOTES
The enoxaparin dose has been adjusted for Nicole Lala [id number] according to the pharmacy practice protocol.      Based on the patient's Estimated Creatinine Clearance: 48.6 mL/min (based on SCr of 1.2 mg/dL)., Body mass index is 45.65 kg/m²., and weight= 132.2 kg (291 lb 7.2 oz), the enoxaparin dose has been adjusted 130 mg every 12 hours.    Thank you,  Orestes Corea, PharmD

## 2017-12-26 LAB
ANION GAP SERPL CALC-SCNC: 7 MMOL/L
BACTERIA #/AREA URNS HPF: ABNORMAL /HPF
BILIRUB UR QL STRIP: NEGATIVE
BUN SERPL-MCNC: 32 MG/DL
CALCIUM SERPL-MCNC: 8.7 MG/DL
CHLORIDE SERPL-SCNC: 98 MMOL/L
CLARITY UR: ABNORMAL
CO2 SERPL-SCNC: 34 MMOL/L
COLOR UR: YELLOW
CREAT SERPL-MCNC: 1.3 MG/DL
EST. GFR  (AFRICAN AMERICAN): 43 ML/MIN/1.73 M^2
EST. GFR  (NON AFRICAN AMERICAN): 37 ML/MIN/1.73 M^2
GLUCOSE SERPL-MCNC: 92 MG/DL
GLUCOSE UR QL STRIP: NEGATIVE
HGB UR QL STRIP: ABNORMAL
INR PPP: 1.3
KETONES UR QL STRIP: NEGATIVE
LEUKOCYTE ESTERASE UR QL STRIP: ABNORMAL
MICROSCOPIC COMMENT: ABNORMAL
NITRITE UR QL STRIP: POSITIVE
PH UR STRIP: 6 [PH] (ref 5–8)
POTASSIUM SERPL-SCNC: 4.1 MMOL/L
PROT UR QL STRIP: ABNORMAL
PROTHROMBIN TIME: 12.7 SEC
RBC #/AREA URNS HPF: 5 /HPF (ref 0–4)
SODIUM SERPL-SCNC: 139 MMOL/L
SP GR UR STRIP: <=1.005 (ref 1–1.03)
URN SPEC COLLECT METH UR: ABNORMAL
UROBILINOGEN UR STRIP-ACNC: NEGATIVE EU/DL
WBC #/AREA URNS HPF: 45 /HPF (ref 0–5)

## 2017-12-26 PROCEDURE — 80048 BASIC METABOLIC PNL TOTAL CA: CPT

## 2017-12-26 PROCEDURE — 87077 CULTURE AEROBIC IDENTIFY: CPT

## 2017-12-26 PROCEDURE — 87186 SC STD MICRODIL/AGAR DIL: CPT

## 2017-12-26 PROCEDURE — 63600175 PHARM REV CODE 636 W HCPCS: Performed by: HOSPITALIST

## 2017-12-26 PROCEDURE — 99900035 HC TECH TIME PER 15 MIN (STAT)

## 2017-12-26 PROCEDURE — 87086 URINE CULTURE/COLONY COUNT: CPT

## 2017-12-26 PROCEDURE — 25000003 PHARM REV CODE 250: Performed by: HOSPITALIST

## 2017-12-26 PROCEDURE — 11000001 HC ACUTE MED/SURG PRIVATE ROOM

## 2017-12-26 PROCEDURE — 87088 URINE BACTERIA CULTURE: CPT

## 2017-12-26 PROCEDURE — 25000003 PHARM REV CODE 250: Performed by: INTERNAL MEDICINE

## 2017-12-26 PROCEDURE — 85610 PROTHROMBIN TIME: CPT

## 2017-12-26 PROCEDURE — 27000221 HC OXYGEN, UP TO 24 HOURS

## 2017-12-26 PROCEDURE — C9113 INJ PANTOPRAZOLE SODIUM, VIA: HCPCS | Performed by: HOSPITALIST

## 2017-12-26 PROCEDURE — 36415 COLL VENOUS BLD VENIPUNCTURE: CPT

## 2017-12-26 PROCEDURE — 94761 N-INVAS EAR/PLS OXIMETRY MLT: CPT

## 2017-12-26 PROCEDURE — 81000 URINALYSIS NONAUTO W/SCOPE: CPT

## 2017-12-26 PROCEDURE — 97803 MED NUTRITION INDIV SUBSEQ: CPT

## 2017-12-26 RX ORDER — ADHESIVE BANDAGE
30 BANDAGE TOPICAL 2 TIMES DAILY PRN
Status: DISCONTINUED | OUTPATIENT
Start: 2017-12-26 | End: 2017-12-30 | Stop reason: HOSPADM

## 2017-12-26 RX ADMIN — MEROPENEM AND SODIUM CHLORIDE 500 MG: 500 INJECTION, SOLUTION INTRAVENOUS at 12:12

## 2017-12-26 RX ADMIN — WARFARIN SODIUM 5 MG: 5 TABLET ORAL at 04:12

## 2017-12-26 RX ADMIN — FUROSEMIDE 40 MG: 40 TABLET ORAL at 09:12

## 2017-12-26 RX ADMIN — PANTOPRAZOLE SODIUM 40 MG: 40 INJECTION, POWDER, FOR SOLUTION INTRAVENOUS at 09:12

## 2017-12-26 RX ADMIN — ASPIRIN 81 MG CHEWABLE TABLET 81 MG: 81 TABLET CHEWABLE at 09:12

## 2017-12-26 RX ADMIN — MEROPENEM AND SODIUM CHLORIDE 500 MG: 500 INJECTION, SOLUTION INTRAVENOUS at 03:12

## 2017-12-26 RX ADMIN — MEROPENEM AND SODIUM CHLORIDE 500 MG: 500 INJECTION, SOLUTION INTRAVENOUS at 09:12

## 2017-12-26 RX ADMIN — ENOXAPARIN SODIUM 130 MG: 150 INJECTION SUBCUTANEOUS at 05:12

## 2017-12-26 RX ADMIN — DILTIAZEM HYDROCHLORIDE 120 MG: 120 CAPSULE, COATED, EXTENDED RELEASE ORAL at 09:12

## 2017-12-26 RX ADMIN — DOCUSATE SODIUM 100 MG: 100 CAPSULE, LIQUID FILLED ORAL at 09:12

## 2017-12-26 RX ADMIN — DOCUSATE SODIUM 100 MG: 100 CAPSULE, LIQUID FILLED ORAL at 08:12

## 2017-12-26 RX ADMIN — ENOXAPARIN SODIUM 130 MG: 150 INJECTION SUBCUTANEOUS at 04:12

## 2017-12-26 NOTE — ASSESSMENT & PLAN NOTE
Creatine stable for now. Monitor UOP and serial BMP and adjust therapy as needed. Renally dose meds.  Estimated Creatinine Clearance: 48.6 mL/min (based on SCr of 1.2 mg/dL).

## 2017-12-26 NOTE — ASSESSMENT & PLAN NOTE
CHF currently controlled. Latest ECHO shows ejection fraction of   EF   Date Value Ref Range Status   09/12/2017 55 55 - 65    ]. Give IV Lasix and monitor clinical status closely. Monitor on telemetry. Last BNP is     Recent Labs  Lab 12/21/17  1226   BNP 1,727*   . When awake, will continue to stress to patient importance of self efficacy and  on diet for CHF.  - Cardiology following

## 2017-12-26 NOTE — SUBJECTIVE & OBJECTIVE
Interval History:  She is awake.  Somewhat conversant.  Can answer questions.  Has very little insight into her current state.  Memory somewhat impaired.  No new complaints.    Review of Systems   Constitutional: Negative for chills, fatigue and fever.   Respiratory: Positive for shortness of breath. Negative for cough.    Cardiovascular: Positive for leg swelling. Negative for chest pain.   Gastrointestinal: Negative for abdominal pain.     Objective:     Vital Signs (Most Recent):  Temp: 97.8 °F (36.6 °C) (12/25/17 1949)  Pulse: 106 (12/25/17 2109)  Resp: (!) 24 (12/25/17 2109)  BP: 117/79 (12/25/17 1949)  SpO2: 98 % (12/25/17 2109) Vital Signs (24h Range):  Temp:  [97.8 °F (36.6 °C)-98.4 °F (36.9 °C)] 97.8 °F (36.6 °C)  Pulse:  [] 106  Resp:  [14-35] 24  SpO2:  [86 %-100 %] 98 %  BP: ()/(54-82) 117/79     Weight: 132.2 kg (291 lb 7.2 oz)  Body mass index is 45.65 kg/m².    Intake/Output Summary (Last 24 hours) at 12/25/17 2219  Last data filed at 12/25/17 0600   Gross per 24 hour   Intake           661.67 ml   Output              675 ml   Net           -13.33 ml      Physical Exam   Constitutional: She is oriented to person, place, and time. She is active. No distress. Nasal cannula in place.   Cushingoid appearance.   HENT:   Mouth/Throat: Oropharynx is clear and moist.   Eyes: Right eye exhibits no discharge. Left eye exhibits no discharge. Right conjunctiva is injected.   Neck: Neck supple.   Fat tissue in neck impedes my view of JVD   Cardiovascular: An irregularly irregular rhythm present. Tachycardia present.  Exam reveals distant heart sounds.    Pulmonary/Chest: No accessory muscle usage or stridor. No tachypnea. She has decreased breath sounds. She has rales.   Abdominal: Soft. Normal appearance and bowel sounds are normal. She exhibits no distension and no mass.   Musculoskeletal: She exhibits no deformity.        Right lower leg: She exhibits edema.        Left lower leg: She exhibits  edema.   Neurological: She is alert and oriented to person, place, and time. No cranial nerve deficit.   Skin: Skin is warm. There is erythema (lower half of both lower legs).   Dry, thickened skin in lower legs   Psychiatric: She has a normal mood and affect. Her speech is normal.       Significant Labs: All pertinent labs within the past 24 hours have been reviewed.    Significant Imaging: I have reviewed all pertinent imaging results/findings within the past 24 hours.

## 2017-12-26 NOTE — ASSESSMENT & PLAN NOTE
CHF currently controlled. Latest ECHO shows ejection fraction of   EF   Date Value Ref Range Status   09/12/2017 55 55 - 65    Monitor clinical status closely. Monitor on telemetry. Last BNP is   Recent Labs  Lab 12/21/17  1226   BNP 1,727*   . Continue to stress to patient importance of self efficacy and  on diet for CHF.  CHF exacerbation has resolved.  Stop IVF.  Resume her oral Lasix daily.

## 2017-12-26 NOTE — ASSESSMENT & PLAN NOTE
Chronic, controlled.  Monitor BP closely.  Current BP meds:     Hospital Medications             diltiaZEM 24 hr capsule 120 mg Take 1 capsule (120 mg total) by mouth once daily.    metoprolol injection 2.5 mg Inject 2.5 mLs (2.5 mg total) into the vein every 6 (six) hours as needed (HR >120).    diltiaZEM 125 mg in  mL infusion (Discontinued) Inject 5 mg/hr into the vein continuous.

## 2017-12-26 NOTE — PROGRESS NOTES
[facility] Hospital Medicine  Progress Note    Patient Name: Nicole Lala  MRN: [id number]  Patient Class: IP- Inpatient   Admission Date: 12/21/2017  Length of Stay: 4 days  Attending Physician: Artie Dong MD  Primary Care Provider: Brandon Gray MD        Subjective:     Principal Problem:Acute respiratory failure with hypoxia and hypercarbia    HPI:  Patient was brought to our ER via ambulance from [facility].  When she arrived, she told the ER staff that she was there for her kidneys.  She was somnolent and was falling asleep when not stimulated.  ABG showed hypercarbia.  She was placed on NIPPV.  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  Recent hospital stays at [facility] were for cellulitis of her legs and COPD decompensation.  She was also recently hospitalized at [facility].  No records of that stay are available at this time.    Hospital Course:  No notes on file    Interval History:  She is awake.  Somewhat conversant.  Can answer questions.  Has very little insight into her current state.  Memory somewhat impaired.  No new complaints.    Review of Systems   Constitutional: Negative for chills, fatigue and fever.   Respiratory: Positive for shortness of breath. Negative for cough.    Cardiovascular: Positive for leg swelling. Negative for chest pain.   Gastrointestinal: Negative for abdominal pain.     Objective:     Vital Signs (Most Recent):  Temp: 97.8 °F (36.6 °C) (12/25/17 1949)  Pulse: 106 (12/25/17 2109)  Resp: (!) 24 (12/25/17 2109)  BP: 117/79 (12/25/17 1949)  SpO2: 98 % (12/25/17 2109) Vital Signs (24h Range):  Temp:  [97.8 °F (36.6 °C)-98.4 °F (36.9 °C)] 97.8 °F (36.6 °C)  Pulse:  [] 106  Resp:  [14-35] 24  SpO2:  [86 %-100 %] 98 %  BP: ()/(54-82) 117/79     Weight: 132.2 kg (291 lb 7.2 oz)  Body mass  index is 45.65 kg/m².    Intake/Output Summary (Last 24 hours) at 12/25/17 2219  Last data filed at 12/25/17 0600   Gross per 24 hour   Intake           661.67 ml   Output              675 ml   Net           -13.33 ml      Physical Exam   Constitutional: She is oriented to person, place, and time. She is active. No distress. Nasal cannula in place.   Cushingoid appearance.   HENT:   Mouth/Throat: Oropharynx is clear and moist.   Eyes: Right eye exhibits no discharge. Left eye exhibits no discharge. Right conjunctiva is injected.   Neck: Neck supple.   Fat tissue in neck impedes my view of JVD   Cardiovascular: An irregularly irregular rhythm present. Tachycardia present.  Exam reveals distant heart sounds.    Pulmonary/Chest: No accessory muscle usage or stridor. No tachypnea. She has decreased breath sounds. She has rales.   Abdominal: Soft. Normal appearance and bowel sounds are normal. She exhibits no distension and no mass.   Musculoskeletal: She exhibits no deformity.        Right lower leg: She exhibits edema.        Left lower leg: She exhibits edema.   Neurological: She is alert and oriented to person, place, and time. No cranial nerve deficit.   Skin: Skin is warm. There is erythema (lower half of both lower legs).   Dry, thickened skin in lower legs   Psychiatric: She has a normal mood and affect. Her speech is normal.       Significant Labs: All pertinent labs within the past 24 hours have been reviewed.    Significant Imaging: I have reviewed all pertinent imaging results/findings within the past 24 hours.    Assessment/Plan:      * Acute respiratory failure with hypoxia and hypercarbia    Improved.  Due to COPD, obesity hypoventilation, and CHF.  She's stable enough to be transferred to telemetry unit.        Chronic diastolic heart failure    CHF currently controlled. Latest ECHO shows ejection fraction of   EF   Date Value Ref Range Status   09/12/2017 55 55 - 65    Monitor clinical status closely.  Monitor on telemetry. Last BNP is   Recent Labs  Lab 12/21/17  1226   BNP 1,727*   . Continue to stress to patient importance of self efficacy and  on diet for CHF.  CHF exacerbation has resolved.  Stop IVF.  Resume her oral Lasix daily.              Urinary tract infection without hematuria    Urine growing Ecoli, ESBL producing.  Will change the Rocephin to a carbapenem.  Re-culture urine and repeat UA.            Long term current use of anticoagulant therapy    Keep INR between 2 and 3 for history of DVT and for prevention of embolic stroke.  Restarted coumadin          Chronic kidney disease, stage IV (severe)    Creatine stable for now. Monitor UOP and serial BMP and adjust therapy as needed. Renally dose meds.  Estimated Creatinine Clearance: 48.6 mL/min (based on SCr of 1.2 mg/dL).            Morbid obesity with BMI of 40.0-44.9, adult    Body mass index is 45.65 kg/m². Morbid obesity complicates all aspects of disease management from diagnostic modalities to treatment.           Essential hypertension    Chronic, controlled.  Monitor BP closely.  Current BP meds:     Hospital Medications             diltiaZEM 24 hr capsule 120 mg Take 1 capsule (120 mg total) by mouth once daily.    metoprolol injection 2.5 mg Inject 2.5 mLs (2.5 mg total) into the vein every 6 (six) hours as needed (HR >120).    diltiaZEM 125 mg in  mL infusion (Discontinued) Inject 5 mg/hr into the vein continuous.                    Atrial fibrillation with rapid ventricular response    A little better controlled on oral diltiazem.  Will need titration.  Cardiologist following.          Lymphedema of both lower extremities    Chronic.  Was getting outpatient therapy at one time prior to going into the NH.  Skin is erythematous but not warm.  Doesn't appear to be cellulitic.  Will monitor for skin breakage.          Benign essential tremor              S/P insertion of IVC (inferior vena caval) filter 3/22/10    Noted.           Personal history of DVT (deep vein thrombosis)    Noted.  Will keep her INR between 2 and 3.            VTE Risk Mitigation         Ordered     enoxaparin injection 130 mg  Every 12 hours (non-standard times)     Route:  Subcutaneous        12/25/17 1446     Reason for No Pharmacological VTE Prophylaxis  Once      12/24/17 1943     warfarin (COUMADIN) tablet 5 mg  Daily     Route:  Oral        12/23/17 1021     Medium Risk of VTE  Once      12/21/17 1651              Artie Dong MD  Department of Hospital Medicine   [facility]

## 2017-12-26 NOTE — PLAN OF CARE
1020  Went to pt's room regarding hospice consult, however no family in room.    1022  Contacted pt's son, Jeremy [phone], to discuss hospice consult.  Jeremy states that the family is in agreement with hospice, however are not aware of any agencies.  Jeremy requested that a list of hospice agencies be emailed to him.    1039  Emailed pt's son a list of hospice agencies.       12/26/17 1029   Discharge Reassessment   Assessment Type Discharge Planning Reassessment   Discharge Plan A Hospice/home

## 2017-12-26 NOTE — ASSESSMENT & PLAN NOTE
Improved.  Due to COPD, obesity hypoventilation, and CHF.  She's stable enough to be transferred to telemetry unit.

## 2017-12-26 NOTE — ASSESSMENT & PLAN NOTE
Urine growing Ecoli, ESBL producing.  Will change the Rocephin to a carbapenem.  Re-culture urine and repeat UA.

## 2017-12-26 NOTE — CONSULTS
"  [facility]  Adult Nutrition  Consult Note    SUMMARY     Recommendations  Recommendation/Intervention: 1.) When appropriate per MD, recommend Cardiac diet   Goals: 1.) diet will advance within 48 hrs.   Nutrition Goal Status: met   Communication of RD Recs: discussed on rounds    1. Atrial fibrillation    2. Fatigue    3. Acute respiratory failure with hypoxia and hypercarbia      Past Medical History:   Diagnosis Date    A-fib     Acute CHF 9/25/2013    Anticoagulant long-term use     Arthritis     Blood transfusion     Branch retinal vein occlusion of right eye     Cellulitis and abscess of lower extremity     Cerebral aneurysm     S/p repair    CKD (chronic kidney disease)     Followed by Dr. 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 Patient on bipap in the ICU. Sleeping with no family at bedside. Rd consulted for "on bipap". Patient known from past admission. Usually eats well. Per chart review, no weight loss noted.   12/26/17 Discussed on rounds that pt's family is actively pursuing hospice options.     Nutrition Prescription Ordered  Current Diet Order: cardiac    Evaluation of Received Nutrients/Fluid Intake      Intake/Output Summary (Last 24 hours) at 12/26/17 1407  Last data filed at 12/26/17 0730   Gross per 24 hour   Intake                0 ml   Output             2000 ml   Net            -2000 ml   Fluid needs not met.   % Intake of Estimated Energy Needs: 0 - 25 %  % Meal Intake: 25%    Nutrition Risk Screen  Nutrition Risk Screen: no indicators present    Nutrition/Diet History  Food Preferences: no cultural or Hindu " "food preferences noted in chart. NKFA.   Factors Affecting Nutritional Intake:  (oxygen status)    Labs/Tests/Procedures/Meds  Diagnostic Test/Procedure Review: reviewed, pertinent  Pertinent Labs Reviewed: reviewed, pertinent  BMP  Lab Results   Component Value Date     2017    K 4.1 2017    CL 98 2017    CO2 34 (H) 2017    BUN 32 (H) 2017    CREATININE 1.3 2017    CALCIUM 8.7 2017    ANIONGAP 7 (L) 2017    ESTGFRAFRICA 43 (A) 2017    EGFRNONAA 37 (A) 2017     Lab Results   Component Value Date    ALBUMIN 2.9 (L) 2017     Lab Results   Component Value Date    CALCIUM 8.7 2017    PHOS 2.4 (L) 2017     No results for input(s): POCTGLUCOSE in the last 24 hours.    Pertinent Medications Reviewed: reviewed  Scheduled Meds:   aspirin  81 mg Oral Daily    diltiaZEM  120 mg Oral Daily    docusate sodium  100 mg Oral BID    enoxaparin  1 mg/kg Subcutaneous Q12H    furosemide  40 mg Oral Daily    meropenem (MERREM) IVPB  500 mg Intravenous Q8H    pantoprazole  40 mg Intravenous Daily    warfarin  5 mg Oral Daily     Continuous Infusions:          Physical Findings  Overall Physical Appearance: on oxygen therapy, obese  Skin: edema, intact (Romario score 10)    Anthropometrics  Temp: 98.1 °F (36.7 °C)  Height: 5' 7"  Weight Method: Bed Scale  Weight: 132.2 kg (291 lb 7.2 oz)  Ideal Body Weight (IBW), Female: 135 lb  % Ideal Body Weight, Female (lb): 213.93 lb  BMI (Calculated): 45.3  BMI Grade: greater than 40 - morbid obesity  Usual Body Weight (UBW), k.45 kg (september of this year)  % Usual Body Weight: 104.64  % Weight Change From Usual Weight: 4.42 %      Estimated/Assessed Needs  Weight Used For Calorie Calculations: 131 kg (288 lb 12.8 oz)   Energy Need Method: [place]-St Jeor  RMR ([place]-St. Jeor Equation): 1787.63- No activity factor required with obesity.   Weight Used For Protein Calculations: 61.3 kg (135 lb 2.3 oz) " (ideal body weight)  1.2 gm Protein (gm): 73.71 and 1.5 gm Protein (gm): 92.14  Fluid Need Method: RDA Method (or per MD)        Assessment and Plan    Morbid obesity with BMI of 40.0-44.9, adult    Related to (etiology):   Excessive energy intake    Signs and Symptoms (as evidenced by):   BMI >40    Interventions/Recommendations (treatment strategy):  Advance to cardiac diet     Nutrition Diagnosis Status:   Progressing (from NPO to cardiac)              Monitor and Evaluation  Food and Nutrient Intake: energy intake, food and beverage intake  Food and Nutrient Adminstration: diet order  Anthropometric Measurements: weight, weight change, body mass index  Biochemical Data, Medical Tests and Procedures: electrolyte and renal panel, glucose/endocrine profile, lipid profile  Nutrition-Focused Physical Findings: overall appearance    Nutrition Risk  Level of Risk:  (x1 weekly)    Nutrition Follow-Up  RD Follow-up?: Yes    Discharge Planning: in process of discharging to hospice care

## 2017-12-26 NOTE — PLAN OF CARE
12/25/17 2109   Patient Assessment/Suction   Level of Consciousness (AVPU) alert   Respiratory Effort Normal;Unlabored   All Lung Fields Breath Sounds clear;diminished   PRE-TX-O2-ETCO2   O2 Device (Oxygen Therapy) BiPAP   Oxygen Concentration (%) 50   SpO2 98 %   Pulse 106   Resp (!) 24   Aerosol Therapy   $ Aerosol Therapy Charges PRN treatment not required   Preset CPAP/BiPAP Settings   Mode Of Delivery BiPAP S/T   $ CPAP/BiPAP Daily Charge BiPAP/CPAP Daily   $ Initial CPAP/BiPAP Setup? No   $ Is patient using? Yes   Equipment Type V60   Airway Device Type total face mask   Humidifier not applicable   Ipap 12   EPAP (cm H2O) 6   Pressure Support (cm H2O) 6   Set Rate (Breaths/Min) 16   ITime (sec) 1   Rise Time (sec) 2   Patient CPAP/BiPAP Settings   RR Total (Breaths/Min) 24   Tidal Volume (mL) 532   VE Minute Ventilation (L/min) 10.7 L/min   Peak Inspiratory Pressure (cm H2O) 13   TiTOT (%) 22   Total Leak (L/Min) 0   Patient Trigger - ST Mode Only (%) 99   CPAP/BiPAP Alarms   High Pressure (cm H2O) 17   Low Pressure (cm H2O) 7   Low Pressure Delay (Sec) 20   Minute Ventilation (L/Min) 3   High RR (breaths/min) 40   Low RR (breaths/min) 14

## 2017-12-26 NOTE — ASSESSMENT & PLAN NOTE
Related to (etiology):   Excessive energy intake    Signs and Symptoms (as evidenced by):   BMI >40    Interventions/Recommendations (treatment strategy):  Advance to cardiac diet     Nutrition Diagnosis Status:   progressing

## 2017-12-26 NOTE — PLAN OF CARE
Problem: Nutrition, Imbalanced: Inadequate Oral Intake (Adult)  Intervention: Promote/Optimize Nutrition  Recommendation/Intervention: 1.) When appropriate per MD, recommend Cardiac diet   Goals: 1.) diet will advance within 48 hrs.   Nutrition Goal Status: met   Communication of RD Recs: discussed on rounds

## 2017-12-26 NOTE — PLAN OF CARE
Call received from pt's son Jeremy ([phone]) stating that the family would like to meet with [facility] and [facility].  Informed son that referrals will be placed to both agencies and they will contact him to arrange a meeting; verbalized understanding.    Referral sent to [facility] and [facility] via [facility].       12/26/17 1530   Discharge Reassessment   Assessment Type Discharge Planning Reassessment   Discharge Plan A Hospice/home

## 2017-12-26 NOTE — PLAN OF CARE
Problem: Patient Care Overview  Goal: Plan of Care Review  Outcome: Ongoing (interventions implemented as appropriate)  Patient safe and free from falls this shift. Weight shift assistance provided Q2. 2L O2 per NC. AF on monitor, rate controlled. Swallows pills whole 2-at-a-time. Significant bruising throughout upper body r/t blood thinning regimen. Le draining clear yellow urine. Bed in lowest position, wheels locked, SR raised, call light in reach.

## 2017-12-26 NOTE — PROGRESS NOTES
1557  Call received from Sarah with [facility] who stated she spoke with pt's son and they are meeting tomorrow at pt's home at 1730 per family request.    1608  Call received from Clifford with [facility] stating he is going to call the son to arrange a meeting.    1623  Call received from Clifford stating they are meeting with the family tomorrow at 1630.

## 2017-12-27 LAB
ANION GAP SERPL CALC-SCNC: 7 MMOL/L
BUN SERPL-MCNC: 28 MG/DL
CALCIUM SERPL-MCNC: 8.9 MG/DL
CHLORIDE SERPL-SCNC: 97 MMOL/L
CO2 SERPL-SCNC: 37 MMOL/L
COLLECT DURATION TIME UR: 24 H
CORTIS 24H UR-MRATE: 53 MCG/24 H (ref 3.5–45)
CREAT SERPL-MCNC: 1.3 MG/DL
EST. GFR  (AFRICAN AMERICAN): 43 ML/MIN/1.73 M^2
EST. GFR  (NON AFRICAN AMERICAN): 37 ML/MIN/1.73 M^2
GLUCOSE SERPL-MCNC: 93 MG/DL
INR PPP: 1.5
POTASSIUM SERPL-SCNC: 3.9 MMOL/L
PROTHROMBIN TIME: 14.9 SEC
SODIUM SERPL-SCNC: 141 MMOL/L
SPECIMEN VOL ?TM UR: 2775 ML

## 2017-12-27 PROCEDURE — 25000003 PHARM REV CODE 250: Performed by: INTERNAL MEDICINE

## 2017-12-27 PROCEDURE — 99900035 HC TECH TIME PER 15 MIN (STAT)

## 2017-12-27 PROCEDURE — 63600175 PHARM REV CODE 636 W HCPCS: Performed by: HOSPITALIST

## 2017-12-27 PROCEDURE — 27000221 HC OXYGEN, UP TO 24 HOURS

## 2017-12-27 PROCEDURE — 36415 COLL VENOUS BLD VENIPUNCTURE: CPT

## 2017-12-27 PROCEDURE — 25000003 PHARM REV CODE 250: Performed by: HOSPITALIST

## 2017-12-27 PROCEDURE — C9113 INJ PANTOPRAZOLE SODIUM, VIA: HCPCS | Performed by: HOSPITALIST

## 2017-12-27 PROCEDURE — 85610 PROTHROMBIN TIME: CPT

## 2017-12-27 PROCEDURE — 94761 N-INVAS EAR/PLS OXIMETRY MLT: CPT

## 2017-12-27 PROCEDURE — 11000001 HC ACUTE MED/SURG PRIVATE ROOM

## 2017-12-27 PROCEDURE — 80048 BASIC METABOLIC PNL TOTAL CA: CPT

## 2017-12-27 RX ADMIN — MEROPENEM AND SODIUM CHLORIDE 500 MG: 500 INJECTION, SOLUTION INTRAVENOUS at 11:12

## 2017-12-27 RX ADMIN — ASPIRIN 81 MG CHEWABLE TABLET 81 MG: 81 TABLET CHEWABLE at 08:12

## 2017-12-27 RX ADMIN — FUROSEMIDE 40 MG: 40 TABLET ORAL at 08:12

## 2017-12-27 RX ADMIN — MEROPENEM AND SODIUM CHLORIDE 500 MG: 500 INJECTION, SOLUTION INTRAVENOUS at 12:12

## 2017-12-27 RX ADMIN — MEROPENEM AND SODIUM CHLORIDE 500 MG: 500 INJECTION, SOLUTION INTRAVENOUS at 02:12

## 2017-12-27 RX ADMIN — PANTOPRAZOLE SODIUM 40 MG: 40 INJECTION, POWDER, FOR SOLUTION INTRAVENOUS at 08:12

## 2017-12-27 RX ADMIN — DOCUSATE SODIUM 100 MG: 100 CAPSULE, LIQUID FILLED ORAL at 08:12

## 2017-12-27 RX ADMIN — WARFARIN SODIUM 5 MG: 5 TABLET ORAL at 04:12

## 2017-12-27 RX ADMIN — ENOXAPARIN SODIUM 130 MG: 150 INJECTION SUBCUTANEOUS at 05:12

## 2017-12-27 RX ADMIN — MEROPENEM AND SODIUM CHLORIDE 500 MG: 500 INJECTION, SOLUTION INTRAVENOUS at 08:12

## 2017-12-27 RX ADMIN — DILTIAZEM HYDROCHLORIDE 120 MG: 120 CAPSULE, COATED, EXTENDED RELEASE ORAL at 08:12

## 2017-12-27 RX ADMIN — ENOXAPARIN SODIUM 130 MG: 150 INJECTION SUBCUTANEOUS at 04:12

## 2017-12-27 NOTE — ASSESSMENT & PLAN NOTE
Keep INR between 2 and 3 for history of DVT and for prevention of embolic stroke.  Restarted coumadin-on lovenox bridge to coumadin

## 2017-12-27 NOTE — ASSESSMENT & PLAN NOTE
Creatine stable for now. Monitor UOP and serial BMP and adjust therapy as needed. Renally dose meds.  Estimated Creatinine Clearance: 44.9 mL/min (based on SCr of 1.3 mg/dL).

## 2017-12-27 NOTE — SUBJECTIVE & OBJECTIVE
Interval History:  No new complaints.  No major changes in status.    Review of Systems   Constitutional: Negative for chills, fatigue and fever.   Respiratory: Positive for shortness of breath. Negative for cough.    Cardiovascular: Positive for leg swelling. Negative for chest pain.   Gastrointestinal: Negative for abdominal pain.     Objective:     Vital Signs (Most Recent):  Temp: 98.2 °F (36.8 °C) (12/26/17 1922)  Pulse: 87 (12/26/17 1922)  Resp: 16 (12/26/17 1922)  BP: (!) 106/55 (12/26/17 1922)  SpO2: 97 % (12/26/17 1922) Vital Signs (24h Range):  Temp:  [96.2 °F (35.7 °C)-98.2 °F (36.8 °C)] 98.2 °F (36.8 °C)  Pulse:  [] 87  Resp:  [16-24] 16  SpO2:  [93 %-98 %] 97 %  BP: (106-128)/(55-84) 106/55     Weight: 132.2 kg (291 lb 7.2 oz)  Body mass index is 45.65 kg/m².    Intake/Output Summary (Last 24 hours) at 12/26/17 1934  Last data filed at 12/26/17 1800   Gross per 24 hour   Intake              720 ml   Output             4050 ml   Net            -3330 ml      Physical Exam   Constitutional: She is oriented to person, place, and time. She is active. No distress. Nasal cannula in place.   Cushingoid appearance.   HENT:   Mouth/Throat: Oropharynx is clear and moist.   Eyes: Right eye exhibits no discharge. Left eye exhibits no discharge. Right conjunctiva is injected.   Neck: Neck supple.   Fat tissue in neck impedes my view of JVD   Cardiovascular: An irregularly irregular rhythm present. Tachycardia present.  Exam reveals distant heart sounds.    Pulmonary/Chest: No accessory muscle usage or stridor. No tachypnea. She has decreased breath sounds. She has rales.   Abdominal: Soft. Normal appearance and bowel sounds are normal. She exhibits no distension and no mass.   Musculoskeletal: She exhibits no deformity.        Right lower leg: She exhibits edema.        Left lower leg: She exhibits edema.   Neurological: She is alert and oriented to person, place, and time. No cranial nerve deficit.   Skin:  Skin is warm. There is erythema (lower half of both lower legs).   Dry, thickened skin in lower legs   Psychiatric: She has a normal mood and affect. Her speech is normal.       Significant Labs: All pertinent labs within the past 24 hours have been reviewed.    Significant Imaging: none

## 2017-12-27 NOTE — ASSESSMENT & PLAN NOTE
1st urine specimen grew ESBL Ecoli.  2nd specimen collected 12/26-e coli  Final cx pending.  Continue:  Antibiotics     Start     Stop Route Frequency Ordered    12/25/17 4359  meropenem-0.9% sodium chloride 500 mg/50 mL IVPB      -- IV Every 8 hours (non-standard times) 12/25/17 0228

## 2017-12-27 NOTE — PLAN OF CARE
Problem: Patient Care Overview  Goal: Plan of Care Review  Outcome: Ongoing (interventions implemented as appropriate)  Patient safe and free from falls. Weight shift assistance provided Q2. 2.5L O2 per NC. AF on monitor. Swallows pills whole. Denies c/o pain. AAOx4 with occasional periods of slight confusion. Bed in lowest position, wheels locked, SR raised, call light in reach.

## 2017-12-27 NOTE — ASSESSMENT & PLAN NOTE
1st urine specimen grew ESBL Ecoli.  2nd specimen collected yesterday, in process.  Continue:  Antibiotics     Start     Stop Route Frequency Ordered    12/25/17 5021  meropenem-0.9% sodium chloride 500 mg/50 mL IVPB      -- IV Every 8 hours (non-standard times) 12/25/17 8178

## 2017-12-27 NOTE — ASSESSMENT & PLAN NOTE
Chronic, controlled.  Monitor BP closely.  Current BP meds:  Hypertension Medications at home            atenolol (TENORMIN) 50 MG tablet Take 50 mg by mouth 2 (two) times daily.    furosemide (LASIX) 40 MG tablet Take 40 mg by mouth once daily.      Hospital Medications             diltiaZEM 24 hr capsule 120 mg Take 1 capsule (120 mg total) by mouth once daily.    furosemide tablet 40 mg Take 1 tablet (40 mg total) by mouth once daily.    metoprolol injection 2.5 mg Inject 2.5 mLs (2.5 mg total) into the vein every 6 (six) hours as needed (HR >120).

## 2017-12-27 NOTE — PROGRESS NOTES
[facility] Hospital Medicine  Progress Note    Patient Name: Nicole Lala  MRN: [id number]  Patient Class: IP- Inpatient   Admission Date: 12/21/2017  Length of Stay: 6 days  Attending Physician: Esther Wilson MD  Primary Care Provider: Brandon Gray MD        Subjective:     Principal Problem:Acute respiratory failure with hypoxia and hypercarbia    HPI:  Patient was brought to our ER via ambulance from [facility].  When she arrived, she told the ER staff that she was there for her kidneys.  She was somnolent and was falling asleep when not stimulated.  ABG showed hypercarbia.  She was placed on NIPPV.  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  Recent hospital stays at [facility] were for cellulitis of her legs and COPD decompensation.  She was also recently hospitalized at [facility].  No records of that stay are available at this time.    Hospital Course:  No notes on file    Interval History: very sob, no other complaints       Review of Systems   Unable to perform ROS: Acuity of condition     Objective:     Vital Signs (Most Recent):  Temp: 98.3 °F (36.8 °C) (12/27/17 1058)  Pulse: 86 (12/27/17 1058)  Resp: 20 (12/27/17 1058)  BP: 118/67 (12/27/17 1058)  SpO2: 96 % (12/27/17 1058) Vital Signs (24h Range):  Temp:  [97.5 °F (36.4 °C)-98.3 °F (36.8 °C)] 98.3 °F (36.8 °C)  Pulse:  [] 86  Resp:  [16-20] 20  SpO2:  [95 %-97 %] 96 %  BP: (106-131)/(55-71) 118/67     Weight: 132.2 kg (291 lb 7.2 oz)  Body mass index is 45.65 kg/m².    Intake/Output Summary (Last 24 hours) at 12/27/17 1537  Last data filed at 12/27/17 0725   Gross per 24 hour   Intake              720 ml   Output             3300 ml   Net            -2580 ml      Physical Exam   Constitutional: She is oriented to person, place, and time. She is active. No distress. Nasal  cannula in place.   Cushingoid appearance. Morbidly obese   Very sob with conversation    HENT:   Mouth/Throat: Oropharynx is clear and moist.   Eyes: Right eye exhibits no discharge. Left eye exhibits no discharge. Right conjunctiva is injected.   Neck: Neck supple.   Fat tissue in neck impedes my view of JVD   Cardiovascular: An irregularly irregular rhythm present. Tachycardia present.  Exam reveals distant heart sounds.    Pulmonary/Chest: No accessory muscle usage or stridor. No tachypnea. She has decreased breath sounds. She has rales.   Abdominal: Soft. Normal appearance and bowel sounds are normal. She exhibits no distension and no mass.   Musculoskeletal: She exhibits edema. She exhibits no deformity.        Right lower leg: She exhibits edema.        Left lower leg: She exhibits edema.   Neurological: She is alert and oriented to person, place, and time. No cranial nerve deficit.   Skin: Skin is warm. There is erythema (lower half of both lower legs).   Dry, thickened skin in lower legs     Psychiatric: She has a normal mood and affect. Her speech is normal.   Nursing note reviewed.      Significant Labs: All pertinent labs within the past 24 hours have been reviewed.    Significant Imaging: I have reviewed and interpreted all pertinent imaging results/findings within the past 24 hours.    Assessment/Plan:      * Acute respiratory failure with hypoxia and hypercarbia    Improved.  Due to COPD, obesity hypoventilation, and CHF.  Monitor o2 sats and work of breathing.        Chronic diastolic heart failure    CHF currently controlled. Latest ECHO shows ejection fraction of   EF   Date Value Ref Range Status   09/12/2017 55 55 - 65    Monitor clinical status closely. Monitor on telemetry.   Continue to stress to patient importance of self efficacy and  on diet for CHF.  CHF exacerbation has resolved.  Resumed her oral Lasix daily.              Urinary tract infection without hematuria    1st urine  specimen grew ESBL Ecoli.  2nd specimen collected 12/26-e coli  Final cx pending.  Continue:  Antibiotics     Start     Stop Route Frequency Ordered    12/25/17 5735  meropenem-0.9% sodium chloride 500 mg/50 mL IVPB      -- IV Every 8 hours (non-standard times) 12/25/17 2236                    Long term current use of anticoagulant therapy    Keep INR between 2 and 3 for history of DVT and for prevention of embolic stroke.  Restarted coumadin-on lovenox bridge to coumadin           Chronic kidney disease, stage IV (severe)    Creatine stable for now. Monitor UOP and serial BMP and adjust therapy as needed. Renally dose meds.  Estimated Creatinine Clearance: 44.9 mL/min (based on SCr of 1.3 mg/dL).            Morbid obesity with BMI of 40.0-44.9, adult    Body mass index is 45.65 kg/m². Morbid obesity complicates all aspects of disease management from diagnostic modalities to treatment.           Essential hypertension    Chronic, controlled.  Monitor BP closely.  Current BP meds:  Hypertension Medications at home            atenolol (TENORMIN) 50 MG tablet Take 50 mg by mouth 2 (two) times daily.    furosemide (LASIX) 40 MG tablet Take 40 mg by mouth once daily.      Hospital Medications             diltiaZEM 24 hr capsule 120 mg Take 1 capsule (120 mg total) by mouth once daily.    furosemide tablet 40 mg Take 1 tablet (40 mg total) by mouth once daily.    metoprolol injection 2.5 mg Inject 2.5 mLs (2.5 mg total) into the vein every 6 (six) hours as needed (HR >120).                         Atrial fibrillation with rapid ventricular response    Rate  controlled on oral diltiazem. -  Cardiologist following.          Lymphedema of both lower extremities    Chronic.  Was getting outpatient therapy at one time prior to going into the NH.  Skin is erythematous but not warm.  Doesn't appear to be cellulitic.  Will monitor for skin breakage.            S/P insertion of IVC (inferior vena caval) filter 3/22/10     Noted.          Personal history of DVT (deep vein thrombosis)      Will keep her INR between 2 and 3.            VTE Risk Mitigation         Ordered     enoxaparin injection 130 mg  Every 12 hours (non-standard times)     Route:  Subcutaneous        12/25/17 1446     Reason for No Pharmacological VTE Prophylaxis  Once      12/24/17 1943     warfarin (COUMADIN) tablet 5 mg  Daily     Route:  Oral        12/23/17 1021     Medium Risk of VTE  Once      12/21/17 1651          Addend--discussed with patient re: hospice   Family has meeting with hospice this afternoon   Time spent seeing patient( greater than 1/2 spent in direct contact) : 38 min    Esther Wilson MD  Department of Hospital Medicine   [facility]

## 2017-12-27 NOTE — PLAN OF CARE
12/27/17 0800   Patient Assessment/Suction   Level of Consciousness (AVPU) alert   Respiratory Effort Normal   Expansion/Accessory Muscles/Retractions no use of accessory muscles   All Lung Fields Breath Sounds clear   PRE-TX-O2-ETCO2   O2 Device (Oxygen Therapy) nasal cannula   Flow (L/min) 2.5   SpO2 95 %   Pulse Oximetry Type Intermittent   Pulse 87   Resp 20

## 2017-12-27 NOTE — SUBJECTIVE & OBJECTIVE
Interval History: very sob, no other complaints       Review of Systems   Unable to perform ROS: Acuity of condition     Objective:     Vital Signs (Most Recent):  Temp: 98.3 °F (36.8 °C) (12/27/17 1058)  Pulse: 86 (12/27/17 1058)  Resp: 20 (12/27/17 1058)  BP: 118/67 (12/27/17 1058)  SpO2: 96 % (12/27/17 1058) Vital Signs (24h Range):  Temp:  [97.5 °F (36.4 °C)-98.3 °F (36.8 °C)] 98.3 °F (36.8 °C)  Pulse:  [] 86  Resp:  [16-20] 20  SpO2:  [95 %-97 %] 96 %  BP: (106-131)/(55-71) 118/67     Weight: 132.2 kg (291 lb 7.2 oz)  Body mass index is 45.65 kg/m².    Intake/Output Summary (Last 24 hours) at 12/27/17 1537  Last data filed at 12/27/17 0725   Gross per 24 hour   Intake              720 ml   Output             3300 ml   Net            -2580 ml      Physical Exam   Constitutional: She is oriented to person, place, and time. She is active. No distress. Nasal cannula in place.   Cushingoid appearance. Morbidly obese   Very sob with conversation    HENT:   Mouth/Throat: Oropharynx is clear and moist.   Eyes: Right eye exhibits no discharge. Left eye exhibits no discharge. Right conjunctiva is injected.   Neck: Neck supple.   Fat tissue in neck impedes my view of JVD   Cardiovascular: An irregularly irregular rhythm present. Tachycardia present.  Exam reveals distant heart sounds.    Pulmonary/Chest: No accessory muscle usage or stridor. No tachypnea. She has decreased breath sounds. She has rales.   Abdominal: Soft. Normal appearance and bowel sounds are normal. She exhibits no distension and no mass.   Musculoskeletal: She exhibits edema. She exhibits no deformity.        Right lower leg: She exhibits edema.        Left lower leg: She exhibits edema.   Neurological: She is alert and oriented to person, place, and time. No cranial nerve deficit.   Skin: Skin is warm. There is erythema (lower half of both lower legs).   Dry, thickened skin in lower legs     Psychiatric: She has a normal mood and affect. Her  speech is normal.   Nursing note reviewed.      Significant Labs: All pertinent labs within the past 24 hours have been reviewed.    Significant Imaging: I have reviewed and interpreted all pertinent imaging results/findings within the past 24 hours.

## 2017-12-27 NOTE — PLAN OF CARE
12/26/17 2011   Patient Assessment/Suction   Level of Consciousness (AVPU) alert   Respiratory Effort Normal;Unlabored   Expansion/Accessory Muscles/Retractions no use of accessory muscles   All Lung Fields Breath Sounds coarse   PRE-TX-O2-ETCO2   O2 Device (Oxygen Therapy) nasal cannula   Flow (L/min) 2.5   SpO2 97 %   Pulse (!) 126   Resp 20   Aerosol Therapy   $ Aerosol Therapy Charges PRN treatment not required   Preset CPAP/BiPAP Settings   Mode Of Delivery Standby   Pt assessed for PRN neb tx, none needed at this time.

## 2017-12-27 NOTE — PROGRESS NOTES
[facility] Medicine  Progress Note    Patient Name: Nicole Lala  MRN: [id number]  Patient Class: IP- Inpatient   Admission Date: 12/21/2017  Length of Stay: 5 days  Attending Physician: Artie Dong MD  Primary Care Provider: Brandon Gray MD        Subjective:     Principal Problem:Acute respiratory failure with hypoxia and hypercarbia    HPI:  Patient was brought to our ER via ambulance from [facility].  When she arrived, she told the ER staff that she was there for her kidneys.  She was somnolent and was falling asleep when not stimulated.  ABG showed hypercarbia.  She was placed on NIPPV.  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  Recent hospital stays at [facility] were for cellulitis of her legs and COPD decompensation.  She was also recently hospitalized at [facility].  No records of that stay are available at this time.    Hospital Course:  No notes on file    Interval History:  No new complaints.  No major changes in status.    Review of Systems   Constitutional: Negative for chills, fatigue and fever.   Respiratory: Positive for shortness of breath. Negative for cough.    Cardiovascular: Positive for leg swelling. Negative for chest pain.   Gastrointestinal: Negative for abdominal pain.     Objective:     Vital Signs (Most Recent):  Temp: 98.2 °F (36.8 °C) (12/26/17 1922)  Pulse: 87 (12/26/17 1922)  Resp: 16 (12/26/17 1922)  BP: (!) 106/55 (12/26/17 1922)  SpO2: 97 % (12/26/17 1922) Vital Signs (24h Range):  Temp:  [96.2 °F (35.7 °C)-98.2 °F (36.8 °C)] 98.2 °F (36.8 °C)  Pulse:  [] 87  Resp:  [16-24] 16  SpO2:  [93 %-98 %] 97 %  BP: (106-128)/(55-84) 106/55     Weight: 132.2 kg (291 lb 7.2 oz)  Body mass index is 45.65 kg/m².    Intake/Output Summary (Last 24 hours) at 12/26/17 1934  Last data filed at 12/26/17  1800   Gross per 24 hour   Intake              720 ml   Output             4050 ml   Net            -3330 ml      Physical Exam   Constitutional: She is oriented to person, place, and time. She is active. No distress. Nasal cannula in place.   Cushingoid appearance.   HENT:   Mouth/Throat: Oropharynx is clear and moist.   Eyes: Right eye exhibits no discharge. Left eye exhibits no discharge. Right conjunctiva is injected.   Neck: Neck supple.   Fat tissue in neck impedes my view of JVD   Cardiovascular: An irregularly irregular rhythm present. Tachycardia present.  Exam reveals distant heart sounds.    Pulmonary/Chest: No accessory muscle usage or stridor. No tachypnea. She has decreased breath sounds. She has rales.   Abdominal: Soft. Normal appearance and bowel sounds are normal. She exhibits no distension and no mass.   Musculoskeletal: She exhibits no deformity.        Right lower leg: She exhibits edema.        Left lower leg: She exhibits edema.   Neurological: She is alert and oriented to person, place, and time. No cranial nerve deficit.   Skin: Skin is warm. There is erythema (lower half of both lower legs).   Dry, thickened skin in lower legs   Psychiatric: She has a normal mood and affect. Her speech is normal.       Significant Labs: All pertinent labs within the past 24 hours have been reviewed.    Significant Imaging: none    Assessment/Plan:      * Acute respiratory failure with hypoxia and hypercarbia    Improved.  Due to COPD, obesity hypoventilation, and CHF.  Monitor o2 sats and work of breathing.        Chronic diastolic heart failure    CHF currently controlled. Latest ECHO shows ejection fraction of   EF   Date Value Ref Range Status   09/12/2017 55 55 - 65    Monitor clinical status closely. Monitor on telemetry.   Continue to stress to patient importance of self efficacy and  on diet for CHF.  CHF exacerbation has resolved.  Resumed her oral Lasix daily.              Urinary tract  infection without hematuria    1st urine specimen grew ESBL Ecoli.  2nd specimen collected yesterday, in process.  Continue:  Antibiotics     Start     Stop Route Frequency Ordered    12/25/17 2345  meropenem-0.9% sodium chloride 500 mg/50 mL IVPB      -- IV Every 8 hours (non-standard times) 12/25/17 2236                    Long term current use of anticoagulant therapy    Keep INR between 2 and 3 for history of DVT and for prevention of embolic stroke.  Restarted coumadin          Chronic kidney disease, stage IV (severe)    Creatine stable for now. Monitor UOP and serial BMP and adjust therapy as needed. Renally dose meds.  Estimated Creatinine Clearance: 44.9 mL/min (based on SCr of 1.3 mg/dL).            Morbid obesity with BMI of 40.0-44.9, adult    Body mass index is 45.65 kg/m². Morbid obesity complicates all aspects of disease management from diagnostic modalities to treatment.           Essential hypertension    Chronic, controlled.  Monitor BP closely.  Current BP meds:  Hypertension Medications at home            atenolol (TENORMIN) 50 MG tablet Take 50 mg by mouth 2 (two) times daily.    furosemide (LASIX) 40 MG tablet Take 40 mg by mouth once daily.      Hospital Medications             diltiaZEM 24 hr capsule 120 mg Take 1 capsule (120 mg total) by mouth once daily.    furosemide tablet 40 mg Take 1 tablet (40 mg total) by mouth once daily.    metoprolol injection 2.5 mg Inject 2.5 mLs (2.5 mg total) into the vein every 6 (six) hours as needed (HR >120).                         Atrial fibrillation with rapid ventricular response    A little better controlled on oral diltiazem.  Will need titration.  Cardiologist following.          Lymphedema of both lower extremities    Chronic.  Was getting outpatient therapy at one time prior to going into the NH.  Skin is erythematous but not warm.  Doesn't appear to be cellulitic.  Will monitor for skin breakage.          S/P insertion of IVC (inferior vena  caval) filter 3/22/10    Noted.          Personal history of DVT (deep vein thrombosis)    Noted.  Will keep her INR between 2 and 3.            VTE Risk Mitigation         Ordered     enoxaparin injection 130 mg  Every 12 hours (non-standard times)     Route:  Subcutaneous        12/25/17 1446     Reason for No Pharmacological VTE Prophylaxis  Once      12/24/17 1943     warfarin (COUMADIN) tablet 5 mg  Daily     Route:  Oral        12/23/17 1021     Medium Risk of VTE  Once      12/21/17 1651              Artie Dong MD  Department of Hospital Medicine   [facility]

## 2017-12-27 NOTE — ASSESSMENT & PLAN NOTE
CHF currently controlled. Latest ECHO shows ejection fraction of   EF   Date Value Ref Range Status   09/12/2017 55 55 - 65    Monitor clinical status closely. Monitor on telemetry.   Continue to stress to patient importance of self efficacy and  on diet for CHF.  CHF exacerbation has resolved.  Resumed her oral Lasix daily.

## 2017-12-28 PROBLEM — I50.33 ACUTE ON CHRONIC DIASTOLIC HEART FAILURE: Status: ACTIVE | Noted: 2017-12-25

## 2017-12-28 PROCEDURE — 94761 N-INVAS EAR/PLS OXIMETRY MLT: CPT

## 2017-12-28 PROCEDURE — 94660 CPAP INITIATION&MGMT: CPT

## 2017-12-28 PROCEDURE — 25000003 PHARM REV CODE 250: Performed by: INTERNAL MEDICINE

## 2017-12-28 PROCEDURE — 25000003 PHARM REV CODE 250: Performed by: HOSPITALIST

## 2017-12-28 PROCEDURE — 25000242 PHARM REV CODE 250 ALT 637 W/ HCPCS: Performed by: HOSPITALIST

## 2017-12-28 PROCEDURE — 27000221 HC OXYGEN, UP TO 24 HOURS

## 2017-12-28 PROCEDURE — C9113 INJ PANTOPRAZOLE SODIUM, VIA: HCPCS | Performed by: HOSPITALIST

## 2017-12-28 PROCEDURE — 63600175 PHARM REV CODE 636 W HCPCS: Performed by: HOSPITALIST

## 2017-12-28 PROCEDURE — 94640 AIRWAY INHALATION TREATMENT: CPT

## 2017-12-28 PROCEDURE — 99233 SBSQ HOSP IP/OBS HIGH 50: CPT | Mod: ,,, | Performed by: INTERNAL MEDICINE

## 2017-12-28 PROCEDURE — 25000003 PHARM REV CODE 250: Performed by: NURSE PRACTITIONER

## 2017-12-28 PROCEDURE — 99900035 HC TECH TIME PER 15 MIN (STAT)

## 2017-12-28 PROCEDURE — 11000001 HC ACUTE MED/SURG PRIVATE ROOM

## 2017-12-28 PROCEDURE — 63600175 PHARM REV CODE 636 W HCPCS: Performed by: NURSE PRACTITIONER

## 2017-12-28 RX ORDER — DILTIAZEM HYDROCHLORIDE 120 MG/1
120 CAPSULE, COATED, EXTENDED RELEASE ORAL ONCE
Status: COMPLETED | OUTPATIENT
Start: 2017-12-28 | End: 2017-12-28

## 2017-12-28 RX ORDER — DILTIAZEM HYDROCHLORIDE 5 MG/ML
10 INJECTION INTRAVENOUS ONCE
Status: COMPLETED | OUTPATIENT
Start: 2017-12-28 | End: 2017-12-28

## 2017-12-28 RX ORDER — DILTIAZEM HYDROCHLORIDE 120 MG/1
240 CAPSULE, COATED, EXTENDED RELEASE ORAL DAILY
Status: DISCONTINUED | OUTPATIENT
Start: 2017-12-29 | End: 2017-12-30 | Stop reason: HOSPADM

## 2017-12-28 RX ORDER — METOPROLOL TARTRATE 1 MG/ML
5 INJECTION, SOLUTION INTRAVENOUS EVERY 6 HOURS PRN
Status: DISCONTINUED | OUTPATIENT
Start: 2017-12-28 | End: 2017-12-30 | Stop reason: HOSPADM

## 2017-12-28 RX ORDER — FUROSEMIDE 10 MG/ML
40 INJECTION INTRAMUSCULAR; INTRAVENOUS 2 TIMES DAILY
Status: DISCONTINUED | OUTPATIENT
Start: 2017-12-28 | End: 2017-12-30 | Stop reason: HOSPADM

## 2017-12-28 RX ADMIN — FUROSEMIDE 40 MG: 10 INJECTION, SOLUTION INTRAMUSCULAR; INTRAVENOUS at 05:12

## 2017-12-28 RX ADMIN — ENOXAPARIN SODIUM 130 MG: 150 INJECTION SUBCUTANEOUS at 05:12

## 2017-12-28 RX ADMIN — FUROSEMIDE 40 MG: 40 TABLET ORAL at 09:12

## 2017-12-28 RX ADMIN — MEROPENEM AND SODIUM CHLORIDE 500 MG: 500 INJECTION, SOLUTION INTRAVENOUS at 09:12

## 2017-12-28 RX ADMIN — DILTIAZEM HYDROCHLORIDE 120 MG: 120 CAPSULE, COATED, EXTENDED RELEASE ORAL at 11:12

## 2017-12-28 RX ADMIN — DILTIAZEM HYDROCHLORIDE 10 MG: 5 INJECTION INTRAVENOUS at 09:12

## 2017-12-28 RX ADMIN — IPRATROPIUM BROMIDE AND ALBUTEROL SULFATE 3 ML: .5; 3 SOLUTION RESPIRATORY (INHALATION) at 01:12

## 2017-12-28 RX ADMIN — METOPROLOL TARTRATE 2.5 MG: 5 INJECTION, SOLUTION INTRAVENOUS at 01:12

## 2017-12-28 RX ADMIN — ASPIRIN 81 MG CHEWABLE TABLET 81 MG: 81 TABLET CHEWABLE at 09:12

## 2017-12-28 RX ADMIN — WARFARIN SODIUM 5 MG: 5 TABLET ORAL at 05:12

## 2017-12-28 RX ADMIN — METOPROLOL TARTRATE 2.5 MG: 5 INJECTION, SOLUTION INTRAVENOUS at 07:12

## 2017-12-28 RX ADMIN — MEROPENEM AND SODIUM CHLORIDE 500 MG: 500 INJECTION, SOLUTION INTRAVENOUS at 11:12

## 2017-12-28 RX ADMIN — METOPROLOL TARTRATE 5 MG: 5 INJECTION INTRAVENOUS at 07:12

## 2017-12-28 RX ADMIN — FUROSEMIDE 40 MG: 10 INJECTION, SOLUTION INTRAMUSCULAR; INTRAVENOUS at 11:12

## 2017-12-28 RX ADMIN — PANTOPRAZOLE SODIUM 40 MG: 40 INJECTION, POWDER, FOR SOLUTION INTRAVENOUS at 09:12

## 2017-12-28 RX ADMIN — IPRATROPIUM BROMIDE AND ALBUTEROL SULFATE 3 ML: .5; 3 SOLUTION RESPIRATORY (INHALATION) at 05:12

## 2017-12-28 RX ADMIN — DOCUSATE SODIUM 100 MG: 100 CAPSULE, LIQUID FILLED ORAL at 08:12

## 2017-12-28 RX ADMIN — DOCUSATE SODIUM 100 MG: 100 CAPSULE, LIQUID FILLED ORAL at 09:12

## 2017-12-28 RX ADMIN — MEROPENEM AND SODIUM CHLORIDE 500 MG: 500 INJECTION, SOLUTION INTRAVENOUS at 05:12

## 2017-12-28 RX ADMIN — DILTIAZEM HYDROCHLORIDE 120 MG: 120 CAPSULE, COATED, EXTENDED RELEASE ORAL at 09:12

## 2017-12-28 NOTE — PLAN OF CARE
Problem: Patient Care Overview  Goal: Plan of Care Review  Patient sats 94% on 3lpm/place BIPAP on setting 12/6 50%FIO2 sats 97%

## 2017-12-28 NOTE — PLAN OF CARE
Problem: Patient Care Overview  Goal: Plan of Care Review  Place patient back on BIPAP per nurse setting 12/6 50%FIO2

## 2017-12-28 NOTE — PLAN OF CARE
Problem: Patient Care Overview  Goal: Plan of Care Review  Outcome: Ongoing (interventions implemented as appropriate)  Alert but confused. Fall and injury free this shift. O2 3L NC in place. Tolerated Bipap after 2nd attempt last night. Off at 04:50.  Given metoprolol 2.5 mg iv push for heart rate >120 x 1 dose. Plan of care reviewed with patient. Did not voice understanding.  Bed in low position and locked. Side rail up x 2. Call bell in reach. Telemetry

## 2017-12-28 NOTE — PROGRESS NOTES
[facility]  Cardiology  Progress Note    Patient Name: Nicole Lala  MRN: [id number]  Admission Date: 12/21/2017  Hospital Length of Stay: 7 days  Code Status: Full Code   Attending Physician: Esther Wilson MD   Primary Care Physician: Brandon Gray MD  Expected Discharge Date:   Principal Problem:Acute respiratory failure with hypoxia and hypercarbia    Subjective:       Interval History: Sitting up in bed. Alert. Patient reporting continued SOB. Remains atrial fibrillation on telemetry with persistent RVR. HR ranging form one teens to 160s on po CCB (long-acting Diltiazem 120mg daily). Received IV CCB and BB doses this morning with minimal improvement in rate. Currently on full dose Lovenox, Warfarin resumed this admission after being held initially being that she presented with supra therapeutic INR > 10 on 12/21/2017 that was managed with vit K.  INR of 1.5 yesterday. O2 sat 93% on 3L NC.     Patient is a difficulty historian, [place]. C/o continued SOB.  CXR done this admission on 12/25 concerning for congestive process. Elevated BNP of 1727 on admission. Prior echo done in 09/2017 showing normal EF of 55%.  Patient is receiving her home regimen of Lasix 40mg po daily.  I&O net negative 5L since admit on 12/21. No significant weight loss or gain since admission on 12/21 per review of daily weights.     ROS   Difficult to fully assess ROS given that pt is [place]   Constitutional: negative for chills, fatigue, fevers.   Respiratory: negative for cough. Positive for shortness of breath.   Cardiovascular: negative for chest pain, chest pressure/discomfort, near-syncope, palpitations, and syncope. Positive for leg swelling.   Gastrointestinal: negative for abdominal pain, nausea and vomiting  Neurological: negative for dizziness, headaches    Objective:     Vital Signs (Most Recent):  Temp: 97.5 °F (36.4 °C) (12/28/17 0735)  Pulse: 104 (12/28/17 1000)  Resp: 18 (12/28/17 0735)  BP: (!) 148/86  (12/28/17 0916)  SpO2: (!) 93 % (12/28/17 0554) Vital Signs (24h Range):  Temp:  [97.5 °F (36.4 °C)-98.4 °F (36.9 °C)] 97.5 °F (36.4 °C)  Pulse:  [] 104  Resp:  [18-22] 18  SpO2:  [93 %-99 %] 93 %  BP: (104-155)/(70-89) 148/86     Weight: 131.4 kg (289 lb 9.6 oz)  Body mass index is 45.36 kg/m².    SpO2: (!) 93 %  O2 Device (Oxygen Therapy): nasal cannula      Intake/Output Summary (Last 24 hours) at 12/28/17 1101  Last data filed at 12/28/17 0500   Gross per 24 hour   Intake              820 ml   Output             2325 ml   Net            -1505 ml       Lines/Drains/Airways     Drain                 Urethral Catheter 12/21/17 7 days          Peripheral Intravenous Line                 Midline Catheter Insertion/Assessment  - Single Lumen 09/14/17 1630 Left brachial vein 18g x 10cm 104 days         Peripheral IV - Single Lumen 12/25/17 0500 Right Wrist 3 days                Physical Exam  Constitutional: Alert. Calm. Cooperative and conversant.   HENT:   Head: Normocephalic, Dry mucus membranes.   Eyes: Conjunctivae normal. Right eye exhibits no discharge. Left eye exhibits no discharge. No scleral icterus.   Neck:Unable to adequately assess JVD d/t body habitus    Cardiovascular: An irregularly irregular rhythm present. Tachycardia present.  Distant heart tones.    Pulses:       Radial pulses are 2+ on the right side, and 2+ on the left side.        Dorsalis pedis pulses are 1+ on the right side, and 1+ on the left side.   Pulmonary/Chest: Respirations even with increased work of breathing. She has decreased breath sounds and rales in the bases with wheezing throughout posterior chest.    Abdominal: Soft. Bowel sounds are normal.There is no guarding.   Musculoskeletal: 1+ BLE pitting edema.     Neurological: Alert. Follows commands, moves all extremities.     Skin: Skin is warm and dry. She is not diaphoretic. + erythema with scaly appearance to right and left lower legs.   Vitals reviewed.    Significant  Labs:   BMP:   Recent Labs  Lab 12/27/17  0500   GLU 93      K 3.9   CL 97   CO2 37*   BUN 28*   CREATININE 1.3   CALCIUM 8.9   , CMP   Recent Labs  Lab 12/27/17  0500      K 3.9   CL 97   CO2 37*   GLU 93   BUN 28*   CREATININE 1.3   CALCIUM 8.9   ANIONGAP 7*   ESTGFRAFRICA 43*   EGFRNONAA 37*   , CBC No results for input(s): WBC, HGB, HCT, PLT in the last 48 hours., INR   Recent Labs  Lab 12/27/17  0500   INR 1.5*   , Lipid Panel No results for input(s): CHOL, HDL, LDLCALC, TRIG, CHOLHDL in the last 48 hours. and Troponin No results for input(s): TROPONINI in the last 48 hours.    Significant Imaging:  CXR 12/25/2017:   Continued cardiomegaly and mild pulmonary edema. Trace right pleural effusion.    Head CT 12/21/2017:  1. There is no definite acute abnormality.  There is no intracranial hemorrhage, mass, obvious acute infarction.    2.  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  Aneurysm clips result in significant beam hardening artifact.  There is an overlying right pterional craniotomy.  There is inferior posterior lateral frontal and anterior medial temporal lobe encephalomalacia on the right.  These findings have progressed compared to the prior study but are not acute.    3.  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  There is no midline shift or mass effect upon the underlying brain.    4. There is trace fluid in the right sphenoid sinus.    5.  There is no change in appearance of the right parietal approach ventriculostomy catheter with the tip in the left lateral ventricle.  There is no hydrocephalus.     CXR 12/21/2017:  There has been decrease in the right basal infiltrate which is mild residual infiltrate and possible also trace pleural effusion.  There is limited visualization of the left lung base on the portable film with a mild left  perihilar infiltrate.  The cardiomediastinal silhouette is stable with heart enlarged    Impression  Partial clearing right lung base compared to the prior exam    Prior Echo 11/17/2017 at [facility]:  Conclusions:  Technically limited study  Normal left ventricular function. EF 60%.   Patient in atrial fibrillation during examination.   Mild aortic root sclerosis  Mild mitral regurgitation   Mild to moderate tricuspid regurgitation   Pulmonary artery systolic pressure of 40 -45 mmHg    Prior Echo 09/12/2017:  CONCLUSIONS     1 - Normal left ventricular systolic function (EF 55-60%).     2 - The estimated PA systolic pressure is greater than 31 mmHg.      Prior Echo 04/2016:  CONCLUSIONS     1 - Biatrial enlargement.     2 - Normal left ventricular systolic function (EF 60-65%).     3 - Normal right ventricular systolic function .     4 - The estimated PA systolic pressure is 36 mmHg.     5 - Mild tricuspid regurgitation.        Assessment and Plan:       Active Diagnoses:    Diagnosis Date Noted POA    PRINCIPAL PROBLEM:  Acute respiratory failure with hypoxia and hypercarbia [J96.01, J96.02] 12/21/2017 Yes    Chronic diastolic heart failure [I50.32] 12/25/2017 Yes    Urinary tract infection without hematuria [N39.0] 12/21/2017 Yes    Chronic kidney disease, stage IV (severe) [N18.4] 04/17/2017 Yes    Long term current use of anticoagulant therapy [Z79.01] 04/17/2017 Not Applicable    Morbid obesity with BMI of 40.0-44.9, adult [E66.01, Z68.41] 03/14/2017 Not Applicable    Essential hypertension [I10] 04/12/2016 Yes    Atrial fibrillation with rapid ventricular response [I48.91] 10/01/2015 Yes    Lymphedema of both lower extremities [I89.0] 08/11/2014 Yes    Personal history of DVT (deep vein thrombosis) [Z86.718] 08/28/2013 Not Applicable    S/P insertion of IVC (inferior vena caval) filter 3/22/10 [Z95.828] 08/28/2013 Not Applicable      Problems Resolved During this Admission:     Diagnosis Date Noted Date Resolved POA    Acute on chronic diastolic heart failure [I50.33] 12/21/2017 12/25/2017 Yes    Supratherapeutic INR [R79.1] 12/21/2017 12/25/2017 Yes       VTE Risk Mitigation         Ordered     enoxaparin injection 130 mg  Every 12 hours (non-standard times)     Route:  Subcutaneous        12/25/17 1446     Reason for No Pharmacological VTE Prophylaxis  Once      12/24/17 1943     warfarin (COUMADIN) tablet 5 mg  Daily     Route:  Oral        12/23/17 1021     Medium Risk of VTE  Once      12/21/17 1651        Atrial fibrillation with persistent RVR, HFpEF (EF 60% in 11/2017)   - Continue CCB. Increase long-acting diltiazem from 120mg to 240mg daily. Defer implementation of BB at this time given patient's current respiratory status with wheezing.   - Diuretic: Change Lasix 40mg po daily to 40mg IV BID  - Daily weight, I&O   - Repeat CXR  - Continue Warfarin  - Supplemental O2  - Monitor renal function and lytes. Keep K > 4.0 and Mg > 2.0   - Monitor on telemetry  - Patient has been followed by cardiology, Dr. Rivas in [place].     This patient has been discussed with and evaluated by my collaborating physician, Dr. Holbrook.  Please see Dr. Holbrook's MD attestation above for additional information/recommendations.       Yanet Ta NP  Cardiology  [facility]

## 2017-12-28 NOTE — PLAN OF CARE
1006  Left a voicemail for pt's son Jeremy [phone] to f/u on hospice.    1010  Call received from pt's son stating that the family would now like to meet with [facility] but will not be able to until 1530 today.  Informed son that this  will contact Dr Wilson to discuss.    1012  Updated Dr Wilson, who stated that patient is now tachycardic so she will not d/c today anyway.    1013  Called pt's son back and informed that patient will not d/c today, therefore a referral will be sent to [facility].    1019  Sent referral to [facility] via [employer].    1037  Call received from Rubin with [facility] stating she received the referral and will contact pt's on.       12/28/17 1007   Discharge Reassessment   Assessment Type Discharge Planning Reassessment   Discharge Plan A Hospice/home

## 2017-12-28 NOTE — PLAN OF CARE
Problem: Patient Care Overview  Goal: Plan of Care Review  Patient aerosol Q4prn requested given via [facility]sk fairly tolerated sats 93% on 3lpm

## 2017-12-28 NOTE — NURSING
HR sustaining 130s and jumping up to 160's. Dr. Holbrook and ROXANNE Holt notified. Awaiting new orders.

## 2017-12-28 NOTE — PLAN OF CARE
Problem: Patient Care Overview  Goal: Plan of Care Review  Patient took BIPAP off/place patient back on NC 3lpm

## 2017-12-29 LAB
ALBUMIN SERPL BCP-MCNC: 2.5 G/DL
ANION GAP SERPL CALC-SCNC: 9 MMOL/L
BACTERIA UR CULT: NORMAL
BASOPHILS # BLD AUTO: 0 K/UL
BASOPHILS NFR BLD: 0.2 %
BNP SERPL-MCNC: 725 PG/ML
BUN SERPL-MCNC: 22 MG/DL
CALCIUM SERPL-MCNC: 9.1 MG/DL
CHLORIDE SERPL-SCNC: 94 MMOL/L
CO2 SERPL-SCNC: 39 MMOL/L
CREAT SERPL-MCNC: 1.2 MG/DL
DIFFERENTIAL METHOD: ABNORMAL
EOSINOPHIL # BLD AUTO: 0.2 K/UL
EOSINOPHIL NFR BLD: 2.1 %
ERYTHROCYTE [DISTWIDTH] IN BLOOD BY AUTOMATED COUNT: 18.3 %
EST. GFR  (AFRICAN AMERICAN): 48 ML/MIN/1.73 M^2
EST. GFR  (NON AFRICAN AMERICAN): 41 ML/MIN/1.73 M^2
GLUCOSE SERPL-MCNC: 108 MG/DL
HCT VFR BLD AUTO: 35.6 %
HGB BLD-MCNC: 11.2 G/DL
INR PPP: 2
LYMPHOCYTES # BLD AUTO: 1.2 K/UL
LYMPHOCYTES NFR BLD: 14.1 %
MAGNESIUM SERPL-MCNC: 1.8 MG/DL
MCH RBC QN AUTO: 30.4 PG
MCHC RBC AUTO-ENTMCNC: 31.5 G/DL
MCV RBC AUTO: 96 FL
MONOCYTES # BLD AUTO: 1 K/UL
MONOCYTES NFR BLD: 11.5 %
NEUTROPHILS # BLD AUTO: 6.3 K/UL
NEUTROPHILS NFR BLD: 72.1 %
PHOSPHATE SERPL-MCNC: 2.3 MG/DL
PHOSPHATE SERPL-MCNC: 2.3 MG/DL
PLATELET # BLD AUTO: 159 K/UL
PMV BLD AUTO: 10 FL
POTASSIUM SERPL-SCNC: 3.9 MMOL/L
PROTHROMBIN TIME: 19.7 SEC
RBC # BLD AUTO: 3.69 M/UL
SODIUM SERPL-SCNC: 142 MMOL/L
WBC # BLD AUTO: 8.7 K/UL

## 2017-12-29 PROCEDURE — C9113 INJ PANTOPRAZOLE SODIUM, VIA: HCPCS | Performed by: HOSPITALIST

## 2017-12-29 PROCEDURE — 94640 AIRWAY INHALATION TREATMENT: CPT

## 2017-12-29 PROCEDURE — 25000242 PHARM REV CODE 250 ALT 637 W/ HCPCS: Performed by: HOSPITALIST

## 2017-12-29 PROCEDURE — 63600175 PHARM REV CODE 636 W HCPCS: Performed by: HOSPITALIST

## 2017-12-29 PROCEDURE — 84100 ASSAY OF PHOSPHORUS: CPT

## 2017-12-29 PROCEDURE — 83735 ASSAY OF MAGNESIUM: CPT

## 2017-12-29 PROCEDURE — 80069 RENAL FUNCTION PANEL: CPT

## 2017-12-29 PROCEDURE — 11000001 HC ACUTE MED/SURG PRIVATE ROOM

## 2017-12-29 PROCEDURE — 85025 COMPLETE CBC W/AUTO DIFF WBC: CPT

## 2017-12-29 PROCEDURE — 83880 ASSAY OF NATRIURETIC PEPTIDE: CPT

## 2017-12-29 PROCEDURE — 94761 N-INVAS EAR/PLS OXIMETRY MLT: CPT

## 2017-12-29 PROCEDURE — 25000003 PHARM REV CODE 250: Performed by: NURSE PRACTITIONER

## 2017-12-29 PROCEDURE — 94660 CPAP INITIATION&MGMT: CPT

## 2017-12-29 PROCEDURE — 99900035 HC TECH TIME PER 15 MIN (STAT)

## 2017-12-29 PROCEDURE — 36415 COLL VENOUS BLD VENIPUNCTURE: CPT

## 2017-12-29 PROCEDURE — 25000003 PHARM REV CODE 250: Performed by: HOSPITALIST

## 2017-12-29 PROCEDURE — 63600175 PHARM REV CODE 636 W HCPCS: Performed by: NURSE PRACTITIONER

## 2017-12-29 PROCEDURE — 27000221 HC OXYGEN, UP TO 24 HOURS

## 2017-12-29 PROCEDURE — 85610 PROTHROMBIN TIME: CPT

## 2017-12-29 RX ORDER — DILTIAZEM HYDROCHLORIDE 5 MG/ML
10 INJECTION INTRAVENOUS ONCE
Status: COMPLETED | OUTPATIENT
Start: 2017-12-29 | End: 2017-12-29

## 2017-12-29 RX ORDER — PHYTONADIONE 5 MG/1
10 TABLET ORAL ONCE
Status: COMPLETED | OUTPATIENT
Start: 2017-12-29 | End: 2017-12-29

## 2017-12-29 RX ORDER — CARVEDILOL 6.25 MG/1
6.25 TABLET ORAL 2 TIMES DAILY
Status: DISCONTINUED | OUTPATIENT
Start: 2017-12-29 | End: 2017-12-30 | Stop reason: HOSPADM

## 2017-12-29 RX ADMIN — DILTIAZEM HYDROCHLORIDE 10 MG: 5 INJECTION INTRAVENOUS at 08:12

## 2017-12-29 RX ADMIN — IPRATROPIUM BROMIDE AND ALBUTEROL SULFATE 3 ML: .5; 3 SOLUTION RESPIRATORY (INHALATION) at 04:12

## 2017-12-29 RX ADMIN — PHYTONADIONE 10 MG: 5 TABLET ORAL at 04:12

## 2017-12-29 RX ADMIN — DILTIAZEM HYDROCHLORIDE 240 MG: 120 CAPSULE, COATED, EXTENDED RELEASE ORAL at 08:12

## 2017-12-29 RX ADMIN — CARVEDILOL 6.25 MG: 6.25 TABLET, FILM COATED ORAL at 08:12

## 2017-12-29 RX ADMIN — FUROSEMIDE 40 MG: 10 INJECTION, SOLUTION INTRAMUSCULAR; INTRAVENOUS at 08:12

## 2017-12-29 RX ADMIN — IPRATROPIUM BROMIDE AND ALBUTEROL SULFATE 3 ML: .5; 3 SOLUTION RESPIRATORY (INHALATION) at 12:12

## 2017-12-29 RX ADMIN — DOCUSATE SODIUM 100 MG: 100 CAPSULE, LIQUID FILLED ORAL at 08:12

## 2017-12-29 RX ADMIN — MEROPENEM AND SODIUM CHLORIDE 500 MG: 500 INJECTION, SOLUTION INTRAVENOUS at 05:12

## 2017-12-29 RX ADMIN — PHYTONADIONE 10 MG: 5 TABLET ORAL at 11:12

## 2017-12-29 RX ADMIN — PANTOPRAZOLE SODIUM 40 MG: 40 INJECTION, POWDER, FOR SOLUTION INTRAVENOUS at 08:12

## 2017-12-29 RX ADMIN — IPRATROPIUM BROMIDE AND ALBUTEROL SULFATE 3 ML: .5; 3 SOLUTION RESPIRATORY (INHALATION) at 07:12

## 2017-12-29 RX ADMIN — METOPROLOL TARTRATE 5 MG: 5 INJECTION INTRAVENOUS at 01:12

## 2017-12-29 RX ADMIN — FUROSEMIDE 40 MG: 10 INJECTION, SOLUTION INTRAMUSCULAR; INTRAVENOUS at 06:12

## 2017-12-29 RX ADMIN — MEROPENEM AND SODIUM CHLORIDE 500 MG: 500 INJECTION, SOLUTION INTRAVENOUS at 08:12

## 2017-12-29 RX ADMIN — ASPIRIN 81 MG CHEWABLE TABLET 81 MG: 81 TABLET CHEWABLE at 08:12

## 2017-12-29 NOTE — ASSESSMENT & PLAN NOTE
Creatine stable for now. Monitor UOP and serial BMP and adjust therapy as needed. Renally dose meds.  Estimated Creatinine Clearance: 47.4 mL/min (based on SCr of 1.2 mg/dL).    Renal function has improved slightly with hydration but now with pulm edema so diuresing;   Pt now dnr-for hospice at home am

## 2017-12-29 NOTE — ASSESSMENT & PLAN NOTE
Chronic.  Was getting outpatient therapy at one time prior to going into the NH.  Skin is erythematous but not warm.  Doesn't appear to be cellulitic.  Will monitor for skin breakage. And continue preventive care

## 2017-12-29 NOTE — ASSESSMENT & PLAN NOTE
Body mass index is 43.71 kg/m². Morbid obesity complicates all aspects of disease management from diagnostic modalities to treatment.   Dc home with hospice am

## 2017-12-29 NOTE — ASSESSMENT & PLAN NOTE
CHF currently controlled. Latest ECHO shows ejection fraction of   EF   Date Value Ref Range Status   09/12/2017 55 55 - 65    Monitor clinical status closely. Monitor on telemetry.   Continue to stress to patient importance of self efficacy and  on diet for CHF.  CHF exacerbation has WORSENED   Increased  l Lasix to bid as discussed with cardiology

## 2017-12-29 NOTE — ASSESSMENT & PLAN NOTE
INR is 2.0 and she is actively bleeding so gave vit K po   Discontinued  lovenox   Discussed with family -pt is going to dc with hospice so coumadin dc-son  understands the reasoning and need to discontinue anti-coagulation

## 2017-12-29 NOTE — SUBJECTIVE & OBJECTIVE
Interval History: patient states she feels good today   Has visible sob  Nursing reports bright red bleeding from site on abd at lovenox injection site.   Surgicel placed during night and dressing but continues to ooze. Bright red blood.      Review of Systems   Unable to perform ROS: Acuity of condition     Objective:     Vital Signs (Most Recent):  Temp: 96.3 °F (35.7 °C) (12/29/17 1118)  Pulse: (!) 151 (12/29/17 1124)  Resp: (!) 22 (12/29/17 1118)  BP: (!) 110/58 (12/29/17 1128)  SpO2: 95 % (12/29/17 1124) Vital Signs (24h Range):  Temp:  [96.3 °F (35.7 °C)-98 °F (36.7 °C)] 96.3 °F (35.7 °C)  Pulse:  [] 151  Resp:  [18-28] 22  SpO2:  [91 %-97 %] 95 %  BP: (108-139)/(58-99) 110/58     Weight: 126.6 kg (279 lb 1.6 oz)  Body mass index is 43.71 kg/m².    Intake/Output Summary (Last 24 hours) at 12/29/17 1337  Last data filed at 12/29/17 0600   Gross per 24 hour   Intake             1420 ml   Output             4050 ml   Net            -2630 ml      Physical Exam   Constitutional: She is oriented to person, place, and time. She is active. No distress. Nasal cannula in place.   Cushingoid appearance. Morbidly obese    HENT:   Mouth/Throat: Oropharynx is clear and moist.   Eyes: Right eye exhibits no discharge. Left eye exhibits no discharge. Right conjunctiva is injected.   Neck: Neck supple.   Fat tissue in neck impedes my view of JVD   Cardiovascular: An irregularly irregular rhythm present. Tachycardia present.  Exam reveals distant heart sounds.    Pulmonary/Chest: No accessory muscle usage or stridor. No tachypnea. She has decreased breath sounds. She has wheezes in the right middle field and the left middle field. She has rales.   Abdominal: Soft. Normal appearance and bowel sounds are normal. She exhibits no distension and no mass.   Genitourinary:   Genitourinary Comments: Le catheter in place    Musculoskeletal: She exhibits edema. She exhibits no deformity.        Right lower leg: She exhibits  edema.        Left lower leg: She exhibits edema.   Neurological: She is alert and oriented to person, place, and time. No cranial nerve deficit.   Skin: Skin is warm. Capillary refill takes less than 2 seconds. There is erythema (lower half of both lower legs).   Dry, thickened skin in lower legs     Psychiatric: She has a normal mood and affect. Her speech is normal and behavior is normal.   Nursing note reviewed.      Significant Labs: All pertinent labs within the past 24 hours have been reviewed.  INR 2.0  Significant Imaging: I have reviewed and interpreted all pertinent imaging results/findings within the past 24 hours.

## 2017-12-29 NOTE — ASSESSMENT & PLAN NOTE
1st urine specimen grew ESBL Ecoli.  2nd specimen collected 12/26-e coli  Final cx pending.-e coli s/pend    Continue:  Antibiotics     Start     Stop Route Frequency Ordered    12/25/17 5682  meropenem-0.9% sodium chloride 500 mg/50 mL IVPB      -- IV Every 8 hours (non-standard times) 12/25/17 0867

## 2017-12-29 NOTE — PROGRESS NOTES
[facility] Hospital Medicine  Progress Note    Patient Name: Nicole Lala  MRN: [id number]  Patient Class: IP- Inpatient   Admission Date: 12/21/2017  Length of Stay: 7 days  Attending Physician: Esther Wilson MD  Primary Care Provider: Brandon Gray MD        Subjective:     Principal Problem:Acute respiratory failure with hypoxia and hypercarbia    HPI:  Patient was brought to our ER via ambulance from [facility].  When she arrived, she told the ER staff that she was there for her kidneys.  She was somnolent and was falling asleep when not stimulated.  ABG showed hypercarbia.  She was placed on NIPPV.  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  Recent hospital stays at [facility] were for cellulitis of her legs and COPD decompensation.  She was also recently hospitalized at [facility].  No records of that stay are available at this time.    Hospital Course:  No notes on file    Interval History: awake, alert, appears less sob  Developed afib/rvr this am     Review of Systems   Unable to perform ROS: Acuity of condition   Respiratory: Negative for cough and shortness of breath.    Cardiovascular: Positive for leg swelling. Negative for chest pain.   Gastrointestinal: Negative for abdominal pain and nausea.   Genitourinary: Negative for difficulty urinating and dysuria.     Objective:     Vital Signs (Most Recent):  Temp: 97.7 °F (36.5 °C) (12/28/17 1634)  Pulse: (!) 133 (12/28/17 1634)  Resp: 18 (12/28/17 1634)  BP: 137/61 (12/28/17 1634)  SpO2: 95 % (12/28/17 1634) Vital Signs (24h Range):  Temp:  [97.2 °F (36.2 °C)-98.4 °F (36.9 °C)] 97.7 °F (36.5 °C)  Pulse:  [] 133  Resp:  [18-28] 18  SpO2:  [93 %-99 %] 95 %  BP: (104-155)/(61-89) 137/61     Weight: 131.4 kg (289 lb 9.6 oz)  Body mass index is 45.36 kg/m².    Intake/Output Summary  (Last 24 hours) at 12/28/17 1855  Last data filed at 12/28/17 1728   Gross per 24 hour   Intake             1350 ml   Output             3100 ml   Net            -1750 ml      Physical Exam   Constitutional: She is oriented to person, place, and time. She is active. No distress. Nasal cannula in place.   Cushingoid appearance. Morbidly obese    HENT:   Mouth/Throat: Oropharynx is clear and moist.   Eyes: Right eye exhibits no discharge. Left eye exhibits no discharge. Right conjunctiva is injected.   Neck: Neck supple.   Fat tissue in neck impedes my view of JVD   Cardiovascular: An irregularly irregular rhythm present. Tachycardia present.  Exam reveals distant heart sounds.    Pulmonary/Chest: No accessory muscle usage or stridor. No tachypnea. She has decreased breath sounds. She has rales.   Abdominal: Soft. Normal appearance and bowel sounds are normal. She exhibits no distension and no mass.   Musculoskeletal: She exhibits edema. She exhibits no deformity.        Right lower leg: She exhibits edema.        Left lower leg: She exhibits edema.   Neurological: She is alert and oriented to person, place, and time. No cranial nerve deficit.   Skin: Skin is warm. Capillary refill takes less than 2 seconds. There is erythema (lower half of both lower legs).   Dry, thickened skin in lower legs     Psychiatric: She has a normal mood and affect. Her speech is normal and behavior is normal.   Nursing note reviewed.      Significant Labs:   BMP:   Recent Labs  Lab 12/27/17  0500   GLU 93      K 3.9   CL 97   CO2 37*   BUN 28*   CREATININE 1.3   CALCIUM 8.9     CBC: No results for input(s): WBC, HGB, HCT, PLT in the last 48 hours.    Significant Imaging: CXR: I have reviewed all pertinent results/findings within the past 24 hours and my personal findings are:  cxr   1.  Cardiomegaly and moderate bilateral pulmonary edema/CHF pattern along with small pleural effusions. Compared to the chest is similar to slightly  worsened relative to 12/25/17.      Assessment/Plan:      * Acute respiratory failure with hypoxia and hypercarbia    Improved.  Due to COPD, obesity hypoventilation, and CHF.  Monitor o2 sats and work of breathing.        Acute on chronic diastolic heart failure    CHF currently controlled. Latest ECHO shows ejection fraction of   EF   Date Value Ref Range Status   09/12/2017 55 55 - 65    Monitor clinical status closely. Monitor on telemetry.   Continue to stress to patient importance of self efficacy and  on diet for CHF.  CHF exacerbation has WORSENED   Increased  l Lasix to bid as discussed with cardiology             Urinary tract infection without hematuria    1st urine specimen grew ESBL Ecoli.  2nd specimen collected 12/26-e coli  Final cx pending.-e coli s/pend    Continue:  Antibiotics     Start     Stop Route Frequency Ordered    12/25/17 4915  meropenem-0.9% sodium chloride 500 mg/50 mL IVPB      -- IV Every 8 hours (non-standard times) 12/25/17 4504                    Long term current use of anticoagulant therapy    Keep INR between 2 and 3 for history of DVT and for prevention of embolic stroke.  Restarted coumadin-on lovenox bridge to coumadin           Chronic kidney disease, stage IV (severe)    Creatine stable for now. Monitor UOP and serial BMP and adjust therapy as needed. Renally dose meds.  Estimated Creatinine Clearance: 44.7 mL/min (based on SCr of 1.3 mg/dL).            Morbid obesity with BMI of 40.0-44.9, adult    Body mass index is 45.65 kg/m². Morbid obesity complicates all aspects of disease management from diagnostic modalities to treatment.           Essential hypertension    Chronic, controlled.  Monitor BP closely.- ATENOLOL DC TODAY BY CARDIOLOGY ; EF WNL  Current BP meds:  Hypertension Medications at home            atenolol (TENORMIN) 50 MG tablet Take 50 mg by mouth 2 (two) times daily.    furosemide (LASIX) 40 MG tablet Take 40 mg by mouth once daily.      Hospital  Medications             diltiaZEM 24 hr capsule 120 mg Take 1 capsule (120 mg total) by mouth once daily.    furosemide tablet 40 mg Take 1 tablet (40 mg total) by mouth once daily.    metoprolol injection 2.5 mg Inject 2.5 mLs (2.5 mg total) into the vein every 6 (six) hours as needed (HR >120).                         Atrial fibrillation with rapid ventricular response    Rate  Un- controlled on oral diltiazem. - likley due to worsening of heart failure HFpef  Cardiologist following.--doseage increased 12/28and b blocker on hold; ex            Lymphedema of both lower extremities    Chronic.  Was getting outpatient therapy at one time prior to going into the NH.  Skin is erythematous but not warm.  Doesn't appear to be cellulitic.  Will monitor for skin breakage. And continue preventive care             S/P insertion of IVC (inferior vena caval) filter 3/22/10    Noted.          Personal history of DVT (deep vein thrombosis)      Will keep her INR between 2 and 3.  On full dose lovenox and monitoring inr -on coumadin           VTE Risk Mitigation         Ordered     enoxaparin injection 130 mg  Every 12 hours (non-standard times)     Route:  Subcutaneous        12/25/17 1446     Reason for No Pharmacological VTE Prophylaxis  Once      12/24/17 1943     warfarin (COUMADIN) tablet 5 mg  Daily     Route:  Oral        12/23/17 1021     Medium Risk of VTE  Once      12/21/17 1651      addend-family discussed with hospice yesterday and wishing to talk with another company today per CM -        Esther Wilson MD  Department of Hospital Medicine   [facility]

## 2017-12-29 NOTE — PLAN OF CARE
12/29/17 0027   Patient Assessment/Suction   Level of Consciousness (AVPU) alert   Respiratory Effort Mild;Mouth breathing;Labored   All Lung Fields Breath Sounds diminished   LINCOLN Breath Sounds diminished   LLL Breath Sounds diminished   RUL Breath Sounds diminished   RML Breath Sounds diminished   RLL Breath Sounds diminished   PRE-TX-O2-ETCO2   O2 Device (Oxygen Therapy) BiPAP   Oxygen Concentration (%) 50   SpO2 96 %   Pulse Oximetry Type Intermittent   $ Pulse Oximetry - Multiple Charge Pulse Oximetry - Multiple   Pulse 108   Resp (!) 28   Aerosol Therapy   $ Aerosol Therapy Charges Aerosol Treatment   Respiratory Treatment Status given   SVN/Inhaler Treatment Route with oxygen;in-line   Position During Treatment HOB at 30 degrees   Patient Tolerance good   Post-Treatment   Post-treatment Heart Rate (beats/min) 111   Post-treatment Resp Rate (breaths/min) 26   All Fields Breath Sounds unchanged   Ready to Wean/Extubation Screen   FIO2<=50 (chart decimal) 0.5   Preset CPAP/BiPAP Settings   Mode Of Delivery BiPAP   $ CPAP/BiPAP Daily Charge BiPAP/CPAP Daily   $ Initial CPAP/BiPAP Setup? No   $ Is patient using? Yes   Equipment Type V60   Airway Device Type total face mask   Ipap 12   EPAP (cm H2O) 6   Pressure Support (cm H2O) 6   Set Rate (Breaths/Min) 16   ITime (sec) 1   Rise Time (sec) 0.2   Patient CPAP/BiPAP Settings   Tidal Volume (mL) 383   Peak Inspiratory Pressure (cm H2O) 13   TiTOT (%) 21   Total Leak (L/Min) 0   Patient Trigger - ST Mode Only (%) 73   CPAP/BiPAP Alarms   High Pressure (cm H2O) 17   Low Pressure (cm H2O) 7   Minute Ventilation (L/Min) 9.4   High RR (breaths/min) 40   Low RR (breaths/min) 12

## 2017-12-29 NOTE — ASSESSMENT & PLAN NOTE
Rate  Un- controlled on oral diltiazem. - megan due to worsening of heart failure HFpef  Cardiologist following.--doseage increased 12/28and rvr continues  Coreg added for rate control -continue to monitor on tele until dc am with hospice

## 2017-12-29 NOTE — ASSESSMENT & PLAN NOTE
Chronic, controlled.  Monitor BP closely.- ATENOLOL DC TODAY BY CARDIOLOGY ; EF WNL  Current BP meds:  Hypertension Medications at home            atenolol (TENORMIN) 50 MG tablet Take 50 mg by mouth 2 (two) times daily.    furosemide (LASIX) 40 MG tablet Take 40 mg by mouth once daily.      Hospital Medications             diltiaZEM 24 hr capsule 120 mg Take 1 capsule (120 mg total) by mouth once daily.    furosemide tablet 40 mg Take 1 tablet (40 mg total) by mouth once daily.    metoprolol injection 2.5 mg Inject 2.5 mLs (2.5 mg total) into the vein every 6 (six) hours as needed (HR >120).

## 2017-12-29 NOTE — PROGRESS NOTES
[facility] Hospital Medicine  Progress Note    Patient Name: Nicole Lala  MRN: [id number]  Patient Class: IP- Inpatient   Admission Date: 12/21/2017  Length of Stay: 8 days  Attending Physician: Esther Wilson MD  Primary Care Provider: Brandon Gray MD        Subjective:     Principal Problem:Acute respiratory failure with hypoxia and hypercarbia    HPI:  Patient was brought to our ER via ambulance from [facility].  When she arrived, she told the ER staff that she was there for her kidneys.  She was somnolent and was falling asleep when not stimulated.  ABG showed hypercarbia.  She was placed on NIPPV.  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  Recent hospital stays at [facility] were for cellulitis of her legs and COPD decompensation.  She was also recently hospitalized at [facility].  No records of that stay are available at this time.    Hospital Course:  No notes on file    Interval History: patient states she feels good today   Has visible sob  Nursing reports bright red bleeding from site on abd at lovenox injection site.   Surgicel placed during night and dressing but continues to ooze. Bright red blood.      Review of Systems   Unable to perform ROS: Acuity of condition     Objective:     Vital Signs (Most Recent):  Temp: 96.3 °F (35.7 °C) (12/29/17 1118)  Pulse: (!) 151 (12/29/17 1124)  Resp: (!) 22 (12/29/17 1118)  BP: (!) 110/58 (12/29/17 1128)  SpO2: 95 % (12/29/17 1124) Vital Signs (24h Range):  Temp:  [96.3 °F (35.7 °C)-98 °F (36.7 °C)] 96.3 °F (35.7 °C)  Pulse:  [] 151  Resp:  [18-28] 22  SpO2:  [91 %-97 %] 95 %  BP: (108-139)/(58-99) 110/58     Weight: 126.6 kg (279 lb 1.6 oz)  Body mass index is 43.71 kg/m².    Intake/Output Summary (Last 24 hours) at 12/29/17 1337  Last data filed at 12/29/17 0600   Gross per 24 hour    Intake             1420 ml   Output             4050 ml   Net            -2630 ml      Physical Exam   Constitutional: She is oriented to person, place, and time. She is active. No distress. Nasal cannula in place.   Cushingoid appearance. Morbidly obese    HENT:   Mouth/Throat: Oropharynx is clear and moist.   Eyes: Right eye exhibits no discharge. Left eye exhibits no discharge. Right conjunctiva is injected.   Neck: Neck supple.   Fat tissue in neck impedes my view of JVD   Cardiovascular: An irregularly irregular rhythm present. Tachycardia present.  Exam reveals distant heart sounds.    Pulmonary/Chest: No accessory muscle usage or stridor. No tachypnea. She has decreased breath sounds. She has wheezes in the right middle field and the left middle field. She has rales.   Abdominal: Soft. Normal appearance and bowel sounds are normal. She exhibits no distension and no mass.   Genitourinary:   Genitourinary Comments: Le catheter in place    Musculoskeletal: She exhibits edema. She exhibits no deformity.        Right lower leg: She exhibits edema.        Left lower leg: She exhibits edema.   Neurological: She is alert and oriented to person, place, and time. No cranial nerve deficit.   Skin: Skin is warm. Capillary refill takes less than 2 seconds. There is erythema (lower half of both lower legs).   Dry, thickened skin in lower legs     Psychiatric: She has a normal mood and affect. Her speech is normal and behavior is normal.   Nursing note reviewed.      Significant Labs: All pertinent labs within the past 24 hours have been reviewed.  INR 2.0  Significant Imaging: I have reviewed and interpreted all pertinent imaging results/findings within the past 24 hours.       Assessment/Plan:      * Acute respiratory failure with hypoxia and hypercarbia    Improved.  Due to COPD, obesity hypoventilation, and CHF.-gently diuresing   Monitor o2 sats and work of breathing.        Acute on chronic diastolic heart  failure    CHF currently controlled. Latest ECHO shows ejection fraction of   EF   Date Value Ref Range Status   09/12/2017 55 55 - 65    Monitor clinical status closely. Monitor on telemetry.   Continue to stress to patient importance of self efficacy and  on diet for CHF.  CHF exacerbation has WORSENED   Increased  l Lasix to bid as discussed with cardiology on 12/28            Urinary tract infection without hematuria    1st urine specimen grew ESBL Ecoli.  2nd specimen collected 12/26-e coli  Final cx pending.-e coli s/pend--day 9     Continue:  Antibiotics     Start     Stop Route Frequency Ordered    12/25/17 2345  meropenem-0.9% sodium chloride 500 mg/50 mL IVPB      -- IV Every 8 hours (non-standard times) 12/25/17 2236                    Long term current use of anticoagulant therapy    See dvt  Has ivc filter -on anticoag  Because of  A fib           Chronic kidney disease, stage IV (severe)    Creatine stable for now. Monitor UOP and serial BMP and adjust therapy as needed. Renally dose meds.  Estimated Creatinine Clearance: 47.4 mL/min (based on SCr of 1.2 mg/dL).    Renal function has improved slightly with hydration but now with pulm edema so diuresing;   Pt now dnr-for hospice at home am         Morbid obesity with BMI of 40.0-44.9, adult    Body mass index is 43.71 kg/m². Morbid obesity complicates all aspects of disease management from diagnostic modalities to treatment.   Dc home with hospice am         Essential hypertension    Chronic, controlled.  Monitor BP closely.- ATENOLOL DC TODAY BY CARDIOLOGY ; EF WNL  Current BP meds:  Hypertension Medications at home            atenolol (TENORMIN) 50 MG tablet Take 50 mg by mouth 2 (two) times daily.    furosemide (LASIX) 40 MG tablet Take 40 mg by mouth once daily.      Hospital Medications             diltiaZEM 24 hr capsule 120 mg Take 1 capsule (120 mg total) by mouth once daily.    furosemide tablet 40 mg Take 1 tablet (40 mg total) by mouth  once daily.    metoprolol injection 2.5 mg Inject 2.5 mLs (2.5 mg total) into the vein every 6 (six) hours as needed (HR >120).                         Atrial fibrillation with rapid ventricular response    Rate  Un- controlled on oral diltiazem. - megan due to worsening of heart failure HFpef  Cardiologist following.--doseage increased 12/28and rvr continues  Coreg added for rate control -continue to monitor on tele until dc am with hospice             Lymphedema of both lower extremities    Chronic.  Was getting outpatient therapy at one time prior to going into the NH.  Skin is erythematous but not warm.  Doesn't appear to be cellulitic.  Will monitor for skin breakage. And continue preventive care             S/P insertion of IVC (inferior vena caval) filter 3/22/10    Noted.          Personal history of DVT (deep vein thrombosis)     INR is 2.0 and she is actively bleeding so gave vit K po   Discontinued  lovenox   Discussed with family -pt is going to dc with hospice so coumadin dc-son  understands the reasoning and need to discontinue anti-coagulation           VTE Risk Mitigation         Ordered     Reason for No Pharmacological VTE Prophylaxis  Once      12/24/17 1943     Medium Risk of VTE  Once      12/21/17 1651          dispo-home am with hospice-pt is now DNR  Discussed with son     Esther Wilson MD  Department of Hospital Medicine   [facility]

## 2017-12-29 NOTE — ASSESSMENT & PLAN NOTE
CHF currently controlled. Latest ECHO shows ejection fraction of   EF   Date Value Ref Range Status   09/12/2017 55 55 - 65    Monitor clinical status closely. Monitor on telemetry.   Continue to stress to patient importance of self efficacy and  on diet for CHF.  CHF exacerbation has WORSENED   Increased  l Lasix to bid as discussed with cardiology on 12/28

## 2017-12-29 NOTE — PROGRESS NOTES
Back in room to check pt. Found pressure dressing saturated and blood running down pt abdomen. Puncture site continues to drain. Call placed to TARAH Borrego NP. States apply Surgi-Seal to site. New order noted.

## 2017-12-29 NOTE — PROGRESS NOTES
Found pt with abdominal bleeding from sq puncture site. Pt was scratching abdomen. Pressure dressing applied. Will continue to monitor.

## 2017-12-29 NOTE — PROGRESS NOTES
New drsg with surgi seal applied. Hand held pressure applied for 15 min. Will continue to monitor. Will pass on in report.

## 2017-12-29 NOTE — ASSESSMENT & PLAN NOTE
1st urine specimen grew ESBL Ecoli.  2nd specimen collected 12/26-e coli  Final cx pending.-e coli s/pend--day 9     Continue:  Antibiotics     Start     Stop Route Frequency Ordered    12/25/17 4215  meropenem-0.9% sodium chloride 500 mg/50 mL IVPB      -- IV Every 8 hours (non-standard times) 12/25/17 1777

## 2017-12-29 NOTE — ASSESSMENT & PLAN NOTE
Improved.  Due to COPD, obesity hypoventilation, and CHF.-gently diuresing   Monitor o2 sats and work of breathing.

## 2017-12-29 NOTE — ASSESSMENT & PLAN NOTE
Rate  Un- controlled on oral diltiazem. - megan due to worsening of heart failure HFpef  Cardiologist following.--doseage increased 12/28and b blocker on hold; ex

## 2017-12-29 NOTE — ASSESSMENT & PLAN NOTE
Creatine stable for now. Monitor UOP and serial BMP and adjust therapy as needed. Renally dose meds.  Estimated Creatinine Clearance: 44.7 mL/min (based on SCr of 1.3 mg/dL).

## 2017-12-29 NOTE — PROGRESS NOTES
Surgi Seal applied to puncture site to rt side of abdomen with pressure dressing. Will continue to monitor,

## 2017-12-29 NOTE — SUBJECTIVE & OBJECTIVE
Interval History: awake, alert, appears less sob  Developed afib/rvr this am     Review of Systems   Unable to perform ROS: Acuity of condition   Respiratory: Negative for cough and shortness of breath.    Cardiovascular: Positive for leg swelling. Negative for chest pain.   Gastrointestinal: Negative for abdominal pain and nausea.   Genitourinary: Negative for difficulty urinating and dysuria.     Objective:     Vital Signs (Most Recent):  Temp: 97.7 °F (36.5 °C) (12/28/17 1634)  Pulse: (!) 133 (12/28/17 1634)  Resp: 18 (12/28/17 1634)  BP: 137/61 (12/28/17 1634)  SpO2: 95 % (12/28/17 1634) Vital Signs (24h Range):  Temp:  [97.2 °F (36.2 °C)-98.4 °F (36.9 °C)] 97.7 °F (36.5 °C)  Pulse:  [] 133  Resp:  [18-28] 18  SpO2:  [93 %-99 %] 95 %  BP: (104-155)/(61-89) 137/61     Weight: 131.4 kg (289 lb 9.6 oz)  Body mass index is 45.36 kg/m².    Intake/Output Summary (Last 24 hours) at 12/28/17 1855  Last data filed at 12/28/17 1728   Gross per 24 hour   Intake             1350 ml   Output             3100 ml   Net            -1750 ml      Physical Exam   Constitutional: She is oriented to person, place, and time. She is active. No distress. Nasal cannula in place.   Cushingoid appearance. Morbidly obese    HENT:   Mouth/Throat: Oropharynx is clear and moist.   Eyes: Right eye exhibits no discharge. Left eye exhibits no discharge. Right conjunctiva is injected.   Neck: Neck supple.   Fat tissue in neck impedes my view of JVD   Cardiovascular: An irregularly irregular rhythm present. Tachycardia present.  Exam reveals distant heart sounds.    Pulmonary/Chest: No accessory muscle usage or stridor. No tachypnea. She has decreased breath sounds. She has rales.   Abdominal: Soft. Normal appearance and bowel sounds are normal. She exhibits no distension and no mass.   Musculoskeletal: She exhibits edema. She exhibits no deformity.        Right lower leg: She exhibits edema.        Left lower leg: She exhibits edema.    Neurological: She is alert and oriented to person, place, and time. No cranial nerve deficit.   Skin: Skin is warm. Capillary refill takes less than 2 seconds. There is erythema (lower half of both lower legs).   Dry, thickened skin in lower legs     Psychiatric: She has a normal mood and affect. Her speech is normal and behavior is normal.   Nursing note reviewed.      Significant Labs:   BMP:   Recent Labs  Lab 12/27/17  0500   GLU 93      K 3.9   CL 97   CO2 37*   BUN 28*   CREATININE 1.3   CALCIUM 8.9     CBC: No results for input(s): WBC, HGB, HCT, PLT in the last 48 hours.    Significant Imaging: CXR: I have reviewed all pertinent results/findings within the past 24 hours and my personal findings are:  cxr   1.  Cardiomegaly and moderate bilateral pulmonary edema/CHF pattern along with small pleural effusions. Compared to the chest is similar to slightly worsened relative to 12/25/17.

## 2017-12-29 NOTE — PLAN OF CARE
12/29/17 1005   Discharge Assessment   Assessment Type Discharge Planning Reassessment   CM placed call to Jeremy jiménez @ [phone] to see which hospice provider they would like. They want [facility]. CM called [phone] and spoke to Clifford in intake. He is asking if patient will go home on bipap and if so we will need orders for bipap sent via [employer] so that he can send to respiratory company for delivery. CM will speak to Dr. Wilson and followup.     CM informed Natty with [facility] that family chose a different provider. Message sent to other referalls via [employer] also.    1056am CM sent dc orders, bipap orders and MAR to [facility] via [facility]. CM called and informed intake that orders were sent. CM spoke to Jeremy jiménez and informed of discharge order as well.     1200 Dr. Wilson informed CM that she spoke to tino Luna and she plans to discharge patient tomorrow once all hospice DME set up and paperwork completed.     400pm CM spoke with tino Luna and he verified that Bipap, has been delivered to hospital, nebulizer and hospital bed to the patient home. CM informed son that when discharge orders are written tomorrow, CM will call [facility], send final orders and that nurse on call from hospice will get in touch with him regarding admission visit. CM will set up ambulance transportation since patient is bedbound. Son verbalized understanding of all information given and was has CM number to call for any questions.

## 2017-12-30 VITALS
TEMPERATURE: 97 F | WEIGHT: 279.13 LBS | HEIGHT: 67 IN | RESPIRATION RATE: 21 BRPM | HEART RATE: 102 BPM | SYSTOLIC BLOOD PRESSURE: 126 MMHG | DIASTOLIC BLOOD PRESSURE: 62 MMHG | BODY MASS INDEX: 43.81 KG/M2 | OXYGEN SATURATION: 97 %

## 2017-12-30 LAB
ALBUMIN SERPL BCP-MCNC: 2.2 G/DL
ANION GAP SERPL CALC-SCNC: 7 MMOL/L
BUN SERPL-MCNC: 30 MG/DL
CALCIUM SERPL-MCNC: 8.8 MG/DL
CHLORIDE SERPL-SCNC: 94 MMOL/L
CO2 SERPL-SCNC: 38 MMOL/L
CREAT SERPL-MCNC: 1.5 MG/DL
EST. GFR  (AFRICAN AMERICAN): 36 ML/MIN/1.73 M^2
EST. GFR  (NON AFRICAN AMERICAN): 32 ML/MIN/1.73 M^2
GLUCOSE SERPL-MCNC: 123 MG/DL
INR PPP: 1.4
MAGNESIUM SERPL-MCNC: 1.9 MG/DL
PHOSPHATE SERPL-MCNC: 2.8 MG/DL
PHOSPHATE SERPL-MCNC: 2.9 MG/DL
POTASSIUM SERPL-SCNC: 4.3 MMOL/L
PROTHROMBIN TIME: 13.8 SEC
SODIUM SERPL-SCNC: 139 MMOL/L

## 2017-12-30 PROCEDURE — 94761 N-INVAS EAR/PLS OXIMETRY MLT: CPT

## 2017-12-30 PROCEDURE — 25000003 PHARM REV CODE 250: Performed by: HOSPITALIST

## 2017-12-30 PROCEDURE — 85610 PROTHROMBIN TIME: CPT

## 2017-12-30 PROCEDURE — 25000242 PHARM REV CODE 250 ALT 637 W/ HCPCS: Performed by: HOSPITALIST

## 2017-12-30 PROCEDURE — 27000221 HC OXYGEN, UP TO 24 HOURS

## 2017-12-30 PROCEDURE — 63600175 PHARM REV CODE 636 W HCPCS: Performed by: HOSPITALIST

## 2017-12-30 PROCEDURE — 63600175 PHARM REV CODE 636 W HCPCS: Performed by: NURSE PRACTITIONER

## 2017-12-30 PROCEDURE — 84100 ASSAY OF PHOSPHORUS: CPT

## 2017-12-30 PROCEDURE — 83735 ASSAY OF MAGNESIUM: CPT

## 2017-12-30 PROCEDURE — 80069 RENAL FUNCTION PANEL: CPT

## 2017-12-30 PROCEDURE — 94640 AIRWAY INHALATION TREATMENT: CPT

## 2017-12-30 PROCEDURE — 25000003 PHARM REV CODE 250: Performed by: NURSE PRACTITIONER

## 2017-12-30 RX ORDER — CARVEDILOL 6.25 MG/1
6.25 TABLET ORAL 2 TIMES DAILY
Qty: 60 TABLET | Refills: 11 | Status: SHIPPED | OUTPATIENT
Start: 2017-12-30 | End: 2018-12-30

## 2017-12-30 RX ORDER — PHYTONADIONE 5 MG/1
10 TABLET ORAL ONCE
Status: COMPLETED | OUTPATIENT
Start: 2017-12-30 | End: 2017-12-30

## 2017-12-30 RX ORDER — MAGNESIUM HYDROXIDE 2400 MG/10ML
10 SUSPENSION ORAL 2 TIMES DAILY PRN
COMMUNITY
Start: 2017-12-30

## 2017-12-30 RX ORDER — FUROSEMIDE 40 MG/1
40 TABLET ORAL DAILY
Qty: 60 TABLET | Refills: 0 | Status: SHIPPED | OUTPATIENT
Start: 2017-12-30

## 2017-12-30 RX ORDER — DOCUSATE SODIUM 100 MG/1
100 CAPSULE, LIQUID FILLED ORAL 2 TIMES DAILY PRN
Refills: 0 | COMMUNITY
Start: 2017-12-30

## 2017-12-30 RX ORDER — DILTIAZEM HYDROCHLORIDE 240 MG/1
240 CAPSULE, COATED, EXTENDED RELEASE ORAL DAILY
Qty: 30 CAPSULE | Refills: 11 | Status: SHIPPED | OUTPATIENT
Start: 2017-12-31 | End: 2018-12-31

## 2017-12-30 RX ADMIN — FUROSEMIDE 40 MG: 10 INJECTION, SOLUTION INTRAMUSCULAR; INTRAVENOUS at 10:12

## 2017-12-30 RX ADMIN — MEROPENEM AND SODIUM CHLORIDE 500 MG: 500 INJECTION, SOLUTION INTRAVENOUS at 12:12

## 2017-12-30 RX ADMIN — MEROPENEM AND SODIUM CHLORIDE 500 MG: 500 INJECTION, SOLUTION INTRAVENOUS at 09:12

## 2017-12-30 RX ADMIN — PHYTONADIONE 10 MG: 5 TABLET ORAL at 10:12

## 2017-12-30 RX ADMIN — CARVEDILOL 6.25 MG: 6.25 TABLET, FILM COATED ORAL at 09:12

## 2017-12-30 RX ADMIN — DOCUSATE SODIUM 100 MG: 100 CAPSULE, LIQUID FILLED ORAL at 09:12

## 2017-12-30 RX ADMIN — DILTIAZEM HYDROCHLORIDE 240 MG: 120 CAPSULE, COATED, EXTENDED RELEASE ORAL at 09:12

## 2017-12-30 RX ADMIN — IPRATROPIUM BROMIDE AND ALBUTEROL SULFATE 3 ML: .5; 3 SOLUTION RESPIRATORY (INHALATION) at 07:12

## 2017-12-30 RX ADMIN — IPRATROPIUM BROMIDE AND ALBUTEROL SULFATE 3 ML: .5; 3 SOLUTION RESPIRATORY (INHALATION) at 12:12

## 2017-12-30 NOTE — PLAN OF CARE
12/30/17 1113   Final Note   Assessment Type Final Discharge Note   Discharge Disposition HospiceHome  ([facility])

## 2017-12-30 NOTE — PLAN OF CARE
12/29/17 1918   Patient Assessment/Suction   Level of Consciousness (AVPU) alert   Respiratory Effort Mild;Labored   All Lung Fields Breath Sounds wheezes, expiratory;coarse   LINCOLN Breath Sounds wheezes, expiratory;coarse   LLL Breath Sounds wheezes, expiratory;coarse   RUL Breath Sounds wheezes, expiratory;coarse   PRE-TX-O2-ETCO2   O2 Device (Oxygen Therapy) nasal cannula   $ Is the patient on Low Flow Oxygen? Yes   Flow (L/min) 3   SpO2 97 %   Pulse Oximetry Type Intermittent   $ Pulse Oximetry - Multiple Charge Pulse Oximetry - Multiple   Pulse 81   Resp (!) 21   Aerosol Therapy   $ Aerosol Therapy Charges Aerosol Treatment   Respiratory Treatment Status given   SVN/Inhaler Treatment Route with oxygen;mask   Position During Treatment HOB at 45 degrees   Patient Tolerance good   Post-Treatment   Post-treatment Heart Rate (beats/min) 89   Post-treatment Resp Rate (breaths/min) 20   All Fields Breath Sounds aeration increased   Preset CPAP/BiPAP Settings   Mode Of Delivery Standby

## 2017-12-30 NOTE — PROGRESS NOTES
ALAN spoke w/ pt's son, Jeremy re: pt's d/c w/ hospice.   ALAN informed him that pt d/c today and will transport via ambulance., ALAN will contact [facility] for update.      ALAN contacted [facility]/ [facility] [phone]--requests that d/c summary be sent w/ pt (not faxed).  Also wants to confirm that pt's BiPap was delivered to pt's room.      10:12  ALAN contacted [facility]/ [facility]--pt's BiPap was delivered yesterday, son brought it to pt's home.    ALAN notified pt's nurse, Giancarlo, to send copy of AVS(d/c summary) w/ pt.

## 2017-12-30 NOTE — NURSING
Right abd drsg saturated with blood.  Dr Wilson notified order for vitamin K 10mg PO. Surgicel applied with abd pad x 2.

## 2017-12-30 NOTE — PLAN OF CARE
Problem: Patient Care Overview  Goal: Plan of Care Review  Outcome: Ongoing (interventions implemented as appropriate)  Nc in use with resp txs PRN and Bipap at HS

## 2017-12-30 NOTE — NURSING
drsg to right abd saturated with blood.  Dr Wilson notified Surgicel ordered placed to abd with abd pad x 2.

## 2017-12-30 NOTE — PLAN OF CARE
Problem: Patient Care Overview  Goal: Plan of Care Review  Outcome: Ongoing (interventions implemented as appropriate)  Patient AAOx4.  VSS.  Has remained afebrile this shift.  POC reviewed with patient.  Open discussion was facilitated.  Patient verbalized understanding.  Bed in lowest position, side rails up x2, call light within reach, and safety measures maintained throughout shift.  Patient denies any needs at this time.  Patient has remained free of falls, trauma, and injury this shift.  Will continue to monitor.

## 2017-12-31 NOTE — HOSPITAL COURSE
Ms Lala was stabilzed from respiratory standpoint with NIV but continued to have pulm edema, ckd4 and heart failure with minimal improvement. Family agreed to home with hospice which was arranged by CM and she dc home with bipap and oxygen. Coumadin for a fib was dc as she had intractable bleeding from lovenox injection site. She required pressure dressing and vitamin K. Cardiology was consulted and a fib/rvr and pulm edema addressed with medication adjustment with cardizem and coreg. She had coughing and wheezing intermittently but continued to report that she felt pretty good.   I spoke with the son the day prior to dc as family was getting her ready for dc to home with hospice.

## 2017-12-31 NOTE — DISCHARGE SUMMARY
[facility] Hospital Medicine  Discharge Summary      Patient Name: Nicole Lala  MRN: [id number]  Admission Date: 12/21/2017  Hospital Length of Stay: 9 days  Discharge Date and Time: 12/30/2017  1:19 PM  Attending Physician: No att. providers found   Discharging Provider: Esther Wilson MD  Primary Care Provider: rBandon Gray MD      HPI:   Patient was brought to our ER via ambulance from [facility].  When she arrived, she told the ER staff that she was there for her kidneys.  She was somnolent and was falling asleep when not stimulated.  ABG showed hypercarbia.  She was placed on NIPPV.  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  Recent hospital stays at [facility] were for cellulitis of her legs and COPD decompensation.  She was also recently hospitalized at [facility].  No records of that stay are available at this time.    * No surgery found *      Hospital Course:   Ms Lala was stabilzed from respiratory standpoint with NIV but continued to have pulm edema, ckd4 and heart failure with minimal improvement. Family agreed to home with hospice which was arranged by CM and she dc home with bipap and oxygen. Coumadin for a fib was dc as she had intractable bleeding from lovenox injection site. She required pressure dressing and vitamin K. Cardiology was consulted and a fib/rvr and pulm edema addressed with medication adjustment with cardizem and coreg. She had coughing and wheezing intermittently but continued to report that she felt pretty good.   I spoke with the son the day prior to dc as family was getting her ready for dc to home with hospice.      Consults:   Consults         Status Ordering Provider     Inpatient consult to Cardiology  Once     Provider:  Radhames Dowling MD    Completed BARON QIU     Inpatient consult to Social  Work/Case Management  Once     Provider:  (Not yet assigned)    Completed YANI LUNA     Nutrition Services Referral  Once     Provider:  (Not yet assigned)    Completed DIMITRIOS ZIMMERMAN          No new Assessment & Plan notes have been filed under this hospital service since the last note was generated.  Service: Hospital Medicine    Final Active Diagnoses:    Diagnosis Date Noted POA    PRINCIPAL PROBLEM:  Acute respiratory failure with hypoxia and hypercarbia [J96.01, J96.02] 12/21/2017 Yes    Acute on chronic diastolic heart failure [I50.33] 12/25/2017 Yes    Urinary tract infection without hematuria [N39.0] 12/21/2017 Yes    Chronic kidney disease, stage IV (severe) [N18.4] 04/17/2017 Yes    Long term current use of anticoagulant therapy [Z79.01] 04/17/2017 Not Applicable    Morbid obesity with BMI of 40.0-44.9, adult [E66.01, Z68.41] 03/14/2017 Not Applicable    Essential hypertension [I10] 04/12/2016 Yes    Atrial fibrillation with rapid ventricular response [I48.91] 10/01/2015 Yes    Lymphedema of both lower extremities [I89.0] 08/11/2014 Yes    Personal history of DVT (deep vein thrombosis) [Z86.718] 08/28/2013 Not Applicable    S/P insertion of IVC (inferior vena caval) filter 3/22/10 [Z95.828] 08/28/2013 Not Applicable      Problems Resolved During this Admission:    Diagnosis Date Noted Date Resolved POA    Acute on chronic diastolic heart failure [I50.33] 12/21/2017 12/25/2017 Yes    Supratherapeutic INR [R79.1] 12/21/2017 12/25/2017 Yes       Discharged Condition: poor    Disposition: Hospice/Home    Follow Up:  Follow-up Information     Froylan Rivas MD.    Specialty:  Cardiology  Why:  As needed  Contact information:  [address] [place] [address] [phone]             [facility].    Specialty:  Hospice and Palliative Medicine  Contact information:  [address] [place] [address] [phone]                 Patient Instructions:     Referral  to Hospice   Referral Priority: Routine Referral Type: Consultation   Referral Reason: Specialty Services Required    Requested Specialty: Hospice and Palliative Medicine    Number of Visits Requested: 1      Activity as tolerated         Significant Diagnostic Studies:   Dc with hospice to home  Results for SANJIV LOMAS (MRN [id number]) as of 12/30/2017 20:02   Ref. Range 12/29/2017 04:39   WBC Latest Ref Range: 3.90 - 12.70 K/uL 8.70   RBC Latest Ref Range: 4.00 - 5.40 M/uL 3.69 (L)   Hemoglobin Latest Ref Range: 12.0 - 16.0 g/dL 11.2 (L)   Hematocrit Latest Ref Range: 37.0 - 48.5 % 35.6 (L)   MCV Latest Ref Range: 82 - 98 fL 96   MCH Latest Ref Range: 27.0 - 31.0 pg 30.4   MCHC Latest Ref Range: 32.0 - 36.0 g/dL 31.5 (L)   RDW Latest Ref Range: 11.5 - 14.5 % 18.3 (H)   Platelets Latest Ref Range: 150 - 350 K/uL 159   Results for SANJIV LOMAS (MRN [id number]) as of 12/30/2017 20:02   Ref. Range 12/29/2017 04:39 12/30/2017 05:08   Protime Latest Ref Range: 9.0 - 12.5 sec 19.7 (H) 13.8 (H)   Coumadin Monitoring INR Latest Ref Range: 0.8 - 1.2  2.0 (H) 1.4 (H)     Results for SANJIV LOMAS (MRN [id number]) as of 12/30/2017 20:02   Ref. Range 12/29/2017 04:39 12/29/2017 04:39 12/30/2017 05:08   Sodium Latest Ref Range: 136 - 145 mmol/L 142  139   Potassium Latest Ref Range: 3.5 - 5.1 mmol/L 3.9  4.3   Chloride Latest Ref Range: 95 - 110 mmol/L 94 (L)  94 (L)   CO2 Latest Ref Range: 23 - 29 mmol/L 39 (H)  38 (H)   Anion Gap Latest Ref Range: 8 - 16 mmol/L 9  7 (L)   BUN, Bld Latest Ref Range: 8 - 23 mg/dL 22  30 (H)   Creatinine Latest Ref Range: 0.5 - 1.4 mg/dL 1.2  1.5 (H)   eGFR if non African American Latest Ref Range: >60 mL/min/1.73 m^2 41 (A)  32 (A)   eGFR if African American Latest Ref Range: >60 mL/min/1.73 m^2 48 (A)  36 (A)   Glucose Latest Ref Range: 70 - 110 mg/dL 108  123 (H)   Calcium Latest Ref Range: 8.7 - 10.5 mg/dL 9.1  8.8   Phosphorus Latest Ref Range: 2.7 - 4.5 mg/dL 2.3 (L) 2.3 (L)  2.8   Magnesium Latest Ref Range: 1.6 - 2.6 mg/dL 1.8     Albumin Latest Ref Range: 3.5 - 5.2 g/dL 2.5 (L)  2.2 (L)   BNP Latest Ref Range: 0 - 99 pg/mL 725 (H)         1.  Cardiomegaly and moderate bilateral pulmonary edema/CHF pattern along with small pleural effusions. Compared to the chest is similar to slightly worsened relative to 12/25/17.    Pending Diagnostic Studies:     None         Medications:  Reconciled Home Medications:   Discharge Medication List as of 12/30/2017 10:28 AM      START taking these medications    Details   carvedilol (COREG) 6.25 MG tablet Take 1 tablet (6.25 mg total) by mouth 2 (two) times daily., Starting Sat 12/30/2017, Until Sun 12/30/2018, Normal      diltiaZEM (CARDIZEM CD) 240 MG 24 hr capsule Take 1 capsule (240 mg total) by mouth once daily., Starting Sun 12/31/2017, Until Mon 12/31/2018, Normal         CONTINUE these medications which have CHANGED    Details   docusate sodium (COLACE) 100 MG capsule Take 1 capsule (100 mg total) by mouth 2 (two) times daily as needed for Constipation., Starting Sat 12/30/2017, OTC      furosemide (LASIX) 40 MG tablet Take 1 tablet (40 mg total) by mouth once daily., Starting Sat 12/30/2017, Normal      magnesium hydroxide, CONCENTRATE, (MILK OF MAGNESIA CONCENTRATED) 2,400 mg/10 mL Susp Take 10 mLs (2,400 mg total) by mouth 2 (two) times daily as needed., Starting Sat 12/30/2017, OTC         CONTINUE these medications which have NOT CHANGED    Details   albuterol-ipratropium 2.5mg-0.5mg/3mL (DUO-NEB) 0.5 mg-3 mg(2.5 mg base)/3 mL nebulizer solution Take 3 mLs by nebulization every 4 (four) hours as needed for Wheezing. Rescue, Starting Thu 9/28/2017, Until Fri 9/28/2018, Normal      ALPRAZolam (XANAX) 0.5 MG tablet Take 0.5 mg by mouth every evening., Historical Med      aspirin 81 MG Chew Take 81 mg by mouth once daily., Historical Med      lactulose (CHRONULAC) 10 gram/15 mL solution Take 20 g by mouth 2 (two) times daily as needed.,  Historical Med      melatonin 3 mg Tab Take 6 mg by mouth every evening., Historical Med         STOP taking these medications       arformoterol (BROVANA) 15 mcg/2 mL Nebu Comments:   Reason for Stopping:         atenolol (TENORMIN) 50 MG tablet Comments:   Reason for Stopping:         warfarin (COUMADIN) 5 MG tablet Comments:   Reason for Stopping:               Indwelling Lines/Drains at time of discharge:   Lines/Drains/Airways     Drain                 Urethral Catheter 12/21/17 9 days                Time spent on the discharge of patient: 34 minutes  Patient was seen and examined on the date of discharge and determined to be suitable for discharge.         Esther Wilson MD  Department of Hospital Medicine  [facility]

## 2019-03-21 NOTE — PROGRESS NOTES
09/14/17 0746   Patient Assessment/Suction   Level of Consciousness (AVPU) alert   Respiratory Effort Unlabored   All Lung Fields Breath Sounds wheezes, expiratory;wheezes, inspiratory   PRE-TX-O2-ETCO2   O2 Device (Oxygen Therapy) nasal cannula w/ humidification   $ Is the patient on Oxygen? Yes   Flow (L/min) 5   SpO2 (!) 94 %   Pulse Oximetry Type Intermittent   $ Pulse Oximetry - Multiple Charge Pulse Oximetry - Multiple   Pulse 108   Resp 20   Aerosol Therapy   $ Aerosol Therapy Charges Aerosol Treatment   Respiratory Treatment Status given   SVN/Inhaler Treatment Route mask   Position During Treatment HOB at 45 degrees   Patient Tolerance good   Post-Treatment   Post-treatment Heart Rate (beats/min) 99   Post-treatment Resp Rate (breaths/min) 20   All Fields Breath Sounds aeration increased      Physical Therapy Evaluation    Patient Name:  Nelsy Marino   MRN:  [id number]    Recommendations:     Discharge Recommendations:  home with home health   Discharge Equipment Recommendations: none   Barriers to discharge: pain to R LE limiting functional mobility     Assessment:     Nelsy Marino is a 78 y.o. female admitted with a medical diagnosis of Critical lower limb ischemia. She presents with the following impairments/functional limitations:  weakness, impaired endurance, impaired functional mobilty, gait instability, impaired balance, impaired cardiopulmonary response to activity, pain, impaired skin, decreased lower extremity function. Patient was limited in PT evaluation due to pain in R LE with activity. She tolerated sitting on edge of bed ~13 minutes with stand by assistance. Also, she stood and took ~6 side steps along edge of bed with RW and CGA. She would continue to benefit from PT while in the hospital.     Rehab Prognosis: Good; patient would benefit from acute skilled PT services to address these deficits and reach maximum level of function.    Recent Surgery: Procedure(s) (LRB):  CREATION, BYPASS, ARTERIAL, ILIAC TO FEMORAL, RIGHT LOWER EXTREMITY, RIGHT PROFUNDAPLASTY (Right) 1 Day Post-Op    Plan:     During this hospitalization, patient to be seen daily to address the identified rehab impairments via gait training, therapeutic activities, therapeutic exercises and progress toward the following goals:    · Plan of Care Expires:  04/04/19    Subjective     Chief Complaint: Pain in right leg.   Patient/Family Comments/goals: To get rid of pain while walking.   Pain/Comfort:  · Pain Rating 1: 6/10  · Location - Side 1: Right  · Location 1: leg  · Pain Addressed 1: Pre-medicate for activity, Distraction, Cessation of Activity  · Pain Rating Post-Intervention 1: 6/10    Living Environment:  Patient lives in a [facility] with her spouse. There is a ramp in the back of the house that she uses so she does not  have to climb the steps in the front of the house. Prior to admission, patients level of function was independent. She could only walk short distances before the pain in her R LE would start hurting. Equipment used at home: walker, rolling, shower chair. Upon discharge, patient will have assistance from spouse and family.    Objective:     Communicated with nurse Mccall prior to session. She stated that patient currently has elevated BP but she has given her 3 BP medicines and pain medicine to help bring it down. Nurse Mccall present throughout PT evaluation to monitor patient. Patient found supine with blood pressure cuff, oxygen, telemetry, pulse ox (continuous), peripheral IV, arterial line  upon PT entry to room.    General Precautions: Standard, fall   Orthopedic Precautions:N/A   Braces: N/A     Exams:  · Cognitive Exam:  Patient is oriented to Person, Place, Time and Situation  · Gross Motor Coordination:  WFL  · Sensation:    · -       Intact  · Skin Integrity/Edema:      · -       Skin integrity: Visible skin intact with dressing intact to incisions  · RUE ROM: WFL  · RUE Strength: WFL  · LUE ROM: WFL  · LUE Strength: WFL  · RLE ROM: WFL except limited at hip due to pain from incision  · RLE Strength: WFL except limited at hip due to pain from incision  · LLE ROM: WFL  · LLE Strength: WFL    Functional Mobility:  · Bed Mobility:     · Rolling Left:  moderate assistance  · Supine to Sit: moderate assistance  · Sit to Supine: moderate assistance  · Transfers:     · Sit to Stand:  minimum assistance with rolling walker  · Gait:  Patient ambulated 6 side steps along edge of bed with Rolling Walker and CGA using 3-point gait. Patient demonstrated decreased sabine, decreased velocity of limb motion and decreased step length during gait due to impaired balance and pain.    Therapeutic Activities and Exercises:  Patient tolerated sitting on edge of bed ~30 minutes with stand by assistance and left lean due to  trying to take pressure off of right hip due to pain.     AM-PAC 6 CLICK MOBILITY  Total Score:20     Patient left supine with all lines intact, call button in reach and nurse Stefany and sister and sister in law present notified.    GOALS:   Multidisciplinary Problems     Physical Therapy Goals        Problem: Physical Therapy Goal    Goal Priority Disciplines Outcome Goal Variances Interventions   Physical Therapy Goal     PT, PT/OT      Description:  Goals to be met by: 19    Patient will increase functional independence with mobility by performin. Supine to sit with supervision  2. Sit to stand transfer with Supervision  3. Gait x500 feet with Supervision using Rolling Walker if needed  4. Lower extremity exercise program x30 reps per handout, with supervision                      History:     Past Medical History:   Diagnosis Date    CHF (congestive heart failure)     Diabetes mellitus     DVT (deep venous thrombosis)     Hypertension     Miscarriage        Past Surgical History:   Procedure Laterality Date     femoral vascular surgery       CREATION, BYPASS, ARTERIAL, ILIAC TO FEMORAL, RIGHT LOWER EXTREMITY, RIGHT PROFUNDAPLASTY Right 3/20/2019    Performed by Danny Dunbar MD at [facility] OR    HYSTERECTOMY      ?unsure cervix present       Time Tracking:     PT Received On: 19  PT Start Time: 1604     PT Stop Time: 1627  PT Total Time (min): 23 min     Billable Minutes: Evaluation 13 and Therapeutic Activity 10      Nona Rueda, PT  2019

## 2022-05-06 NOTE — ASSESSMENT & PLAN NOTE
Chronic, continue antihypertensive regimen.  Monitor BP closely, titrate medication as required for sustained BP control.     Pt repeatedly removing sling. Education provided to patient about importance of sling. Sling placed back on patient.

## 2022-06-16 NOTE — PROGRESS NOTES
Spoke with Carmen in pharmacy concerning Vit IVPB and pharmacy is making PB and will send.   1 Principal Discharge DX:	Chest pain

## 2022-07-19 NOTE — ASSESSMENT & PLAN NOTE
BMI 43.2.  Obesity compounds patient co-morbidities and complicates treatment course.  Counseling given regarding diet modification and exercise recommendations.       no fever and no chills.

## 2022-11-25 NOTE — PLAN OF CARE
Problem: Occupational Therapy Goal  Goal: Occupational Therapy Goal  Goals to be met by: 11/6/17     Patient will increase functional independence with ADLs by performing:    Toileting from bedside commode with Minimal Assistance and Assistive Devices as needed for hygiene and clothing management.   Toilet transfer to bedside commode with Minimal Assistance.  Upper extremity exercise program x15 reps per handout, with supervision.  Pt to utilize energy conservation techniques during ADL task performance with occasional cues.      Outcome: Ongoing (interventions implemented as appropriate)  OT evaluation completed today. Goals & care plan established.    JARRED River  10/30/2017           Name band;

## 2023-09-27 NOTE — PROGRESS NOTES
"Nicole Lala presented for a  Medicare AWV and comprehensive Health Risk Assessment today. The following components were reviewed and updated:    · Medical history  · Family History  · Social history  · Allergies and Current Medications  · Health Risk Assessment  · Health Maintenance  · Care Team     Review of Systems   Constitutional: Negative for chills, fever, malaise/fatigue and weight loss.   HENT: Negative.    Respiratory: Negative for cough, shortness of breath and wheezing.    Cardiovascular: Positive for leg swelling (chronic). Negative for chest pain, palpitations and PND.   Skin: Negative for rash.   Neurological: Negative for dizziness, weakness and headaches.       ** See Completed Assessments for Annual Wellness Visit within the encounter summary.**       The following assessments were completed:  · Living Situation  · CAGE  · Depression Screening  · Timed Get Up and Go  · Whisper Test  · Cognitive Function Screening      · Nutrition Screening  · ADL Screening  · PAQ Screening    Vitals:    04/17/17 0903   BP: 131/67   BP Location: Left arm   Patient Position: Sitting   BP Method: Automatic   Pulse: 85   Height: 5' 7" (1.702 m)     There is no height or weight on file to calculate BMI.  Physical Exam   Constitutional: She is oriented to person, place, and time. She appears well-nourished.   Cardiovascular: Normal rate, regular rhythm, normal heart sounds and intact distal pulses.    Pulmonary/Chest: Effort normal and breath sounds normal. She has no wheezes. She has no rales.   Neurological: She is alert and oriented to person, place, and time.   Skin: Skin is warm and dry. No rash noted.   Vitals reviewed.        Diagnoses and health risks identified today and associated recommendations/orders:    1. Encounter for preventive health examination  Written rx given to patient to update tetanus    2. Chronic kidney disease, stage IV (severe)  Stable- labs reviewed 3/2017  Continue nephrology follow up (Dr." Jb)    3. Atrial fibrillation with rapid ventricular response  Stable- continue current medications and cardiology follow up (Dr. Goodman)    4. Lymphedema  Stable- continue home care and with pumps  Therapy prn    5. Venous stasis of lower extremity  Stable- continue current medications and cardiology follow up (Dr. Goodman)    6. Essential hypertension  Stable- continue current medications and cardiology follow up (Dr. Goodman)    7. Morbid obesity due to excess calories  Encouraged healthy eating, weight loss and increased physical activity    8. Iron deficiency anemia, unspecified iron deficiency anemia type  Stable- CBC 5/2016  Continue ongoing monitoring    9. Long term (current) use of anticoagulants  Stable- continue current medications and cardiology follow up (Dr. Goodman)      Provided Nicole with a 5-10 year written screening schedule and personal prevention plan. Recommendations were developed using the USPSTF age appropriate recommendations. Education, counseling, and referrals were provided as needed. After Visit Summary printed and given to patient which includes a list of additional screenings\tests needed.    Return in about 3 months (around 7/17/2017). with PCP or sooner if needed    Bobbi Lai NP        PROCEDURE: Radiofrequency Ablation of Atrial Fibrillation    ELECTROPHYSIOLOGIST: Sumit Escalante MD,          COMPLICATIONS:  none        DISPOSITION: observation      CONDITION: stable    Pt doing well s/p Radiofrequency Ablation of atrial fibrillation ([facility]/PVI, CTI) with access obtain via b/l femoral veins and hemostasis achieved with figure of 8 sutures. Denies complaint.     MEDICATIONS  (STANDING):    MEDICATIONS  (PRN):    Allergies    No Known Allergies    VS:  T(C): 36.6 (09-27-23 @ 10:32), Max: 36.6 (09-27-23 @ 10:25)  HR: 66 (09-27-23 @ 10:32) (66 - 66)  BP: 113/64 (09-27-23 @ 10:32) (113/64 - 113/64)  RR: 12 (09-27-23 @ 10:32) (12 - 15)  SpO2: 98% (09-27-23 @ 10:32) (98% - 100%)    Post procedure VS:  BP ____  HR___  SpO2 ______ on ____    Physical Exam:  Neuro: A&O x 3, SANDOVAL, FC  Card: S1/S2, RRR, no m/g/r  Resp: lungs CTA b/l  Abd: S/NT/ND  Groins: hemostatic sutures in place; sites C/D/I; no bleeding, hematoma, erythema, exudate or edema  Ext: no edema; distal pulses intact    I/Os: _____    ECG: pending    Assessment:   Pt is a 51 year old male with underlying schizoaffective disorder (health care proxy is his aunt, Alla Hilton, [facility] resident), COPD, 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  Pt now status post elective uncomplicated Radiofrequency Ablation of atrial fibrillation ([facility]ACA/PVI, CTI) with access obtain via b/l femoral veins and hemostasis achieved with figure of 8 sutures.       Plan:   Bedrest x 4 hours, then OOB with assistance and progress as tolerated.   Groin sutures to be removed by EP service in AM.   Radial art line to be removed once pt fully awake with stable vitals >1 hour.    Eliquis to be restarted tonight at 23:30  DO NOT HOLD, INTERRUPT OR REVERSE ANTICOAGULATION WITHOUT EXPLICIT APPROVAL FROM EP SERVICE.   Lasix 20mg IV x 1 dose once ambulating, then Lasix 20mg PO daily x 3 days.   Start Protonix 40mg twice daily x 2 weeks, then once daily x 6 weeks.   Start Carafate 1gm BID x 2 weeks.   ESRD on HD, Nephrology consulted,  Plan for HD in AM  Continue other home medications.   Strict I/Os.  Please encourage incentive spirometry and ambulation once able.  Observation and monitoring on telemetry overnight with anticipated discharge in the AM and outpt follow up in 2-4 weeks.   PROCEDURE: Radiofrequency Ablation of Atrial Fibrillation    ELECTROPHYSIOLOGIST: Sumit Escalante MD,          COMPLICATIONS:  none        DISPOSITION: observation      CONDITION: stable    Pt doing well s/p Radiofrequency Ablation of atrial fibrillation ([facility]ACA/PVI, CTI) with access obtain via R femoral veins and hemostasis achieved with figure of 8 sutures. Denies complaint.     MEDICATIONS  (STANDING):    MEDICATIONS  (PRN):    Allergies    No Known Allergies    VS:  T(C): 36.6 (09-27-23 @ 10:32), Max: 36.6 (09-27-23 @ 10:25)  HR: 66 (09-27-23 @ 10:32) (66 - 66)  BP: 113/64 (09-27-23 @ 10:32) (113/64 - 113/64)  RR: 12 (09-27-23 @ 10:32) (12 - 15)  SpO2: 98% (09-27-23 @ 10:32) (98% - 100%)    Post procedure VS:  /72  HR 74  SpO2 100 on FM    Physical Exam:  Neuro: Lethargic (s/p procedure), SANDOVAL, FC  Card: S1/S2, RRR, no m/g/r  Resp: lungs CTA b/l  Abd: S/NT/ND  Groins: hemostatic sutures in place; sites C/D/I; no bleeding, hematoma, erythema, exudate or edema  Ext: no edema; distal pulses intact    I/Os: 1.3/DTV (HD patient on home Torsemide)    ECG: SR @ 72, COURTNEY 186, QRSD 154    Assessment:   Pt is a 51 year old male with underlying schizoaffective disorder (health care proxy is his aunt, Alla Hilton, [facility] resident), COPD, 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  Pt now status post elective uncomplicated Radiofrequency Ablation of atrial fibrillation ([facility]ACA/PVI, CTI) with access obtain via R femoral veins and hemostasis achieved with figure of 8 sutures.       Plan:   Bedrest x 4 hours, then OOB with assistance and progress as tolerated.   Groin sutures to be removed by EP service in AM.    Eliquis to be restarted tonight at 23:30  DO NOT HOLD, INTERRUPT OR REVERSE ANTICOAGULATION WITHOUT EXPLICIT APPROVAL FROM EP SERVICE.   Lasix 40mg IV x 1 dose once ambulating, then resume patient home torsemide  ESRD on HD, Nephrology consulted,  Plan for HD in AM  Start Protonix 40mg twice daily x 2 weeks, then once daily x 6 weeks.   Start Carafate 1gm BID x 2 weeks.   Continue other home medications.   Strict I/Os.  Please encourage incentive spirometry and ambulation once able.  Observation and monitoring on telemetry overnight with anticipated discharge in the AM and outpt follow up in 2-4 weeks.

## (undated) DEVICE — CANNULA ANTERIOR CHAMBER 30G

## (undated) DEVICE — SEE MEDLINE ITEM 157128

## (undated) DEVICE — SEE L#120831

## (undated) DEVICE — SPONGE WEC CEL SPEARS

## (undated) DEVICE — GLOVE BIOGEL ECLIPSE SZ 6.5

## (undated) DEVICE — GARTER EYE ADULT

## (undated) DEVICE — SOL BETADINE 5%

## (undated) DEVICE — SOL IRR STRL WATER 500ML

## (undated) DEVICE — SOL IRR BSS OPHTH 500ML STRL

## (undated) DEVICE — SYR LUER LOCK 1CC

## (undated) DEVICE — PACK OPHTHALMIC

## (undated) DEVICE — PACK EYE CUSTOM COVINGTON.